# Patient Record
Sex: MALE | Race: ASIAN | NOT HISPANIC OR LATINO | Employment: UNEMPLOYED
[De-identification: names, ages, dates, MRNs, and addresses within clinical notes are randomized per-mention and may not be internally consistent; named-entity substitution may affect disease eponyms.]

---

## 2017-01-26 ENCOUNTER — RECORDS - HEALTHEAST (OUTPATIENT)
Dept: ADMINISTRATIVE | Facility: OTHER | Age: 62
End: 2017-01-26

## 2017-04-05 ENCOUNTER — RECORDS - HEALTHEAST (OUTPATIENT)
Dept: ADMINISTRATIVE | Facility: OTHER | Age: 62
End: 2017-04-05

## 2017-08-23 ENCOUNTER — RECORDS - HEALTHEAST (OUTPATIENT)
Dept: ADMINISTRATIVE | Facility: OTHER | Age: 62
End: 2017-08-23

## 2017-09-11 ENCOUNTER — RECORDS - HEALTHEAST (OUTPATIENT)
Dept: ADMINISTRATIVE | Facility: OTHER | Age: 62
End: 2017-09-11

## 2017-10-27 ENCOUNTER — RECORDS - HEALTHEAST (OUTPATIENT)
Dept: ADMINISTRATIVE | Facility: OTHER | Age: 62
End: 2017-10-27

## 2017-12-13 ENCOUNTER — RECORDS - HEALTHEAST (OUTPATIENT)
Dept: ADMINISTRATIVE | Facility: OTHER | Age: 62
End: 2017-12-13

## 2018-01-18 ENCOUNTER — RECORDS - HEALTHEAST (OUTPATIENT)
Dept: ADMINISTRATIVE | Facility: OTHER | Age: 63
End: 2018-01-18

## 2018-03-23 ENCOUNTER — RECORDS - HEALTHEAST (OUTPATIENT)
Dept: ADMINISTRATIVE | Facility: OTHER | Age: 63
End: 2018-03-23

## 2018-05-11 ENCOUNTER — RECORDS - HEALTHEAST (OUTPATIENT)
Dept: ADMINISTRATIVE | Facility: OTHER | Age: 63
End: 2018-05-11

## 2018-06-15 ENCOUNTER — RECORDS - HEALTHEAST (OUTPATIENT)
Dept: GENERAL RADIOLOGY | Facility: CLINIC | Age: 63
End: 2018-06-15

## 2018-06-15 ENCOUNTER — COMMUNICATION - HEALTHEAST (OUTPATIENT)
Dept: FAMILY MEDICINE | Facility: CLINIC | Age: 63
End: 2018-06-15

## 2018-06-15 ENCOUNTER — OFFICE VISIT - HEALTHEAST (OUTPATIENT)
Dept: FAMILY MEDICINE | Facility: CLINIC | Age: 63
End: 2018-06-15

## 2018-06-15 DIAGNOSIS — R06.02 SHORTNESS OF BREATH: ICD-10-CM

## 2018-06-15 DIAGNOSIS — Z12.11 SCREEN FOR COLON CANCER: ICD-10-CM

## 2018-06-15 DIAGNOSIS — R93.89 ABNORMAL CXR (CHEST X-RAY): ICD-10-CM

## 2018-06-15 DIAGNOSIS — Z71.85 VACCINE COUNSELING: ICD-10-CM

## 2018-06-15 LAB
ALBUMIN SERPL-MCNC: 3.4 G/DL (ref 3.5–5)
ALP SERPL-CCNC: 82 U/L (ref 45–120)
ALT SERPL W P-5'-P-CCNC: 40 U/L (ref 0–45)
ANION GAP SERPL CALCULATED.3IONS-SCNC: 13 MMOL/L (ref 5–18)
AST SERPL W P-5'-P-CCNC: 33 U/L (ref 0–40)
ATRIAL RATE - MUSE: 78 BPM
BASOPHILS # BLD AUTO: 0 THOU/UL (ref 0–0.2)
BASOPHILS NFR BLD AUTO: 0 % (ref 0–2)
BILIRUB SERPL-MCNC: 0.5 MG/DL (ref 0–1)
BNP SERPL-MCNC: <10 PG/ML (ref 0–55)
BUN SERPL-MCNC: 32 MG/DL (ref 8–22)
CALCIUM SERPL-MCNC: 9.3 MG/DL (ref 8.5–10.5)
CHLORIDE BLD-SCNC: 109 MMOL/L (ref 98–107)
CO2 SERPL-SCNC: 20 MMOL/L (ref 22–31)
CREAT SERPL-MCNC: 1.5 MG/DL (ref 0.7–1.3)
D DIMER PPP FEU-MCNC: 1.12 FEU UG/ML
DIASTOLIC BLOOD PRESSURE - MUSE: NORMAL MMHG
EOSINOPHIL # BLD AUTO: 0.2 THOU/UL (ref 0–0.4)
EOSINOPHIL NFR BLD AUTO: 2 % (ref 0–6)
ERYTHROCYTE [DISTWIDTH] IN BLOOD BY AUTOMATED COUNT: 11.9 % (ref 11–14.5)
GFR SERPL CREATININE-BSD FRML MDRD: 47 ML/MIN/1.73M2
GLUCOSE BLD-MCNC: 89 MG/DL (ref 70–125)
HCT VFR BLD AUTO: 47.4 % (ref 40–54)
HGB BLD-MCNC: 15.5 G/DL (ref 14–18)
INTERPRETATION ECG - MUSE: NORMAL
LYMPHOCYTES # BLD AUTO: 2 THOU/UL (ref 0.8–4.4)
LYMPHOCYTES NFR BLD AUTO: 28 % (ref 20–40)
MCH RBC QN AUTO: 30.4 PG (ref 27–34)
MCHC RBC AUTO-ENTMCNC: 32.7 G/DL (ref 32–36)
MCV RBC AUTO: 93 FL (ref 80–100)
MONOCYTES # BLD AUTO: 0.5 THOU/UL (ref 0–0.9)
MONOCYTES NFR BLD AUTO: 7 % (ref 2–10)
NEUTROPHILS # BLD AUTO: 4.4 THOU/UL (ref 2–7.7)
NEUTROPHILS NFR BLD AUTO: 63 % (ref 50–70)
P AXIS - MUSE: 49 DEGREES
PLATELET # BLD AUTO: 133 THOU/UL (ref 140–440)
PMV BLD AUTO: 8.3 FL (ref 7–10)
POTASSIUM BLD-SCNC: 4.4 MMOL/L (ref 3.5–5)
PR INTERVAL - MUSE: 140 MS
PROT SERPL-MCNC: 6.8 G/DL (ref 6–8)
QRS DURATION - MUSE: 86 MS
QT - MUSE: 380 MS
QTC - MUSE: 433 MS
R AXIS - MUSE: -25 DEGREES
RBC # BLD AUTO: 5.1 MILL/UL (ref 4.4–6.2)
SODIUM SERPL-SCNC: 142 MMOL/L (ref 136–145)
SYSTOLIC BLOOD PRESSURE - MUSE: NORMAL MMHG
T AXIS - MUSE: 17 DEGREES
TROPONIN I SERPL-MCNC: <0.01 NG/ML (ref 0–0.29)
TSH SERPL DL<=0.005 MIU/L-ACNC: 1.06 UIU/ML (ref 0.3–5)
VENTRICULAR RATE- MUSE: 78 BPM
WBC: 7 THOU/UL (ref 4–11)

## 2018-06-15 RX ORDER — CLONAZEPAM 1 MG/1
1 TABLET ORAL AT BEDTIME
Status: SHIPPED | COMMUNITY
Start: 2018-06-15 | End: 2021-08-06

## 2018-06-15 ASSESSMENT — MIFFLIN-ST. JEOR: SCORE: 1573.71

## 2018-06-18 ENCOUNTER — COMMUNICATION - HEALTHEAST (OUTPATIENT)
Dept: FAMILY MEDICINE | Facility: CLINIC | Age: 63
End: 2018-06-18

## 2018-06-18 ENCOUNTER — HOSPITAL ENCOUNTER (OUTPATIENT)
Dept: CT IMAGING | Facility: HOSPITAL | Age: 63
Discharge: HOME | End: 2018-06-18
Attending: FAMILY MEDICINE

## 2018-06-18 DIAGNOSIS — J90 PLEURAL EFFUSION: ICD-10-CM

## 2018-06-18 DIAGNOSIS — R06.02 SHORTNESS OF BREATH: ICD-10-CM

## 2018-06-18 DIAGNOSIS — R93.89 ABNORMAL CXR (CHEST X-RAY): ICD-10-CM

## 2018-08-03 ENCOUNTER — OFFICE VISIT - HEALTHEAST (OUTPATIENT)
Dept: PULMONOLOGY | Facility: OTHER | Age: 63
End: 2018-08-03

## 2018-08-03 DIAGNOSIS — K21.9 GERD (GASTROESOPHAGEAL REFLUX DISEASE): ICD-10-CM

## 2018-08-03 DIAGNOSIS — J94.1 FIBROTHORAX: ICD-10-CM

## 2018-08-03 DIAGNOSIS — J90 PLEURAL EFFUSION: ICD-10-CM

## 2018-08-03 DIAGNOSIS — J90 PLEURAL EFFUSION ON LEFT: ICD-10-CM

## 2018-08-03 DIAGNOSIS — J45.40 MODERATE PERSISTENT ASTHMA: ICD-10-CM

## 2018-08-03 DIAGNOSIS — R06.09 DYSPNEA ON EXERTION: ICD-10-CM

## 2018-08-17 ENCOUNTER — HOSPITAL ENCOUNTER (OUTPATIENT)
Dept: RESPIRATORY THERAPY | Facility: HOSPITAL | Age: 63
Discharge: HOME | End: 2018-08-17
Attending: INTERNAL MEDICINE

## 2018-08-17 ENCOUNTER — COMMUNICATION - HEALTHEAST (OUTPATIENT)
Dept: PULMONOLOGY | Facility: OTHER | Age: 63
End: 2018-08-17

## 2018-08-17 ENCOUNTER — HOSPITAL ENCOUNTER (OUTPATIENT)
Dept: CARDIOLOGY | Facility: HOSPITAL | Age: 63
Discharge: HOME | End: 2018-08-17
Attending: INTERNAL MEDICINE

## 2018-08-17 DIAGNOSIS — R06.09 OTHER FORMS OF DYSPNEA: ICD-10-CM

## 2018-08-17 DIAGNOSIS — J45.40 MODERATE PERSISTENT ASTHMA: ICD-10-CM

## 2018-08-17 DIAGNOSIS — R06.09 DYSPNEA ON EXERTION: ICD-10-CM

## 2018-08-17 DIAGNOSIS — M54.2 CERVICALGIA: ICD-10-CM

## 2018-08-17 DIAGNOSIS — I10 ESSENTIAL HYPERTENSION: ICD-10-CM

## 2018-08-17 DIAGNOSIS — J94.1 FIBROTHORAX: ICD-10-CM

## 2018-08-17 LAB
AORTIC ROOT: 3.6 CM
AORTIC VALVE MEAN VELOCITY: 86.9 CM/S
ASCENDING AORTA: 3.4 CM
AV CUSP SEPERATION: 2.2 CM
AV CUSP SEPERATION: 2.2 CM
AV DIMENSIONLESS INDEX VTI: 0.8
AV MEAN GRADIENT: 3 MMHG
AV PEAK GRADIENT: 5.2 MMHG
AV VALVE AREA: 3 CM2
AV VELOCITY RATIO: 0.9
BSA FOR ECHO PROCEDURE: 1.98 M2
CV ECHO HEIGHT: 65 IN
CV ECHO WEIGHT: 188 LBS
DOP CALC AO PEAK VEL: 114 CM/S
DOP CALC AO VTI: 22.8 CM
DOP CALC LVOT AREA: 3.8 CM2
DOP CALC LVOT DIAMETER: 2.2 CM
DOP CALC LVOT PEAK VEL: 97.5 CM/S
DOP CALC LVOT STROKE VOLUME: 68.4 CM3
DOP CALC MV VTI: 22.4 CM
DOP CALCLVOT PEAK VEL VTI: 18 CM
EJECTION FRACTION: 52 % (ref 55–75)
FRACTIONAL SHORTENING: 26.6 % (ref 28–44)
HGB BLD-MCNC: 15.7 G/DL (ref 14–18)
INTERVENTRICULAR SEPTUM IN END DIASTOLE: 0.85 CM (ref 0.6–1)
IVS/PW RATIO: 0.9
LA AREA 1: 14.8 CM2
LA AREA 2: 21.3 CM2
LEFT ATRIUM LENGTH: 4.6 CM
LEFT ATRIUM SIZE: 3.5 CM
LEFT ATRIUM VOLUME INDEX: 29.4 ML/M2
LEFT ATRIUM VOLUME: 58.3 ML
LEFT VENTRICLE CARDIAC INDEX: 2.3 L/MIN/M2
LEFT VENTRICLE CARDIAC OUTPUT: 4.7 L/MIN
LEFT VENTRICLE DIASTOLIC VOLUME INDEX: 36 CM3/M2 (ref 34–74)
LEFT VENTRICLE DIASTOLIC VOLUME: 71.2 CM3 (ref 62–150)
LEFT VENTRICLE HEART RATE: 68 BPM
LEFT VENTRICLE MASS INDEX: 75.6 G/M2
LEFT VENTRICLE SYSTOLIC VOLUME INDEX: 17.1 CM3/M2 (ref 11–31)
LEFT VENTRICLE SYSTOLIC VOLUME: 33.9 CM3 (ref 21–61)
LEFT VENTRICULAR INTERNAL DIMENSION IN DIASTOLE: 4.77 CM (ref 4.2–5.8)
LEFT VENTRICULAR INTERNAL DIMENSION IN SYSTOLE: 3.5 CM (ref 2.5–4)
LEFT VENTRICULAR MASS: 149.6 G
LEFT VENTRICULAR OUTFLOW TRACT MEAN GRADIENT: 2 MMHG
LEFT VENTRICULAR OUTFLOW TRACT MEAN VELOCITY: 70.3 CM/S
LEFT VENTRICULAR OUTFLOW TRACT PEAK GRADIENT: 4 MMHG
LEFT VENTRICULAR POSTERIOR WALL IN END DIASTOLE: 0.98 CM (ref 0.6–1)
LV STROKE VOLUME INDEX: 34.5 ML/M2
MITRAL VALVE DECELERATION SLOPE: 3430 MM/S2
MITRAL VALVE E/A RATIO: 0.9
MITRAL VALVE MEAN INFLOW VELOCITY: 50.2 CM/S
MITRAL VALVE PEAK VELOCITY: 102 CM/S
MITRAL VALVE PRESSURE HALF-TIME: 64 MS
MV AREA VTI: 3.05 CM2
MV AVERAGE E/E' RATIO: 13.7 CM/S
MV DECELERATION TIME: 201 MS
MV E'TISSUE VEL-LAT: 6 CM/S
MV E'TISSUE VEL-MED: 6.19 CM/S
MV LATERAL E/E' RATIO: 13.9
MV MEAN GRADIENT: 1 MMHG
MV MEDIAL E/E' RATIO: 13.5
MV PEAK A VELOCITY: 96 CM/S
MV PEAK E VELOCITY: 83.4 CM/S
MV PEAK GRADIENT: 4.2 MMHG
MV VALVE AREA BY CONTINUITY EQUATION: 3.1 CM2
MV VALVE AREA PRESSURE 1/2 METHOD: 3.4 CM2
NUC REST DIASTOLIC VOLUME INDEX: 3008 LBS
NUC REST SYSTOLIC VOLUME INDEX: 65 IN
TRICUSPID REGURGITATION PEAK PRESSURE GRADIENT: 22.3 MMHG
TRICUSPID VALVE ANULAR PLANE SYSTOLIC EXCURSION: 2.2 CM
TRICUSPID VALVE PEAK REGURGITANT VELOCITY: 236 CM/S

## 2018-08-17 ASSESSMENT — MIFFLIN-ST. JEOR: SCORE: 1564.64

## 2018-08-20 ENCOUNTER — COMMUNICATION - HEALTHEAST (OUTPATIENT)
Dept: PULMONOLOGY | Facility: OTHER | Age: 63
End: 2018-08-20

## 2018-09-06 ENCOUNTER — HOSPITAL ENCOUNTER (OUTPATIENT)
Dept: ULTRASOUND IMAGING | Facility: CLINIC | Age: 63
Discharge: HOME | End: 2018-09-06
Attending: INTERNAL MEDICINE | Admitting: RADIOLOGY

## 2018-09-06 DIAGNOSIS — J90 PLEURAL EFFUSION: ICD-10-CM

## 2018-09-06 DIAGNOSIS — R06.09 OTHER FORMS OF DYSPNEA: ICD-10-CM

## 2018-09-06 DIAGNOSIS — R06.09 DYSPNEA ON EXERTION: ICD-10-CM

## 2018-09-06 LAB
INR PPP: 0.98 (ref 0.9–1.1)
PLATELET # BLD AUTO: 123 THOU/UL (ref 140–440)

## 2018-09-27 ENCOUNTER — OFFICE VISIT - HEALTHEAST (OUTPATIENT)
Dept: PULMONOLOGY | Facility: OTHER | Age: 63
End: 2018-09-27

## 2018-09-27 DIAGNOSIS — J45.41 MODERATE PERSISTENT ASTHMA WITH EXACERBATION: ICD-10-CM

## 2018-10-19 ENCOUNTER — OFFICE VISIT - HEALTHEAST (OUTPATIENT)
Dept: FAMILY MEDICINE | Facility: CLINIC | Age: 63
End: 2018-10-19

## 2018-10-19 ENCOUNTER — RECORDS - HEALTHEAST (OUTPATIENT)
Dept: GENERAL RADIOLOGY | Facility: CLINIC | Age: 63
End: 2018-10-19

## 2018-10-19 DIAGNOSIS — G89.29 CHRONIC LEFT-SIDED LOW BACK PAIN WITHOUT SCIATICA: ICD-10-CM

## 2018-10-19 DIAGNOSIS — F32.9 MAJOR DEPRESSIVE DISORDER, SINGLE EPISODE, UNSPECIFIED: ICD-10-CM

## 2018-10-19 DIAGNOSIS — G89.29 OTHER CHRONIC PAIN: ICD-10-CM

## 2018-10-19 DIAGNOSIS — M54.50 LOW BACK PAIN: ICD-10-CM

## 2018-10-19 DIAGNOSIS — Z12.11 COLON CANCER SCREENING: ICD-10-CM

## 2018-10-19 DIAGNOSIS — Z71.85 VACCINE COUNSELING: ICD-10-CM

## 2018-10-19 DIAGNOSIS — J45.40 MODERATE PERSISTENT ASTHMA, UNSPECIFIED WHETHER COMPLICATED: ICD-10-CM

## 2018-10-19 DIAGNOSIS — M54.50 CHRONIC LEFT-SIDED LOW BACK PAIN WITHOUT SCIATICA: ICD-10-CM

## 2018-10-19 DIAGNOSIS — I10 ESSENTIAL HYPERTENSION: ICD-10-CM

## 2018-10-22 ENCOUNTER — HOSPITAL ENCOUNTER (OUTPATIENT)
Dept: PHYSICAL MEDICINE AND REHAB | Facility: CLINIC | Age: 63
Discharge: HOME | End: 2018-10-22
Attending: FAMILY MEDICINE

## 2018-10-22 DIAGNOSIS — M51.369 DDD (DEGENERATIVE DISC DISEASE), LUMBAR: ICD-10-CM

## 2018-10-22 DIAGNOSIS — M54.50 CHRONIC BILATERAL LOW BACK PAIN WITHOUT SCIATICA: ICD-10-CM

## 2018-10-22 DIAGNOSIS — G89.29 CHRONIC BILATERAL LOW BACK PAIN WITHOUT SCIATICA: ICD-10-CM

## 2018-10-22 DIAGNOSIS — M54.16 RIGHT LUMBAR RADICULOPATHY: ICD-10-CM

## 2018-10-22 DIAGNOSIS — M47.816 LUMBAR FACET ARTHROPATHY: ICD-10-CM

## 2018-10-23 ENCOUNTER — OFFICE VISIT - HEALTHEAST (OUTPATIENT)
Dept: PHARMACY | Facility: CLINIC | Age: 63
End: 2018-10-23

## 2018-10-23 DIAGNOSIS — K21.9 GERD (GASTROESOPHAGEAL REFLUX DISEASE): ICD-10-CM

## 2018-10-23 DIAGNOSIS — E78.1 PURE HYPERGLYCERIDEMIA: ICD-10-CM

## 2018-10-23 DIAGNOSIS — J45.40 MODERATE PERSISTENT ASTHMA, UNSPECIFIED WHETHER COMPLICATED: ICD-10-CM

## 2018-10-23 DIAGNOSIS — M54.50 CHRONIC LEFT-SIDED LOW BACK PAIN WITHOUT SCIATICA: ICD-10-CM

## 2018-10-23 DIAGNOSIS — I10 ESSENTIAL HYPERTENSION: ICD-10-CM

## 2018-10-23 DIAGNOSIS — F32.9 MAJOR DEPRESSIVE DISORDER, SINGLE EPISODE, UNSPECIFIED: ICD-10-CM

## 2018-10-23 DIAGNOSIS — G89.29 CHRONIC LEFT-SIDED LOW BACK PAIN WITHOUT SCIATICA: ICD-10-CM

## 2018-10-30 ENCOUNTER — HOSPITAL ENCOUNTER (OUTPATIENT)
Dept: MRI IMAGING | Facility: HOSPITAL | Age: 63
Discharge: HOME | End: 2018-10-30

## 2018-10-30 DIAGNOSIS — G89.29 CHRONIC BILATERAL LOW BACK PAIN WITHOUT SCIATICA: ICD-10-CM

## 2018-10-30 DIAGNOSIS — M51.369 DDD (DEGENERATIVE DISC DISEASE), LUMBAR: ICD-10-CM

## 2018-10-30 DIAGNOSIS — M54.50 CHRONIC BILATERAL LOW BACK PAIN WITHOUT SCIATICA: ICD-10-CM

## 2018-10-30 DIAGNOSIS — M54.16 RIGHT LUMBAR RADICULOPATHY: ICD-10-CM

## 2018-10-30 DIAGNOSIS — M47.816 LUMBAR FACET ARTHROPATHY: ICD-10-CM

## 2018-11-05 ENCOUNTER — HOSPITAL ENCOUNTER (OUTPATIENT)
Dept: PHYSICAL MEDICINE AND REHAB | Facility: CLINIC | Age: 63
Discharge: HOME | End: 2018-11-05
Attending: NURSE PRACTITIONER

## 2018-11-05 DIAGNOSIS — G89.29 CHRONIC BILATERAL LOW BACK PAIN WITHOUT SCIATICA: ICD-10-CM

## 2018-11-05 DIAGNOSIS — M54.50 CHRONIC BILATERAL LOW BACK PAIN WITHOUT SCIATICA: ICD-10-CM

## 2018-11-05 DIAGNOSIS — M54.16 RIGHT LUMBAR RADICULOPATHY: ICD-10-CM

## 2018-11-05 DIAGNOSIS — M51.369 DDD (DEGENERATIVE DISC DISEASE), LUMBAR: ICD-10-CM

## 2018-11-05 DIAGNOSIS — M47.816 LUMBAR FACET ARTHROPATHY: ICD-10-CM

## 2018-11-05 DIAGNOSIS — M48.061 LUMBAR FORAMINAL STENOSIS: ICD-10-CM

## 2018-11-08 ENCOUNTER — HOSPITAL ENCOUNTER (OUTPATIENT)
Dept: PHYSICAL MEDICINE AND REHAB | Facility: CLINIC | Age: 63
Discharge: HOME | End: 2018-11-08
Attending: PAIN MEDICINE

## 2018-11-08 DIAGNOSIS — G89.29 CHRONIC BILATERAL LOW BACK PAIN WITHOUT SCIATICA: ICD-10-CM

## 2018-11-08 DIAGNOSIS — M54.16 RIGHT LUMBAR RADICULOPATHY: ICD-10-CM

## 2018-11-08 DIAGNOSIS — M99.83 OTHER BIOMECHANICAL LESIONS OF LUMBAR REGION: ICD-10-CM

## 2018-11-08 DIAGNOSIS — M54.50 CHRONIC BILATERAL LOW BACK PAIN WITHOUT SCIATICA: ICD-10-CM

## 2018-11-08 DIAGNOSIS — M48.061 LUMBAR FORAMINAL STENOSIS: ICD-10-CM

## 2018-11-12 ENCOUNTER — OFFICE VISIT - HEALTHEAST (OUTPATIENT)
Dept: FAMILY MEDICINE | Facility: CLINIC | Age: 63
End: 2018-11-12

## 2018-11-12 ENCOUNTER — COMMUNICATION - HEALTHEAST (OUTPATIENT)
Dept: FAMILY MEDICINE | Facility: CLINIC | Age: 63
End: 2018-11-12

## 2018-11-12 DIAGNOSIS — R05.9 COUGH: ICD-10-CM

## 2018-11-12 DIAGNOSIS — R80.9 PROTEINURIA, UNSPECIFIED TYPE: ICD-10-CM

## 2018-11-12 DIAGNOSIS — I10 ESSENTIAL HYPERTENSION: ICD-10-CM

## 2018-11-12 LAB
ANION GAP SERPL CALCULATED.3IONS-SCNC: 10 MMOL/L (ref 5–18)
BUN SERPL-MCNC: 32 MG/DL (ref 8–22)
CALCIUM SERPL-MCNC: 8.5 MG/DL (ref 8.5–10.5)
CHLORIDE BLD-SCNC: 108 MMOL/L (ref 98–107)
CO2 SERPL-SCNC: 22 MMOL/L (ref 22–31)
CREAT SERPL-MCNC: 1.39 MG/DL (ref 0.7–1.3)
GFR SERPL CREATININE-BSD FRML MDRD: 52 ML/MIN/1.73M2
GLUCOSE BLD-MCNC: 101 MG/DL (ref 70–125)
POTASSIUM BLD-SCNC: 4.5 MMOL/L (ref 3.5–5)
SODIUM SERPL-SCNC: 140 MMOL/L (ref 136–145)

## 2018-11-26 ENCOUNTER — HOSPITAL ENCOUNTER (OUTPATIENT)
Dept: PHYSICAL MEDICINE AND REHAB | Facility: CLINIC | Age: 63
Discharge: HOME | End: 2018-11-26
Attending: NURSE PRACTITIONER

## 2018-11-26 DIAGNOSIS — M54.16 RIGHT LUMBAR RADICULOPATHY: ICD-10-CM

## 2018-11-26 DIAGNOSIS — M54.50 CHRONIC BILATERAL LOW BACK PAIN WITHOUT SCIATICA: ICD-10-CM

## 2018-11-26 DIAGNOSIS — M51.369 DDD (DEGENERATIVE DISC DISEASE), LUMBAR: ICD-10-CM

## 2018-11-26 DIAGNOSIS — M48.061 LUMBAR FORAMINAL STENOSIS: ICD-10-CM

## 2018-11-26 DIAGNOSIS — G89.29 CHRONIC BILATERAL LOW BACK PAIN WITHOUT SCIATICA: ICD-10-CM

## 2018-11-26 DIAGNOSIS — M47.816 LUMBAR FACET ARTHROPATHY: ICD-10-CM

## 2018-12-06 ENCOUNTER — OFFICE VISIT - HEALTHEAST (OUTPATIENT)
Dept: PULMONOLOGY | Facility: OTHER | Age: 63
End: 2018-12-06

## 2018-12-06 DIAGNOSIS — J45.909 UNCOMPLICATED ASTHMA, UNSPECIFIED ASTHMA SEVERITY, UNSPECIFIED WHETHER PERSISTENT: ICD-10-CM

## 2018-12-06 DIAGNOSIS — J90 PLEURAL EFFUSION ON LEFT: ICD-10-CM

## 2018-12-06 DIAGNOSIS — J94.1 FIBROTHORAX: ICD-10-CM

## 2018-12-06 ASSESSMENT — MIFFLIN-ST. JEOR: SCORE: 1555.57

## 2018-12-12 ENCOUNTER — OFFICE VISIT - HEALTHEAST (OUTPATIENT)
Dept: FAMILY MEDICINE | Facility: CLINIC | Age: 63
End: 2018-12-12

## 2018-12-12 DIAGNOSIS — J94.1 FIBROTHORAX: ICD-10-CM

## 2018-12-12 DIAGNOSIS — J45.40 MODERATE PERSISTENT ASTHMA, UNSPECIFIED WHETHER COMPLICATED: ICD-10-CM

## 2018-12-12 DIAGNOSIS — J06.9 ACUTE URI: ICD-10-CM

## 2019-01-14 ENCOUNTER — OFFICE VISIT - HEALTHEAST (OUTPATIENT)
Dept: FAMILY MEDICINE | Facility: CLINIC | Age: 64
End: 2019-01-14

## 2019-01-14 DIAGNOSIS — J45.40 MODERATE PERSISTENT ASTHMA, UNSPECIFIED WHETHER COMPLICATED: ICD-10-CM

## 2019-01-14 DIAGNOSIS — J90 PLEURAL EFFUSION ON LEFT: ICD-10-CM

## 2019-01-14 DIAGNOSIS — I10 ESSENTIAL HYPERTENSION: ICD-10-CM

## 2019-01-22 ENCOUNTER — OFFICE VISIT - HEALTHEAST (OUTPATIENT)
Dept: PHARMACY | Facility: CLINIC | Age: 64
End: 2019-01-22

## 2019-01-22 DIAGNOSIS — M54.50 CHRONIC LEFT-SIDED LOW BACK PAIN WITHOUT SCIATICA: ICD-10-CM

## 2019-01-22 DIAGNOSIS — J45.40 MODERATE PERSISTENT ASTHMA, UNSPECIFIED WHETHER COMPLICATED: ICD-10-CM

## 2019-01-22 DIAGNOSIS — E78.1 PURE HYPERGLYCERIDEMIA: ICD-10-CM

## 2019-01-22 DIAGNOSIS — G89.29 CHRONIC LEFT-SIDED LOW BACK PAIN WITHOUT SCIATICA: ICD-10-CM

## 2019-01-22 DIAGNOSIS — I10 ESSENTIAL HYPERTENSION: ICD-10-CM

## 2019-01-22 DIAGNOSIS — F32.9 MAJOR DEPRESSIVE DISORDER WITH SINGLE EPISODE, REMISSION STATUS UNSPECIFIED: ICD-10-CM

## 2019-02-19 ENCOUNTER — OFFICE VISIT - HEALTHEAST (OUTPATIENT)
Dept: FAMILY MEDICINE | Facility: CLINIC | Age: 64
End: 2019-02-19

## 2019-02-19 DIAGNOSIS — I10 ESSENTIAL HYPERTENSION: ICD-10-CM

## 2019-02-19 DIAGNOSIS — G89.29 CHRONIC LEFT-SIDED LOW BACK PAIN WITHOUT SCIATICA: ICD-10-CM

## 2019-02-19 DIAGNOSIS — K21.9 GASTROESOPHAGEAL REFLUX DISEASE WITHOUT ESOPHAGITIS: ICD-10-CM

## 2019-02-19 DIAGNOSIS — Z71.85 VACCINE COUNSELING: ICD-10-CM

## 2019-02-19 DIAGNOSIS — J45.40 MODERATE PERSISTENT ASTHMA, UNSPECIFIED WHETHER COMPLICATED: ICD-10-CM

## 2019-02-19 DIAGNOSIS — M54.50 CHRONIC LEFT-SIDED LOW BACK PAIN WITHOUT SCIATICA: ICD-10-CM

## 2019-02-19 DIAGNOSIS — H61.23 BILATERAL IMPACTED CERUMEN: ICD-10-CM

## 2019-03-11 ENCOUNTER — HOSPITAL ENCOUNTER (OUTPATIENT)
Dept: RADIOLOGY | Facility: HOSPITAL | Age: 64
Discharge: HOME | End: 2019-03-11
Attending: INTERNAL MEDICINE

## 2019-03-11 ENCOUNTER — OFFICE VISIT - HEALTHEAST (OUTPATIENT)
Dept: PULMONOLOGY | Facility: OTHER | Age: 64
End: 2019-03-11

## 2019-03-11 DIAGNOSIS — J45.40 MODERATE PERSISTENT ASTHMA, UNSPECIFIED WHETHER COMPLICATED: ICD-10-CM

## 2019-03-11 DIAGNOSIS — J32.9 CHRONIC SINUSITIS, UNSPECIFIED LOCATION: ICD-10-CM

## 2019-03-11 DIAGNOSIS — J45.40 MODERATE PERSISTENT ASTHMA WITHOUT COMPLICATION: ICD-10-CM

## 2019-03-11 ASSESSMENT — MIFFLIN-ST. JEOR: SCORE: 1573.71

## 2019-03-13 ENCOUNTER — COMMUNICATION - HEALTHEAST (OUTPATIENT)
Dept: PULMONOLOGY | Facility: OTHER | Age: 64
End: 2019-03-13

## 2019-03-21 ENCOUNTER — OFFICE VISIT - HEALTHEAST (OUTPATIENT)
Dept: FAMILY MEDICINE | Facility: CLINIC | Age: 64
End: 2019-03-21

## 2019-03-21 DIAGNOSIS — I10 ESSENTIAL HYPERTENSION: ICD-10-CM

## 2019-03-21 DIAGNOSIS — M54.50 CHRONIC LEFT-SIDED LOW BACK PAIN WITHOUT SCIATICA: ICD-10-CM

## 2019-03-21 DIAGNOSIS — J45.40 MODERATE PERSISTENT ASTHMA, UNSPECIFIED WHETHER COMPLICATED: ICD-10-CM

## 2019-03-21 DIAGNOSIS — G89.29 CHRONIC LEFT-SIDED LOW BACK PAIN WITHOUT SCIATICA: ICD-10-CM

## 2019-03-21 DIAGNOSIS — J90 PLEURAL EFFUSION ON LEFT: ICD-10-CM

## 2019-03-21 DIAGNOSIS — K21.9 GASTROESOPHAGEAL REFLUX DISEASE WITHOUT ESOPHAGITIS: ICD-10-CM

## 2019-04-23 ENCOUNTER — OFFICE VISIT - HEALTHEAST (OUTPATIENT)
Dept: PHARMACY | Facility: CLINIC | Age: 64
End: 2019-04-23

## 2019-04-23 DIAGNOSIS — K21.9 GASTROESOPHAGEAL REFLUX DISEASE WITHOUT ESOPHAGITIS: ICD-10-CM

## 2019-04-23 DIAGNOSIS — G89.29 CHRONIC LEFT-SIDED LOW BACK PAIN WITHOUT SCIATICA: ICD-10-CM

## 2019-04-23 DIAGNOSIS — M54.50 CHRONIC LEFT-SIDED LOW BACK PAIN WITHOUT SCIATICA: ICD-10-CM

## 2019-04-23 DIAGNOSIS — I10 ESSENTIAL HYPERTENSION: ICD-10-CM

## 2019-04-23 DIAGNOSIS — F32.9 MAJOR DEPRESSIVE DISORDER WITH SINGLE EPISODE, REMISSION STATUS UNSPECIFIED: ICD-10-CM

## 2019-04-23 DIAGNOSIS — J45.40 MODERATE PERSISTENT ASTHMA, UNSPECIFIED WHETHER COMPLICATED: ICD-10-CM

## 2019-05-01 ENCOUNTER — OFFICE VISIT - HEALTHEAST (OUTPATIENT)
Dept: PHARMACY | Facility: CLINIC | Age: 64
End: 2019-05-01

## 2019-05-01 DIAGNOSIS — F32.9 MAJOR DEPRESSIVE DISORDER WITH SINGLE EPISODE, REMISSION STATUS UNSPECIFIED: ICD-10-CM

## 2019-05-01 DIAGNOSIS — F32.9 MAJOR DEPRESSIVE DISORDER, SINGLE EPISODE, UNSPECIFIED: ICD-10-CM

## 2019-05-01 DIAGNOSIS — M54.50 CHRONIC LEFT-SIDED LOW BACK PAIN WITHOUT SCIATICA: ICD-10-CM

## 2019-05-01 DIAGNOSIS — G89.29 CHRONIC LEFT-SIDED LOW BACK PAIN WITHOUT SCIATICA: ICD-10-CM

## 2019-05-01 DIAGNOSIS — I10 ESSENTIAL HYPERTENSION: ICD-10-CM

## 2019-05-01 DIAGNOSIS — K21.9 GASTROESOPHAGEAL REFLUX DISEASE WITHOUT ESOPHAGITIS: ICD-10-CM

## 2019-05-01 DIAGNOSIS — J45.40 MODERATE PERSISTENT ASTHMA, UNSPECIFIED WHETHER COMPLICATED: ICD-10-CM

## 2019-05-01 RX ORDER — SENNOSIDES 8.6 MG
650 CAPSULE ORAL EVERY 8 HOURS PRN
Qty: 100 TABLET | Refills: 3 | Status: SHIPPED | OUTPATIENT
Start: 2019-05-01 | End: 2021-07-26

## 2019-05-02 ENCOUNTER — COMMUNICATION - HEALTHEAST (OUTPATIENT)
Dept: FAMILY MEDICINE | Facility: CLINIC | Age: 64
End: 2019-05-02

## 2019-05-02 DIAGNOSIS — F32.9 MAJOR DEPRESSIVE DISORDER, SINGLE EPISODE, UNSPECIFIED: ICD-10-CM

## 2019-05-21 ENCOUNTER — OFFICE VISIT - HEALTHEAST (OUTPATIENT)
Dept: FAMILY MEDICINE | Facility: CLINIC | Age: 64
End: 2019-05-21

## 2019-05-21 ENCOUNTER — OFFICE VISIT - HEALTHEAST (OUTPATIENT)
Dept: PHARMACY | Facility: CLINIC | Age: 64
End: 2019-05-21

## 2019-05-21 DIAGNOSIS — E78.1 PURE HYPERGLYCERIDEMIA: ICD-10-CM

## 2019-05-21 DIAGNOSIS — F32.9 MAJOR DEPRESSIVE DISORDER WITH SINGLE EPISODE, REMISSION STATUS UNSPECIFIED: ICD-10-CM

## 2019-05-21 DIAGNOSIS — F32.1 CURRENT MODERATE EPISODE OF MAJOR DEPRESSIVE DISORDER WITHOUT PRIOR EPISODE (H): ICD-10-CM

## 2019-05-21 DIAGNOSIS — J45.40 MODERATE PERSISTENT ASTHMA, UNSPECIFIED WHETHER COMPLICATED: ICD-10-CM

## 2019-05-21 DIAGNOSIS — J90 PLEURAL EFFUSION ON LEFT: ICD-10-CM

## 2019-05-21 DIAGNOSIS — M54.50 CHRONIC LEFT-SIDED LOW BACK PAIN WITHOUT SCIATICA: ICD-10-CM

## 2019-05-21 DIAGNOSIS — I10 ESSENTIAL HYPERTENSION: ICD-10-CM

## 2019-05-21 DIAGNOSIS — K21.9 GASTROESOPHAGEAL REFLUX DISEASE WITHOUT ESOPHAGITIS: ICD-10-CM

## 2019-05-21 DIAGNOSIS — G89.29 CHRONIC LEFT-SIDED LOW BACK PAIN WITHOUT SCIATICA: ICD-10-CM

## 2019-05-21 DIAGNOSIS — J45.40 MODERATE PERSISTENT ASTHMA WITHOUT COMPLICATION: ICD-10-CM

## 2019-05-21 LAB
ALBUMIN SERPL-MCNC: 3.8 G/DL (ref 3.5–5)
ALP SERPL-CCNC: 59 U/L (ref 45–120)
ALT SERPL W P-5'-P-CCNC: 20 U/L (ref 0–45)
ANION GAP SERPL CALCULATED.3IONS-SCNC: 9 MMOL/L (ref 5–18)
AST SERPL W P-5'-P-CCNC: 18 U/L (ref 0–40)
BILIRUB SERPL-MCNC: 0.5 MG/DL (ref 0–1)
BUN SERPL-MCNC: 39 MG/DL (ref 8–22)
CALCIUM SERPL-MCNC: 9.1 MG/DL (ref 8.5–10.5)
CHLORIDE BLD-SCNC: 110 MMOL/L (ref 98–107)
CHOLEST SERPL-MCNC: 109 MG/DL
CO2 SERPL-SCNC: 20 MMOL/L (ref 22–31)
CREAT SERPL-MCNC: 1.79 MG/DL (ref 0.7–1.3)
FASTING STATUS PATIENT QL REPORTED: YES
GFR SERPL CREATININE-BSD FRML MDRD: 39 ML/MIN/1.73M2
GLUCOSE BLD-MCNC: 91 MG/DL (ref 70–125)
HDLC SERPL-MCNC: 32 MG/DL
LDLC SERPL CALC-MCNC: 42 MG/DL
POTASSIUM BLD-SCNC: 4.6 MMOL/L (ref 3.5–5)
PROT SERPL-MCNC: 7 G/DL (ref 6–8)
SODIUM SERPL-SCNC: 139 MMOL/L (ref 136–145)
TRIGL SERPL-MCNC: 173 MG/DL

## 2019-05-22 LAB
HBV SURFACE AB SERPL IA-ACNC: POSITIVE M[IU]/ML
HBV SURFACE AG SERPL QL IA: NEGATIVE
HCV AB SERPL QL IA: NEGATIVE

## 2019-05-28 ENCOUNTER — COMMUNICATION - HEALTHEAST (OUTPATIENT)
Dept: FAMILY MEDICINE | Facility: CLINIC | Age: 64
End: 2019-05-28

## 2019-05-28 DIAGNOSIS — M54.50 CHRONIC LEFT-SIDED LOW BACK PAIN WITHOUT SCIATICA: ICD-10-CM

## 2019-05-28 DIAGNOSIS — G89.29 CHRONIC LEFT-SIDED LOW BACK PAIN WITHOUT SCIATICA: ICD-10-CM

## 2019-05-29 ENCOUNTER — COMMUNICATION - HEALTHEAST (OUTPATIENT)
Dept: FAMILY MEDICINE | Facility: CLINIC | Age: 64
End: 2019-05-29

## 2019-06-19 ENCOUNTER — COMMUNICATION - HEALTHEAST (OUTPATIENT)
Dept: PULMONOLOGY | Facility: OTHER | Age: 64
End: 2019-06-19

## 2019-06-19 DIAGNOSIS — J45.40 MODERATE PERSISTENT ASTHMA: ICD-10-CM

## 2019-07-15 ENCOUNTER — AMBULATORY - HEALTHEAST (OUTPATIENT)
Dept: PULMONOLOGY | Facility: OTHER | Age: 64
End: 2019-07-15

## 2019-07-15 DIAGNOSIS — J45.40 MODERATE PERSISTENT ASTHMA: ICD-10-CM

## 2019-08-20 ENCOUNTER — OFFICE VISIT - HEALTHEAST (OUTPATIENT)
Dept: PHARMACY | Facility: CLINIC | Age: 64
End: 2019-08-20

## 2019-08-20 DIAGNOSIS — M54.50 CHRONIC LEFT-SIDED LOW BACK PAIN WITHOUT SCIATICA: ICD-10-CM

## 2019-08-20 DIAGNOSIS — K21.9 GASTROESOPHAGEAL REFLUX DISEASE WITHOUT ESOPHAGITIS: ICD-10-CM

## 2019-08-20 DIAGNOSIS — I10 ESSENTIAL HYPERTENSION: ICD-10-CM

## 2019-08-20 DIAGNOSIS — F32.9 MAJOR DEPRESSIVE DISORDER WITH SINGLE EPISODE, REMISSION STATUS UNSPECIFIED: ICD-10-CM

## 2019-08-20 DIAGNOSIS — J45.40 MODERATE PERSISTENT ASTHMA, UNSPECIFIED WHETHER COMPLICATED: ICD-10-CM

## 2019-08-20 DIAGNOSIS — G89.29 CHRONIC LEFT-SIDED LOW BACK PAIN WITHOUT SCIATICA: ICD-10-CM

## 2019-08-22 ENCOUNTER — AMBULATORY - HEALTHEAST (OUTPATIENT)
Dept: PULMONOLOGY | Facility: OTHER | Age: 64
End: 2019-08-22

## 2019-08-22 ENCOUNTER — OFFICE VISIT - HEALTHEAST (OUTPATIENT)
Dept: FAMILY MEDICINE | Facility: CLINIC | Age: 64
End: 2019-08-22

## 2019-08-22 DIAGNOSIS — Z71.85 VACCINE COUNSELING: ICD-10-CM

## 2019-08-22 DIAGNOSIS — J90 CHRONIC PLEURAL EFFUSION: ICD-10-CM

## 2019-08-22 DIAGNOSIS — I10 ESSENTIAL HYPERTENSION: ICD-10-CM

## 2019-08-22 DIAGNOSIS — J45.41 MODERATE PERSISTENT ASTHMA WITH ACUTE EXACERBATION: ICD-10-CM

## 2019-08-22 DIAGNOSIS — J30.1 SEASONAL ALLERGIC RHINITIS DUE TO POLLEN: ICD-10-CM

## 2019-08-22 DIAGNOSIS — Z11.4 ENCOUNTER FOR SCREENING FOR HIV: ICD-10-CM

## 2019-08-22 DIAGNOSIS — N18.30 CKD (CHRONIC KIDNEY DISEASE) STAGE 3, GFR 30-59 ML/MIN (H): ICD-10-CM

## 2019-08-22 DIAGNOSIS — F32.1 CURRENT MODERATE EPISODE OF MAJOR DEPRESSIVE DISORDER WITHOUT PRIOR EPISODE (H): ICD-10-CM

## 2019-08-22 DIAGNOSIS — K21.9 GASTROESOPHAGEAL REFLUX DISEASE WITHOUT ESOPHAGITIS: ICD-10-CM

## 2019-08-22 DIAGNOSIS — R06.02 SHORTNESS OF BREATH: ICD-10-CM

## 2019-08-22 DIAGNOSIS — J45.909 ASTHMA: ICD-10-CM

## 2019-08-22 LAB
ANION GAP SERPL CALCULATED.3IONS-SCNC: 8 MMOL/L (ref 5–18)
BUN SERPL-MCNC: 35 MG/DL (ref 8–22)
CALCIUM SERPL-MCNC: 9.3 MG/DL (ref 8.5–10.5)
CHLORIDE BLD-SCNC: 107 MMOL/L (ref 98–107)
CO2 SERPL-SCNC: 25 MMOL/L (ref 22–31)
CREAT SERPL-MCNC: 1.96 MG/DL (ref 0.7–1.3)
ERYTHROCYTE [DISTWIDTH] IN BLOOD BY AUTOMATED COUNT: 11.4 % (ref 11–14.5)
GFR SERPL CREATININE-BSD FRML MDRD: 35 ML/MIN/1.73M2
GLUCOSE BLD-MCNC: 84 MG/DL (ref 70–125)
HBA1C MFR BLD: 5.4 % (ref 3.5–6)
HCT VFR BLD AUTO: 48.9 % (ref 40–54)
HGB BLD-MCNC: 16.5 G/DL (ref 14–18)
HIV 1+2 AB+HIV1 P24 AG SERPL QL IA: NEGATIVE
MCH RBC QN AUTO: 30.9 PG (ref 27–34)
MCHC RBC AUTO-ENTMCNC: 33.8 G/DL (ref 32–36)
MCV RBC AUTO: 92 FL (ref 80–100)
PLATELET # BLD AUTO: 141 THOU/UL (ref 140–440)
PMV BLD AUTO: 7.9 FL (ref 7–10)
POTASSIUM BLD-SCNC: 4.8 MMOL/L (ref 3.5–5)
RBC # BLD AUTO: 5.35 MILL/UL (ref 4.4–6.2)
SODIUM SERPL-SCNC: 140 MMOL/L (ref 136–145)
WBC: 8.6 THOU/UL (ref 4–11)

## 2019-08-22 ASSESSMENT — MIFFLIN-ST. JEOR: SCORE: 1555.57

## 2019-08-23 LAB — 25(OH)D3 SERPL-MCNC: 34.8 NG/ML (ref 30–80)

## 2019-08-29 ENCOUNTER — COMMUNICATION - HEALTHEAST (OUTPATIENT)
Dept: FAMILY MEDICINE | Facility: CLINIC | Age: 64
End: 2019-08-29

## 2019-09-09 ENCOUNTER — COMMUNICATION - HEALTHEAST (OUTPATIENT)
Dept: FAMILY MEDICINE | Facility: CLINIC | Age: 64
End: 2019-09-09

## 2019-09-09 DIAGNOSIS — I10 ESSENTIAL HYPERTENSION: ICD-10-CM

## 2019-09-17 ENCOUNTER — OFFICE VISIT - HEALTHEAST (OUTPATIENT)
Dept: PHARMACY | Facility: CLINIC | Age: 64
End: 2019-09-17

## 2019-09-17 DIAGNOSIS — J45.40 MODERATE PERSISTENT ASTHMA, UNSPECIFIED WHETHER COMPLICATED: ICD-10-CM

## 2019-09-17 DIAGNOSIS — K21.9 GASTROESOPHAGEAL REFLUX DISEASE WITHOUT ESOPHAGITIS: ICD-10-CM

## 2019-09-17 DIAGNOSIS — I10 ESSENTIAL HYPERTENSION: ICD-10-CM

## 2019-09-17 DIAGNOSIS — M54.50 CHRONIC LEFT-SIDED LOW BACK PAIN WITHOUT SCIATICA: ICD-10-CM

## 2019-09-17 DIAGNOSIS — G89.29 CHRONIC LEFT-SIDED LOW BACK PAIN WITHOUT SCIATICA: ICD-10-CM

## 2019-09-17 DIAGNOSIS — F32.9 MAJOR DEPRESSIVE DISORDER WITH SINGLE EPISODE, REMISSION STATUS UNSPECIFIED: ICD-10-CM

## 2019-09-20 ENCOUNTER — COMMUNICATION - HEALTHEAST (OUTPATIENT)
Dept: FAMILY MEDICINE | Facility: CLINIC | Age: 64
End: 2019-09-20

## 2019-10-01 ENCOUNTER — OFFICE VISIT - HEALTHEAST (OUTPATIENT)
Dept: PULMONOLOGY | Facility: OTHER | Age: 64
End: 2019-10-01

## 2019-10-01 ENCOUNTER — RECORDS - HEALTHEAST (OUTPATIENT)
Dept: PULMONOLOGY | Facility: OTHER | Age: 64
End: 2019-10-01

## 2019-10-01 ENCOUNTER — RECORDS - HEALTHEAST (OUTPATIENT)
Dept: ADMINISTRATIVE | Facility: OTHER | Age: 64
End: 2019-10-01

## 2019-10-01 DIAGNOSIS — J45.909 UNSPECIFIED ASTHMA, UNCOMPLICATED: ICD-10-CM

## 2019-10-01 DIAGNOSIS — K21.9 GASTROESOPHAGEAL REFLUX DISEASE WITHOUT ESOPHAGITIS: ICD-10-CM

## 2019-10-01 DIAGNOSIS — J90 LOCULATED PLEURAL EFFUSION: ICD-10-CM

## 2019-10-01 DIAGNOSIS — J98.4 RESTRICTIVE LUNG DISEASE: ICD-10-CM

## 2019-10-01 DIAGNOSIS — R94.2 ABNORMAL PFT: ICD-10-CM

## 2019-10-01 DIAGNOSIS — J45.40 MODERATE PERSISTENT ASTHMA WITHOUT COMPLICATION: ICD-10-CM

## 2019-10-01 ASSESSMENT — MIFFLIN-ST. JEOR: SCORE: 1551.03

## 2019-10-07 ENCOUNTER — AMBULATORY - HEALTHEAST (OUTPATIENT)
Dept: PULMONOLOGY | Facility: OTHER | Age: 64
End: 2019-10-07

## 2019-10-07 DIAGNOSIS — J45.40 MODERATE PERSISTENT ASTHMA: ICD-10-CM

## 2019-10-07 DIAGNOSIS — J45.41 MODERATE PERSISTENT ASTHMA WITH EXACERBATION: ICD-10-CM

## 2019-10-30 ENCOUNTER — COMMUNICATION - HEALTHEAST (OUTPATIENT)
Dept: FAMILY MEDICINE | Facility: CLINIC | Age: 64
End: 2019-10-30

## 2019-10-30 DIAGNOSIS — I10 ESSENTIAL HYPERTENSION: ICD-10-CM

## 2019-10-30 DIAGNOSIS — J45.40 MODERATE PERSISTENT ASTHMA, UNSPECIFIED WHETHER COMPLICATED: ICD-10-CM

## 2019-10-30 DIAGNOSIS — E78.1 PURE HYPERGLYCERIDEMIA: ICD-10-CM

## 2019-10-30 DIAGNOSIS — F32.9 MAJOR DEPRESSIVE DISORDER, SINGLE EPISODE, UNSPECIFIED: ICD-10-CM

## 2019-11-07 ENCOUNTER — OFFICE VISIT - HEALTHEAST (OUTPATIENT)
Dept: FAMILY MEDICINE | Facility: CLINIC | Age: 64
End: 2019-11-07

## 2019-11-07 DIAGNOSIS — Z71.84 TRAVEL ADVICE ENCOUNTER: ICD-10-CM

## 2019-11-07 ASSESSMENT — MIFFLIN-ST. JEOR: SCORE: 1574.28

## 2019-11-21 ENCOUNTER — OFFICE VISIT - HEALTHEAST (OUTPATIENT)
Dept: FAMILY MEDICINE | Facility: CLINIC | Age: 64
End: 2019-11-21

## 2019-11-21 DIAGNOSIS — Z12.11 SCREEN FOR COLON CANCER: ICD-10-CM

## 2020-01-06 ENCOUNTER — OFFICE VISIT - HEALTHEAST (OUTPATIENT)
Dept: FAMILY MEDICINE | Facility: CLINIC | Age: 65
End: 2020-01-06

## 2020-01-06 DIAGNOSIS — J45.40 MODERATE PERSISTENT ASTHMA WITHOUT COMPLICATION: ICD-10-CM

## 2020-01-06 DIAGNOSIS — F32.1 CURRENT MODERATE EPISODE OF MAJOR DEPRESSIVE DISORDER WITHOUT PRIOR EPISODE (H): ICD-10-CM

## 2020-01-06 DIAGNOSIS — N18.30 CKD (CHRONIC KIDNEY DISEASE) STAGE 3, GFR 30-59 ML/MIN (H): ICD-10-CM

## 2020-01-06 DIAGNOSIS — I10 ESSENTIAL HYPERTENSION: ICD-10-CM

## 2020-01-06 LAB
ANION GAP SERPL CALCULATED.3IONS-SCNC: 9 MMOL/L (ref 5–18)
BUN SERPL-MCNC: 27 MG/DL (ref 8–22)
CALCIUM SERPL-MCNC: 8.9 MG/DL (ref 8.5–10.5)
CHLORIDE BLD-SCNC: 113 MMOL/L (ref 98–107)
CO2 SERPL-SCNC: 17 MMOL/L (ref 22–31)
CREAT SERPL-MCNC: 1.52 MG/DL (ref 0.7–1.3)
GFR SERPL CREATININE-BSD FRML MDRD: 46 ML/MIN/1.73M2
GLUCOSE BLD-MCNC: 90 MG/DL (ref 70–125)
POTASSIUM BLD-SCNC: 4.4 MMOL/L (ref 3.5–5)
SODIUM SERPL-SCNC: 139 MMOL/L (ref 136–145)

## 2020-01-06 ASSESSMENT — ANXIETY QUESTIONNAIRES
5. BEING SO RESTLESS THAT IT IS HARD TO SIT STILL: SEVERAL DAYS
3. WORRYING TOO MUCH ABOUT DIFFERENT THINGS: SEVERAL DAYS
4. TROUBLE RELAXING: SEVERAL DAYS
1. FEELING NERVOUS, ANXIOUS, OR ON EDGE: SEVERAL DAYS
6. BECOMING EASILY ANNOYED OR IRRITABLE: SEVERAL DAYS
IF YOU CHECKED OFF ANY PROBLEMS ON THIS QUESTIONNAIRE, HOW DIFFICULT HAVE THESE PROBLEMS MADE IT FOR YOU TO DO YOUR WORK, TAKE CARE OF THINGS AT HOME, OR GET ALONG WITH OTHER PEOPLE: SOMEWHAT DIFFICULT
2. NOT BEING ABLE TO STOP OR CONTROL WORRYING: SEVERAL DAYS
7. FEELING AFRAID AS IF SOMETHING AWFUL MIGHT HAPPEN: NOT AT ALL
GAD7 TOTAL SCORE: 6

## 2020-01-06 ASSESSMENT — PATIENT HEALTH QUESTIONNAIRE - PHQ9: SUM OF ALL RESPONSES TO PHQ QUESTIONS 1-9: 5

## 2020-01-11 ENCOUNTER — COMMUNICATION - HEALTHEAST (OUTPATIENT)
Dept: FAMILY MEDICINE | Facility: CLINIC | Age: 65
End: 2020-01-11

## 2020-01-22 ENCOUNTER — AMBULATORY - HEALTHEAST (OUTPATIENT)
Dept: PULMONOLOGY | Facility: OTHER | Age: 65
End: 2020-01-22

## 2020-01-22 DIAGNOSIS — J45.40 MODERATE PERSISTENT ASTHMA WITHOUT COMPLICATION: ICD-10-CM

## 2020-01-22 DIAGNOSIS — J32.9 CHRONIC SINUSITIS, UNSPECIFIED LOCATION: ICD-10-CM

## 2020-01-26 ENCOUNTER — COMMUNICATION - HEALTHEAST (OUTPATIENT)
Dept: FAMILY MEDICINE | Facility: CLINIC | Age: 65
End: 2020-01-26

## 2020-01-26 DIAGNOSIS — J15.1 PNEUMONIA DUE TO PSEUDOMONAS SPECIES, UNSPECIFIED LATERALITY, UNSPECIFIED PART OF LUNG (H): ICD-10-CM

## 2020-01-28 ENCOUNTER — OFFICE VISIT - HEALTHEAST (OUTPATIENT)
Dept: FAMILY MEDICINE | Facility: CLINIC | Age: 65
End: 2020-01-28

## 2020-01-28 DIAGNOSIS — J44.1 CHRONIC OBSTRUCTIVE PULMONARY DISEASE WITH ACUTE EXACERBATION (H): ICD-10-CM

## 2020-01-28 DIAGNOSIS — Z09 HOSPITAL DISCHARGE FOLLOW-UP: ICD-10-CM

## 2020-01-28 DIAGNOSIS — I10 BENIGN ESSENTIAL HTN: ICD-10-CM

## 2020-01-28 DIAGNOSIS — J15.1 PNEUMONIA OF RIGHT LOWER LOBE DUE TO PSEUDOMONAS SPECIES (H): ICD-10-CM

## 2020-01-28 DIAGNOSIS — R73.9 STEROID-INDUCED HYPERGLYCEMIA: ICD-10-CM

## 2020-01-28 DIAGNOSIS — T38.0X5A STEROID-INDUCED HYPERGLYCEMIA: ICD-10-CM

## 2020-01-28 DIAGNOSIS — F32.1 MODERATE MAJOR DEPRESSION (H): ICD-10-CM

## 2020-01-28 LAB
ERYTHROCYTE [DISTWIDTH] IN BLOOD BY AUTOMATED COUNT: 11.7 % (ref 11–14.5)
HCT VFR BLD AUTO: 56 % (ref 40–54)
HGB BLD-MCNC: 18.2 G/DL (ref 14–18)
MCH RBC QN AUTO: 29.1 PG (ref 27–34)
MCHC RBC AUTO-ENTMCNC: 32.5 G/DL (ref 32–36)
MCV RBC AUTO: 89 FL (ref 80–100)
PLATELET # BLD AUTO: 209 THOU/UL (ref 140–440)
PMV BLD AUTO: 8.3 FL (ref 7–10)
RBC # BLD AUTO: 6.26 MILL/UL (ref 4.4–6.2)
WBC: 11.8 THOU/UL (ref 4–11)

## 2020-01-29 LAB
ANION GAP SERPL CALCULATED.3IONS-SCNC: 13 MMOL/L (ref 5–18)
BUN SERPL-MCNC: 43 MG/DL (ref 8–22)
CALCIUM SERPL-MCNC: 9.1 MG/DL (ref 8.5–10.5)
CHLORIDE BLD-SCNC: 100 MMOL/L (ref 98–107)
CO2 SERPL-SCNC: 23 MMOL/L (ref 22–31)
CREAT SERPL-MCNC: 2.14 MG/DL (ref 0.7–1.3)
GFR SERPL CREATININE-BSD FRML MDRD: 31 ML/MIN/1.73M2
GLUCOSE BLD-MCNC: 110 MG/DL (ref 70–125)
POTASSIUM BLD-SCNC: 4.7 MMOL/L (ref 3.5–5)
SODIUM SERPL-SCNC: 136 MMOL/L (ref 136–145)

## 2020-02-01 ENCOUNTER — RECORDS - HEALTHEAST (OUTPATIENT)
Dept: ADMINISTRATIVE | Facility: OTHER | Age: 65
End: 2020-02-01

## 2020-02-03 ENCOUNTER — RECORDS - HEALTHEAST (OUTPATIENT)
Dept: ADMINISTRATIVE | Facility: OTHER | Age: 65
End: 2020-02-03

## 2020-02-04 ENCOUNTER — RECORDS - HEALTHEAST (OUTPATIENT)
Dept: ADMINISTRATIVE | Facility: OTHER | Age: 65
End: 2020-02-04

## 2020-02-07 ENCOUNTER — OFFICE VISIT - HEALTHEAST (OUTPATIENT)
Dept: PULMONOLOGY | Facility: OTHER | Age: 65
End: 2020-02-07

## 2020-02-07 DIAGNOSIS — R06.09 DYSPNEA ON EXERTION: ICD-10-CM

## 2020-02-07 DIAGNOSIS — J94.1 FIBROTHORAX: ICD-10-CM

## 2020-02-07 DIAGNOSIS — J32.9 CHRONIC SINUSITIS, UNSPECIFIED LOCATION: ICD-10-CM

## 2020-02-07 ASSESSMENT — MIFFLIN-ST. JEOR: SCORE: 1517.01

## 2020-02-11 ENCOUNTER — OFFICE VISIT - HEALTHEAST (OUTPATIENT)
Dept: FAMILY MEDICINE | Facility: CLINIC | Age: 65
End: 2020-02-11

## 2020-02-11 DIAGNOSIS — R06.02 SHORTNESS OF BREATH: ICD-10-CM

## 2020-02-11 DIAGNOSIS — J90 CHRONIC PLEURAL EFFUSION: ICD-10-CM

## 2020-02-11 DIAGNOSIS — J18.9 COMMUNITY ACQUIRED PNEUMONIA OF RIGHT LOWER LOBE OF LUNG: ICD-10-CM

## 2020-02-14 ENCOUNTER — COMMUNICATION - HEALTHEAST (OUTPATIENT)
Dept: NURSING | Facility: CLINIC | Age: 65
End: 2020-02-14

## 2020-02-14 DIAGNOSIS — J90 CHRONIC PLEURAL EFFUSION: ICD-10-CM

## 2020-02-17 ENCOUNTER — AMBULATORY - HEALTHEAST (OUTPATIENT)
Dept: CARDIAC REHAB | Facility: CLINIC | Age: 65
End: 2020-02-17

## 2020-02-18 ENCOUNTER — COMMUNICATION - HEALTHEAST (OUTPATIENT)
Dept: NURSING | Facility: CLINIC | Age: 65
End: 2020-02-18

## 2020-03-16 ENCOUNTER — AMBULATORY - HEALTHEAST (OUTPATIENT)
Dept: PULMONOLOGY | Facility: OTHER | Age: 65
End: 2020-03-16

## 2020-03-16 ENCOUNTER — COMMUNICATION - HEALTHEAST (OUTPATIENT)
Dept: FAMILY MEDICINE | Facility: CLINIC | Age: 65
End: 2020-03-16

## 2020-03-16 DIAGNOSIS — F32.9 MAJOR DEPRESSIVE DISORDER, SINGLE EPISODE, UNSPECIFIED: ICD-10-CM

## 2020-03-16 DIAGNOSIS — J45.40 MODERATE PERSISTENT ASTHMA, UNSPECIFIED WHETHER COMPLICATED: ICD-10-CM

## 2020-03-23 ENCOUNTER — COMMUNICATION - HEALTHEAST (OUTPATIENT)
Dept: FAMILY MEDICINE | Facility: CLINIC | Age: 65
End: 2020-03-23

## 2020-03-23 DIAGNOSIS — E78.1 PURE HYPERGLYCERIDEMIA: ICD-10-CM

## 2020-03-23 DIAGNOSIS — I10 BENIGN ESSENTIAL HTN: ICD-10-CM

## 2020-04-06 ENCOUNTER — RECORDS - HEALTHEAST (OUTPATIENT)
Dept: ADMINISTRATIVE | Facility: OTHER | Age: 65
End: 2020-04-06

## 2020-04-13 ENCOUNTER — COMMUNICATION - HEALTHEAST (OUTPATIENT)
Dept: FAMILY MEDICINE | Facility: CLINIC | Age: 65
End: 2020-04-13

## 2020-04-13 DIAGNOSIS — E78.1 PURE HYPERGLYCERIDEMIA: ICD-10-CM

## 2020-05-04 ENCOUNTER — COMMUNICATION - HEALTHEAST (OUTPATIENT)
Dept: FAMILY MEDICINE | Facility: CLINIC | Age: 65
End: 2020-05-04

## 2020-05-04 DIAGNOSIS — I10 ESSENTIAL HYPERTENSION: ICD-10-CM

## 2020-05-08 ENCOUNTER — COMMUNICATION - HEALTHEAST (OUTPATIENT)
Dept: FAMILY MEDICINE | Facility: CLINIC | Age: 65
End: 2020-05-08

## 2020-05-08 DIAGNOSIS — M54.50 CHRONIC LEFT-SIDED LOW BACK PAIN WITHOUT SCIATICA: ICD-10-CM

## 2020-05-08 DIAGNOSIS — F32.9 MAJOR DEPRESSIVE DISORDER, SINGLE EPISODE, UNSPECIFIED: ICD-10-CM

## 2020-05-08 DIAGNOSIS — G89.29 CHRONIC LEFT-SIDED LOW BACK PAIN WITHOUT SCIATICA: ICD-10-CM

## 2020-06-02 ENCOUNTER — COMMUNICATION - HEALTHEAST (OUTPATIENT)
Dept: FAMILY MEDICINE | Facility: CLINIC | Age: 65
End: 2020-06-02

## 2020-06-02 ENCOUNTER — AMBULATORY - HEALTHEAST (OUTPATIENT)
Dept: PULMONOLOGY | Facility: OTHER | Age: 65
End: 2020-06-02

## 2020-06-02 DIAGNOSIS — J45.40 MODERATE PERSISTENT ASTHMA WITHOUT COMPLICATION: ICD-10-CM

## 2020-06-02 DIAGNOSIS — I10 BENIGN ESSENTIAL HTN: ICD-10-CM

## 2020-06-02 RX ORDER — IRBESARTAN 150 MG/1
TABLET ORAL
Qty: 90 TABLET | Refills: 4 | Status: SHIPPED | OUTPATIENT
Start: 2020-06-02 | End: 2021-07-26

## 2020-07-28 ENCOUNTER — COMMUNICATION - HEALTHEAST (OUTPATIENT)
Dept: FAMILY MEDICINE | Facility: CLINIC | Age: 65
End: 2020-07-28

## 2020-07-28 DIAGNOSIS — K21.9 GASTROESOPHAGEAL REFLUX DISEASE WITHOUT ESOPHAGITIS: ICD-10-CM

## 2020-08-11 ENCOUNTER — OFFICE VISIT - HEALTHEAST (OUTPATIENT)
Dept: FAMILY MEDICINE | Facility: CLINIC | Age: 65
End: 2020-08-11

## 2020-08-11 DIAGNOSIS — J45.40 MODERATE PERSISTENT ASTHMA WITHOUT COMPLICATION: ICD-10-CM

## 2020-08-11 DIAGNOSIS — J44.9 CHRONIC OBSTRUCTIVE PULMONARY DISEASE, UNSPECIFIED COPD TYPE (H): ICD-10-CM

## 2020-08-11 DIAGNOSIS — F32.1 MODERATE MAJOR DEPRESSION (H): ICD-10-CM

## 2020-08-11 DIAGNOSIS — N18.30 CKD (CHRONIC KIDNEY DISEASE) STAGE 3, GFR 30-59 ML/MIN (H): ICD-10-CM

## 2020-08-11 DIAGNOSIS — I10 BENIGN ESSENTIAL HTN: ICD-10-CM

## 2020-08-11 RX ORDER — BLOOD PRESSURE TEST KIT
KIT MISCELLANEOUS
Qty: 1 EACH | Refills: 0 | Status: SHIPPED | OUTPATIENT
Start: 2020-08-11

## 2020-08-24 ENCOUNTER — AMBULATORY - HEALTHEAST (OUTPATIENT)
Dept: PULMONOLOGY | Facility: OTHER | Age: 65
End: 2020-08-24

## 2020-08-24 DIAGNOSIS — J45.40 MODERATE PERSISTENT ASTHMA, UNSPECIFIED WHETHER COMPLICATED: ICD-10-CM

## 2020-09-21 ENCOUNTER — COMMUNICATION - HEALTHEAST (OUTPATIENT)
Dept: FAMILY MEDICINE | Facility: CLINIC | Age: 65
End: 2020-09-21

## 2020-09-21 ENCOUNTER — AMBULATORY - HEALTHEAST (OUTPATIENT)
Dept: PULMONOLOGY | Facility: OTHER | Age: 65
End: 2020-09-21

## 2020-09-21 DIAGNOSIS — J45.40 MODERATE PERSISTENT ASTHMA, UNSPECIFIED WHETHER COMPLICATED: ICD-10-CM

## 2020-09-21 DIAGNOSIS — E78.1 PURE HYPERGLYCERIDEMIA: ICD-10-CM

## 2020-09-21 DIAGNOSIS — J45.40 MODERATE PERSISTENT ASTHMA: ICD-10-CM

## 2020-09-21 RX ORDER — ALBUTEROL SULFATE 90 UG/1
2 AEROSOL, METERED RESPIRATORY (INHALATION) EVERY 4 HOURS PRN
Qty: 1 INHALER | Refills: 11 | Status: SHIPPED | OUTPATIENT
Start: 2020-09-21 | End: 2021-08-20

## 2020-09-21 RX ORDER — BUDESONIDE AND FORMOTEROL FUMARATE DIHYDRATE 160; 4.5 UG/1; UG/1
AEROSOL RESPIRATORY (INHALATION)
Qty: 1 INHALER | Refills: 11 | Status: SHIPPED | OUTPATIENT
Start: 2020-09-21 | End: 2021-08-24

## 2020-11-09 ENCOUNTER — COMMUNICATION - HEALTHEAST (OUTPATIENT)
Dept: FAMILY MEDICINE | Facility: CLINIC | Age: 65
End: 2020-11-09

## 2020-11-09 DIAGNOSIS — I10 ESSENTIAL HYPERTENSION: ICD-10-CM

## 2020-11-10 ENCOUNTER — COMMUNICATION - HEALTHEAST (OUTPATIENT)
Dept: FAMILY MEDICINE | Facility: CLINIC | Age: 65
End: 2020-11-10

## 2020-11-10 DIAGNOSIS — F32.9 MAJOR DEPRESSIVE DISORDER, SINGLE EPISODE, UNSPECIFIED: ICD-10-CM

## 2020-11-10 RX ORDER — MULTIVITAMIN WITH FOLIC ACID 400 MCG
TABLET ORAL
Qty: 30 TABLET | Refills: 11 | Status: SHIPPED | OUTPATIENT
Start: 2020-11-10 | End: 2021-10-19

## 2020-11-16 ENCOUNTER — OFFICE VISIT - HEALTHEAST (OUTPATIENT)
Dept: FAMILY MEDICINE | Facility: CLINIC | Age: 65
End: 2020-11-16

## 2020-11-16 DIAGNOSIS — E78.1 PURE HYPERGLYCERIDEMIA: ICD-10-CM

## 2020-11-16 DIAGNOSIS — J44.1 CHRONIC OBSTRUCTIVE PULMONARY DISEASE WITH ACUTE EXACERBATION (H): ICD-10-CM

## 2020-11-18 ENCOUNTER — OFFICE VISIT - HEALTHEAST (OUTPATIENT)
Dept: PULMONOLOGY | Facility: OTHER | Age: 65
End: 2020-11-18

## 2020-11-18 DIAGNOSIS — J45.40 MODERATE PERSISTENT ASTHMA WITHOUT COMPLICATION: ICD-10-CM

## 2020-11-18 DIAGNOSIS — R06.09 DYSPNEA ON EXERTION: ICD-10-CM

## 2020-11-18 DIAGNOSIS — J94.1 FIBROTHORAX: ICD-10-CM

## 2020-12-07 ENCOUNTER — AMBULATORY - HEALTHEAST (OUTPATIENT)
Dept: PULMONOLOGY | Facility: OTHER | Age: 65
End: 2020-12-07

## 2020-12-07 DIAGNOSIS — J45.40 MODERATE PERSISTENT ASTHMA WITHOUT COMPLICATION: ICD-10-CM

## 2020-12-07 DIAGNOSIS — J32.9 CHRONIC SINUSITIS, UNSPECIFIED LOCATION: ICD-10-CM

## 2020-12-07 RX ORDER — LORATADINE 10 MG/1
10 TABLET ORAL DAILY
Qty: 30 TABLET | Refills: 11 | Status: SHIPPED | OUTPATIENT
Start: 2020-12-07 | End: 2021-11-15

## 2020-12-14 ENCOUNTER — COMMUNICATION - HEALTHEAST (OUTPATIENT)
Dept: GERIATRIC MEDICINE | Facility: CLINIC | Age: 65
End: 2020-12-14

## 2020-12-21 ENCOUNTER — COMMUNICATION - HEALTHEAST (OUTPATIENT)
Dept: GERIATRIC MEDICINE | Facility: CLINIC | Age: 65
End: 2020-12-21

## 2020-12-22 ENCOUNTER — COMMUNICATION - HEALTHEAST (OUTPATIENT)
Dept: GERIATRIC MEDICINE | Facility: CLINIC | Age: 65
End: 2020-12-22

## 2020-12-22 ASSESSMENT — PATIENT HEALTH QUESTIONNAIRE - PHQ9: SUM OF ALL RESPONSES TO PHQ QUESTIONS 1-9: 22

## 2020-12-29 ENCOUNTER — COMMUNICATION - HEALTHEAST (OUTPATIENT)
Dept: GERIATRIC MEDICINE | Facility: CLINIC | Age: 65
End: 2020-12-29

## 2020-12-30 ENCOUNTER — OFFICE VISIT - HEALTHEAST (OUTPATIENT)
Dept: FAMILY MEDICINE | Facility: CLINIC | Age: 65
End: 2020-12-30

## 2020-12-30 DIAGNOSIS — I10 BENIGN ESSENTIAL HTN: ICD-10-CM

## 2020-12-30 DIAGNOSIS — J90 CHRONIC PLEURAL EFFUSION: ICD-10-CM

## 2020-12-30 DIAGNOSIS — N18.30 STAGE 3 CHRONIC KIDNEY DISEASE, UNSPECIFIED WHETHER STAGE 3A OR 3B CKD (H): ICD-10-CM

## 2020-12-30 DIAGNOSIS — Z09 HOSPITAL DISCHARGE FOLLOW-UP: ICD-10-CM

## 2020-12-30 DIAGNOSIS — J45.40 MODERATE PERSISTENT ASTHMA WITHOUT COMPLICATION: ICD-10-CM

## 2020-12-30 DIAGNOSIS — R06.02 SHORTNESS OF BREATH: ICD-10-CM

## 2020-12-30 LAB
ANION GAP SERPL CALCULATED.3IONS-SCNC: 11 MMOL/L (ref 5–18)
BUN SERPL-MCNC: 22 MG/DL (ref 8–22)
CALCIUM SERPL-MCNC: 9.1 MG/DL (ref 8.5–10.5)
CHLORIDE BLD-SCNC: 103 MMOL/L (ref 98–107)
CO2 SERPL-SCNC: 27 MMOL/L (ref 22–31)
CREAT SERPL-MCNC: 1.89 MG/DL (ref 0.7–1.3)
ERYTHROCYTE [DISTWIDTH] IN BLOOD BY AUTOMATED COUNT: 11.9 % (ref 11–14.5)
GFR SERPL CREATININE-BSD FRML MDRD: 36 ML/MIN/1.73M2
GLUCOSE BLD-MCNC: 89 MG/DL (ref 70–125)
HCT VFR BLD AUTO: 39.8 % (ref 40–54)
HGB BLD-MCNC: 13.3 G/DL (ref 14–18)
MCH RBC QN AUTO: 30.5 PG (ref 27–34)
MCHC RBC AUTO-ENTMCNC: 33.3 G/DL (ref 32–36)
MCV RBC AUTO: 92 FL (ref 80–100)
PLATELET # BLD AUTO: 148 THOU/UL (ref 140–440)
PMV BLD AUTO: 7.3 FL (ref 7–10)
POTASSIUM BLD-SCNC: 5.1 MMOL/L (ref 3.5–5)
RBC # BLD AUTO: 4.35 MILL/UL (ref 4.4–6.2)
SODIUM SERPL-SCNC: 141 MMOL/L (ref 136–145)
WBC: 8.3 THOU/UL (ref 4–11)

## 2020-12-30 ASSESSMENT — MIFFLIN-ST. JEOR: SCORE: 1540.25

## 2020-12-31 ENCOUNTER — COMMUNICATION - HEALTHEAST (OUTPATIENT)
Dept: FAMILY MEDICINE | Facility: CLINIC | Age: 65
End: 2020-12-31

## 2020-12-31 ENCOUNTER — COMMUNICATION - HEALTHEAST (OUTPATIENT)
Dept: GERIATRIC MEDICINE | Facility: CLINIC | Age: 65
End: 2020-12-31

## 2021-01-04 ASSESSMENT — ACTIVITIES OF DAILY LIVING (ADL)
DEPENDENT_IADLS:: CLEANING;COOKING;LAUNDRY;SHOPPING;MEAL PREPARATION;MEDICATION MANAGEMENT;MONEY MANAGEMENT;TRANSPORTATION

## 2021-01-18 ENCOUNTER — OFFICE VISIT - HEALTHEAST (OUTPATIENT)
Dept: FAMILY MEDICINE | Facility: CLINIC | Age: 66
End: 2021-01-18

## 2021-01-18 DIAGNOSIS — I10 BENIGN ESSENTIAL HTN: ICD-10-CM

## 2021-01-18 DIAGNOSIS — G89.29 CHRONIC LEFT-SIDED LOW BACK PAIN WITHOUT SCIATICA: ICD-10-CM

## 2021-01-18 DIAGNOSIS — J94.1 FIBROTHORAX: ICD-10-CM

## 2021-01-18 DIAGNOSIS — N18.32 STAGE 3B CHRONIC KIDNEY DISEASE (H): ICD-10-CM

## 2021-01-18 DIAGNOSIS — Z12.11 SCREEN FOR COLON CANCER: ICD-10-CM

## 2021-01-18 DIAGNOSIS — F32.1 MODERATE MAJOR DEPRESSION (H): ICD-10-CM

## 2021-01-18 DIAGNOSIS — J44.1 CHRONIC OBSTRUCTIVE PULMONARY DISEASE WITH ACUTE EXACERBATION (H): ICD-10-CM

## 2021-01-18 DIAGNOSIS — M54.50 CHRONIC LEFT-SIDED LOW BACK PAIN WITHOUT SCIATICA: ICD-10-CM

## 2021-01-18 DIAGNOSIS — J45.40 MODERATE PERSISTENT ASTHMA WITHOUT COMPLICATION: ICD-10-CM

## 2021-01-18 LAB
ANION GAP SERPL CALCULATED.3IONS-SCNC: 12 MMOL/L (ref 5–18)
BUN SERPL-MCNC: 34 MG/DL (ref 8–22)
CALCIUM SERPL-MCNC: 8.9 MG/DL (ref 8.5–10.5)
CHLORIDE BLD-SCNC: 105 MMOL/L (ref 98–107)
CO2 SERPL-SCNC: 22 MMOL/L (ref 22–31)
CREAT SERPL-MCNC: 1.78 MG/DL (ref 0.7–1.3)
GFR SERPL CREATININE-BSD FRML MDRD: 39 ML/MIN/1.73M2
GLUCOSE BLD-MCNC: 177 MG/DL (ref 70–125)
POTASSIUM BLD-SCNC: 4.3 MMOL/L (ref 3.5–5)
SODIUM SERPL-SCNC: 139 MMOL/L (ref 136–145)

## 2021-01-18 RX ORDER — GABAPENTIN 300 MG/1
300 CAPSULE ORAL 3 TIMES DAILY
Qty: 270 CAPSULE | Refills: 3 | Status: SHIPPED | OUTPATIENT
Start: 2021-01-18 | End: 2021-12-16

## 2021-01-19 ENCOUNTER — COMMUNICATION - HEALTHEAST (OUTPATIENT)
Dept: GERIATRIC MEDICINE | Facility: CLINIC | Age: 66
End: 2021-01-19

## 2021-01-19 DIAGNOSIS — M54.50 CHRONIC LEFT-SIDED LOW BACK PAIN WITHOUT SCIATICA: ICD-10-CM

## 2021-01-19 DIAGNOSIS — G89.29 CHRONIC LEFT-SIDED LOW BACK PAIN WITHOUT SCIATICA: ICD-10-CM

## 2021-01-26 ENCOUNTER — RECORDS - HEALTHEAST (OUTPATIENT)
Dept: ADMINISTRATIVE | Facility: OTHER | Age: 66
End: 2021-01-26

## 2021-01-26 ENCOUNTER — TRANSFERRED RECORDS (OUTPATIENT)
Dept: MULTI SPECIALTY CLINIC | Facility: CLINIC | Age: 66
End: 2021-01-26
Payer: COMMERCIAL

## 2021-01-26 LAB — COLOGUARD-ABSTRACT: NEGATIVE

## 2021-01-29 ENCOUNTER — COMMUNICATION - HEALTHEAST (OUTPATIENT)
Dept: FAMILY MEDICINE | Facility: CLINIC | Age: 66
End: 2021-01-29

## 2021-02-01 ENCOUNTER — AMBULATORY - HEALTHEAST (OUTPATIENT)
Dept: PULMONOLOGY | Facility: OTHER | Age: 66
End: 2021-02-01

## 2021-02-01 ENCOUNTER — COMMUNICATION - HEALTHEAST (OUTPATIENT)
Dept: FAMILY MEDICINE | Facility: CLINIC | Age: 66
End: 2021-02-01

## 2021-02-01 DIAGNOSIS — F32.9 MAJOR DEPRESSIVE DISORDER, SINGLE EPISODE, UNSPECIFIED: ICD-10-CM

## 2021-02-01 DIAGNOSIS — J45.40 MODERATE PERSISTENT ASTHMA, UNSPECIFIED WHETHER COMPLICATED: ICD-10-CM

## 2021-02-01 RX ORDER — MONTELUKAST SODIUM 10 MG/1
10 TABLET ORAL AT BEDTIME
Qty: 30 TABLET | Refills: 11 | Status: SHIPPED | OUTPATIENT
Start: 2021-02-01 | End: 2022-01-10

## 2021-02-03 ENCOUNTER — COMMUNICATION - HEALTHEAST (OUTPATIENT)
Dept: FAMILY MEDICINE | Facility: CLINIC | Age: 66
End: 2021-02-03

## 2021-02-03 DIAGNOSIS — F32.9 MAJOR DEPRESSIVE DISORDER, SINGLE EPISODE, UNSPECIFIED: ICD-10-CM

## 2021-03-19 ENCOUNTER — COMMUNICATION - HEALTHEAST (OUTPATIENT)
Dept: FAMILY MEDICINE | Facility: CLINIC | Age: 66
End: 2021-03-19

## 2021-03-29 ENCOUNTER — COMMUNICATION - HEALTHEAST (OUTPATIENT)
Dept: FAMILY MEDICINE | Facility: CLINIC | Age: 66
End: 2021-03-29

## 2021-03-29 DIAGNOSIS — F32.9 MAJOR DEPRESSIVE DISORDER, SINGLE EPISODE, UNSPECIFIED: ICD-10-CM

## 2021-03-31 ENCOUNTER — COMMUNICATION - HEALTHEAST (OUTPATIENT)
Dept: FAMILY MEDICINE | Facility: CLINIC | Age: 66
End: 2021-03-31

## 2021-03-31 DIAGNOSIS — F32.9 MAJOR DEPRESSIVE DISORDER, SINGLE EPISODE, UNSPECIFIED: ICD-10-CM

## 2021-04-20 ENCOUNTER — OFFICE VISIT - HEALTHEAST (OUTPATIENT)
Dept: FAMILY MEDICINE | Facility: CLINIC | Age: 66
End: 2021-04-20

## 2021-04-20 ENCOUNTER — COMMUNICATION - HEALTHEAST (OUTPATIENT)
Dept: FAMILY MEDICINE | Facility: CLINIC | Age: 66
End: 2021-04-20

## 2021-04-20 DIAGNOSIS — J45.40 MODERATE PERSISTENT ASTHMA WITHOUT COMPLICATION: ICD-10-CM

## 2021-04-20 DIAGNOSIS — I10 BENIGN ESSENTIAL HTN: ICD-10-CM

## 2021-04-20 DIAGNOSIS — F32.1 MODERATE MAJOR DEPRESSION (H): ICD-10-CM

## 2021-04-20 DIAGNOSIS — J42 CHRONIC BRONCHITIS, UNSPECIFIED CHRONIC BRONCHITIS TYPE (H): ICD-10-CM

## 2021-04-20 DIAGNOSIS — J90 CHRONIC PLEURAL EFFUSION: ICD-10-CM

## 2021-04-20 DIAGNOSIS — M54.50 CHRONIC LEFT-SIDED LOW BACK PAIN WITHOUT SCIATICA: ICD-10-CM

## 2021-04-20 DIAGNOSIS — R06.02 SHORTNESS OF BREATH: ICD-10-CM

## 2021-04-20 DIAGNOSIS — J94.1 FIBROTHORAX: ICD-10-CM

## 2021-04-20 DIAGNOSIS — N18.32 STAGE 3B CHRONIC KIDNEY DISEASE (H): ICD-10-CM

## 2021-04-20 DIAGNOSIS — G89.29 CHRONIC LEFT-SIDED LOW BACK PAIN WITHOUT SCIATICA: ICD-10-CM

## 2021-04-26 ENCOUNTER — AMBULATORY - HEALTHEAST (OUTPATIENT)
Dept: PULMONOLOGY | Facility: OTHER | Age: 66
End: 2021-04-26

## 2021-04-26 ENCOUNTER — COMMUNICATION - HEALTHEAST (OUTPATIENT)
Dept: PULMONOLOGY | Facility: OTHER | Age: 66
End: 2021-04-26

## 2021-04-26 ENCOUNTER — COMMUNICATION - HEALTHEAST (OUTPATIENT)
Dept: FAMILY MEDICINE | Facility: CLINIC | Age: 66
End: 2021-04-26

## 2021-04-26 DIAGNOSIS — F32.9 MAJOR DEPRESSIVE DISORDER, SINGLE EPISODE, UNSPECIFIED: ICD-10-CM

## 2021-04-26 DIAGNOSIS — J45.40 MODERATE PERSISTENT ASTHMA WITHOUT COMPLICATION: ICD-10-CM

## 2021-04-26 DIAGNOSIS — J45.40 MODERATE PERSISTENT ASTHMA, UNSPECIFIED WHETHER COMPLICATED: ICD-10-CM

## 2021-04-26 RX ORDER — DULOXETIN HYDROCHLORIDE 60 MG/1
CAPSULE, DELAYED RELEASE ORAL
Qty: 60 CAPSULE | Refills: 11 | Status: SHIPPED | OUTPATIENT
Start: 2021-04-26 | End: 2022-04-05

## 2021-04-26 RX ORDER — IPRATROPIUM BROMIDE AND ALBUTEROL SULFATE 2.5; .5 MG/3ML; MG/3ML
3 SOLUTION RESPIRATORY (INHALATION) 4 TIMES DAILY PRN
Qty: 480 ML | Refills: 3 | Status: SHIPPED | OUTPATIENT
Start: 2021-04-26 | End: 2021-09-27

## 2021-04-26 RX ORDER — TIOTROPIUM BROMIDE INHALATION SPRAY 3.12 UG/1
SPRAY, METERED RESPIRATORY (INHALATION)
Qty: 4 G | Refills: 11 | Status: SHIPPED | OUTPATIENT
Start: 2021-04-26 | End: 2022-04-04

## 2021-05-26 ENCOUNTER — COMMUNICATION - HEALTHEAST (OUTPATIENT)
Dept: FAMILY MEDICINE | Facility: CLINIC | Age: 66
End: 2021-05-26

## 2021-05-26 VITALS — SYSTOLIC BLOOD PRESSURE: 127 MMHG | HEART RATE: 76 BPM | DIASTOLIC BLOOD PRESSURE: 82 MMHG

## 2021-05-26 DIAGNOSIS — E78.1 PURE HYPERGLYCERIDEMIA: ICD-10-CM

## 2021-05-26 RX ORDER — FENOFIBRATE 145 MG/1
TABLET, COATED ORAL
Qty: 30 TABLET | Refills: 1 | Status: SHIPPED | OUTPATIENT
Start: 2021-05-26 | End: 2021-09-17

## 2021-05-26 ASSESSMENT — PATIENT HEALTH QUESTIONNAIRE - PHQ9: SUM OF ALL RESPONSES TO PHQ QUESTIONS 1-9: 5

## 2021-05-27 ASSESSMENT — PATIENT HEALTH QUESTIONNAIRE - PHQ9: SUM OF ALL RESPONSES TO PHQ QUESTIONS 1-9: 22

## 2021-05-28 ASSESSMENT — ASTHMA QUESTIONNAIRES
ACT_TOTALSCORE: 7
ACT_TOTALSCORE: 7
ACT_TOTALSCORE: 10
ACT_TOTALSCORE: 9
ACT_TOTALSCORE: 8
ACT_TOTALSCORE: 11
ACT_TOTALSCORE: 8
ACT_TOTALSCORE: 7

## 2021-05-28 ASSESSMENT — ANXIETY QUESTIONNAIRES: GAD7 TOTAL SCORE: 6

## 2021-05-28 NOTE — PROGRESS NOTES
MTM Initial Encounter  Assessment & Plan                                                       Essential hypertension - Stable   Blood pressure at goal of less than 130/80.  Continue current therapy.     Moderate persistent asthma, unspecified whether complicated -needs improvement. Based on pt's report, pt is not using inhalers correctly. Reviewed maintenance vs rescue inhaler. Also reviewed dose of Incruse. Will have patient make the changes today, and follow-up in 1 week to ensure appropriate use and also assess technique at that time.   -Pt to use Symbicort 2 puffs two times a day   -Pt to use albuterol PRN   -Pt to decrease Incruse to 1 puff daily       Pain: Stable - continue current therapy.      Mood/Sleep: Unimproved - patient has low mood due to persistent pulmonary issues. Also having frequent anxiety, but unclear which medications patient is using. Will review at f/u. At that time, may benefit from Increasing dose of Duloxetine and discussion with psych provider about decreasing Clonazepam use.     Heartburn: Stable - continue current therapy     Follow Up:   Return in about 1 week (around 4/30/2019).      Subjective & Objective                                                     Venita Sanchez is a 63 y.o. male coming in for an initial visit for Medication Therapy Management. He was referred to me from Sylvia Lugo MD for medication review. Joined by professional [employer] .       Medication Adherence: Did not bring medications to visit today. Sets up medications in a pill box. Denies missed doses.     HTN: currently using losartan-HCTZ 100-12.5 mg daily . No HA, no blurry vision, no lightheadedness, no chest pain.    BP Readings from Last 3 Encounters:   04/23/19 124/68   03/21/19 122/74   03/11/19 112/70        Asthma/Allergies: Prescribed Incruse Ellipta 62.5 mg 1 puff daily, montelukast 10 mg daily, Loratadine 10 mg daily, ipratropium-albuterol 0.5-2.5 mg four times a day PRN, fluticasone  50 mcg spray 1 spray each nostril daily, Symbicort 160-4.5 mcg 2 puffs two times a day, and albuterol HFA 90 mcg Q4H PRN.     Did not bring inhalers to visit but states he's using albuterol 2 puffs two times a day,  Symbicort as needed when having shortness of breath, using Incruse two times a day. Using Flonase PRN for stuffy nose. Is using Loratadine daily. Uses nebulizer three times a day - four times a day. Unclear if pt is using Montelukast, doesn't think he has it.     Still having shortness of breath. Waking up at night with shortness of breath and wheezing, daily according to wife.   ACT Total Score: (!) 6 (3/21/2019  1:00 PM)      Pain: Using Acetaminophen 650 mg three times a day PRN, Duloxetine 60 mg daily, Gabapentin 300 mg two times a day PRN,     Reports pain in back is okay after having received injections at spine clinic. Denies pain anywhere else.        Mood/Sleep: Using Duloxetine 60 mg daily and Trazodone 50 mg at bedtime. Also using Clonazepam 1 mg daily. Following with a psychiatrist outside of [facility]. Not sleeping very well - tossing and turning but this is likely due to difficulty breathing. Mood is still low due to concerns about lungs and ability to breathe.     PHQ-9 Total Score: 8 (11/12/2018 10:00 AM)      Fill Date ID Written Drug Qty Days Prescriber Rx # Pharmacy Refill Daily Dose * Pymt Type    04/04/2019  2  01/09/2019  Clonazepam 1 Mg Tablet  30 30 [facility]  [id number]  Com ([id number])  3 2.00 [place]   04/04/2019  2  01/22/2019  Gabapentin 300 Mg Capsule  60 30 [facility]  [id number]  Com ([id number])  2  [place]   03/05/2019  2  01/09/2019  Clonazepam 1 Mg Tablet  30 30 [facility]  [id number]  Com (3501)  2 2.[place]   03/05/2019  2  01/22/2019  Gabapentin 300 Mg Capsule  60 30 Sa [facility]  [id number]  Com ([id number][place]   02/04/2019  2  01/09/2019  Clonazepam 1 Mg Tablet  30 30 [facility]  [id number]  Com ([id number])  1 2.[place]   01/22/2019  2  01/22/2019  Gabapentin 300 Mg  Capsule  60 30 [facility]  [id number]  Com (0426)  [place]   01/09/2019  2  01/09/2019  Clonazepam 1 Mg Tablet  30 30 Ca [facility]  [id number]  Com (3206)  [place] 2.[place]   12/12/2018  2  08/07/2018  Clonazepam 1 Mg Tablet  30 30 [facility]  [id number]  Com (6992)  5 2.[place]         Been worried about his lungs. Feels worried that he's been told there aren't many options for him.     Heartburn: Using Ranitidine 150 mg two times a day PRN - typically using every few days. Does find it effective for symptom relief.       PMH: reviewed in EPIC   Allergies/ADRs: reviewed in EPIC   Alcohol: reviewed in EPIC   Tobacco:   Social History     Tobacco Use   Smoking Status Never Smoker   Smokeless Tobacco Never Used     Today's Vitals:   Vitals:    04/23/19 1529   BP: 124/68     ----------------    Much or all of the text in this note was generated through the use of Dragon Dictate voice-to-text software. Errors in spelling or words which seem out of context are unintentional. Sound alike errors, in particular, may have escaped editing.    The patient declined an after visit summary    I spent 30 minutes with this patient today.   All changes were made via collaborative practice agreement with Sylvia Lugo MD. A copy of the visit note was provided to the patient's provider.     Bry Corona PharmD  Medication Therapy Management (MTM) Pharmacist  [facility]        Current Outpatient Medications   Medication Sig Dispense Refill     acetaminophen (MAPAP ARTHRITIS PAIN) 650 MG CR tablet Take 650 mg by mouth every 8 (eight) hours as needed for pain.       albuterol (VENTOLIN HFA) 90 mcg/actuation inhaler Inhale 2 puffs every 4 (four) hours as needed for wheezing or shortness of breath. 1 Inhaler 11     aspirin 81 MG EC tablet Take 1 tablet (81 mg total) by mouth daily. 100 tablet 11     budesonide-formoterol (SYMBICORT) 160-4.5 mcg/actuation inhaler Inhale 2 puffs 2 (two) times a day. 1 Inhaler 11      DULoxetine (CYMBALTA) 60 MG capsule Take 1 capsule (60 mg total) by mouth daily. 90 capsule 4     fenofibrate (TRICOR) 145 MG tablet Take 1 tablet (145 mg total) by mouth daily. 90 tablet 4     fluticasone (FLONASE ALLERGY RELIEF) 50 mcg/actuation nasal spray 1 spray in each nostril daily 16 g 12     gabapentin (NEURONTIN) 300 MG capsule Take 1 capsule (300 mg total) by mouth 2 (two) times a day. 60 capsule 3     ipratropium-albuterol (DUO-NEB) 0.5-2.5 mg/3 mL nebulizer Take 3 mL by nebulization 4 (four) times a day. 360 mL 11     loratadine (CLARITIN) 10 mg tablet Take 1 tablet (10 mg total) by mouth daily. 30 tablet 11     multivitamin-Ca-iron-minerals (TAB A GERARDO) 18-0.4 mg Tab Take 1 tablet by mouth daily. 90 tablet 4     ranitidine (ZANTAC) 150 MG tablet Take 1 tablet (150 mg total) by mouth 2 (two) times a day as needed for heartburn. 60 tablet 11     traZODone (DESYREL) 50 MG tablet Take 1 tablet (50 mg total) by mouth at bedtime. 30 tablet 6     umeclidinium (INCRUSE ELLIPTA) 62.5 mcg/actuation DsDv inhaler Inhale 1 puff daily. 30 each 11     clonazePAM (KLONOPIN) 1 MG tablet Take 1 mg by mouth 2 (two) times a day as needed for anxiety.       losartan-hydrochlorothiazide (HYZAAR) 100-12.5 mg per tablet Take 1 tablet by mouth daily. 30 tablet 11     montelukast (SINGULAIR) 10 mg tablet Take 1 tablet (10 mg total) by mouth at bedtime. 30 tablet 11     No current facility-administered medications for this visit.

## 2021-05-28 NOTE — TELEPHONE ENCOUNTER
Refill Approved    Rx renewed per Medication Renewal Policy. Medication was last renewed on 10/23/18.    Shelbi Gavin, [facility] Connection Triage/Med Refill 5/2/2019     Requested Prescriptions   Pending Prescriptions Disp Refills     traZODone (DESYREL) 50 MG tablet [Pharmacy Med Name: TRAZODONE HCL 50 MG TABS 50 TAB] 30 tablet 6     Sig: TAKE 1 TABLET DAILY AT BEDTIME FOR SLEEP AID // 1 HNUB NOJ 1 LUB JAXON MUS PW PAB KOM TSAUG ZOG       Tricyclics/Misc Antidepressant/Antianxiety Meds Refill Protocol Passed - 5/2/2019  7:27 AM        Passed - PCP or prescribing provider visit in last year     Last office visit with prescriber/PCP: 3/21/2019 Sylvia Lugo MD OR same dept: 3/21/2019 Sylvia Lugo MD OR same specialty: 3/21/2019 Sylvia Lugo MD  Last physical: Visit date not found Last MTM visit: Visit date not found   Next visit within 3 mo: Visit date not found  Next physical within 3 mo: Visit date not found  Prescriber OR PCP: Sylvia Lugo MD  Last diagnosis associated with med order: 1. Major Depression, Single Episode  - traZODone (DESYREL) 50 MG tablet [Pharmacy Med Name: TRAZODONE HCL 50 MG TABS 50 TAB]; TAKE 1 TABLET DAILY AT BEDTIME FOR SLEEP AID // 1 HNUB NOJ 1 KATHY BANKSGANESH MUS PW NATIVIDAD KOM TSAUG ZOG  Dispense: 30 tablet; Refill: 6    If protocol passes may refill for 12 months if within 3 months of last provider visit (or a total of 15 months).

## 2021-05-28 NOTE — PATIENT INSTRUCTIONS - HE
Recommendations from today's MTM visit:                                                    MTM (medication therapy management) is a service provided by a clinical pharmacist designed to help you get the most of out of your medicines. and Today we reviewed what your medicines are for, how to know if they are working, that your medicines are safe and how to make your medicine regimen as easy as possible.     1. I sent refills of Ranitidine and Acetaminophen to your pharmacy    2. Increase Duloxetine 60 mg to 2 capsules every day at bedtime       Next MTM visit: Tuesday, May 21 at 10:30     To schedule another MTM appointment, please call the clinic directly or you may call the MTM scheduling line at [phone] or toll-free at [phone].     My Clinical Pharmacist's contact information:                                                      It was a pleasure seeing you today!  Please feel free to contact me with any questions or concerns you have.      Bry Corona, PharmD  Medication Therapy Management (MTM) Pharmacist  [facility]       You may receive a survey about the MTM services you received by email and/or US Mail.  I would appreciate your feedback to help me serve you better in the future. Your comments will be anonymous.

## 2021-05-29 NOTE — TELEPHONE ENCOUNTER
Refill Approved    Rx renewed per Medication Renewal Policy. Medication was last renewed on 1/22/19.    Shelbi Gavin, [facility] Connection Triage/Med Refill 5/28/2019     Requested Prescriptions   Pending Prescriptions Disp Refills     gabapentin (NEURONTIN) 300 MG capsule [Pharmacy Med Name: GABAPENTIN 300 MG CAPSULE 300 CAP] 60 capsule 3     Sig: TAKE 1 CAPSULE TWICE DAILY // IB ZAUG NOJ 1 LUB, 1 HNUB 2 ZAUG       Gabapentin/Levetiracetam/Tiagabine Refill Protocol  Passed - 5/28/2019 10:47 AM        Passed - PCP or prescribing provider visit in past 12 months or next 3 months     Last office visit with prescriber/PCP: 5/21/2019 Sylvia Lugo MD OR same dept: 5/21/2019 Sylvia Lugo MD OR same specialty: 5/21/2019 Sylvia Lugo MD  Last physical: Visit date not found Last MTM visit: Visit date not found   Next visit within 3 mo: Visit date not found  Next physical within 3 mo: Visit date not found  Prescriber OR PCP: Sylvia Lugo MD  Last diagnosis associated with med order: 1. Chronic left-sided low back pain without sciatica  - gabapentin (NEURONTIN) 300 MG capsule [Pharmacy Med Name: GABAPENTIN 300 MG CAPSULE 300 CAP]; TAKE 1 CAPSULE TWICE DAILY // IB ZAUG NOJ 1 LUB, 1 HNUB 2 ZAUG  Dispense: 60 capsule; Refill: 3    If protocol passes may refill for 12 months if within 3 months of last provider visit (or a total of 15 months).

## 2021-05-29 NOTE — PROGRESS NOTES
[facility] Office Visit    Chief Complaint:  Chief Complaint   Patient presents with     Follow-up         Assessment/Plan:  1. Essential hypertension  BP Readings from Last 3 Encounters:   05/21/19 122/79   05/01/19 128/76   04/23/19 124/68       well controlled today.  Plan for bloodpressure management includes ongoing focus on healthy DASH type diet and increased activity, encouraged to avoid tobacco products and limit alcohol use, stress reduction, continue hyzaar 100/12.5mg daily.  Labwork and meds ordered and reviewed as below  Lab Results   Component Value Date    K 4.6 05/21/2019      Lab Results   Component Value Date    CREATININE 1.79 (H) 05/21/2019       - Comprehensive Metabolic Panel    2. Moderate persistent asthma without complication  Continue singulair 10mg daily, duonebs scheduled, claritin 10mg daily, incruse ellipta daily, zantac, flonase, symbicort, rescue inhaler as needed.      3. Current moderate episode of major depressive disorder without prior episode (H)  Trazodone at bedtime, cymbalta increased to 120mg daily, clonazepam prn    4. Gastroesophageal reflux disease without esophagitis  Doing well with prn zantac    5. Essential Hypertriglyceridemia  Continue tricor daily  - Lipid Cascade    6. Pleural effusion on left  Chronic and stable.  Check for hepatitis status and vaccinate as needed.    - Hepatitis C Antibody (Anti-HCV)  - Hepatitis B Surface Antibody (Anti-HBs) Vaccine Check  - Hepatitis B Surface Antigen (HBsAG)      Return in about 3 months (around 8/21/2019) for Recheck.    Patient Education/AVS:  There are no Patient Instructions on file for this visit.    HPI:   Venita Sanchez is a 63 y.o. male c/o f/u on his meds.  Has been working with MTM and did increase his cymbalta to 120mg and finds this better. More stable and not thinking too much about his problems.  Pain is stable- not better or worse.  Breathing is stable- not bothering him too much right now.   Not having cough right now.  Fasting today.  Did get his heartburn medicine back ranitidine and takes this when he gets a burning sensation or a hot feeling in his belly.  Takes this about 2-3x/month.  Eating healthy- lots of veggies and broth with some rice.  Walking daily.      ROS:  Constitutional, CV, Resp, GI, , MSK, skin, neuro, psych all negative except as outlined in the HPI above and patient denies any other symptoms.      History summarized from1-2:MTM 4/2019 and 5/2019 reviewed.  Increased duloxetine to 120mg.  Restart ranitidine two times a day.  Continue to work on inhaler technique.      Physical Exam:  /79 (Patient Site: Right Arm, Patient Position: Sitting, Cuff Size: Adult Regular)   Pulse 73   Wt 191 lb (86.6 kg)   BMI 31.78 kg/m   Body mass index is 31.78 kg/m . No LMP for male patient.  Vital signs reviewed  Wt Readings from Last 3 Encounters:   05/21/19 191 lb (86.6 kg)   03/21/19 192 lb (87.1 kg)   03/11/19 190 lb (86.2 kg)     Social History     Tobacco Use   Smoking Status Never Smoker   Smokeless Tobacco Never Used     Social History     Substance and Sexual Activity   Sexual Activity Yes     Partners: Female     Birth control/protection: Post-menopausal     No data recorded  PHQ-9 Total Score: 15 (5/21/2019 11:00 AM)    PHQ-2 Total Score: 6 (5/21/2019 11:00 AM)    ACT Total Score: (!) 11 (5/21/2019 11:00 AM)      All normal as below except abnormalities include: pt appears well and at baseline.  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   General is a  63 y.o. male sitting comfortably in no apparent distress.   Neck: Supple without lymphadenopathy or thyromegally  CV: Regular rate and rhythm S1S2 without rubs, murmurs or gallops,   Lungs: Clear to auscultation bilaterally  Extremities: Warm, No Edema, 2+ Pedal and radial pulses bilaterally  Skin: No lesions or rashes noted  Neuro/MSK: Able to ambulate around the exam room with equal movement,  strength and normal coordination of the upper and lower extremeties symmetrically    Results for orders placed or performed in visit on 05/21/19   Comprehensive Metabolic Panel   Result Value Ref Range    Sodium 139 136 - 145 mmol/L    Potassium 4.6 3.5 - 5.0 mmol/L    Chloride 110 (H) 98 - 107 mmol/L    CO2 20 (L) 22 - 31 mmol/L    Anion Gap, Calculation 9 5 - 18 mmol/L    Glucose 91 70 - 125 mg/dL    BUN 39 (H) 8 - 22 mg/dL    Creatinine 1.79 (H) 0.70 - 1.30 mg/dL    GFR MDRD Af Amer 47 (L) >60 mL/min/1.73m2    GFR MDRD Non Af Amer 39 (L) >60 mL/min/1.73m2    Bilirubin, Total 0.5 0.0 - 1.0 mg/dL    Calcium 9.1 8.5 - 10.5 mg/dL    Protein, Total 7.0 6.0 - 8.0 g/dL    Albumin 3.8 3.5 - 5.0 g/dL    Alkaline Phosphatase 59 45 - 120 U/L    AST 18 0 - 40 U/L    ALT 20 0 - 45 U/L   Hepatitis C Antibody (Anti-HCV)   Result Value Ref Range    Hepatitis C Ab Negative Negative   Hepatitis B Surface Antibody (Anti-HBs) Vaccine Check   Result Value Ref Range    HBV Surface Ab (Vaccine Check) Positive Positive   Hepatitis B Surface Antigen (HBsAG)   Result Value Ref Range    Hepatitis B Surface Ag Negative Negative   Lipid Cascade   Result Value Ref Range    Cholesterol 109 <=199 mg/dL    Triglycerides 173 (H) <=149 mg/dL    HDL Cholesterol 32 (L) >=40 mg/dL    LDL Calculated 42 <=129 mg/dL    Patient Fasting > 8hrs? Yes        Med list and active problem list reviewed and updated as part of this encounter    Current Outpatient Medications on File Prior to Visit   Medication Sig Dispense Refill     acetaminophen (MAPAP ARTHRITIS PAIN) 650 MG CR tablet Take 1 tablet (650 mg total) by mouth every 8 (eight) hours as needed for pain. 100 tablet 3     albuterol (VENTOLIN HFA) 90 mcg/actuation inhaler Inhale 2 puffs every 4 (four) hours as needed for wheezing or shortness of breath. 1 Inhaler 11     aspirin 81 MG EC tablet Take 1 tablet (81 mg total) by mouth daily. 100 tablet 11     budesonide-formoterol (SYMBICORT) 160-4.5  mcg/actuation inhaler Inhale 2 puffs 2 (two) times a day. 1 Inhaler 11     clonazePAM (KLONOPIN) 1 MG tablet Take 1 mg by mouth 2 (two) times a day as needed for anxiety.       DULoxetine (CYMBALTA) 60 MG capsule Take 2 capsules (120 mg total) by mouth daily. 180 capsule 3     fenofibrate (TRICOR) 145 MG tablet Take 1 tablet (145 mg total) by mouth daily. 90 tablet 4     fluticasone (FLONASE ALLERGY RELIEF) 50 mcg/actuation nasal spray 1 spray in each nostril daily 16 g 12     ipratropium-albuterol (DUO-NEB) 0.5-2.5 mg/3 mL nebulizer Take 3 mL by nebulization 4 (four) times a day. 360 mL 11     loratadine (CLARITIN) 10 mg tablet Take 1 tablet (10 mg total) by mouth daily. 30 tablet 11     losartan-hydrochlorothiazide (HYZAAR) 100-12.5 mg per tablet Take 1 tablet by mouth daily. 30 tablet 11     montelukast (SINGULAIR) 10 mg tablet Take 1 tablet (10 mg total) by mouth at bedtime. 30 tablet 11     multivitamin-Ca-iron-minerals (TAB A GERARDO) 18-0.4 mg Tab Take 1 tablet by mouth daily. 90 tablet 4     ranitidine (ZANTAC) 150 MG tablet Take 1 tablet (150 mg total) by mouth 2 (two) times a day as needed for heartburn. 60 tablet 11     traZODone (DESYREL) 50 MG tablet TAKE 1 TABLET DAILY AT BEDTIME FOR SLEEP AID // 1 HNUB NOJ 1 LUB THAUM MUS PW PAB KOM TSAUG ZOG 90 tablet 3     umeclidinium (INCRUSE ELLIPTA) 62.5 mcg/actuation DsDv inhaler Inhale 1 puff daily. 30 each 11     No current facility-administered medications on file prior to visit.          Sylvia Lugo MD

## 2021-05-31 NOTE — PROGRESS NOTES
MTM Follow Up Encounter  Assessment & Plan                                                     Moderate persistent asthma, unspecified whether complicated - Needs improvement- ACT not at goal >19 and pt reports symptoms have worsened. Unclear why pt was switched to Spiriva, but hasn't been using since he was not educated on device. Reviewed with pt today and had him use his first dose in the clinic. Also reviewed that Symbicort maintenance should be use two times a day every day.   -Reviewed Spiriva device education. Pt took dose in clinic today.   -Pt to use Symbicort two times a day every day   -Future: Repeat ACT at f/u     Pain: Stable - pt reports good control with current regimen. Given that pt is not having breakthrough pain later in the day, likely okay to continue using gabapentin 2 caps once daily vs 1 cap two times a day.   -Continue current therapy      Mood/Sleep: Unimproved- Pt reports improvement in sleep and decreased anxiety with increase in Duloxetine dose but still worries about breathing issues. Has not followed with psychiatrist recently. Encouraged pt to make a follow up and discussed that pt can continue following with PharmD for appropriate inhaler use to help with breathing.   -Continue current therapy    -Pt to reschedule psych appt.     Heartburn: Improved - pt reports he has medication and it's controlling symptoms well.   -Continue current therapy     Essential hypertension: Stable - Blood pressure at goal of less than 130/80.    -Continue current therapy.     Follow Up:   Return in about 1 month (around 9/20/2019) for Medication Management Pharmacist.      Subjective & Objective                                                     Venita Sanchez is a 63 y.o. male coming in for a follow up visit for Medication Therapy Management. He was referred to me from Sylvia Lugo MD for medication review. Joined by professional [facility] .     Chief Complaint: I don't know how to use this  new inhaler.     Medication Adherence:  Daughter-in-law sets up meds in a pill box and calls for refills. Pt reports she is a nurse. Denies missed doses. No meds at visit.     Asthma/Allergies: Prescribed Spiriva Respimat 2.5 mcg 2 puff daily, montelukast 10 mg daily, Loratadine 10 mg daily, ipratropium-albuterol 0.5-2.5 mg four times a day PRN, fluticasone 50 mcg spray 1 spray each nostril daily, Symbicort 160-4.5 mcg 2 puffs two times a day, and albuterol HFA 90 mcg Q4H PRN.     Ran out of Incruse on the 12th. Had Spiriva delivered but didn't know how to use so hasn't been using. Has noticed more coughing.      Using Symbicort once daily. Sometimes twice daily.     ACT Total Score: (!) 11 (5/21/2019 11:00 AM)       Pain: Prescribed Acetaminophen 650 mg three times a day PRN, Duloxetine 60 mg 2 caps daily, Gabapentin 300 mg two times a day.  Taking Gabapentin 300 mg 2 caps once daily. Feels like pain is well controlled. Not having flares in the evening.       Mood/Sleep: Using Duloxetine 60 mg 2 caps daily and Trazodone 50 mg at bedtime. Continues Clonazepam 1 mg daily. Feels like Duloxetine is helping to reduce racing thoughts and with sleep. Feels like mood overall is not great. Continues to be very stressed with cough and breathing. Worries about being unable to breath. Has not had a recent appointment with psychiatry.     PHQ-9 Total Score: 15 (5/21/2019 11:00 AM)      Heartburn: Prescribed Ranitidine 150 mg two times a day PRN. Symptoms well controlled with medication.     HTN: currently using losartan-HCTZ 100-12.5 mg daily . No HA, no blurry vision, no lightheadedness, no chest pain.    BP Readings from Last 3 Encounters:   08/20/19 114/72   05/21/19 122/79   05/01/19 128/76     Results for orders placed or performed in visit on 11/12/18   Basic Metabolic Panel   Result Value Ref Range    Sodium 140 136 - 145 mmol/L    Potassium 4.5 3.5 - 5.0 mmol/L    Chloride 108 (H) 98 - 107 mmol/L    CO2 22 22 - 31  mmol/L    Anion Gap, Calculation 10 5 - 18 mmol/L    Glucose 101 70 - 125 mg/dL    Calcium 8.5 8.5 - 10.5 mg/dL    BUN 32 (H) 8 - 22 mg/dL    Creatinine 1.39 (H) 0.70 - 1.30 mg/dL    GFR MDRD Af Amer >60 >60 mL/min/1.73m2    GFR MDRD Non Af Amer 52 (L) >60 mL/min/1.73m2       PMH: reviewed in EPIC   Allergies/ADRs: reviewed in EPIC   Alcohol: reviewed in EPIC   Tobacco:   Social History     Tobacco Use   Smoking Status Never Smoker   Smokeless Tobacco Never Used     Today's Vitals:   Vitals:    08/20/19 1104   BP: 114/72   Pulse: 82     ----------------    Much or all of the text in this note was generated through the use of Dragon Dictate voice-to-text software. Errors in spelling or words which seem out of context are unintentional. Sound alike errors, in particular, may have escaped editing.    The patient was given a summary of these recommendations as an after visit summary    I spent 30 minutes with this patient today.   All changes were made via collaborative practice agreement with Sylvia Lugo MD. A copy of the visit note was provided to the patient's provider.     Bry Corona, LudivinaD  Medication Therapy Management (MTM) Pharmacist  [facility]        Current Outpatient Medications   Medication Sig Dispense Refill     albuterol (PROAIR HFA;PROVENTIL HFA;VENTOLIN HFA) 90 mcg/actuation inhaler Inhale 2 puffs every 4 (four) hours as needed for wheezing or shortness of breath. 1 Inhaler 11     aspirin 81 MG EC tablet Take 1 tablet (81 mg total) by mouth daily. 100 tablet 11     budesonide-formoterol (SYMBICORT) 160-4.5 mcg/actuation inhaler Inhale 2 puffs 2 (two) times a day. 1 Inhaler 11     fluticasone (FLONASE ALLERGY RELIEF) 50 mcg/actuation nasal spray 1 spray in each nostril daily 16 g 12     gabapentin (NEURONTIN) 300 MG capsule TAKE 1 CAPSULE TWICE DAILY // IB ZAUG NOJ 1 LUB, 1 HNUB 2 ZAUG 180 capsule 3     ipratropium-albuterol (DUO-NEB) 0.5-2.5 mg/3 mL nebulizer Take 3 mL by  nebulization 4 (four) times a day. 360 mL 11     loratadine (CLARITIN) 10 mg tablet Take 1 tablet (10 mg total) by mouth daily. 30 tablet 11     losartan-hydrochlorothiazide (HYZAAR) 100-12.5 mg per tablet Take 1 tablet by mouth daily. 30 tablet 11     montelukast (SINGULAIR) 10 mg tablet Take 1 tablet (10 mg total) by mouth at bedtime. 30 tablet 11     multivitamin-Ca-iron-minerals (TAB A GERARDO) 18-0.4 mg Tab Take 1 tablet by mouth daily. 90 tablet 4     ranitidine (ZANTAC) 150 MG tablet Take 1 tablet (150 mg total) by mouth 2 (two) times a day as needed for heartburn. 60 tablet 11     tiotropium bromide (SPIRIVA RESPIMAT) 2.5 mcg/actuation Mist Inhale 2 puffs daily. 4 g 11     traZODone (DESYREL) 50 MG tablet TAKE 1 TABLET DAILY AT BEDTIME FOR SLEEP AID // 1 HNUB NOJ 1 LUB THAUM MUS PW PAB KOM TSAUG ZOG 90 tablet 3     acetaminophen (MAPAP ARTHRITIS PAIN) 650 MG CR tablet Take 1 tablet (650 mg total) by mouth every 8 (eight) hours as needed for pain. 100 tablet 3     clonazePAM (KLONOPIN) 1 MG tablet Take 1 mg by mouth 2 (two) times a day as needed for anxiety.       DULoxetine (CYMBALTA) 60 MG capsule Take 2 capsules (120 mg total) by mouth daily. 180 capsule 3     fenofibrate (TRICOR) 145 MG tablet Take 1 tablet (145 mg total) by mouth daily. 90 tablet 4     No current facility-administered medications for this visit.

## 2021-05-31 NOTE — PROGRESS NOTES
[facility] Office Visit    Chief Complaint:  Chief Complaint   Patient presents with     Follow-up         Assessment/Plan:  1. Moderate persistent asthma with acute exacerbation  Currently having mild URI and/or allergy triggers in addition may be having reflux at HS increasing sx.  Add PPI am fasting and continue ranitidine hs only.  Continue singluair, loratidine, flonase for allergies.  spiriva and symbicort for asthma/lung support.  Pt open to pulmonary rehab at this point as long as it is in [place] and easy to get to independently and not have to rely on his kids for transportation.  F/u with lung doctor advised every 3-6 months.  Pt will get flu shot later this fall.    - Ambulatory referral to Pulmonology  - Ambulatory Referral to Pulmonary Rehab    2. Current moderate episode of major depressive disorder without prior episode (H)  Improved control on cymbalta 120mg daily.      3. CKD (chronic kidney disease) stage 3, GFR 30-59 ml/min (H)  Bump in Cr last visit noted.  bp well controlled.  Continue losartan/hctz 100/12.5mg daily, check labs as below.    - Basic Metabolic Panel  - Vitamin D, Total (25-Hydroxy)  - HM2(CBC w/o Differential)    4. Essential hypertension  BP Readings from Last 3 Encounters:   08/22/19 118/70   08/20/19 114/72   05/21/19 122/79       well controlled today.  Plan for bloodpressure management includes ongoing focus on healthy DASH type diet and increased activity, encouraged to avoid tobacco products and limit alcohol use, stress reduction, continue losartan/hctz 100/12.5mg daily.  Screen for diabetes.  Labwork and meds ordered and reviewed as below  Lab Results   Component Value Date    K 4.6 05/21/2019      Lab Results   Component Value Date    CREATININE 1.79 (H) 05/21/2019       - Glycosylated Hemoglobin A1c  - Ambulatory referral to Ophthalmology    5. Gastroesophageal reflux disease without esophagitis  Add PPI daily fasting in am to see if it helps with  stomach problems and increased cough and shortness of breath while laying down at night.  Zantac at hs only now.    - omeprazole (PRILOSEC) 20 MG capsule; Take 1 capsule (20 mg total) by mouth daily before breakfast.  Dispense: 30 capsule; Refill: 11  - ranitidine (ZANTAC) 150 MG tablet; Take 1 tablet (150 mg total) by mouth at bedtime.  Dispense: 30 tablet; Refill: 11    6. Chronic pleural effusion  Pulmonary rehab appreciated. F/u with lung doctor every 3- 6months.  Flu shot this fall.  Lung meds ongoing.    - Ambulatory referral to Pulmonology  - Ambulatory Referral to Pulmonary Rehab    7. Shortness of breath  As above  - Ambulatory referral to Pulmonology  - Ambulatory Referral to Pulmonary Rehab    8. Encounter for screening for HIV  - HIV Antigen/Antibody Screening [place]    9. Vaccine counseling  - Varicella Zoster, Recombinant Vaccine IM    10. Seasonal allergic rhinitis due to pollen  Continue loratidine, singulair and flonase      Return in about 3 months (around 11/22/2019) for Recheck.    Patient Education/AVS:  Patient Instructions   Take new acid medicine in the morning before you eat- omeprazole  Take ranitidine just at bedtime        HPI:   Venita Sanchez is a 63 y.o. male c/o f/u on his chronic medical conditions.  Struggling with cough at night and wondering if there is anything he can take for this- making it hard for him to sleep. Remembers getting 7 pills at a previous visit to help with this and wondering if he can get this again.  Also noting ongoing shortness of breath.  Last saw lung doctor 3/2019 and was supposed to f/u in 3 months but never got scheduled.     Does note some reflux sx at night when he lays down and this makes the cough worse.  Has had a hoarse voice for past 2 weeks along with increased mucous, coughing, clearing throat, uri sx.  No fever.  Allergies acting up and pt is using nasal spray and pills per report.  Did not bring meds in for review today.   "    ROS:  Constitutional, CV, Resp, GI, , MSK, skin, neuro, psych all negative except as outlined in the HPI above and patient denies any other symptoms.      History summarized from1-2:MTM over summer months reviewed- asthma still not well controlled.  Reviewing how to use spiriva and that symbicort is scheduled two times a day.  Pain well controlled with gabapentin 2 caps daily.  Improved sleep and worry with increase to 120mg duloxetine but still worries about breathing.  bp and heartburn well controlled.    Pulmonary visit 3/2019 reviewed outlining meds and recommendations for pulmonary rehab and f/u in 3 months.    Lab tests reviewed-1: 2019    Physical Exam:  /70 (Patient Site: Right Arm, Patient Position: Sitting, Cuff Size: Adult Regular)   Pulse 80   Resp 24   Ht 5' 5\" (1.651 m)   Wt 186 lb (84.4 kg)   SpO2 96%   BMI 30.95 kg/m   Body mass index is 30.95 kg/m . No LMP for male patient.  Vital signs reviewed  Wt Readings from Last 3 Encounters:   08/22/19 186 lb (84.4 kg)   05/21/19 191 lb (86.6 kg)   03/21/19 192 lb (87.1 kg)     Social History     Tobacco Use   Smoking Status Never Smoker   Smokeless Tobacco Never Used     Social History     Substance and Sexual Activity   Sexual Activity Yes     Partners: Female     Birth control/protection: Post-menopausal     No data recorded  PHQ-9 Total Score: 15 (5/21/2019 11:00 AM)    PHQ-2 Total Score: 6 (5/21/2019 11:00 AM)    ACT Total Score: (!) 11 (5/21/2019 11:00 AM)      All normal as below except abnormalities include: bilateral cerumen - wet, hoarse voice and frequently clearing throat, epigastric pain on palpation, left decreased breath sounds stable, right lung clear and moving air well- no wheezing.    General is a  63 y.o. male sitting comfortably in no apparent distress.   HEENT:  TM are clear bilaterally.  Eye, nasal, oral exams within normal   Neck: Supple without lymphadenopathy or thyromegally  CV: Regular rate and rhythm S1S2 without " rubs, murmurs or gallops,   Abd:  +BS, soft ND,  No masses or organomegally  Extremities: Warm, No Edema, 2+ Pedal and radial pulses bilaterally  Skin: No lesions or rashes noted  Neuro/MSK: Able to ambulate around the exam room with equal movement, strength and normal coordination of the upper and lower extremeties symmetrically    Results for orders placed or performed in visit on 05/21/19   Comprehensive Metabolic Panel   Result Value Ref Range    Sodium 139 136 - 145 mmol/L    Potassium 4.6 3.5 - 5.0 mmol/L    Chloride 110 (H) 98 - 107 mmol/L    CO2 20 (L) 22 - 31 mmol/L    Anion Gap, Calculation 9 5 - 18 mmol/L    Glucose 91 70 - 125 mg/dL    BUN 39 (H) 8 - 22 mg/dL    Creatinine 1.79 (H) 0.70 - 1.30 mg/dL    GFR MDRD Af Amer 47 (L) >60 mL/min/1.73m2    GFR MDRD Non Af Amer 39 (L) >60 mL/min/1.73m2    Bilirubin, Total 0.5 0.0 - 1.0 mg/dL    Calcium 9.1 8.5 - 10.5 mg/dL    Protein, Total 7.0 6.0 - 8.0 g/dL    Albumin 3.8 3.5 - 5.0 g/dL    Alkaline Phosphatase 59 45 - 120 U/L    AST 18 0 - 40 U/L    ALT 20 0 - 45 U/L   Hepatitis C Antibody (Anti-HCV)   Result Value Ref Range    Hepatitis C Ab Negative Negative   Hepatitis B Surface Antibody (Anti-HBs) Vaccine Check   Result Value Ref Range    HBV Surface Ab (Vaccine Check) Positive Positive   Hepatitis B Surface Antigen (HBsAG)   Result Value Ref Range    Hepatitis B Surface Ag Negative Negative   Lipid Cascade   Result Value Ref Range    Cholesterol 109 <=199 mg/dL    Triglycerides 173 (H) <=149 mg/dL    HDL Cholesterol 32 (L) >=40 mg/dL    LDL Calculated 42 <=129 mg/dL    Patient Fasting > 8hrs? Yes        Med list and active problem list reviewed and updated as part of this encounter    Current Outpatient Medications on File Prior to Visit   Medication Sig Dispense Refill     acetaminophen (MAPAP ARTHRITIS PAIN) 650 MG CR tablet Take 1 tablet (650 mg total) by mouth every 8 (eight) hours as needed for pain. 100 tablet 3     albuterol (PROAIR HFA;PROVENTIL  HFA;VENTOLIN HFA) 90 mcg/actuation inhaler Inhale 2 puffs every 4 (four) hours as needed for wheezing or shortness of breath. 1 Inhaler 11     aspirin 81 MG EC tablet Take 1 tablet (81 mg total) by mouth daily. 100 tablet 11     budesonide-formoterol (SYMBICORT) 160-4.5 mcg/actuation inhaler Inhale 2 puffs 2 (two) times a day. 1 Inhaler 11     clonazePAM (KLONOPIN) 1 MG tablet Take 1 mg by mouth 2 (two) times a day as needed for anxiety.       DULoxetine (CYMBALTA) 60 MG capsule Take 2 capsules (120 mg total) by mouth daily. 180 capsule 3     fenofibrate (TRICOR) 145 MG tablet Take 1 tablet (145 mg total) by mouth daily. 90 tablet 4     fluticasone (FLONASE ALLERGY RELIEF) 50 mcg/actuation nasal spray 1 spray in each nostril daily 16 g 12     gabapentin (NEURONTIN) 300 MG capsule TAKE 1 CAPSULE TWICE DAILY // IB ZAUG NOJ 1 LUB, 1 HNUB 2 ZAUG 180 capsule 3     ipratropium-albuterol (DUO-NEB) 0.5-2.5 mg/3 mL nebulizer Take 3 mL by nebulization 4 (four) times a day. 360 mL 11     loratadine (CLARITIN) 10 mg tablet Take 1 tablet (10 mg total) by mouth daily. 30 tablet 11     losartan-hydrochlorothiazide (HYZAAR) 100-12.5 mg per tablet Take 1 tablet by mouth daily. 30 tablet 11     montelukast (SINGULAIR) 10 mg tablet Take 1 tablet (10 mg total) by mouth at bedtime. 30 tablet 11     multivitamin-Ca-iron-minerals (TAB A GEARRDO) 18-0.4 mg Tab Take 1 tablet by mouth daily. 90 tablet 4     tiotropium bromide (SPIRIVA RESPIMAT) 2.5 mcg/actuation Mist Inhale 2 puffs daily. 4 g 11     traZODone (DESYREL) 50 MG tablet TAKE 1 TABLET DAILY AT BEDTIME FOR SLEEP AID // 1 HNUB NOJ 1 LUB THAUM MUS PW PAB KOM TSAUG ZOG 90 tablet 3     [DISCONTINUED] ranitidine (ZANTAC) 150 MG tablet Take 1 tablet (150 mg total) by mouth 2 (two) times a day as needed for heartburn. 60 tablet 11     No current facility-administered medications on file prior to visit.          Sylvia Lugo MD

## 2021-05-31 NOTE — PATIENT INSTRUCTIONS - HE
Take new acid medicine in the morning before you eat- omeprazole  Take ranitidine just at bedtime

## 2021-06-01 VITALS — WEIGHT: 188.7 LBS | BODY MASS INDEX: 31.4 KG/M2

## 2021-06-01 VITALS — BODY MASS INDEX: 31.95 KG/M2 | WEIGHT: 192 LBS

## 2021-06-01 VITALS — WEIGHT: 188 LBS | HEIGHT: 65 IN | BODY MASS INDEX: 31.32 KG/M2

## 2021-06-01 VITALS — BODY MASS INDEX: 32.14 KG/M2 | WEIGHT: 193.12 LBS

## 2021-06-01 VITALS — WEIGHT: 188 LBS | BODY MASS INDEX: 31.28 KG/M2

## 2021-06-01 VITALS — HEIGHT: 65 IN | BODY MASS INDEX: 31.65 KG/M2 | WEIGHT: 190 LBS

## 2021-06-01 NOTE — TELEPHONE ENCOUNTER
Central PA team  [phone]  Pool: HE PA MED ([id number])          PA has been initiated.       PA form completed and faxed insurance via Cover My Meds     Key:  [id number]       Medication:  Trelegy Ellipta 100-62.5-25MCG/INH aerosol powder      Insurance:  [employer]           Response will be received via fax and may take up to 5-10 business days depending on plan

## 2021-06-01 NOTE — PATIENT INSTRUCTIONS - HE
1. Continue Incruse one inhalation daily.  2. Symbicort two inhalations twice daily. Rinse, gargle, and spit water after use.  3. Use the rescue inhaler or the nebulizer as needed.  4. Continue montelukast (Singulair) one pill at bedtime.  5. Start loratadine (Claritin) one pill daily.  6. Nasal fluticasone (Flonase) one spray in each nostril daily.  7. Follow up with Dr. Quinn in 6 months  8. Contact us at the pulmonary clinic if you have any questions

## 2021-06-01 NOTE — TELEPHONE ENCOUNTER
Medication Question or Clarification  Who is calling: Pharmacy: [facility]  What medication are you calling about? (include dose and sig)   Disp Refills Start End     fluticasone-umeclidinium-vilanterol (TRELEGY ELLIPTA) 100-62.5-25 mcg DsDv inhaler 60 each 1 9/18/2019     Sig - Route: Inhale 1 Inhalation daily. - Inhalation    Sent to pharmacy as: fluticasone-umeclidinium-vilanterol (TRELEGY ELLIPTA) 100-62.5-25 mcg DsDv inhaler    E-Prescribing Status: Receipt confirmed by pharmacy (9/18/2019  3:15 PM CDT)        Who prescribed the medication?: Sylvia Lugo MD   What is your question/concern?: Fax stated patient's insurance prefers Advair or Dulera. Please advise if a PA should be done instead.  Pharmacy: [facility] to leave a detailed message?: No  Site CMT - Please call the pharmacy to obtain any additional needed information.

## 2021-06-01 NOTE — TELEPHONE ENCOUNTER
Refill Approved    Rx renewed per Medication Renewal Policy. Medication was last renewed on 10/23/18.    Shelbi Gavin, [facility] Triage/Med Refill 9/10/2019     Requested Prescriptions   Pending Prescriptions Disp Refills     losartan-hydrochlorothiazide (HYZAAR) 100-12.5 mg per tablet [Pharmacy Med Name: LOSARTAN-HCTZ 100-12.5 MG T 100-12.5 TAB] 30 tablet 11     Sig: TAKE 1 TABLET DAILY BY MOUTH FOR HIGH BLOOD PRESSURE // IB HNUB NOJ 1 LUB PAB ROXANNE NTSHAV SIAB       Diuretics/Combination Diuretics Refill Protocol  Passed - 9/9/2019 11:24 AM        Passed - Visit with PCP or prescribing provider visit in past 12 months     Last office visit with prescriber/PCP: 8/22/2019 Sylvia Lugo MD OR same dept: 8/22/2019 Sylvia Lugo MD OR same specialty: 8/22/2019 Sylvia Lugo MD  Last physical: Visit date not found Last MTM visit: Visit date not found   Next visit within 3 mo: Visit date not found  Next physical within 3 mo: Visit date not found  Prescriber OR PCP: Sylvia Lugo MD  Last diagnosis associated with med order: 1. Essential hypertension  - losartan-hydrochlorothiazide (HYZAAR) 100-12.5 mg per tablet [Pharmacy Med Name: LOSARTAN-HCTZ 100-12.5 MG T 100-12.5 TAB]; TAKE 1 TABLET DAILY BY MOUTH FOR HIGH BLOOD PRESSURE // IB HNUB NOJ 1 LUB NATIVIDAD ROXANNE NTSHAV SIAB  Dispense: 30 tablet; Refill: 11    If protocol passes may refill for 12 months if within 3 months of last provider visit (or a total of 15 months).             Passed - Serum Potassium in past 12 months      Lab Results   Component Value Date    Potassium 4.8 08/22/2019             Passed - Serum Sodium in past 12 months      Lab Results   Component Value Date    Sodium 140 08/22/2019             Passed - Blood pressure on file in past 12 months     BP Readings from Last 1 Encounters:   08/22/19 118/70             Passed - Serum Creatinine in past 12 months      Creatinine   Date Value Ref Range Status   08/22/2019 1.96 (H) 0.70  - 1.30 mg/dL Final

## 2021-06-01 NOTE — PATIENT INSTRUCTIONS - HE
Recommendations from today's MTM visit:                                                    MTM (medication therapy management) is a service provided by a clinical pharmacist designed to help you get the most of out of your medicines. and Today we reviewed what your medicines are for, how to know if they are working, that your medicines are safe and how to make your medicine regimen as easy as possible.     1. I will talk to your pulmonologist about combining your inhalers into 1 device.     2. Take Gabapentin two times a day, every day.      3. Take Omeprazole 1 capsule every morning     4. Decrease Ranitidine to 1 tablet daily at bedtime.       It was great to speak with you today.  I value your experience and would be very thankful for your time with providing feedback on our clinic survey. You may receive a survey via email or text message in the next few days.     Next MTM visit: We will call you to schedule this     To schedule another MTM appointment, please call the clinic directly or you may call the MTM scheduling line at [phone] or toll-free at [phone].     My Clinical Pharmacist's contact information:                                                      It was a pleasure seeing you today!  Please feel free to contact me with any questions or concerns you have.      Bry Corona, PharmD  Medication Therapy Management (MTM) Pharmacist  [facility]

## 2021-06-01 NOTE — TELEPHONE ENCOUNTER
Prior Authorization Request  Who s requesting:  Pharmacy  Pharmacy Name and Location: [facility]  Medication Name: Trelegy Ellipta  Insurance Plan: [facility]   Insurance Member ID Number:  [id number]   Informed patient that prior authorizations can take up to 10 business days for response:   Yes  Okay to leave a detailed message: Yes      Pt with severe lung disease, following closely with pulmonology, primary care, MTM, and psychiatry.     Already maxed out on Montelukast, Loratadine, Duo-Neb, Flonase. Also using Symbicort and Spiriva. Has albuterol as needed.     Has tried Incruse in the past- also no longer covered. Due to restrictions in lung function, unable to breath slowly and deeply enough to effectively use HFA inhalers and get the entire dose. Has been able to demonstrate appropriate inhaler technique with appropriate lung capacity for ellipta devices.   Breo is also non-preferred per formulary, and given complexity of pt's condition and current medication regimen, would not be preferred.       Bry Corona, PharmD  Medication Therapy Management (MTM) Pharmacist  [facility]

## 2021-06-02 ENCOUNTER — OFFICE VISIT - HEALTHEAST (OUTPATIENT)
Dept: PULMONOLOGY | Facility: OTHER | Age: 66
End: 2021-06-02

## 2021-06-02 VITALS — BODY MASS INDEX: 31.12 KG/M2 | WEIGHT: 187 LBS

## 2021-06-02 VITALS — WEIGHT: 188 LBS | BODY MASS INDEX: 31.28 KG/M2

## 2021-06-02 VITALS — WEIGHT: 192.5 LBS | BODY MASS INDEX: 32.03 KG/M2

## 2021-06-02 VITALS — HEIGHT: 65 IN | WEIGHT: 190 LBS | BODY MASS INDEX: 31.65 KG/M2

## 2021-06-02 VITALS — WEIGHT: 191 LBS | BODY MASS INDEX: 31.78 KG/M2

## 2021-06-02 VITALS — BODY MASS INDEX: 31.62 KG/M2 | WEIGHT: 190 LBS

## 2021-06-02 VITALS — WEIGHT: 193 LBS | BODY MASS INDEX: 32.12 KG/M2

## 2021-06-02 VITALS — BODY MASS INDEX: 31.28 KG/M2 | WEIGHT: 188 LBS

## 2021-06-02 VITALS — WEIGHT: 192 LBS | BODY MASS INDEX: 31.95 KG/M2

## 2021-06-02 VITALS — WEIGHT: 186 LBS | HEIGHT: 65 IN | BODY MASS INDEX: 30.99 KG/M2

## 2021-06-02 VITALS — BODY MASS INDEX: 30.95 KG/M2 | WEIGHT: 186 LBS

## 2021-06-02 DIAGNOSIS — R07.1 CHEST PAIN ON BREATHING: ICD-10-CM

## 2021-06-02 NOTE — TELEPHONE ENCOUNTER
RN cannot approve Refill Request    RN can NOT refill this medication med is not covered by policy/route to provider.        Shelbi Gavin, [facility] Triage/Med Refill 10/31/2019    Requested Prescriptions   Pending Prescriptions Disp Refills     TAB-A-GERARDO per tablet [Pharmacy Med Name: TAB-A-GERARDO TABS TAB] 90 tablet 4     Sig: TAKE 1 TABLET BY MOUTH DAILY // IB HNUB NOJ 1 LUB       There is no refill protocol information for this order        SYMBICORT 160-4.5 mcg/actuation inhaler [Pharmacy Med Name: SYMBICORT 160-4.5 MCG -4.5 AERO]  11     Sig: INHALE 2 PUFFS TWICE DAILY. RINSE MOUTH AND SPIT OUT AFTERWARDS // 1 ZAUG NQUS 2 PAS, 1 HNU SIV 2 ZAUG. YAUG NCAUJ THAUM SIV TAS.       There is no refill protocol information for this order      Signed Prescriptions Disp Refills    fenofibrate (TRICOR) 145 MG tablet 90 tablet 1     Sig: TAKE 1 TABLET BY MOUTH DAILY FOR CHOLESTEROL // IB HNUB NOJ 1 LUB PAB ROXANNE NTSHAV MUAJ ROJ       Fenofibrate Refill Protocol Passed - 10/30/2019 12:10 PM        Passed - Renal status in last 6 months     Creatinine   Date Value Ref Range Status   08/22/2019 1.96 (H) 0.70 - 1.30 mg/dL Final             Passed - Fasting lipid cascade in last 12 months     Cholesterol   Date Value Ref Range Status   05/21/2019 109 <=199 mg/dL Final     Triglycerides   Date Value Ref Range Status   05/21/2019 173 (H) <=149 mg/dL Final     HDL Cholesterol   Date Value Ref Range Status   05/21/2019 32 (L) >=40 mg/dL Final     LDL Calculated   Date Value Ref Range Status   05/21/2019 42 <=129 mg/dL Final     Patient Fasting > 8hrs?   Date Value Ref Range Status   05/21/2019 Yes  Final             Passed - AST or ALT in last 12 months     AST   Date Value Ref Range Status   05/21/2019 18 0 - 40 U/L Final     ALT   Date Value Ref Range Status   05/21/2019 20 0 - 45 U/L Final               Passed - PCP or prescribing provider visit in past 12 months       Last office visit with prescriber/PCP: 8/22/2019  Sylvia Lugo MD OR same dept: 8/22/2019 Sylvia Lugo MD OR same specialty: 8/22/2019 Sylvia Lugo MD  Last physical: Visit date not found Last MTM visit: Visit date not found   Next visit within 3 mo: Visit date not found  Next physical within 3 mo: Visit date not found  Prescriber OR PCP: Sylvia Lugo MD  Last diagnosis associated with med order: 1. Major Depression, Single Episode  - TAB-A-GERARDO per tablet [Pharmacy Med Name: TAB-A-GERARDO TABS TAB]; TAKE 1 TABLET BY MOUTH DAILY // IB HNUB NOJ 1 LUB  Dispense: 90 tablet; Refill: 4    2. Moderate persistent asthma, unspecified whether complicated  - SYMBICORT 160-4.5 mcg/actuation inhaler [Pharmacy Med Name: SYMBICORT 160-4.5 MCG -4.5 AERO]; INHALE 2 PUFFS TWICE DAILY. RINSE MOUTH AND SPIT OUT AFTERWARDS // 1 ZAUG NQUS 2 PAS, 1 HNU SIV 2 ZAUG. YAUG NCAUJ THAUM SIV TAS.; Refill: 11    3. Essential Hypertriglyceridemia  - fenofibrate (TRICOR) 145 MG tablet; TAKE 1 TABLET BY MOUTH DAILY FOR CHOLESTEROL // IB HNUB NOJ 1 LUB PAB ROXANNE NTSHAV MUAJ ROJ  Dispense: 90 tablet; Refill: 1    4. Essential hypertension  - aspirin 81 MG EC tablet; TAKE 1 TABLET BY MOUTH DAILY FOR STROKE PREVENTION // IB HNUB NOJ 1 LUB PAB KOM NTSHAV KHIAV ZOO  Dispense: 100 tablet; Refill: 1    If protocol passes may refill for 12 months if within 3 months of last provider visit (or a total of 15 months).            aspirin 81 MG EC tablet 100 tablet 1     Sig: TAKE 1 TABLET BY MOUTH DAILY FOR STROKE PREVENTION // IB HNUB NOJ 1 LUB PAB KOM NTSHAV KHIAV ZOO       Aspirin/Dipyridamole Refill Protocol Passed - 10/30/2019 12:10 PM        Passed - PCP or prescribing provider visit in past 12 months       Last office visit with prescriber/PCP: 8/22/2019 Sylvia Lugo MD OR same dept: 8/22/2019 Sylvia Lugo MD OR same specialty: 8/22/2019 Sylvia Lugo MD  Last physical: Visit date not found Last MTM visit: Visit date not found    Next appt within 3 mo:  Visit date not found Next physical within 3 mo: Visit date not found  Prescriber OR PCP: Sylvia Lugo MD  Last diagnosis associated with med order: 1. Major Depression, Single Episode  - TAB-A-GERARDO per tablet [Pharmacy Med Name: TAB-A-GERARDO TABS TAB]; TAKE 1 TABLET BY MOUTH DAILY // IB HNUB NOJ 1 LUB  Dispense: 90 tablet; Refill: 4    2. Moderate persistent asthma, unspecified whether complicated  - SYMBICORT 160-4.5 mcg/actuation inhaler [Pharmacy Med Name: SYMBICORT 160-4.5 MCG -4.5 AERO]; INHALE 2 PUFFS TWICE DAILY. RINSE MOUTH AND SPIT OUT AFTERWARDS // 1 ZAUG NQUS 2 PAS, 1 HNU SIV 2 ZAUG. YAUG NCAUJ THAUM SIV TAS.; Refill: 11    3. Essential Hypertriglyceridemia  - fenofibrate (TRICOR) 145 MG tablet; TAKE 1 TABLET BY MOUTH DAILY FOR CHOLESTEROL // IB HNUB NOJ 1 LUB PAB ROXANNE NTSHAV MUAJ ROJ  Dispense: 90 tablet; Refill: 1    4. Essential hypertension  - aspirin 81 MG EC tablet; TAKE 1 TABLET BY MOUTH DAILY FOR STROKE PREVENTION // IB HNUB NOJ 1 LUB PAB KOM NTSHAV KHIAV ZOO  Dispense: 100 tablet; Refill: 1    If protocol passes may refill for 6 months if within 3 months of last provider visit (or a total of 9 months).

## 2021-06-02 NOTE — PROGRESS NOTES
PULMONARY OUTPATIENT CONSULT NOTE    Assessment:   1. Moderate persistent asthma  Positive outdoors allergies. No tobacco user.  Continue LABA/ICS, LAMA, albuterol HFA as needed  2. Allergic rhinitis  Nasal steroid, H1 blocker, singulair.   3. Chronic loculated large left side pleural effusion with rim calcification  Stable over several years.   Patient declines referral to thoracic surgery for further evaluation at this time.   4. Abnormal PFTs  Unchanged PFTs over one year. Study showed a moderate restrictive lung disease, and moderate reduction of diffusion capacity.   Chest CT scan showed normal lung parenchyma, large left side chronic loculated pleural effusion, with chronic atelectasis.   Abnormalities noted on chest CT scan can results in abnormal lung function test.    5.  GERD  Raise the head of the bed, avoid eating close to bedtime at least 3 hours. PPI daily    Plan:   1. Continue Incruse one inhalation daily.  2. Symbicort two inhalations twice daily, rinse your mouth with water after each use.  3. Albuterol HFA or nebulizer as needed.  4. Continue montelukast (Singulair) one pill at bedtime.  5. Continue loratadine (Claritin) one pill daily.  6. Nasal fluticasone (Flonase) one spray in each nostril daily.  7. Raise the head of the bed, avoid eating close to bedtime at least 3 hours.   8. Continue PPI daily  9. Follow up with Dr. Quinn in 6 months  10. Contact us at the pulmonary clinic if you have any questions    Landon Wright  Pulmonary / Critical Care  10/1/2019    CC:      Chief Complaint   Patient presents with     Asthma     HPI:       Venita Sanchez is an 64 y.o. male who presents for follow up.   Patient has history of asthma, allergic rhinitis, GERD, chronic loculated left pleural effusion.   Patient is followed by Dr. Quinn, he was seen last on 3/11/2019. An  was used during this encounter.   Reports unchanged mild exertional dyspnea. No limitations with activities.  Reports mild dry cough.   No wheezes. Denies chest pain, orthopnea, PND or swelling of LEs.   No acid reflux symptoms while taking PPI.  No sleeping problems.   Denies tobacco use in the past.     Past Medical History:   Diagnosis Date     Fibrothorax      GERD (gastroesophageal reflux disease)      Moderate persistent asthma      Pleural effusion on left      Medications:     Prior to Admission medications    Medication Sig Start Date End Date Taking? Authorizing Provider   acetaminophen (MAPAP ARTHRITIS PAIN) 650 MG CR tablet Take 1 tablet (650 mg total) by mouth every 8 (eight) hours as needed for pain. 5/1/19  Yes Sylvia Lugo MD   aspirin 81 MG EC tablet Take 1 tablet (81 mg total) by mouth daily. 10/23/18  Yes Sylvia Lugo MD   budesonide-formoterol (SYMBICORT) 160-4.5 mcg/actuation inhaler Inhale 2 puffs 2 (two) times a day. 10/23/18  Yes Sylvia Lugo MD   clonazePAM (KLONOPIN) 1 MG tablet Take 1 mg by mouth 2 (two) times a day as needed for anxiety.   Yes PROVIDER, RUIZ   DULoxetine (CYMBALTA) 60 MG capsule Take 2 capsules (120 mg total) by mouth daily. 5/1/19  Yes Sylvia Lugo MD   fenofibrate (TRICOR) 145 MG tablet Take 1 tablet (145 mg total) by mouth daily. 10/23/18  Yes Sylvia Lugo MD   fluticasone (FLONASE ALLERGY RELIEF) 50 mcg/actuation nasal spray 1 spray in each nostril daily 3/11/19  Yes Martir Quinn MD   fluticasone-umeclidinium-vilanterol (TRELEGY ELLIPTA) 100-62.5-25 mcg DsDv inhaler Inhale 1 Inhalation daily. 9/18/19  Yes Sylvia Lugo MD   gabapentin (NEURONTIN) 300 MG capsule TAKE 1 CAPSULE TWICE DAILY // IB ZAUG NOJ 1 LUB, 1 HNUB 2 ZAUG 5/28/19  Yes Sylvia Lugo MD   loratadine (CLARITIN) 10 mg tablet Take 1 tablet (10 mg total) by mouth daily. 3/11/19  Yes Martir Quinn MD   losartan-hydrochlorothiazide (HYZAAR) 100-12.5 mg per tablet TAKE 1 TABLET DAILY BY MOUTH FOR HIGH BLOOD PRESSURE // IB HNUB NOJ 1 KATHY OLIVEIRA  NTSHAV SIAB 9/10/19  Yes Sylvia Lugo MD   montelukast (SINGULAIR) 10 mg tablet Take 1 tablet (10 mg total) by mouth at bedtime. 3/11/19  Yes Martir Quinn MD   multivitamin-Ca-iron-minerals (TAB A GERARDO) 18-0.4 mg Tab Take 1 tablet by mouth daily. 10/23/18  Yes Sylvia Lugo MD   omeprazole (PRILOSEC) 20 MG capsule Take 1 capsule (20 mg total) by mouth daily before breakfast. 8/22/19  Yes Sylvia Lugo MD   ranitidine (ZANTAC) 150 MG tablet Take 1 tablet (150 mg total) by mouth at bedtime. 8/22/19  Yes Sylvia Lugo MD   tiotropium bromide (SPIRIVA RESPIMAT) 2.5 mcg/actuation Mist Inhale 2 puffs daily. 7/15/19  Yes Martir Quinn MD   traZODone (DESYREL) 50 MG tablet TAKE 1 TABLET DAILY AT BEDTIME FOR SLEEP AID // 1 HNUB NOJ 1 LUB THAUM MUS PW PAB KOM TSAUG ZOG 5/2/19  Yes Sylvia Lugo MD   albuterol (PROAIR HFA;PROVENTIL HFA;VENTOLIN HFA) 90 mcg/actuation inhaler Inhale 2 puffs every 4 (four) hours as needed for wheezing or shortness of breath. 10/7/19 8/4/20  Martir Quinn MD   ipratropium-albuterol (DUO-NEB) 0.5-2.5 mg/3 mL nebulizer Take 3 mL by nebulization 4 (four) times a day. 10/7/19 11/6/19  Martir Quinn MD     Social History     Socioeconomic History     Marital status:      Spouse name: Not on file     Number of children: Not on file     Years of education: Not on file     Highest education level: Not on file   Occupational History     Not on file   Social Needs     Financial resource strain: Not on file     Food insecurity:     Worry: Not on file     Inability: Not on file     Transportation needs:     Medical: Not on file     Non-medical: Not on file   Tobacco Use     Smoking status: Never Smoker     Smokeless tobacco: Never Used   Substance and Sexual Activity     Alcohol use: No     Drug use: No     Sexual activity: Yes     Partners: Female     Birth control/protection: Post-menopausal   Lifestyle     Physical activity:     Days per week: Not  "on file     Minutes per session: Not on file     Stress: Not on file   Relationships     Social connections:     Talks on phone: Not on file     Gets together: Not on file     Attends [employer] service: Not on file     Active member of club or organization: Not on file     Attends meetings of clubs or organizations: Not on file     Relationship status: Not on file     Intimate partner violence:     Fear of current or ex partner: Not on file     Emotionally abused: Not on file     Physically abused: Not on file     Forced sexual activity: Not on file   Other Topics Concern     Not on file   Social History Narrative    Lives with wife and kids     FAMILY Hx  - No history on file    Review of Systems  A 12 point comprehensive review of systems was negative except as noted.        Objective:       Vitals:    10/01/19 0843   BP: 138/80   Pulse: 76   Resp: 12   SpO2: 96%   Weight: 185 lb (83.9 kg)   Height: 5' 5\" (1.651 m)     Gen: awake, alert, no distress  HEENT: pink conjunctiva, moist mucosa, Mallampati II/IV  Neck: no thyromegaly, masses or JVD  Lungs: decrease breath sounds left base  CV: regular, no murmurs or gallops appreciated  Abdomen: soft, NT, BS wnl  Ext: no edema  Neuro: CN II-XII intact, non focal     Diagnostic tests:     PFTs 10/1/2019  FEV1/FVC is 80 and is normal.  FEV1 is 56% predicted and is reduced.  FVC is 56% predicted and reduced.  There was no no improvement in spirometry after a single inhaled does of bronchodilator.  TLC is 56% predicted and is reduced.  RV is 63% predicted and is reduced.  DLCO is 58% predicted and is reduced when it   is corrected for hemoglobin.    CTA CHEST PE RUN  6/18/2018 8:50 AM  INDICATION: Shortness of breath sob; pe;  COMPARISON: 04/10/2014  FINDINGS:  ANGIOGRAM CHEST: Adequate opacification of pulmonary arteries and branches. No evidence of pulmonary emboli.  RV/LV RATIO: N/A  LUNGS AND PLEURA: No change in the chronic, large loculated left-sided pleural effusion " with considerable atelectasis of the left lower lobe. This is again noted to have heterogeneous central contents and irregular areas of rim calcification. No new parenchymal disease.  MEDIASTINUM: There are a few small mediastinal nodes largest measuring 8 mm in short axis (image 331).  LIMITED UPPER ABDOMEN: There is a 5 mm low dense right adrenal nodule.   MUSCULOSKELETAL: Unremarkable.  CONCLUSION:  1.  Chronic, complex large right-sided pleural effusion is again noted and unchanged over the last 4 years. Has patient had pulmonary or thoracic surgery consultation to be evaluated for decortication?  2.  No evidence of pulmonary emboli.  3.  Less than 1 cm presumed nonfunctioning right adrenal adenoma again noted.    XR CHEST 2 VIEWS  3/11/2019 11:23 AM  INDICATION: Moderate persistent asthma, uncomplicated.  COMPARISON: 06/18/2018, 06/15/2018.  FINDINGS: Peripherally calcified chronic pleural fluid and/or thickening mid and inferior left hemithorax with compressive atelectasis of the lingula and left lower lobe stable. Faint linear opacities right middle and lower lobe consistent with strands of fibrosis and bronchial wall thickening stable. Lungs otherwise clear. Heart size normal.

## 2021-06-02 NOTE — TELEPHONE ENCOUNTER
Refill Approved    Rx renewed per Medication Renewal Policy. Medication was last renewed on 10/23/18.    Shelbi Gavin, [facility] Triage/Med Refill 10/31/2019     Requested Prescriptions   Pending Prescriptions Disp Refills     TAB-A-GERARDO per tablet [Pharmacy Med Name: TAB-A-GERARDO TABS TAB] 90 tablet 4     Sig: TAKE 1 TABLET BY MOUTH DAILY // IB HNUB NOJ 1 LUB       There is no refill protocol information for this order        SYMBICORT 160-4.5 mcg/actuation inhaler [Pharmacy Med Name: SYMBICORT 160-4.5 MCG -4.5 AERO]  11     Sig: INHALE 2 PUFFS TWICE DAILY. RINSE MOUTH AND SPIT OUT AFTERWARDS // 1 ZAUG NQUS 2 PAS, 1 HNU SIV 2 ZAUG. YAUG NCAUJ THAUM SIV TAS.       There is no refill protocol information for this order        fenofibrate (TRICOR) 145 MG tablet [Pharmacy Med Name: FENOFIBRATE 145 MG TABS 145 TAB] 90 tablet 4     Sig: TAKE 1 TABLET BY MOUTH DAILY FOR CHOLESTEROL // IB HNUB NOJ 1 LUB PAB ROXANNE NTSHAV MUAJ ROJ       Fenofibrate Refill Protocol Passed - 10/30/2019 12:10 PM        Passed - Renal status in last 6 months     Creatinine   Date Value Ref Range Status   08/22/2019 1.96 (H) 0.70 - 1.30 mg/dL Final             Passed - Fasting lipid cascade in last 12 months     Cholesterol   Date Value Ref Range Status   05/21/2019 109 <=199 mg/dL Final     Triglycerides   Date Value Ref Range Status   05/21/2019 173 (H) <=149 mg/dL Final     HDL Cholesterol   Date Value Ref Range Status   05/21/2019 32 (L) >=40 mg/dL Final     LDL Calculated   Date Value Ref Range Status   05/21/2019 42 <=129 mg/dL Final     Patient Fasting > 8hrs?   Date Value Ref Range Status   05/21/2019 Yes  Final             Passed - AST or ALT in last 12 months     AST   Date Value Ref Range Status   05/21/2019 18 0 - 40 U/L Final     ALT   Date Value Ref Range Status   05/21/2019 20 0 - 45 U/L Final               Passed - PCP or prescribing provider visit in past 12 months       Last office visit with prescriber/PCP: 8/22/2019  Sylvia Lugo MD OR same dept: 8/22/2019 Sylvia Lugo MD OR same specialty: 8/22/2019 Sylvia Lugo MD  Last physical: Visit date not found Last MTM visit: Visit date not found   Next visit within 3 mo: Visit date not found  Next physical within 3 mo: Visit date not found  Prescriber OR PCP: Sylvia Lugo MD  Last diagnosis associated with med order: 1. Major Depression, Single Episode  - TAB-A-GERARDO per tablet [Pharmacy Med Name: TAB-A-GERARDO TABS TAB]; TAKE 1 TABLET BY MOUTH DAILY // IB HNUB NOJ 1 LUB  Dispense: 90 tablet; Refill: 4    2. Moderate persistent asthma, unspecified whether complicated  - SYMBICORT 160-4.5 mcg/actuation inhaler [Pharmacy Med Name: SYMBICORT 160-4.5 MCG -4.5 AERO]; INHALE 2 PUFFS TWICE DAILY. RINSE MOUTH AND SPIT OUT AFTERWARDS // 1 ZAUG NQUS 2 PAS, 1 HNU SIV 2 ZAUG. YAUG NCAUJ THAUM SIV TAS.; Refill: 11    3. Essential Hypertriglyceridemia  - fenofibrate (TRICOR) 145 MG tablet [Pharmacy Med Name: FENOFIBRATE 145 MG TABS 145 TAB]; TAKE 1 TABLET BY MOUTH DAILY FOR CHOLESTEROL // IB HNUB NOJ 1 LUB PAB ROXANNE NTSHAV MUAJ ROJ  Dispense: 90 tablet; Refill: 4    4. Essential hypertension  - aspirin 81 MG EC tablet [Pharmacy Med Name:  ASPIRIN EC LOW DOSE 81 M 81 TAB]; TAKE 1 TABLET BY MOUTH DAILY FOR STROKE PREVENTION // IB HNUB NOJ 1 LUB PAB KOM NTSHAV KHIAV ZOO  Dispense: 100 tablet; Refill: 11    If protocol passes may refill for 12 months if within 3 months of last provider visit (or a total of 15 months).             aspirin 81 MG EC tablet [Pharmacy Med Name:  ASPIRIN EC LOW DOSE 81 M 81 TAB] 100 tablet 11     Sig: TAKE 1 TABLET BY MOUTH DAILY FOR STROKE PREVENTION // IB HNUB NOJ 1 LUB PAB KOM NTSHAV KHIAV ZOO       Aspirin/Dipyridamole Refill Protocol Passed - 10/30/2019 12:10 PM        Passed - PCP or prescribing provider visit in past 12 months       Last office visit with prescriber/PCP: 8/22/2019 Sylvia uLgo MD OR same dept: 8/22/2019 Brittney,  Sylvia Peck MD OR same specialty: 8/22/2019 Sylvia Lugo MD  Last physical: Visit date not found Last MTM visit: Visit date not found    Next appt within 3 mo: Visit date not found Next physical within 3 mo: Visit date not found  Prescriber OR PCP: Sylvia Lugo MD  Last diagnosis associated with med order: 1. Major Depression, Single Episode  - TAB-A-GERARDO per tablet [Pharmacy Med Name: TAB-A-GERARDO TABS TAB]; TAKE 1 TABLET BY MOUTH DAILY // IB HNUB NOJ 1 LUB  Dispense: 90 tablet; Refill: 4    2. Moderate persistent asthma, unspecified whether complicated  - SYMBICORT 160-4.5 mcg/actuation inhaler [Pharmacy Med Name: SYMBICORT 160-4.5 MCG -4.5 AERO]; INHALE 2 PUFFS TWICE DAILY. RINSE MOUTH AND SPIT OUT AFTERWARDS // 1 ZAUG NQUS 2 PAS, 1 HNU SIV 2 ZAUG. YAUG NCAUJ THAUM SIV TAS.; Refill: 11    3. Essential Hypertriglyceridemia  - fenofibrate (TRICOR) 145 MG tablet [Pharmacy Med Name: FENOFIBRATE 145 MG TABS 145 TAB]; TAKE 1 TABLET BY MOUTH DAILY FOR CHOLESTEROL // IB HNUB NOJ 1 LUB PAB ROXANNE NTSHAV MUAJ ROJ  Dispense: 90 tablet; Refill: 4    4. Essential hypertension  - aspirin 81 MG EC tablet [Pharmacy Med Name: HM ASPIRIN EC LOW DOSE 81 M 81 TAB]; TAKE 1 TABLET BY MOUTH DAILY FOR STROKE PREVENTION // IB HNUB NOJ 1 LUB PAB KOM NTSHAV KHIAV ZOO  Dispense: 100 tablet; Refill: 11    If protocol passes may refill for 6 months if within 3 months of last provider visit (or a total of 9 months).

## 2021-06-03 VITALS — HEIGHT: 65 IN | WEIGHT: 186 LBS | BODY MASS INDEX: 30.99 KG/M2

## 2021-06-03 VITALS
WEIGHT: 188 LBS | OXYGEN SATURATION: 98 % | SYSTOLIC BLOOD PRESSURE: 118 MMHG | RESPIRATION RATE: 16 BRPM | HEART RATE: 76 BPM | BODY MASS INDEX: 31.53 KG/M2 | DIASTOLIC BLOOD PRESSURE: 80 MMHG

## 2021-06-03 VITALS
DIASTOLIC BLOOD PRESSURE: 85 MMHG | HEIGHT: 65 IN | WEIGHT: 191 LBS | BODY MASS INDEX: 31.82 KG/M2 | SYSTOLIC BLOOD PRESSURE: 127 MMHG | RESPIRATION RATE: 24 BRPM | TEMPERATURE: 98.6 F | HEART RATE: 94 BPM

## 2021-06-03 VITALS
RESPIRATION RATE: 24 BRPM | BODY MASS INDEX: 32.03 KG/M2 | HEART RATE: 84 BPM | OXYGEN SATURATION: 96 % | WEIGHT: 191 LBS | SYSTOLIC BLOOD PRESSURE: 142 MMHG | DIASTOLIC BLOOD PRESSURE: 84 MMHG

## 2021-06-03 VITALS
BODY MASS INDEX: 30.82 KG/M2 | DIASTOLIC BLOOD PRESSURE: 80 MMHG | SYSTOLIC BLOOD PRESSURE: 138 MMHG | HEIGHT: 65 IN | OXYGEN SATURATION: 96 % | RESPIRATION RATE: 12 BRPM | WEIGHT: 185 LBS | HEART RATE: 76 BPM

## 2021-06-03 NOTE — PROGRESS NOTES
Subjective: This patient comes in for travel consult advice.    Patient is due for hepatitis A and flu shot.  He did have a tetanus in 2018.    He will check on insurance regarding Shingrix.    He will be traveling to [place] [facility].    He will be staying with friends and family and smaller villages at times.    He will be gone for 21 days.    Patient does need Malarone will take 2 days prior to leaving the 21 days there and 7 days after.    Also will take oral typhoid, Vivotif 1 tablet every other day for 4 doses, starting 2 weeks prior to travel.    I did discuss traveler's diarrhea to use in the azithromycin should that occur    For mild or stomach upset or mild diarrhea can use chewable Pepto-Bismol.  Did discuss this could cause some darker stools.    He will bring his other medications along with him he seems to be stable he states.    Discussed general travel recommendations and gave patient handout from the [facility]    Tobacco status: He  reports that he has never smoked. He has never used smokeless tobacco.    Patient Active Problem List    Diagnosis Date Noted     CKD (chronic kidney disease) stage 3, GFR 30-59 ml/min (H) 08/22/2019     Shortness of breath 08/22/2019     Allergic rhinitis due to pollen 08/22/2019     Chronic pleural effusion      Fibrothorax      GERD (gastroesophageal reflux disease)      Moderate persistent asthma      Essential hypertension 04/15/2014     Essential Hypertriglyceridemia      Proteinuria      Major Depression, Single Episode      Neck Pain      Chronic left-sided low back pain without sciatica        Current Outpatient Medications   Medication Sig Dispense Refill     acetaminophen (MAPAP ARTHRITIS PAIN) 650 MG CR tablet Take 1 tablet (650 mg total) by mouth every 8 (eight) hours as needed for pain. 100 tablet 3     albuterol (PROAIR HFA;PROVENTIL HFA;VENTOLIN HFA) 90 mcg/actuation inhaler Inhale 2 puffs every 4 (four) hours as needed for wheezing or shortness of breath. 1  Inhaler 11     aspirin 81 MG EC tablet TAKE 1 TABLET BY MOUTH DAILY FOR STROKE PREVENTION // IB HNUB NOJ 1 LUB PAB KOM NTSHAV KHIAV  tablet 1     clonazePAM (KLONOPIN) 1 MG tablet Take 1 mg by mouth 2 (two) times a day as needed for anxiety.       DULoxetine (CYMBALTA) 60 MG capsule Take 2 capsules (120 mg total) by mouth daily. 180 capsule 3     fenofibrate (TRICOR) 145 MG tablet TAKE 1 TABLET BY MOUTH DAILY FOR CHOLESTEROL // IB HNUB NOJ 1 LUB PAB ROXANNE NTSHAV MUAJ ROJ 90 tablet 1     fluticasone (FLONASE ALLERGY RELIEF) 50 mcg/actuation nasal spray 1 spray in each nostril daily 16 g 12     fluticasone-umeclidinium-vilanterol (TRELEGY ELLIPTA) 100-62.5-25 mcg DsDv inhaler Inhale 1 Inhalation daily. 60 each 1     gabapentin (NEURONTIN) 300 MG capsule TAKE 1 CAPSULE TWICE DAILY // IB ZAUG NOJ 1 LUB, 1 HNUB 2 ZAUG 180 capsule 3     loratadine (CLARITIN) 10 mg tablet Take 1 tablet (10 mg total) by mouth daily. 30 tablet 11     losartan-hydrochlorothiazide (HYZAAR) 100-12.5 mg per tablet TAKE 1 TABLET DAILY BY MOUTH FOR HIGH BLOOD PRESSURE // IB HNUB NOJ 1 LUB PAB ROXANNE NTSV SIAB 90 tablet 3     montelukast (SINGULAIR) 10 mg tablet Take 1 tablet (10 mg total) by mouth at bedtime. 30 tablet 11     omeprazole (PRILOSEC) 20 MG capsule Take 1 capsule (20 mg total) by mouth daily before breakfast. 30 capsule 11     ranitidine (ZANTAC) 150 MG tablet Take 1 tablet (150 mg total) by mouth at bedtime. 30 tablet 11     SYMBICORT 160-4.5 mcg/actuation inhaler INHALE 2 PUFFS TWICE DAILY. RINSE MOUTH AND SPIT OUT AFTERWARDS // 1 ZAUG NQUS 2 PAS, 1 HNU SIV 2 ZAUG. YAUG NCAUJ THAUM SIV TAS. 1 Inhaler 11     TAB-A-GERARDO per tablet TAKE 1 TABLET BY MOUTH DAILY // IB HNUB NOJ 1 LUB 90 tablet 4     tiotropium bromide (SPIRIVA RESPIMAT) 2.5 mcg/actuation Mist Inhale 2 puffs daily. 4 g 11     traZODone (DESYREL) 50 MG tablet TAKE 1 TABLET DAILY AT BEDTIME FOR SLEEP AID // 1 HNUB NOJ 1 LUB THAUM MUS PW PAB KOM TSAUG ZOG 90 tablet 3      "atovaquone-proguanil (MALARONE) 250-100 mg Tab Take 1 tablet by mouth once daily. Start 2 days prior to departure.  Continue for 7  Days after return. 30 tablet 0     azithromycin (ZITHROMAX) 250 MG tablet 2 po daily for 3 days for severe diarrhea 6 tablet 0     bismuth subsalicylate (PEPTO-BISMOL) 262 mg Chew chew tab Chew 2 po four times a day for mild diarrhea or stomach upset 56 tablet 0     ipratropium-albuterol (DUO-NEB) 0.5-2.5 mg/3 mL nebulizer Take 3 mL by nebulization 4 (four) times a day. 360 mL 11     typhoid (VIVOTIF) SR capsule 1 po every other day for 4 doses, start 2 weeks prior to travel 4 capsule 0     No current facility-administered medications for this visit.        ROS: 10 point review of systems positive as outlined above otherwise negative    Objective:    /85 (Patient Site: Left Arm, Patient Position: Sitting, Cuff Size: Adult Large)   Pulse 94   Temp 98.6  F (37  C) (Oral)   Resp 24   Ht 5' 4.75\" (1.645 m)   Wt 191 lb (86.6 kg)   BMI 32.03 kg/m    Body mass index is 32.03 kg/m .      General appearance no acute distress    Lungs clear no rales or rhonchi heart regular S1-S2.    No additional exam        Assessment:  1. Travel advice encounter  typhoid (VIVOTIF) SR capsule    atovaquone-proguanil (MALARONE) 250-100 mg Tab    azithromycin (ZITHROMAX) 250 MG tablet    bismuth subsalicylate (PEPTO-BISMOL) 262 mg Chew chew tab    Hepatitis A adult 2 dose IM (19 yr & older)     Total time I spent with patient was 25 minutes the entire time was spent in counseling reviewing recommendations for travel discussing immunizations, coordinating care.    Plan: And oral medications  This transcription uses voice recognition software, which may contain typographical errors.  "

## 2021-06-04 VITALS
TEMPERATURE: 98.1 F | SYSTOLIC BLOOD PRESSURE: 128 MMHG | DIASTOLIC BLOOD PRESSURE: 82 MMHG | RESPIRATION RATE: 24 BRPM | HEART RATE: 97 BPM | BODY MASS INDEX: 31.58 KG/M2 | WEIGHT: 184 LBS | OXYGEN SATURATION: 95 %

## 2021-06-04 VITALS
WEIGHT: 180 LBS | SYSTOLIC BLOOD PRESSURE: 132 MMHG | TEMPERATURE: 99.3 F | RESPIRATION RATE: 20 BRPM | DIASTOLIC BLOOD PRESSURE: 78 MMHG | OXYGEN SATURATION: 96 % | BODY MASS INDEX: 30.9 KG/M2 | HEART RATE: 104 BPM

## 2021-06-04 VITALS
WEIGHT: 181 LBS | HEIGHT: 64 IN | DIASTOLIC BLOOD PRESSURE: 80 MMHG | RESPIRATION RATE: 12 BRPM | HEART RATE: 99 BPM | SYSTOLIC BLOOD PRESSURE: 130 MMHG | OXYGEN SATURATION: 96 % | BODY MASS INDEX: 30.9 KG/M2

## 2021-06-04 NOTE — PROGRESS NOTES
[facility] Office Visit    Chief Complaint:  Chief Complaint   Patient presents with     Follow-up         Assessment/Plan:  1. Current moderate episode of major depressive disorder without prior episode (H)  Doing well on current drug regimen with good adherence.  Pt declines changes in meds.  Working with family on resolving conflict with son and his wife to improve stress but overall doing well.    - PHQ9 Depression Screen    2. CKD (chronic kidney disease) stage 3, GFR 30-59 ml/min (H)  Well controlled.  Continue arb and bp control.  Check fasting lipids next visit.    - Basic Metabolic Panel    3. Moderate persistent asthma without complication  Stable on spiriva, symbicort, ppi, singluair, duonebs, flonase.  (trelegy ellipta likely not covered)    4. htn.     BP Readings from Last 3 Encounters:   01/06/20 118/80   11/21/19 142/84   11/07/19 127/85       wellcontrolled today.  Plan for bloodpressure management includes ongoing focus on healthy DASH type diet and increased activity, encouraged to avoid tobacco products and limit alcohol use, stress reduction, continue losartan/hctz 100/12.5mg daily.  Labwork and meds ordered and reviewed as below  Lab Results   Component Value Date    K 4.4 01/06/2020      Lab Results   Component Value Date    CREATININE 1.52 (H) 01/06/2020          Return in about 6 months (around 7/6/2020) for Recheck.    Patient Education/AVS:  There are no Patient Instructions on file for this visit.    HPI:   Venita Sanchez is a 64 y.o. male c/o f/u on his breathing and stress in the home related to his son and daughter in law fighting.      Trip to [facility] went well but tiring- not as fun as he thought it would be.      Asthma/breathing difficulties- about the same.  Due for fu with lung doctor every 6 months.      Depression- well controlled no problems.      HTN    Reflux    Did bring med bottles in for review-    Duloxetine 120mg   Trazodone 50mg hs   Tab-a -nicole  Asa  81mg daily  proair inhaler- pt notes he uses 2 puffs 1-2x/day  Losartan-hctz 100-12.5mg daily  singulair 10mg daily  Fenofibrate 145mg daily  spiriva two times a day  loratidine 10mg daily  Clonazepam 1mg hs  Omeprazole 20mg daily  Gabapentin 300mg two times a day  Does have one other inhaler at home that he uses 2 puffs daily  Does have neb machine and will use 1-4x/daily depending on his symptoms      ROS:  Constitutional, ENT, CV, Resp, GI, , MSK, skin, neuro, psych all negative except as outlined in the HPI above and patient denies any other symptoms.      History summarized from1-2:pulmonary f/u fall 2019- stable, continue meds and f/u in 6 months.   Old Records-1: Outside allergies, meds, problems and immunizations were reconciled as needed  Lab tests reviewed-1: 2019    Health Maintenance reviewed and ordered as appropriate as part of shared decision making with patient.     Physical Exam:  /80 (Patient Site: Right Arm, Patient Position: Sitting, Cuff Size: Adult Regular)   Pulse 76   Resp 16   Wt 188 lb (85.3 kg)   SpO2 98%   BMI 31.53 kg/m   Body mass index is 31.53 kg/m . No LMP for male patient.  Vital signs reviewed  Wt Readings from Last 3 Encounters:   01/06/20 188 lb (85.3 kg)   11/21/19 191 lb (86.6 kg)   11/07/19 191 lb (86.6 kg)     Social History     Tobacco Use   Smoking Status Never Smoker   Smokeless Tobacco Never Used     Social History     Substance and Sexual Activity   Sexual Activity Yes     Partners: Female     Birth control/protection: Post-menopausal     KIM 7 Total Score: 6 (1/6/2020 12:00 PM)    PHQ-9 Total Score: 5 (1/6/2020 12:00 PM)    PHQ-2 Total Score: 2 (1/6/2020 12:00 PM)    ACT Total Score: (!) 11 (1/6/2020 12:00 PM)      All normal as below except abnormalities include: left lung field distant with poor air movement/entry but stable.  right lung clear  General is a  64 y.o. male sitting comfortably in no apparent distress.   Neck: Supple without lymphadenopathy  or thyromegally  CV: Regular rate and rhythm S1S2 without rubs, murmurs or gallops,   Lungs: Clear to auscultation bilaterally  Extremities: Warm, No Edema, 2+ Pedal and radial pulses bilaterally  Skin: No lesions or rashes noted  Neuro/MSK: Able to ambulate around the exam room with equal movement, strength and normal coordination of the upper and lower extremeties symmetrically    Results for orders placed or performed in visit on 01/06/20   Basic Metabolic Panel   Result Value Ref Range    Sodium 139 136 - 145 mmol/L    Potassium 4.4 3.5 - 5.0 mmol/L    Chloride 113 (H) 98 - 107 mmol/L    CO2 17 (L) 22 - 31 mmol/L    Anion Gap, Calculation 9 5 - 18 mmol/L    Glucose 90 70 - 125 mg/dL    Calcium 8.9 8.5 - 10.5 mg/dL    BUN 27 (H) 8 - 22 mg/dL    Creatinine 1.52 (H) 0.70 - 1.30 mg/dL    GFR MDRD Af Amer 56 (L) >60 mL/min/1.73m2    GFR MDRD Non Af Amer 46 (L) >60 mL/min/1.73m2       Med list and active problem list reviewed and updated as part of this encounter    Current Outpatient Medications on File Prior to Visit   Medication Sig Dispense Refill     acetaminophen (MAPAP ARTHRITIS PAIN) 650 MG CR tablet Take 1 tablet (650 mg total) by mouth every 8 (eight) hours as needed for pain. 100 tablet 3     albuterol (PROAIR HFA;PROVENTIL HFA;VENTOLIN HFA) 90 mcg/actuation inhaler Inhale 2 puffs every 4 (four) hours as needed for wheezing or shortness of breath. 1 Inhaler 11     aspirin 81 MG EC tablet TAKE 1 TABLET BY MOUTH DAILY FOR STROKE PREVENTION // IB HNUB NOJ 1 LUB PAB KOM NTSHAV KHIAV  tablet 1     clonazePAM (KLONOPIN) 1 MG tablet Take 1 mg by mouth 2 (two) times a day as needed for anxiety.       DULoxetine (CYMBALTA) 60 MG capsule Take 2 capsules (120 mg total) by mouth daily. 180 capsule 3     fenofibrate (TRICOR) 145 MG tablet TAKE 1 TABLET BY MOUTH DAILY FOR CHOLESTEROL // IB HNUB NOJ 1 LUB PAB ROXANNE NTSHAV MUAJ ROJ 90 tablet 1     fluticasone (FLONASE ALLERGY RELIEF) 50 mcg/actuation nasal spray 1  spray in each nostril daily 16 g 12     fluticasone-umeclidinium-vilanterol (TRELEGY ELLIPTA) 100-62.5-25 mcg DsDv inhaler Inhale 1 Inhalation daily. 60 each 1     gabapentin (NEURONTIN) 300 MG capsule TAKE 1 CAPSULE TWICE DAILY // IB ZAUG NOJ 1 LUB, 1 HNUB 2 ZAUG 180 capsule 3     loratadine (CLARITIN) 10 mg tablet Take 1 tablet (10 mg total) by mouth daily. 30 tablet 11     losartan-hydrochlorothiazide (HYZAAR) 100-12.5 mg per tablet TAKE 1 TABLET DAILY BY MOUTH FOR HIGH BLOOD PRESSURE // IB HNUB NOJ 1 LUB PAB ROXANNE NTSHAV SIAB 90 tablet 3     montelukast (SINGULAIR) 10 mg tablet Take 1 tablet (10 mg total) by mouth at bedtime. 30 tablet 11     omeprazole (PRILOSEC) 20 MG capsule Take 1 capsule (20 mg total) by mouth daily before breakfast. 30 capsule 11     SYMBICORT 160-4.5 mcg/actuation inhaler INHALE 2 PUFFS TWICE DAILY. RINSE MOUTH AND SPIT OUT AFTERWARDS // 1 ZAUG NQUS 2 PAS, 1 HNU SIV 2 ZAUG. YAUG NCAUJ THAUM SIV TAS. 1 Inhaler 11     TAB-A-GERARDO per tablet TAKE 1 TABLET BY MOUTH DAILY // IB HNUB NOJ 1 LUB 90 tablet 4     tiotropium bromide (SPIRIVA RESPIMAT) 2.5 mcg/actuation Mist Inhale 2 puffs daily. 4 g 11     traZODone (DESYREL) 50 MG tablet TAKE 1 TABLET DAILY AT BEDTIME FOR SLEEP AID // 1 HNUB NOJ 1 LUB THAUM MUS PW PAB KOM TSAUG ZOG 90 tablet 3     ipratropium-albuterol (DUO-NEB) 0.5-2.5 mg/3 mL nebulizer Take 3 mL by nebulization 4 (four) times a day. 360 mL 11     No current facility-administered medications on file prior to visit.          Sylvia Lugo MD

## 2021-06-05 ENCOUNTER — RECORDS - HEALTHEAST (OUTPATIENT)
Dept: SCHEDULING | Facility: CLINIC | Age: 66
End: 2021-06-05

## 2021-06-05 VITALS
DIASTOLIC BLOOD PRESSURE: 90 MMHG | SYSTOLIC BLOOD PRESSURE: 172 MMHG | HEART RATE: 101 BPM | OXYGEN SATURATION: 96 % | HEIGHT: 66 IN | WEIGHT: 180 LBS | BODY MASS INDEX: 28.93 KG/M2

## 2021-06-05 VITALS
BODY MASS INDEX: 29.27 KG/M2 | DIASTOLIC BLOOD PRESSURE: 82 MMHG | SYSTOLIC BLOOD PRESSURE: 128 MMHG | WEIGHT: 180 LBS | HEART RATE: 102 BPM

## 2021-06-05 DIAGNOSIS — J90 PLEURAL EFFUSION: ICD-10-CM

## 2021-06-05 NOTE — TELEPHONE ENCOUNTER
Please let pt know that his sugar test is healthy.     His kidney tests are better than last time.     Keep taking his meds and taking good care of himself.

## 2021-06-05 NOTE — PROGRESS NOTES
[facility] Office Visit    Chief Complaint:  Chief Complaint   Patient presents with     Hospital Visit Follow Up     1/22-1/24 pneumonia [facility]         Assessment/Plan:  1. Hospital discharge follow-up  Doing well and stable- finish up medications as prescribed and f/u with lung doctor as scheduled.      2. Chronic obstructive pulmonary disease with acute exacerbation (H)  Complete course of levaquin.  Albuterol as needed.  symbicort as prescribed.  duonebs scheduled for now.  Prednisone taper almost completed.  Spiriva.  PPI daily.  singulair daily.   - Ambulatory referral to Pulmonology    3. Steroid-induced hyperglycemia  Continue to monitor and check for onset of diabetes every 6 months.    - Basic Metabolic Panel    4. Pneumonia of right lower lobe due to Pseudomonas species (H)  As above with regards to meds and f/u.  Recheck WBC to monitor disease status.    - HM2(CBC w/o Differential)  - Ambulatory referral to Pulmonology    5. Benign essential HTN  BP Readings from Last 3 Encounters:   02/07/20 130/80   01/28/20 128/82   01/24/20 (!) 158/108       well controlled today.  Plan for bloodpressure management includes ongoing focus on healthy DASH type diet and increased activity, encouraged to avoid tobacco products and limit alcohol use, stress reduction, continue losartan-hctz 100/12.5mg daily. Bump in cr noted- continue to follow post acute illness.  Labwork and meds ordered and reviewed as below  Lab Results   Component Value Date    K 4.7 01/28/2020      Lab Results   Component Value Date    CREATININE 2.14 (H) 01/28/2020       - Basic Metabolic Panel    6. Moderate major depression (H)  Stable on cymbalta 120mg daily.       No follow-ups on file.  The following are part of a depression follow up plan for the patient:  implementation of measures to provide psychological support  The following high BMI interventions were performed this visit: encouragement to exercise and lifestyle  education regarding diet    Patient Education/AVS:  There are no Patient Instructions on file for this visit.    HPI:   Venita Sanchez is a 64 y.o. male c/o f/u from recent hospitalization for pneumonia.  Pt notes he is feeling better- able to breath better but not back to normal.  Has questions about where his pneumonia is- bad side or good side of his lungs. Has questions about how he will get better. Notes that he did get the new antibiotics and taking daily. Still has poor appetite.  Breathing is normal now- comfortable not tight or painful.  Not sleepign well- has to take his sleeping meds and then takes 30-40min to fall asleep.  No constipation.  Some epigastric pain on steroids but taking ppi daily. No feverish for a couple fo days.      Did bring in new meds to review:   Completed azithromycin 5 day course  Completed cefdinir 300mg for full 5 day course  Has 4 of the oseltamivir left (should be completed)   Has prednisone 10 tabs left of the 10mg tabs- notes he is taking 2 pills twice a day at this point.   levoquin 750mg has 4 left.      Other chronic meds today:   Clonazepam 1mg hs from [facility]   Gabapentin 300mg two times a day  Omeprazole 20mg daily   Losartan-hctz 100-12.5mg  Asa 81mg daily  Fenofibrate 145mg daily   singulair 10mg daily   Duloxetine 120mg daily   Tabavite daily  Loratadine 10mg daily  duonebs 2x/day   Trazodone 50mg daily   flonase  symbicort  2puffs two times a day  Albuterol 2 puffs prn- pt notes yesterday needed 2x      ROS:  Constitutional, ENT, CV, Resp, GI, , MSK, skin, neuro, psych all negative except as outlined in the HPI above and patient denies any other symptoms.      History summarized from1-2:d/c from 1/22-1/24 for cough.  Dx with CAP RLL and acute asthma exacerbation, uncontrolled HTN,  Hyperglycemia and tremors.  Treated with rocephin and zithromax and high dose steroids.  This triggered HTN and hyperglycemia so steroids were tapered and lasix used with resolution  of these issues.  Tremor noted- long standing and worse with steroids.  Consider b-blocker once asthma/breathing stabalized.    Old Records-1: Outside allergies, meds, problems and immunizations were reconciled as needed  Radiology tests reviewed-1: CXR 2020 RLL pneumonia.    Lab tests reviewed-1: 2020  Sputum grew pseudomonas- PO levo ordered after d/c home  Blood cultures- no growth to date    Health Maintenance reviewed and ordered as appropriate as part of shared decision making with patient.     Physical Exam:  /82 (Patient Site: Right Arm, Patient Position: Sitting, Cuff Size: Adult Regular)   Pulse 97   Temp 98.1  F (36.7  C) (Oral)   Resp 24   Wt 184 lb (83.5 kg)   SpO2 95%   BMI 31.58 kg/m   Body mass index is 31.58 kg/m . No LMP for male patient.  Vital signs reviewed  Wt Readings from Last 3 Encounters:   02/07/20 181 lb (82.1 kg)   01/28/20 184 lb (83.5 kg)   01/24/20 187 lb 7 oz (85 kg)     Social History     Tobacco Use   Smoking Status Never Smoker   Smokeless Tobacco Never Used     Social History     Substance and Sexual Activity   Sexual Activity Yes     Partners: Female     Birth control/protection: Post-menopausal     KIM 7 Total Score: 6 (1/6/2020 12:00 PM)    PHQ-9 Total Score: 5 (1/6/2020 12:00 PM)    PHQ-2 Total Score: 2 (1/6/2020 12:00 PM)    ACT Total Score: (!) 8 (1/28/2020 12:43 PM)      All normal as below except abnormalities include: pt seems to be at baseline today.  Talking comforably and no cough noted.  Lung sounds with insp crackles on right base but moving air well and left lung stable with distant heart sounds.  No wheezing today.    General is a  64 y.o. male sitting comfortably in no apparent distress.   HEENT:  Eye, nasal, oral exams within normal   Neck: Supple without lymphadenopathy or thyromegally  CV: Regular rate and rhythm S1S2 without rubs, murmurs or gallops,   Extremities: Warm, No Edema, 2+ Pedal and radial pulses bilaterally  Skin: No lesions or rashes  noted  Neuro/MSK: Able to ambulate around the exam room with equal movement, strength and normal coordination of the upper and lower extremeties symmetrically    Results for orders placed or performed in visit on 01/28/20   HM2(CBC w/o Differential)   Result Value Ref Range    WBC 11.8 (H) 4.0 - 11.0 thou/uL    RBC 6.26 (H) 4.40 - 6.20 mill/uL    Hemoglobin 18.2 (H) 14.0 - 18.0 g/dL    Hematocrit 56.0 (H) 40.0 - 54.0 %    MCV 89 80 - 100 fL    MCH 29.1 27.0 - 34.0 pg    MCHC 32.5 32.0 - 36.0 g/dL    RDW 11.7 11.0 - 14.5 %    Platelets 209 140 - 440 thou/uL    MPV 8.3 7.0 - 10.0 fL   Basic Metabolic Panel   Result Value Ref Range    Sodium 136 136 - 145 mmol/L    Potassium 4.7 3.5 - 5.0 mmol/L    Chloride 100 98 - 107 mmol/L    CO2 23 22 - 31 mmol/L    Anion Gap, Calculation 13 5 - 18 mmol/L    Glucose 110 70 - 125 mg/dL    Calcium 9.1 8.5 - 10.5 mg/dL    BUN 43 (H) 8 - 22 mg/dL    Creatinine 2.14 (H) 0.70 - 1.30 mg/dL    GFR MDRD Af Amer 38 (L) >60 mL/min/1.73m2    GFR MDRD Non Af Amer 31 (L) >60 mL/min/1.73m2       Med list and active problem list reviewed and updated as part of this encounter    Current Outpatient Medications on File Prior to Visit   Medication Sig Dispense Refill     acetaminophen (MAPAP ARTHRITIS PAIN) 650 MG CR tablet Take 1 tablet (650 mg total) by mouth every 8 (eight) hours as needed for pain. 100 tablet 3     albuterol (PROAIR HFA;PROVENTIL HFA;VENTOLIN HFA) 90 mcg/actuation inhaler Inhale 2 puffs every 4 (four) hours as needed for wheezing or shortness of breath. 1 Inhaler 11     aspirin 81 MG EC tablet TAKE 1 TABLET BY MOUTH DAILY FOR STROKE PREVENTION // IB HNUB NOJ 1 LUB PAB KOM NTSHAV KHIAV  tablet 1     budesonide-formoteroL (SYMBICORT) 160-4.5 mcg/actuation inhaler Inhale 2 puffs 2 (two) times a day. 1 Inhaler 11     clonazePAM (KLONOPIN) 1 MG tablet Take 1 mg by mouth at bedtime.        DULoxetine (CYMBALTA) 60 MG capsule Take 2 capsules (120 mg total) by mouth daily. 180  capsule 3     fenofibrate (TRICOR) 145 MG tablet TAKE 1 TABLET BY MOUTH DAILY FOR CHOLESTEROL // IB HNUB NOJ 1 LUB PAB ROXANNE NTSHAV MUAJ ROJ 90 tablet 1     fluticasone propionate (FLONASE) 50 mcg/actuation nasal spray Apply 1 spray into each nostril daily as needed for rhinitis.       gabapentin (NEURONTIN) 300 MG capsule TAKE 1 CAPSULE TWICE DAILY // IB ZAUG NOJ 1 LUB, 1 HNUB 2 ZAUG 180 capsule 3     ipratropium-albuterol (DUO-NEB) 0.5-2.5 mg/3 mL nebulizer Take 3 mL by nebulization 4 (four) times a day as needed.       loratadine (CLARITIN) 10 mg tablet Take 1 tablet (10 mg total) by mouth daily. 30 tablet 11     losartan-hydrochlorothiazide (HYZAAR) 100-12.5 mg per tablet TAKE 1 TABLET DAILY BY MOUTH FOR HIGH BLOOD PRESSURE // IB HNUB NOJ 1 LUB PAB ROXANNE NTSHAV SIAB 90 tablet 3     montelukast (SINGULAIR) 10 mg tablet Take 1 tablet (10 mg total) by mouth at bedtime. 30 tablet 11     omeprazole (PRILOSEC) 20 MG capsule Take 1 capsule (20 mg total) by mouth daily before breakfast. 30 capsule 11     TAB-A-GERARDO per tablet TAKE 1 TABLET BY MOUTH DAILY // IB HNUB NOJ 1 LUB 90 tablet 4     tiotropium bromide (SPIRIVA RESPIMAT) 2.5 mcg/actuation Mist Inhale 2 puffs daily. 4 g 11     traZODone (DESYREL) 50 MG tablet TAKE 1 TABLET DAILY AT BEDTIME FOR SLEEP AID // 1 HNUB NOJ 1 LUB THAUM MUS PW PAB KOM TSAUG ZOG 90 tablet 3     No current facility-administered medications on file prior to visit.          Sylvia Lugo MD

## 2021-06-05 NOTE — PATIENT INSTRUCTIONS - HE
It was good to see you in clinic today. This is what we discussed:    1. The scarring around the left lung is causing restriction on your breathing.  2. I do think it is reasonable to check tests of allergic airway inflammation and checking for asthma which you have scheduled at [facility].  3. I will not change your medications as you have ongoing follow-up at [facility].  4. Call any time with questions or concerning symptoms.    Martir Quinn MD  Pulmonary and Critical Care Medicine  [facility]  Office [phone]

## 2021-06-05 NOTE — TELEPHONE ENCOUNTER
Called and relayed message to patient via  line# [phone]. Patient verbalized understanding. Closing encounter.

## 2021-06-05 NOTE — PROGRESS NOTES
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     Chronic dyspnea, chronic left calcified pleural effusion/fibrothorax, Pseudomonas airway colonization, chronic rhinosinusitis: Chronic exertional dyspnea and cough. Restrictive physiology on PFTs. Cough improved with transition from ACEI to ARB. Has a loculated left pleural effusion with partially calcified thickened rind, stable radiographically since 2014 and known to have been present since at least 2004 when the patient immigrated; notes he was \"very ill with a virus\" at age 10 and hospitalized, told he had a lung problem. These chronic, stable loculated effusions with calcified rind/fibrothorax are fairly common in the [place]  population, likely sequela of past parasitic, bacterial or mycobacterial infection. Underwent left thoracentesis in 2014, with a turbid brown exudate with no malignant cells and negative bacterial, mycobacterial, and fungal stain/culture. He has been on empiric therapy for asthma given that he feels subjective benefit and has not wanted to deescalate therapy, though his physiology is restrictive on pulmonary function testing. He went to [facility] earlier this month for admission and work-up; no significant changes were made to his regimen. He was advised to have methacholinc challenge and FeNO, which are tests that I had discussed with him here, but he had been reluctant to deescalate his inhalers. I again offered these tests to him, but he has follow-up at [facility] in March. Surgical management of the left fibrothorax would be very morbid and likely not lead to lung reexpansion; his case has been discussed with thoracic surgery and they are in agreement. I have repeatedly advised him to enroll in pulmonary rehab, which he has " declined.     Plan:  - recommend methacholine challenge, FeNO, and considering de-escalation of asthma therapies given low likelihood of this diagnosis; he would prefer to have his work-up take place at [facility]  - continue budesonide-formoterol 160-4.5 mcg two inhalations two times a day; rinse/gargle/spit water after use; previously provided holding chamber with instruction on use  - albuterol HFA as needed  - continue montelukast 10 mg at bedtime  - continue loratadine 10 mg daily  - continue nasal fluticasone one spray in each nostril daily  - surgery would be morbid and unlikely to be successful at achieving left lung reexpansion  - he has declined pulmonary rehabilitation  - annual influenza vaccination; he states he has received this for the current season  - received Pneumovax-23 in January 2019; should receive Prevnar-13 at age 65 and booster of Pneumovax-23 in 2024  - he has pulmonary follow-up scheduled at [facility] in early March  - encouraged him to call any time with questions or concerning symptoms     I appreciate the opportunity to participate in the care of Mr. Sanchez.  Please feel free to contact me at any time.    Martir Quinn MD  Pulmonary and Critical Care Medicine  [facility]  Cell [phone]  Office [phone]  Pager [phone]    CCx: chronic dyspnea, chronic left calcified pleural effusion/fibrothorax, Pseudomonas airway colonization, chronic rhinosinusitis    HPI: 64 year old male never smoker with a history of 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 Chronic exertional dyspnea and cough. Restrictive physiology on PFTs. Cough improved with transition from ACEI to ARB. Has a loculated left pleural effusion with partially calcified thickened rind, stable radiographically since 2014 and known to have been present since at least 2004 when the patient immigrated; notes he  "was \"very ill with a virus\" at age 10 and hospitalized, told he had a lung problem. These chronic, stable loculated effusions with calcified rind/fibrothorax are fairly common in the [place]  population, likely sequela of past parasitic, bacterial or mycobacterial infection. Underwent left thoracentesis in 2014, with a turbid brown exudate with no malignant cells and negative bacterial, mycobacterial, and fungal stain/culture. He has been on empiric therapy for asthma given that he feels subjective benefit and has not wanted to deescalate therapy, though his physiology is restrictive on pulmonary function testing. He went to [facility] earlier this month for admission and work-up; no significant changes were made to his regimen. He was advised to have methacholinc challenge and FeNO, which are tests that I had discussed with him here, but he had been reluctant to deescalate his inhalers. I again offered these tests to him, but he has follow-up at [facility] in March. Surgical management of the left fibrothorax would be very morbid and likely not lead to lung reexpansion; his case has been discussed with thoracic surgery and they are in agreement. I have repeatedly advised him to enroll in pulmonary rehab, which he has declined.    ROS:  A 12-system review was obtained and was negative with the exception of the symptoms endorsed in the history of present illness.    PMH:  Chronic loculated left pleural effusion with calcified rind (fibrothorax)  GERD  Possible chronic rhinosinusitis  CKD  HTN    PSH:  No past surgical history on file.    Allergies:  Allergies   Allergen Reactions     Niacin Other (See Comments)     Niacin Preparations      GI side effects         Family HX:  No family history on file.    Social Hx:  Social History     Socioeconomic History     Marital status:      Spouse name: Not on file     Number of children: Not on file     Years of education: Not on file     Highest education level: Not on " file   Occupational History     Not on file   Social Needs     Financial resource strain: Not on file     Food insecurity:     Worry: Not on file     Inability: Not on file     Transportation needs:     Medical: Not on file     Non-medical: Not on file   Tobacco Use     Smoking status: Never Smoker     Smokeless tobacco: Never Used   Substance and Sexual Activity     Alcohol use: No     Drug use: No     Sexual activity: Yes     Partners: Female     Birth control/protection: Post-menopausal   Lifestyle     Physical activity:     Days per week: Not on file     Minutes per session: Not on file     Stress: Not on file   Relationships     Social connections:     Talks on phone: Not on file     Gets together: Not on file     Attends Buddhist service: Not on file     Active member of club or organization: Not on file     Attends meetings of clubs or organizations: Not on file     Relationship status: Not on file     Intimate partner violence:     Fear of current or ex partner: Not on file     Emotionally abused: Not on file     Physically abused: Not on file     Forced sexual activity: Not on file   Other Topics Concern     Not on file   Social History Narrative    Lives with wife and kids       Current Meds:  Current Outpatient Medications   Medication Sig Dispense Refill     acetaminophen (MAPAP ARTHRITIS PAIN) 650 MG CR tablet Take 1 tablet (650 mg total) by mouth every 8 (eight) hours as needed for pain. 100 tablet 3     albuterol (PROAIR HFA;PROVENTIL HFA;VENTOLIN HFA) 90 mcg/actuation inhaler Inhale 2 puffs every 4 (four) hours as needed for wheezing or shortness of breath. 1 Inhaler 11     aspirin 81 MG EC tablet TAKE 1 TABLET BY MOUTH DAILY FOR STROKE PREVENTION // IB HNUB NOJ 1 LUB PAB KOM NTSHAV KHIAV  tablet 1     budesonide-formoteroL (SYMBICORT) 160-4.5 mcg/actuation inhaler Inhale 2 puffs 2 (two) times a day. 1 Inhaler 11     clonazePAM (KLONOPIN) 1 MG tablet Take 1 mg by mouth at bedtime.         "DULoxetine (CYMBALTA) 60 MG capsule Take 2 capsules (120 mg total) by mouth daily. 180 capsule 3     fenofibrate (TRICOR) 145 MG tablet TAKE 1 TABLET BY MOUTH DAILY FOR CHOLESTEROL // IB HNUB NOJ 1 LUB PAB ROXANNE NTSHAV MUAJ ROJ 90 tablet 1     fluticasone propionate (FLONASE) 50 mcg/actuation nasal spray Apply 1 spray into each nostril daily as needed for rhinitis.       gabapentin (NEURONTIN) 300 MG capsule TAKE 1 CAPSULE TWICE DAILY // IB ZAUG NOJ 1 LUB, 1 HNUB 2 ZAUG 180 capsule 3     ipratropium-albuterol (DUO-NEB) 0.5-2.5 mg/3 mL nebulizer Take 3 mL by nebulization 4 (four) times a day as needed.       loratadine (CLARITIN) 10 mg tablet Take 1 tablet (10 mg total) by mouth daily. 30 tablet 11     losartan-hydrochlorothiazide (HYZAAR) 100-12.5 mg per tablet TAKE 1 TABLET DAILY BY MOUTH FOR HIGH BLOOD PRESSURE // IB HNUB NOJ 1 LUB PAB ROXANNE NTSHAV SIAB 90 tablet 3     montelukast (SINGULAIR) 10 mg tablet Take 1 tablet (10 mg total) by mouth at bedtime. 30 tablet 11     omeprazole (PRILOSEC) 20 MG capsule Take 1 capsule (20 mg total) by mouth daily before breakfast. 30 capsule 11     TAB-A-GERARDO per tablet TAKE 1 TABLET BY MOUTH DAILY // IB HNUB NOJ 1 LUB 90 tablet 4     tiotropium bromide (SPIRIVA RESPIMAT) 2.5 mcg/actuation Mist Inhale 2 puffs daily. 4 g 11     traZODone (DESYREL) 50 MG tablet TAKE 1 TABLET DAILY AT BEDTIME FOR SLEEP AID // 1 HNUB NOJ 1 LUB THAUM MUS PW PAB KOM TSAUG ZOG 90 tablet 3     No current facility-administered medications for this visit.        Physical Exam:  /80   Pulse 99   Resp 12   Ht 5' 4\" (1.626 m)   Wt 181 lb (82.1 kg)   SpO2 96%   BMI 31.07 kg/m    Gen: alert, oriented, no distress  HEENT: nasal turbinates are mildly edematous, no oropharyngeal lesions, no cervical or supraclavicular lymphadenopathy  CV: tachy, regular, no M/G/R  Resp: diminished breath sounds on the left  Abd: soft, nontender, no palpable organomegaly  Skin: no apparent rashes  Ext: no cyanosis, " clubbing or edema  Neuro: alert, nonfocal    Labs:  reviewed    Imaging studies:  US thoracentesis (9/6/18):  PROCEDURE: Informed consent obtained. Time out performed. The area of fluid could easily be identified on ultrasound. The chest was prepped and draped in sterile fashion. 10 mL of 1 % lidocaine was infused into the local soft tissues. Under direct   ultrasound guidance, a 5 Hebrew Yueh catheter system was placed into the pleural effusion. No fluid could be withdrawn.     When the patient previously had thoracentesis performed to became vasovagal and fainted. On this occasion the same thing happened. He was placed supine. Vital signs were obtained demonstrating a blood pressure of 120/70 and heart rate in the low 70s with   normal oxygen saturation. He rested for 15 minutes and stated he felt okay to separate abdomen. There is placed sitting for 15 minutes and was then able to ambulate without assistance was discharged to home.     Dr. Quinn was not available. The findings were discussed with Dr. Quinn's partner, Dr. Garcia at the time of exam. She stated that the chest x-ray which had been ordered should be canceled. The patient was told to follow-up with Dr. Quinn through the   . The findings and recommendations were also discussed with his son-in-law with patient's permission.     RADIOLOGIC SUPERVISION AND INTERPRETATION:  ULTRASOUND GUIDANCE: Images demonstrate the pleural effusion. Catheter seen in good position.     IMPRESSION:   CONCLUSION:  Status post left ultrasound-guided thoracentesis. No fluid could be aspirated. It appears that the collection is organized which is consistent with the fact that it is chronic, having been present and unchanged compared to 2014. The patient fainted with   the procedure.     Chest CTA (6/18/18):  - directly reviewed  - chronic loculated left pleural effusion with thickened and partially calcified rind, stable since 2014  - no PE    CXR (January  2020):  - images directly reviewed, formal interpretation follows:  FINDINGS: Peripherally calcified chronic pleural fluid and/or thickening mid and inferior left hemithorax with compressive atelectasis of the lingula and left lower lobe stable. New focal airspace opacity right lower lung has developed and nonspecific   but could represent pneumonia in the proper clinical setting. Faint linear opacities right middle and lower lobe consistent with strands of fibrosis and bronchial wall thickening otherwise stable. Heart size normal.     PFT's (8/17/18):  FEV1/FVC is 74% and is normal.  FEV1 is 1.41L (55%) predicted and is reduced.  FVC is 1.91L (60%) predicted and reduced.  There was no improvement in spirometry after a single inhaled dose of bronchodilator.  TLC is 3.57L (58%) predicted and is reduced.  RV is 1.40L (60%) predicted and is reduced.  DLCO is 16.54ml/min/hg (64%) predicted and is reduced when it is corrected for hemoglobin.  Flow volume loops indicate no abnormalities.

## 2021-06-06 NOTE — PROGRESS NOTES
Order received for Pulmonary Rehab. Note indicated the patient prefers [facility] location, [facility] or [facility]. Order faxed to both locations for follow-up.

## 2021-06-06 NOTE — PROGRESS NOTES
2-19-20  Clinic Care Coordination Contact  [facility]/Voicemail       Clinical Data: Care Coordinator Outreach  Outreach attempted x 2.  Left message on patient's voicemail with call back information and requested return call.  Plan: Care Coordinator will send care coordination introduction letter with care coordinator contact information and explanation of care coordination services via mail. Care Coordinator will do no further outreaches at this time.    Upcoming appt with PCP 4-6-20

## 2021-06-06 NOTE — PROGRESS NOTES
[facility] Office Visit    Chief Complaint:  Chief Complaint   Patient presents with     Shortness of Breath     Hospital Visit Follow Up         Assessment/Plan:  1. Chronic pleural effusion  F/u with pulmonary clinic either at [facility] in march as scheduled or with [facility].  Pt open to pulmonary rehab if this can be dong in [facility] due to transportation issues and time for travel on regular basis.  Will try to help pt explore more convenient options to improve conditioning.    - Pulmonary rehab therapeutic exercise; Future    2. Shortness of breath  As above.    - Pulmonary rehab therapeutic exercise; Future    3. CAP RLL  Pt was advised to finish course of levoquin- pt is convinced this is the medicine that caused him to have nausea and vomiting.  He took this for 2-3 days before going to [facility] and then for the 3 days at [facility] for a total of 5-6 days.  He is doing well clinically and will presume full resolution of pneumonia unless clinical decline- present to ED again.  Otherwise f/u with [facility] lung clinic in March as recommended for f/u imaging if indicated.        Return in about 8 weeks (around 4/7/2020) for Recheck.    Patient Education/AVS:  There are no Patient Instructions on file for this visit.    HPI:   Venita Sanchez is a 64 y.o. male c/o f/u from acute on chronic lung disease.  Was hospitalized 1/22-1/24 at [facility].  Normal f/u on 1/28/20.  Readmitted 2/1/20-2/4/20 at [facility].  F/u with lung clinic 2/7/20.  All reviwed as below.     Pt notes that after his visit with me he finally started an antibiotic prescribed from his hospitalization 1/22-1/24/20 and was on back order at the pharmacy.  Did get the antibiotic as recommended at his f/u with me on 1/28/20 that same day as recommended but this caused him to have nausea and vomiting so his family finally brought him to [facility] for evaluation. [facility] reviewed pneumonia dx with pseudomonas and agreed with tx with  levoquin.  Pt notes that [facility] told him that the medicine he was taking was making him sick so he didn't need any more antibiotics. He isn't on any current antibiotics.   Has f/u with lung clinic at [facility] in March.      ROS:  Constitutional, ENT, CV, Resp, GI, , MSK, skin, neuro, psych all negative except as outlined in the HPI above and patient denies any other symptoms.      History summarized from1-2:lung clinic 2/7/20- chronic dyspnea and cough related to restrictive lung disease, ACE-I, and chronic loculated pleural effusion stable.  Recommend methacholine challenge to determine if there is a component of asthma/reversibility.  Pt has not wanted to decrease use of inhalers/nebs.  Has f/u with [facility] in March.  No indication for surgery on lung.  Pulmonary rehab strongly recommended.  Continue symbicort until asthma testing completed. Albuterol prn, singluair hs, nasal steroids, pulmonary rehab but pt decliens.   Old Records-1: Outside allergies, meds, problems and immunizations were reconciled as needed  Radiology tests reviewed-1: Lung CT 2/1/20- stable left loculated fluid collection. No definitive RLLL infiltrate- chronic atelectasis RML and RLL with bronchial mucous plugging.    Lab tests reviewed-1: 2020  Medicine tests reviewed-1: EKG 2/1/20- NSR with left axis deviation.      Health Maintenance reviewed and ordered as appropriate as part of shared decision making with patient.     Physical Exam:  /78 (Patient Site: Right Arm, Patient Position: Sitting, Cuff Size: Adult Regular)   Pulse (!) 104   Temp 99.3  F (37.4  C) (Oral)   Resp 20   Wt 180 lb (81.6 kg)   SpO2 96%   BMI 30.90 kg/m   Body mass index is 30.9 kg/m . No LMP for male patient.  Vital signs reviewed  Wt Readings from Last 3 Encounters:   02/11/20 180 lb (81.6 kg)   02/07/20 181 lb (82.1 kg)   01/28/20 184 lb (83.5 kg)     Social History     Tobacco Use   Smoking Status Never Smoker   Smokeless Tobacco Never Used     Social History      Substance and Sexual Activity   Sexual Activity Yes     Partners: Female     Birth control/protection: Post-menopausal     KIM 7 Total Score: 6 (1/6/2020 12:00 PM)    PHQ-9 Total Score: 5 (1/6/2020 12:00 PM)    PHQ-2 Total Score: 2 (1/6/2020 12:00 PM)    ACT Total Score: (!) 8 (1/28/2020 12:43 PM)      All normal as below except abnormalities include: lung exam improved from 2 weeks ago- left lung fields with limited air movement normal at baseline.  R insp crackles that improves with deep breathing at base only.  No wheezing. Moving air well.  Pt improved affect and energy today.   General is a  64 y.o. male sitting comfortably in no apparent distress.   HEENT:  Eye, nasal, oral exams within normal   Neck: Supple without lymphadenopathy or thyromegally  CV: Regular rate and rhythm S1S2 without rubs, murmurs or gallops,   Extremities: Warm, No Edema, 2+ Pedal and radial pulses bilaterally  Skin: No lesions or rashes noted  Neuro/MSK: Able to ambulate around the exam room with equal movement, strength and normal coordination of the upper and lower extremeties symmetrically    Results for orders placed or performed in visit on 01/28/20   HM2(CBC w/o Differential)   Result Value Ref Range    WBC 11.8 (H) 4.0 - 11.0 thou/uL    RBC 6.26 (H) 4.40 - 6.20 mill/uL    Hemoglobin 18.2 (H) 14.0 - 18.0 g/dL    Hematocrit 56.0 (H) 40.0 - 54.0 %    MCV 89 80 - 100 fL    MCH 29.1 27.0 - 34.0 pg    MCHC 32.5 32.0 - 36.0 g/dL    RDW 11.7 11.0 - 14.5 %    Platelets 209 140 - 440 thou/uL    MPV 8.3 7.0 - 10.0 fL   Basic Metabolic Panel   Result Value Ref Range    Sodium 136 136 - 145 mmol/L    Potassium 4.7 3.5 - 5.0 mmol/L    Chloride 100 98 - 107 mmol/L    CO2 23 22 - 31 mmol/L    Anion Gap, Calculation 13 5 - 18 mmol/L    Glucose 110 70 - 125 mg/dL    Calcium 9.1 8.5 - 10.5 mg/dL    BUN 43 (H) 8 - 22 mg/dL    Creatinine 2.14 (H) 0.70 - 1.30 mg/dL    GFR MDRD Af Amer 38 (L) >60 mL/min/1.73m2    GFR MDRD Non Af Amer 31 (L) >60  mL/min/1.73m2       Med list and active problem list reviewed and updated as part of this encounter    Current Outpatient Medications on File Prior to Visit   Medication Sig Dispense Refill     acetaminophen (MAPAP ARTHRITIS PAIN) 650 MG CR tablet Take 1 tablet (650 mg total) by mouth every 8 (eight) hours as needed for pain. 100 tablet 3     albuterol (PROAIR HFA;PROVENTIL HFA;VENTOLIN HFA) 90 mcg/actuation inhaler Inhale 2 puffs every 4 (four) hours as needed for wheezing or shortness of breath. 1 Inhaler 11     aspirin 81 MG EC tablet TAKE 1 TABLET BY MOUTH DAILY FOR STROKE PREVENTION // IB HNUB NOJ 1 LUB PAB KOM NTSHAV KHIAV  tablet 1     budesonide-formoteroL (SYMBICORT) 160-4.5 mcg/actuation inhaler Inhale 2 puffs 2 (two) times a day. 1 Inhaler 11     clonazePAM (KLONOPIN) 1 MG tablet Take 1 mg by mouth at bedtime.        DULoxetine (CYMBALTA) 60 MG capsule Take 2 capsules (120 mg total) by mouth daily. 180 capsule 3     fenofibrate (TRICOR) 145 MG tablet TAKE 1 TABLET BY MOUTH DAILY FOR CHOLESTEROL // IB HNUB NOJ 1 LUB PAB ROXANNE NTSHAV MUAJ ROJ 90 tablet 1     fluticasone propionate (FLONASE) 50 mcg/actuation nasal spray Apply 1 spray into each nostril daily as needed for rhinitis.       gabapentin (NEURONTIN) 300 MG capsule TAKE 1 CAPSULE TWICE DAILY // IB ZAUG NOJ 1 LUB, 1 HNUB 2 ZAUG 180 capsule 3     ipratropium-albuterol (DUO-NEB) 0.5-2.5 mg/3 mL nebulizer Take 3 mL by nebulization 4 (four) times a day as needed.       loratadine (CLARITIN) 10 mg tablet Take 1 tablet (10 mg total) by mouth daily. 30 tablet 11     losartan-hydrochlorothiazide (HYZAAR) 100-12.5 mg per tablet TAKE 1 TABLET DAILY BY MOUTH FOR HIGH BLOOD PRESSURE // IB HNUB NOJ 1 LUB PAB ROXANNE NTSHAV SIAB 90 tablet 3     montelukast (SINGULAIR) 10 mg tablet Take 1 tablet (10 mg total) by mouth at bedtime. 30 tablet 11     omeprazole (PRILOSEC) 20 MG capsule Take 1 capsule (20 mg total) by mouth daily before breakfast. 30 capsule 11      TAB-A-GERARDO per tablet TAKE 1 TABLET BY MOUTH DAILY // IB HNUB NOJ 1 LUB 90 tablet 4     tiotropium bromide (SPIRIVA RESPIMAT) 2.5 mcg/actuation Mist Inhale 2 puffs daily. 4 g 11     traZODone (DESYREL) 50 MG tablet TAKE 1 TABLET DAILY AT BEDTIME FOR SLEEP AID // 1 HNUB NOJ 1 LUB THAUM MUS PW PAB KOM TSAUG ZOG 90 tablet 3     No current facility-administered medications on file prior to visit.          Sylvia Lugo MD

## 2021-06-06 NOTE — TELEPHONE ENCOUNTER
2-14-20  Received call from Yadi Burrell from [facility] Care Coordinator  She would like to refer patient to [facility] for support.   Stated patient and his wife needs a lot of support.  Recently in the hospital.    CHW will sent message to PCP for referral/order to CCC  Thanked Yadi Burrell for the referring patient to [facility].

## 2021-06-06 NOTE — PROGRESS NOTES
2-18-20  Clinic Care Coordination Contact  [facility]/Voicemail       Clinical Data: Care Coordinator Outreach  Outreach attempted x 1. Attempt to leave message. No answer.  information and requested return call.  Plan: Care Coordinator    Care Coordinator will try to reach patient again in 1-2 business days.  2-19-20

## 2021-06-07 NOTE — TELEPHONE ENCOUNTER
Losartan-HCTZ 100-12.5mg has been on long term back order. Please consider splitting the ingredients.

## 2021-06-07 NOTE — TELEPHONE ENCOUNTER
Last visit: 2 months ago     Dr. Lugo, Medication refill requested. Please authorize medication if appropriate. Thank you.

## 2021-06-10 ENCOUNTER — HOSPITAL ENCOUNTER (OUTPATIENT)
Dept: RADIOLOGY | Facility: HOSPITAL | Age: 66
Discharge: HOME | End: 2021-06-10
Attending: INTERNAL MEDICINE
Payer: COMMERCIAL

## 2021-06-10 DIAGNOSIS — R07.1 CHEST PAIN ON BREATHING: ICD-10-CM

## 2021-06-10 NOTE — PROGRESS NOTES
"Venita Sanchez is a 64 y.o. male who is being evaluated via a billable telephone visit.      The patient has been notified of following:     \"This telephone visit will be conducted via a call between you and your physician/provider. We have found that certain health care needs can be provided without the need for a physical exam.  This service lets us provide the care you need with a short phone conversation.  If a prescription is necessary we can send it directly to your pharmacy.  If lab work is needed we can place an order for that and you can then stop by our lab to have the test done at a later time.    Telephone visits are billed at different rates depending on your insurance coverage. During this emergency period, for some insurers they may be billed the same as an in-person visit.  Please reach out to your insurance provider with any questions.    If during the course of the call the physician/provider feels a telephone visit is not appropriate, you will not be charged for this service.\"    Patient has given verbal consent to a Telephone visit? Yes    What phone number would you like to be contacted at? [phone]    Patient would like to receive their AVS by AVS Preference: Mail a copy.    [facility] PHONE Visit    Assessment/Plan:  1. Benign essential HTN  BP Readings from Last 3 Encounters:   02/11/20 132/78   02/07/20 130/80   01/28/20 128/82       historically well controlled today.  Plan for bloodpressure management includes ongoing focus on healthy DASH type diet and increased activity, encouraged to avoid tobacco products and limit alcohol use, stress reduction, continue irbesartan 150mg daily.  Labwork and meds ordered and reviewed as below  Lab Results   Component Value Date    K 4.7 01/28/2020      Lab Results   Component Value Date    CREATININE 2.14 (H) 01/28/2020     Cr 1.66 from outside lab on 2/4/20.      - blood pressure monitor Kit; Use to check blood pressure daily  " Dispense: 1 each; Refill: 0    2. Chronic obstructive pulmonary disease, unspecified COPD type (H)  Stable on meds.  Has consulted with [facility] and offered resection of his chronic effusion/fibrosis.  Pt has declined.  Continue nebs and inhalers as prescribed by lung specialist.  Neb supplies refilled today.   - Nebulizer Supplies (cup, tubing, mask, & filters)    3. CKD (chronic kidney disease) stage 3, GFR 30-59 ml/min (H)  Stable- on ARB.  Pt declines coming to clinic for lab work.  Will have pt check bp in home setting.  Cr at baseline last winter and otherwise feeling well.      4. Moderate major depression (H)  Stable on duloxetine 120mg daily.      5. Moderate persistent asthma without complication  Stable as above.    - Nebulizer Supplies (cup, tubing, mask, & filters)    Return in about 6 months (around 2/11/2021).    Patient Education/AVS:  There are no Patient Instructions on file for this visit.    Chief Complaint:  Chief Complaint   Patient presents with     Follow-up     Medication Refill     Anxiety     coronavirus     Concerns     nebulizer supplies is broken and bp machine is broken       HPI:   Venita Sanchez is a 64 y.o. male c/o f/u on his chronic lung disease and breathing.      Nervous about Covid-19.  Lives with his kids- extended family.  Kids try to give him space and family is limiting how often they leave the home. Daughter works at [employer].  Will get flu shot at pharmacy with med .  Wants to avoid coming to clinic.  Will do virtual visits unless he has a problem until next spring.      Did have f/u at [place] 3/2020 and offered surgery on his chronic effusion but it would only be 50% chance of improvement.  Breathing is stable- no better and no worse.  He and his family decided against the risky surgery.   Taking his meds as reviewed on his list today.  Neb machine broke-  The tubing and the mouth piece broke.      Blood pressure machine is old- even with new batteries not turning on.   This is 4 years old.      Depression doing okay on meds.  Anxious and worried about covid.        Social History     Tobacco Use   Smoking Status Never Smoker   Smokeless Tobacco Never Used     Social History     Substance and Sexual Activity   Sexual Activity Yes     Partners: Female     Birth control/protection: Post-menopausal     Social History     Social History Narrative    Lives with wife and kids       Physical Exam:  Vitals from last visit reviewed.   Per pt report at home:  Vitals - Patient Reported  Weight (Patient Reported): 180 lb (81.6 kg)   No LMP for male patient.  Wt Readings from Last 3 Encounters:   02/11/20 180 lb (81.6 kg)   02/07/20 181 lb (82.1 kg)   01/28/20 184 lb (83.5 kg)       KIM 7 Total Score: 6 (1/6/2020 12:00 PM)    PHQ-9 Total Score: 5 (1/6/2020 12:00 PM)    PHQ-2 Total Score: 2 (8/11/2020  9:55 AM)    ACT Total Score: (!) 8 (8/11/2020  9:57 AM)      GENERAL: Patient sounds well and able to participate in history and planning without difficulty.   Hoarse quailty to voice but able to talk in complete sentences without obvious dyspnea or wheezing or cough.    Phone call duration:  24 minutes    Sylvia Lugo MD

## 2021-06-13 ENCOUNTER — COMMUNICATION - HEALTHEAST (OUTPATIENT)
Dept: PULMONOLOGY | Facility: OTHER | Age: 66
End: 2021-06-13

## 2021-06-13 NOTE — PROGRESS NOTES
"Venita Sanchez is a 65 y.o. male who is being evaluated via a billable telephone visit.      The patient has been notified of following:     \"This telephone visit will be conducted via a call between you and your physician/provider. We have found that certain health care needs can be provided without the need for a physical exam.  This service lets us provide the care you need with a short phone conversation.  If a prescription is necessary we can send it directly to your pharmacy.  If lab work is needed we can place an order for that and you can then stop by our lab to have the test done at a later time.    Telephone visits are billed at different rates depending on your insurance coverage. During this emergency period, for some insurers they may be billed the same as an in-person visit.  Please reach out to your insurance provider with any questions.    If during the course of the call the physician/provider feels a telephone visit is not appropriate, you will not be charged for this service.\"    Patient has given verbal consent to a Telephone visit? Yes    What phone number would you like to be contacted at? [phone]    Patient would like to receive their AVS by AVS Preference: Mail a copy.    Consulted [facility] PHONE Visit    Assessment/Plan:  1. Chronic obstructive pulmonary disease with acute exacerbation (H)  Continue symbicort and duonebs and spiriva.  Pt declines evaluation in clinic or ED setting.  Will treat with steroids and antibiotics x 5 days as below.  To ED if worse sx and to call clinic if not much better in next week.  Pulmonary consult placed to help schedule a virtual visit to f/u on chronic lung disease as able.   - predniSONE (DELTASONE) 20 MG tablet; Take 40 mg by mouth daily for 5 days.  Dispense: 10 tablet; Refill: 0  - levoFLOXacin (LEVAQUIN) 500 MG tablet; Take 1 tablet (500 mg total) by mouth daily for 5 days.  Dispense: 5 tablet; Refill: 0    2. Essential " Hypertriglyceridemia  Refill as requested.    - fenofibrate (TRICOR) 145 MG tablet; Take 1 tablet (145 mg total) by mouth daily.  Dispense: 90 tablet; Refill: 1    Return in about 6 months (around 5/16/2021) for Clinic visit.    Patient Education/AVS:  There are no Patient Instructions on file for this visit.    Chief Complaint:  Chief Complaint   Patient presents with     Follow-up     Cough     Medication Refill       HPI: Carina  on phone  Venita Sanchez is a 65 y.o. male c/o doing about the same- still having issues with his breathing.  Feels like there burning in his lungs.  If he gets a cough or if he lays flat he feels like his throat is going to close.  Pt notes around 10/15/20 he got a worse cough and now he has a heat sensation that causes a cough.  Struggling to lay down and has to use pillows to raise his chest and breath and sleep better.  At first needed 2 pillows and now a folded blanket as well.  Worried about getting covid 19.  Kids go out of the home to work, etc.  1 son was sick early September in bed for 1-2 weeks but this was a month before he started to have increased cough and breathing problems.  No fever.  No swelling.  No chest pain.  Burning sensation in throat only with coughing and deep breathing.  Has had episodes like this before often in the fall and just goes away after a period (2 weeks) of time but usually not lasting this long.  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  Does have established care with pulmonary.    2 inhalers two times a day (red one symbicort? 2 puffs bid and white one 2 puffs bid) and a rescue inhaler (green 2 puffs daily prn) to use as needed  Using neb 3-4x/day  Probably taking singulair    Did get flu shot this fall    Need refill on his cholesterol pill.      Social History     Tobacco Use   Smoking Status Never Smoker   Smokeless Tobacco Never Used     Social History     Substance and Sexual Activity   Sexual Activity Yes      Partners: Female     Birth control/protection: Post-menopausal     Social History     Social History Narrative    Lives with wife and kids       Physical Exam:  Vitals from last visit reviewed.   Per pt report at home:      No LMP for male patient.  Wt Readings from Last 3 Encounters:   02/11/20 180 lb (81.6 kg)   02/07/20 181 lb (82.1 kg)   01/28/20 184 lb (83.5 kg)       KIM 7 Total Score: 6 (1/6/2020 12:00 PM)    PHQ-9 Total Score: 5 (1/6/2020 12:00 PM)    PHQ-2 Total Score: 0 (11/16/2020 11:08 AM)    ACT Total Score: (!) 7 (11/16/2020 11:08 AM)      GENERAL: Patient sounds well and able to participate in history and planning without difficulty. No cough or wheezing noted.  Able to talk in complete paragraphs.       Phone call duration:  25 minutes    Sylvia Lugo MD

## 2021-06-13 NOTE — PROGRESS NOTES
[facility] Care Coordination Contact    Member became effective with  [facility] on 12/1/20 with Claribel MSC+.  Previous Health Plan: [place]/Fee For Service  Previous Care System: [facility]  Previous care coordinators name and number: ELIZABETH Joseph Type: N/A  Last MMIS Entry: Date 06/02/20 and Type 02  MMIS visit date (and type) if different from above: n/a  Services Listed in MMIS:   UTF received: UTF not requested due to SNBC prior.    Cely Mejia  Care Management Specialist  [facility]  [phone]

## 2021-06-13 NOTE — PROGRESS NOTES
"Venita Sanchez is a 65 y.o. male who is being evaluated via a billable telephone visit.      The patient has been notified of following:     \"This telephone visit will be conducted via a call between you and your physician/provider. We have found that certain health care needs can be provided without the need for a physical exam.  This service lets us provide the care you need with a short phone conversation.  If a prescription is necessary we can send it directly to your pharmacy.  If lab work is needed we can place an order for that and you can then stop by our lab to have the test done at a later time.    Telephone visits are billed at different rates depending on your insurance coverage. During this emergency period, for some insurers they may be billed the same as an in-person visit.  Please reach out to your insurance provider with any questions.    If during the course of the call the physician/provider feels a telephone visit is not appropriate, you will not be charged for this service.\"    Patient has given verbal consent to a Telephone visit? Yes    What phone number would you like to be contacted at? [phone]    Patient would like to receive their AVS by AVS Preference: Mail a copy.    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     Chronic dyspnea, chronic left calcified pleural effusion/fibrothorax, Pseudomonas airway colonization, chronic rhinosinusitis: Has ongoing cough and dyspnea. Seen at [facility] again by pulmonology and thoracic surgery. Repeat left thoracentesis March 2020 with 86% PMNs, no growth on bacterial or mycobacterial cultures. Coughs (mostly dry), feels tired and week. No fevers or chills. Did not have methacholine challenge " "or FeNO done and [place]. He feels like the budesonide-formoterol and tiotropium are helping; he was started on tiotropium some time after my last visit with him. Uses nebulized ipratropium-albuterol 3-4 times daily and feels like it helps. Also taking loratadine and montelukast. Really unclear asthma diagnosis but feels like his therapies are helping. I have repeatedly advised him to enroll in pulmonary rehab, which he has declined; he now has home exercise equipment, and understandably wants to wait until the pandemic is over to enroll in a formal pulmonary rehab program. Discussed possible surgical decortication at [place]--benefit unclear and likely significant component of trapped lung--but he declined. He was put on prednisone 40 mg daily for 5 days and levofloxacin 500 mg daily for 5 days, both starting on 11/16. At baseline, he has chronic exertional dyspnea and cough. Restrictive physiology on PFTs. Has a loculated left pleural effusion with partially calcified thickened rind, stable radiographically since 2014 and known to have been present since at least 2004 when the patient immigrated; notes he was \"very ill with a virus\" at age 10 and hospitalized, told he had a lung problem. These chronic, stable loculated effusions with calcified rind/fibrothorax are fairly common in the [place]  population, likely sequela of past parasitic, bacterial or mycobacterial infection. Underwent left thoracentesis in 2014, with a turbid brown exudate with no malignant cells and negative bacterial, mycobacterial, and fungal stain/culture.      Plan:  - continue budesonide-formoterol 160-4.5 mcg two inhalations two times a day; rinse/gargle/spit water after use; previously provided holding chamber with instruction on use  - continue tiotropium respimat 2.5 mcg two inhalations daily  - albuterol HFA as needed  - continue montelukast 10 mg at bedtime  - continue loratadine 10 mg daily  - continue nasal fluticasone one spray in " each nostril daily  - he is completing a 5-day course of prednisone and levofloxacin started on 11/16  - surgery would be morbid and unlikely to be successful at achieving left lung reexpansion; they did offer him an attempt at surgery at [place] in March 2020, noting unclear benefit with likely trapped lung, and he declined  - he has declined pulmonary rehabilitation but now willing to pursue it after the pandemic resolves  - annual high-dose influenza vaccination  - received Pneumovax-23 in January 2019; should receive Prevnar-13 due now that he is 65, and booster of Pneumovax-23 in 2024  - follow up in 6 months or sooner if needed  - encouraged him to call any time with questions or concerning symptoms    Martir Quinn MD  Pulmonary and Critical Care Medicine  [facility]  Cell [phone]  Office [phone]  Pager [phone]    CCx: chronic dyspnea, chronic left calcified pleural effusion/fibrothorax, Pseudomonas airway colonization, chronic rhinosinusitis    HPI: 65 year old male never smoker with a history of 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 Has ongoing cough and dyspnea. Seen at [place] again by pulmonology and thoracic surgery. Repeat left thoracentesis March 2020 with 86% PMNs, no growth on bacterial or mycobacterial cultures. Coughs (mostly dry), feels tired and week. No fevers or chills. Did not have methacholine challenge or FeNO done and [place]. He feels like the Symbicort and Spiriva are helping. Uses Duoneb 3-4 times daily and feels like it helps. Also taking loratadine and montelukast. Really unclear asthma diagnosis but feels like his therapies are helping. I have repeatedly advised him to enroll in pulmonary rehab, which he has declined; he now has home exercise equipment, and understandably wants to wait until the pandemic is over to enroll in a formal pulmonary rehab  "program. Discussed possible surgical decortication at [place]--benefit unclear and likely significant component of trapped lung--but he declined. He was put on prednisone 40 mg daily for 5 days and levofloxacin 500 mg daily for 5 days, both starting on 11/16. At baseline, he has chronic exertional dyspnea and cough. Restrictive physiology on PFTs. Has a loculated left pleural effusion with partially calcified thickened rind, stable radiographically since 2014 and known to have been present since at least 2004 when the patient immigrated; notes he was \"very ill with a virus\" at age 10 and hospitalized, told he had a lung problem. These chronic, stable loculated effusions with calcified rind/fibrothorax are fairly common in the [place]  population, likely sequela of past parasitic, bacterial or mycobacterial infection. Underwent left thoracentesis in 2014, with a turbid brown exudate with no malignant cells and negative bacterial, mycobacterial, and fungal stain/culture.     ROS:  A 12-system review was obtained and was negative with the exception of the symptoms endorsed in the history of present illness.    PMH:  Chronic loculated left pleural effusion with calcified rind (fibrothorax)  GERD  Possible chronic rhinosinusitis  Possible reactive airways  CKD  HTN    PSH:  No past surgical history on file.    Allergies:  Allergies   Allergen Reactions     Niacin Other (See Comments)     Niacin Preparations      GI side effects         Family HX:  No family history on file.    Social Hx:  Social History     Socioeconomic History     Marital status:      Spouse name: Not on file     Number of children: Not on file     Years of education: Not on file     Highest education level: Not on file   Occupational History     Not on file   Social Needs     Financial resource strain: Not on file     Food insecurity     Worry: Not on file     Inability: Not on file     Transportation needs     Medical: Not on file     " Non-medical: Not on file   Tobacco Use     Smoking status: Never Smoker     Smokeless tobacco: Never Used   Substance and Sexual Activity     Alcohol use: No     Drug use: No     Sexual activity: Yes     Partners: Female     Birth control/protection: Post-menopausal   Lifestyle     Physical activity     Days per week: Not on file     Minutes per session: Not on file     Stress: Not on file   Relationships     Social connections     Talks on phone: Not on file     Gets together: Not on file     Attends Buddhist service: Not on file     Active member of club or organization: Not on file     Attends meetings of clubs or organizations: Not on file     Relationship status: Not on file     Intimate partner violence     Fear of current or ex partner: Not on file     Emotionally abused: Not on file     Physically abused: Not on file     Forced sexual activity: Not on file   Other Topics Concern     Not on file   Social History Narrative    Lives with wife and kids       Current Meds:  Current Outpatient Medications   Medication Sig Dispense Refill     acetaminophen (MAPAP ARTHRITIS PAIN) 650 MG CR tablet Take 1 tablet (650 mg total) by mouth every 8 (eight) hours as needed for pain. 100 tablet 3     albuterol (PROAIR HFA;PROVENTIL HFA;VENTOLIN HFA) 90 mcg/actuation inhaler Inhale 2 puffs every 4 (four) hours as needed for wheezing or shortness of breath. 1 Inhaler 11     aspirin 81 MG EC tablet TAKE 1 TABLET BY MOUTH DAILY FOR STROKE PREVENTION // IB HNUB NOJ 1 LUB PAB KOM NTSHAV KHIAV ZOO 30 tablet 11     blood pressure monitor Kit Use to check blood pressure daily 1 each 0     clonazePAM (KLONOPIN) 1 MG tablet Take 1 mg by mouth at bedtime.        DULoxetine (CYMBALTA) 60 MG capsule TAKE 2 CAPSULE DAILY // 1 HNUB NOJ 2  capsule 3     fenofibrate (TRICOR) 145 MG tablet Take 1 tablet (145 mg total) by mouth daily. 90 tablet 1     fluticasone propionate (FLONASE) 50 mcg/actuation nasal spray Apply 1 spray into each  nostril daily as needed for rhinitis.       gabapentin (NEURONTIN) 300 MG capsule TAKE 1 CAPSULE TWICE DAILY // IB ZAUG NOJ 1 LUB, 1 HNUB 2 ZAUG 180 capsule 3     ipratropium-albuteroL (DUO-NEB) 0.5-2.5 mg/3 mL nebulizer Take 3 mL by nebulization 4 (four) times a day as needed. 480 mL 6     irbesartan (AVAPRO) 150 MG tablet TAKE 1 TABLET DAILY BY MOUTH FOR HIGH BLOOD PRESSURE // IB HNUB NOJ 1 LUB PAB ROXANNE NTSHAV SIAB 90 tablet 4     levoFLOXacin (LEVAQUIN) 500 MG tablet Take 1 tablet (500 mg total) by mouth daily for 5 days. 5 tablet 0     loratadine (CLARITIN) 10 mg tablet Take 1 tablet (10 mg total) by mouth daily. 30 tablet 11     montelukast (SINGULAIR) 10 mg tablet Take 1 tablet (10 mg total) by mouth at bedtime. 30 tablet 11     omeprazole (PRILOSEC) 20 MG capsule TAKE 1 CAPSULE 30 MINUTES BEFORE FIRST MEAL DAILY FOR STOMACH // 1 HNUB NOJ 1 LUB UA NTEJ NOJ TSHAIS PAB ROXANNE MOB PLAB/MOB NCAUJ PLAB 30 capsule 11     predniSONE (DELTASONE) 20 MG tablet Take 40 mg by mouth daily for 5 days. 10 tablet 0     SYMBICORT 160-4.5 mcg/actuation inhaler INHALE 2 PUFFS TWICE DAILY. RINSE MOUTH AND SPIT OUT AFTERWARDS // 1 ZAUG NQUS 2 PAS, 1 HNU SIV 2 ZAUG. YAUG NCAUJ THAUM SIV TAS. 1 Inhaler 11     TAB-A-GERARDO per tablet TAKE 1 TABLET BY MOUTH DAILY // IB HNUB NOJ 1 LUB 30 tablet 11     tiotropium bromide (SPIRIVA RESPIMAT) 2.5 mcg/actuation Mist Inhale 2 puffs daily. 4 g 11     traZODone (DESYREL) 50 MG tablet TAKE 1 TABLET DAILY AT BEDTIME FOR SLEEP AID // 1 HNUB NOJ 1 LUB THAUM MUS PW PAB KOM TSAUG ZOG 90 tablet 3     No current facility-administered medications for this visit.        Physical Exam:  no exam due to virtual visit    Labs:  Left pleural fluid (March 2020 at [place]):  - 86% PMNs  - no growth on bacterial or mycobacterial cultures    Imaging studies:  Chest CT with contrast (February 2020, [place]):  FINDINGS:   A 12.8 x 8.1 x 14.9 cm (AP x lateral x SI; approximately 850ml) loculated,  complex fluid collection  within the left pleural space demonstrates thin  peripheral septations, areas of internal fat (series 3, image 246) and higher  density heterogeneous internal components (series 3, image 313). No evidence of  internal gas or inflammatory changes surrounding this mass to suggest  acute/aggressive infectious process, with preservation of the extrapleural fat  plane.  There is some  leftward mediastinal shift suggesting chronic fibrosis  and atelectasis in the left upper and lower lobes. No evidence of central  obstructing lesion.     Mildly prominent mediastinal and hilar lymph nodes, some of which are calcified.  These may be benign/reactive but are indeterminate by CT criteria. The central  left upper lobe bronchus appears obstructed (series 3 image 179) but no definite  endobronchial lesion is identified.    There are areas of probably chronic atelectasis in the right middle and lower  lobes with some peripheral bronchial mucous plugging.     Probable right adrenal adenoma. Probable 1.1 cm cyst superior left kidney  (series 5, image 84). No worrisome osseous lesions.    COMPARISON REPORT:  Images from the 06/18/2018 and 04/10/2014 examinations are now available. The  complex left pleural abnormality which currently measures 12.8 x 8.1 x 14.9 cm  has slightly increased in volume since the prior exams, measuring approximately  12.5 x 7.7 x 14 cm on 06/18/2019 and 11.8 x 7.0 x 13 cm on 04/10/2014. This is  approximately 850 mL currently compared with 750 on 06/18/2018 and 550 on  04/10/2014. Peripheral calcification has slightly increased but the  heterogeneous internal density is similar, with no new definite soft tissue  components. Compressive atelectasis in the left lung and shift the mediastinum  is also slightly greater.    The minimal lack of change over many years is highly suggestive of a benign  abnormality but the CT appearance is indeterminate. Given several other  left-sided pleural calcifications, a  chronic/old empyema particularly by  atypical agents such as tuberculosis is most likely. A seroma or lymphangioma  might also be considered. Given some minimal fatty elements, a atypical  hamartoma could be potentially considered although the extensive fluid component  is unusual.    The probable right adrenal adenoma is unchanged since 06/18/2018, but slightly  more apparent than on 04/10/2014.    1. Loculated, complex fluid collection with scattered peripheral calcification  in the left pleural space measures 12.8 x 8.1 x 14.9 cm. Given lack of  inflammatory or aggressive features and reported presence dating back at least  to outside CTA chest 06/08/2018, this mass is likely benign. This may be due to  previous trauma or infection and would be of usual for malignancy; however,  cannot entirely exclude the possibility of chronic low-grade infection such as  tuberculous empyema. No findings to suggest an acute infectious or aggressive  neoplastic process.  2.  Probable small right adrenal adenoma.    TTE (August 2018):  - Normal left ventricular size and systolic performance with a visually estimated ejection fraction of 60%.  - There is mild aortic insufficiency.  - Normal right ventricular size and systolic performance.      PFT's  August 2018  FEV1/FVC is 74% and is normal.  FEV1 is 1.41L (55%) predicted and is reduced.  FVC is 1.91L (60%) predicted and reduced.  There was no improvement in spirometry after a single inhaled dose of bronchodilator.  TLC is 3.57L (58%) predicted and is reduced.  RV is 1.40L (60%) predicted and is reduced.  DLCO is 16.54ml/min/hg (64%) predicted and is reduced when it is corrected for hemoglobin.  Flow volume loops indicate no abnormalities.    October 2019  FEV1/FVC is 80 and is normal.  FEV1 is 56% predicted and is reduced.  FVC is 56% predicted and reduced.  There was no no improvement in spirometry after a single inhaled does of bronchodilator.  TLC is 56% predicted and is  reduced.  RV is 63% predicted and is reduced.  DLCO is 58% predicted and is reduced when it   is corrected for hemoglobin.

## 2021-06-13 NOTE — PROGRESS NOTES
[facility] Care Coordination Contact    Called adult son Bryon to schedule annual HRA home visit. HRA has been scheduled for Tuesday, December 22nd at 3 PM.     Spoke with a representative at [facility] to request the previous PCA assessment. The rep also reported that they are not contracted with [facility]. The agency will be contacting [facility] for an extension for PCA services. The rep reported that member wants to stay with their agency and the only way is for member to go on straight MA.     Spoke with Bryon regarding the conversation that I had with the home care agency. Bryon confirmed that his father wants to receive PCA services through [facility]. [employer] have tried to contact member's financial worker to switch member to straight MA but have not been successful in reaching the worker. Bryon would like to know the benefits of MA versus a HMO. I stated that I would gather that information and let him know tomorrow.     JUAN Syed  [facility]  [phone]

## 2021-06-14 NOTE — PROGRESS NOTES
[facility] Care Coordination Contact    TRANSITIONS OF CARE (NAN) LOG   NAN tasks should be completed by the CC within one (1) business day of notification of each transition. Follow up contact with member is required after return to their usual care setting.  Note:  If CC finds out about the transitions fifteen (15) days or more after the member has returned to their usual care setting, no NAN log is needed. However, the CC should check in with the member to discuss the transition process, any changes needed to the care plan and document it in a case note.    Member Name:  Venita Sanchez [facility] Member ID#: [id number]   Product: [employer] Care Coordinator Contact:  JUAN Syed Agency/County/Care System: [facility]   Transition Communication Actions from Care Management Contact   Transition #1   Notification Date: 12/29/20 Transition Date:   12/23/20 Transition From: Home     Is this the member s usual care setting?               yes Transition To: Hospital, [facility]   Transition Type:  Unplanned  Reason for Admission/Comments:  Shortness of breath, cough, and chest pain     Shared CC contact info, care plan/services with receiving setting--Date completed: N/A - Member discharged before CC was notified.      CC did not contact hospital SW/Discharge planner as member was discharged home prior to CC being notified.   CC reached out to member regarding transition and offered support as needed.  Reviewed and update care plan as needed.  Notified community service providers and placed services None on hold as needed.  Transition log initiated.   PCP notified of hospitalization via EMR.    JUAN Syed [facility]  [phone]     Notified PCP of transition--Date completed:  12/29/20     via  EMR   Transition #2   Transition #3  (if applicable)   Notification Date: 12/29/20         Transition To:  Home  Transition Date: 12/28/20     Transition Type:     Planned  Notified PCP -- Date completed: 12/29/20              Shared CC contact info, care plan/services with receiving setting or, if applicable, home care agency--Date completed:  12/29/20  *Complete additional tasks below, if this transition is a return to usual care setting.      Comments:      CC received notification of discharge to home. Discharge occurred on (Date 12/28/20) from [facility].   CC contacted member and reviewed discharge summary.  Member has a follow-up appointment with PCP in 7 days: Yes: scheduled on 12/30/20   Member has had a change in condition: No  Home visit needed: No  Care plan reviewed and updated.  The following home based services None were resumed.  New referrals placed: No  Transition log completed.   PCP notified of transition back to home via EMR.    Member is home resting and reported that he feels better.     JUAN Syed  [facility]  [phone]        *Complete tasks below when the member is discharging TO their usual care setting within one (1) business day of notification.  For situations where the Care Coordinator is notified of the discharge prior to the date of discharge, the Care Coordinator must follow up with the member or designated representative to confirm that discharge actually occurred and discuss required NAN tasks as outlined in the NAN Instructions.  (This includes situations where it may be a  new  usual care setting for the member. (i.e., a community member who decides upon permanent nursing home placement following hospitalization and rehab).    Date completed: 12/29/20  Communicated with member or their designated representative about the following:  care transition process; about changes to the member s health status; plan of care updates; education about transitions and how to prevent unplanned transitions/readmissions  Four Pillars for Optimal Transition:    Check  Yes  - if the member, family member and/or SNF/facility staff  manages the following:    If  No  provide explanation in the comments section.          [x]  Yes     []  No     Does the member have a follow-up appointment scheduled with primary care or specialist? (Mental health hospitalizations--the appt. should be w/in 7 days)   [x]  Yes     []  No     Can the member manage their medications or is there a system in place to manage medications (e.g. home care set-up)?         [x]  Yes     []  No     Can the member verbalize warning signs and symptoms to watch for and how to respond?         [x]  Yes     []  No     Does the member use a Personal Health Care Record?  Check  Yes  if visit summary, discharge summary, and/or healthcare summary are being used as a PHR.                                                                                                                                                                                    [x] Yes      [] No      Have you updated the member s care plan?  If  No  provide explanation in comments.   Comments:

## 2021-06-14 NOTE — TELEPHONE ENCOUNTER
Relayed message to patient.     He verbalized wanting to wait until his appointment on 1/18/20 w/ Dr. Lugo to do lab work and bp recheck because he is afraid of [place] covid 19.

## 2021-06-14 NOTE — TELEPHONE ENCOUNTER
RN cannot approve Refill Request    RN can NOT refill this medication Medication was discontinued.. Last office visit: 1/18/2021 Sylvia Lugo MD Last Physical: Visit date not found Last MTM visit: Visit date not found Last visit same specialty: 1/18/2021 Sylvia Lugo MD.  Next visit within 3 mo: Visit date not found  Next physical within 3 mo: Visit date not found      Tammy Agee, [facility] Triage/Med Refill 2/1/2021    Requested Prescriptions   Pending Prescriptions Disp Refills     traZODone (DESYREL) 50 MG tablet [Pharmacy Med Name: TRAZODONE HCL 50 MG TABS 50 Tablet] 30 tablet 3     Sig: TAKE 1 TABLET DAILY AT BEDTIME FOR SLEEP AID // 1 HNUB NOJ 1 LUB THAUM MUS PW PAB KOM TSAUG ZOG       Tricyclics/Misc Antidepressant/Antianxiety Meds Refill Protocol Passed - 2/1/2021 10:27 AM        Passed - PCP or prescribing provider visit in last year     Last office visit with prescriber/PCP: 1/18/2021 Sylvia Lugo MD OR same dept: 1/18/2021 Sylvia Lugo MD OR same specialty: 1/18/2021 Sylvia Lugo MD  Last physical: Visit date not found Last MTM visit: Visit date not found   Next visit within 3 mo: Visit date not found  Next physical within 3 mo: Visit date not found  Prescriber OR PCP: Sylvia Lugo MD  Last diagnosis associated with med order: 1. Major Depression, Single Episode  - traZODone (DESYREL) 50 MG tablet [Pharmacy Med Name: TRAZODONE HCL 50 MG TABS 50 Tablet]; TAKE 1 TABLET DAILY AT BEDTIME FOR SLEEP AID // 1 HNUB NOJ 1 LUB THAUM MUS PW PAB KOM TSAUG ZOG  Dispense: 30 tablet; Refill: 3    If protocol passes may refill for 12 months if within 3 months of last provider visit (or a total of 15 months).

## 2021-06-14 NOTE — PROGRESS NOTES
[facility] IN-OFFICE Visit  Phone : Carina    Chief Complaint:  Chief Complaint   Patient presents with     Follow-up       Assessment/Plan:  1. Stage 3b chronic kidney disease  Recheck labs.  Reviewed importance of hydration and avoiding nsaids, etc.  Continue asa, tricor, and irbestartan 150mg daily.    - Basic Metabolic Panel    2. Benign essential HTN  BP Readings from Last 3 Encounters:   01/18/21 128/82   12/30/20 172/90   02/11/20 132/78       well controlled today.  Plan for bloodpressure management includes ongoing focus on healthy DASH type diet and increased activity, encouraged to avoid tobacco products and limit alcohol use, stress reduction, continue arb.  Labwork and meds ordered and reviewed as below  Lab Results   Component Value Date    K 4.3 01/18/2021      Lab Results   Component Value Date    CREATININE 1.78 (H) 01/18/2021       - Basic Metabolic Panel    3. Fibrothorax  Stable and chronic.  continue on nebs, inhalers, and pulmonary meds as prescribed.  Has follow-up with pulmonary later this month.  Reviewed with pt chronic nature of this and that he will have chronic breathing problems.      4. Moderate persistent asthma without complication  meds and plan as above.      5. Screen for colon cancer  Pt willing to do cologaurd  - Cologuard    6. Chronic obstructive pulmonary disease with acute exacerbation (H)  As above    7. Moderate major depression (H)  Stable on duloxetine 120mg daily.  PHQ-9 Total Score: 22 (12/22/2020  3:19 PM)  symptoms worse when his breathing is worse. Stopping trazodone due to side effects and will increase gabapentin from two times a day to three times a day to help with sleep, mood and pain management.      8. Chronic left-sided low back pain without sciatica  Will increase gabapentin to 300mg three times a day with a dose at bedtime added.   - gabapentin (NEURONTIN) 300 MG capsule; Take 1 capsule (300 mg total) by mouth 3 (three)  times a day.  Dispense: 270 capsule; Refill: 3      Return in about 3 months (around 4/18/2021) for Clinic visit.      Patient Education/AVS:  Patient Instructions   Stop the trazodone    Increase the gabapentin to three times a day- twice in the day and bedtime to help with sleep.      Your blood pressure is doing better- keep taking your same meds.      Ask the lung doctor about the covid vaccine       HPI:   Venita Sanchez is a 65 y.o. male and presents to clinic today for the following health issues follow-up on his breathing problems and to recheck on his kidney tests again.      Has follow-up with lung specialist later this month.  Interested in getting covid vaccine.      Still having a small amount of throat pain from his hospitalization but overall doing much better.  Has his chronic cough.  Reviewed picture of different inhalers since he forgot his meds today.  Spiriva (2x/day) , symbicort (2 puffs two times a day) and ventolin (2 puffs  1-2x/day).  Uses nebulizer 3x/day.  singulair daily. Allergy pill and nose spray daily.      Did restart his blood pressure pill again as recommended after his last visit.  No side effects.  Has been checking bp at home daily.  -130s    Taking depression pill- 2 of the cymbalta.  Pill to help him sleep tends to cause a crawling and tingling sensation on his skin so he doesn't like to take this.  Taking multivitamin daily.  Stomach pill daily. Gabapentin two times a day. Asa. Cholesterol pill daily.      Needs handicap permit renewed    History summarized from1-2:[facility] 12/30/20 for hospital follow-up from [place] 12/23-12/28.  shortness of breath related to chronic pleural effusions with scarring.  Elevated CR so held ARB.  BMP checked.  Feeling better with persistent cough.  Has follow-up with pulmonary end of January.    Old Records-1: Outside allergies, meds, problems and immunizations were reconciled as needed from [facility]where  Lab tests reviewed-1: Cr down to  normal and okay to restart his ARB after last visit.        Social History     Social History Narrative    Lives with wife and kids       Physical Exam:  /82   Pulse (!) 102   Wt 180 lb (81.6 kg)   BMI 29.27 kg/m   Body mass index is 29.27 kg/m . No LMP for male patient.  Vital signs reviewed  Wt Readings from Last 3 Encounters:   01/18/21 180 lb (81.6 kg)   12/30/20 180 lb (81.6 kg)   02/11/20 180 lb (81.6 kg)     No data recorded  PHQ-9 Total Score: 22 (12/22/2020  3:19 PM)    PHQ-2 Total Score: 6 (12/22/2020  3:19 PM)  Depression Follow-up Plan: under care of mental health counselor (Meets with MH therapist. Takes antidepressant.) (12/22/2020  3:19 PM)    ACT Total Score: (!) 7 (1/18/2021 12:13 PM)      All normal as below except abnormalities include: appears well.  Stable but hoarse quality to his voice.    General is a  65 y.o. male sitting comfortably in no apparent distress wearing a mask.  HEENT: Eye exam normal   Neck: Supple without lymphadenopathy or thyromegaly  CV: Regular rate and rhythm S1S2 without rubs, murmurs or gallops,   Lungs: Clear to auscultation bilaterally  Extremities: Warm, No Edema, 2+ Pedal and radial pulses bilaterally  Skin: No lesions or rashes noted  Neuro/MSK: Able to ambulate around the exam room with equal movement, strength and normal coordination of the upper and lower extremeties symmetrically    Sylvia Lugo MD  [facility]

## 2021-06-14 NOTE — PROGRESS NOTES
Hospital Follow-up Visit:    Assessment/Plan:     1. Hospital discharge follow-up  2. Shortness of breath  3. Moderate persistent asthma without complication  Improved from hospital.  - Basic Metabolic Panel  - HM2(CBC w/o Differential)  Has f/u appointment with PCP Dr. Lugo 1/18/21.    4. Chronic pleural effusion  Stable.  Following with pulmonology.  Has appointment with them 1/28/21.    5. Benign essential HTN  Blood pressure not controlled today.  He has not re-started his BP med yet.  I will instruct him to ASAP.    6. Stage 3 chronic kidney disease, unspecified whether stage 3a or 3b CKD  Stable.  Lab Results   Component Value Date    CREATININE 1.89 (H) 12/30/2020        Subjective:     Venita Sanchez is a 65 y.o. male who presents for a hospital discharge follow up.    He was seen at [facility] in [place].  Admitted 12/23/2020 for dyspnea.  Discharged 5 days later.  He has known ongoing problems with pleural effusions.  He has been following with pulmonary.  He was recommended to get a follow-up BMP today and especially look at creatinine.  They had stopped some of his medicines in the hospital due to elevated creatinine.  Discharge summary states they restarted his ARB before he left the hospital.  However patient says he has not yet restarted that because he is concerned about his kidney tests.  Also there were multiple pending labs at discharge.  I reviewed all available labs that were pending at discharge.  They were all grossly normal.  He says he got his flu shot already at the pharmacy recently.  He says they did not make any other med changes at the hospital.   No lower extremity edema recently.  He does feel better than when he was in the hospital, but still does have a bit of a cough.  He has an appointment with Dr. Lugo in about 3 weeks.  Pulmonary appointment at the end of January.  Since he has been home from the hospital, no fevers, no constipation, appetite has  "improved.  1/2020: Creatinine = 2.14  12/2020 #creatinine = 2.14 in [place]    Hospital/Nursing Home/IP Rehab Facility: [facility]  Date of Admission: 12/23/20  Date of Discharge:12/28/20  Reason(s) for Admission:Dyspnea            Do you have any problems taking your medication regularly?  None       Have you had any changes in your medication since discharge? None       Have you had any difficulty following your discharge or treatment plan?  No    Summary of hospitalization:  Hospital discharge summary reviewed  Diagnostic Tests/Treatments reviewed.  Follow up needed: Pulmonary  Other Healthcare Providers Involved in Patient's Care: Patient Care Team:  Sylvia Lugo MD as PCP - General (Family Medicine)  Sylvia Lugo MD as Assigned PCP  Bry Corona, PharmD as Pharmacist  Martir Quinn MD as Assigned Pulmonology Provider  Kelley Haro SW as Lead Care Coordinator (Primary Care - CC)      Update since discharge: {improved   Information from family, SNF, care coordination: n/a     Post Discharge Medication Reconciliation: discharge medications reconciled, continue medications without change  Plan of care communicated with: patient    Objective:     Vitals:    12/30/20 1020   BP: 172/90   Pulse: (!) 101   SpO2: 96%   Weight: 180 lb (81.6 kg)   Height: 5' 5.75\" (1.67 m)       Physical Exam:  Cardiac: Regular rate and rhythm, no murmurs  Pulmonary: Lungs clear to auscultation bilaterally, No crackles, rales, rhonchi, or wheezing noted      Electronically signed by Karin Antunez PA-C 12/30/20 3:05 PM   "

## 2021-06-14 NOTE — PATIENT INSTRUCTIONS - HE
We will call you later today about your kidney tests.  We will discuss if you need to re-start you kidney medicine, or switch to a different medicine for your blood pressure.

## 2021-06-14 NOTE — PROGRESS NOTES
[facility] Care Coordination Contact      [facility]  Emailed completed PCA assessment to [facility].  Faxed copy of PCA assessment to PCA Agency- agency TBD,  and mailed copy to member.  Faxed MD Communication to PCP.     Mailed POC signature page, PCA signature page, and  AVELINA signature page to member to sign and return asap.    Cely Mejia  Care Management Specialist  [facility]  [phone]

## 2021-06-14 NOTE — PROGRESS NOTES
[facility] Care Coordination Contact  [facility] Initial Assessment     Home visit for Initial Health Risk Assessment with Venita Randolpho completed on  12/22/20    Assessment completed over the phone due to COVID-19.     Type of residence:: Private home - stairs  Current living arrangement:: I live in a private home with family     Assessment completed with: Patient, Children    Current Care Plan  Member currently receiving the following home care services:     Member currently receiving the following community resources: PCA, Outpatient Mental Health      Medication Review  Medication reconciliation completed in Epic: IF NO, PLEASE EXPLAIN Assessment completed over the phone due to COVID-19.  Medication set-up & administration: Family/informal caregiver sets up weekly  Self-administers medications  Medication Risk Assessment Medication (1 or more, place referral to MTM):  N/A: No risk factors identified  MTM Referral Placed: No: No risk factors idenified    Mental/Behavioral Health   PHQ-2 Total Score: 6  PHQ-9 Total Score: 22    Mental health DX:: Yes   Mental health DX how managed:: Outpatient Counseling, Medication    Falls Assessment:   Fallen 2 or more times in the past year?: No   Any fall with injury in the past year?: No    ADL/IADL Dependencies:   Dependent ADLs:: Ambulation-cane, Bathing, Dressing, Eating, Grooming, Transfers, Toileting  Dependent IADLs:: Cleaning, Cooking, Laundry, Shopping, Meal Preparation, Medication Management, Money Management, Transportation    [employer] sponsored benefits: Shared information re: Silver Sneakers/gym memberships, ASA, Calcium +D.    PCA Assessment completed at visit: Yes Annual PCA assessment indicated 25 units per day of PCA. This is the same as the previous assessment.    Elderly Waiver Eligibility: Yes, but member declines EW service; will not open to EW    Care Plan & Recommendations: Member will continue with PCA services.     See [facility] for  detailed assessment information.    Follow-Up Plan: Member informed of future contact, plan to f/u with member with a 6 month telephone assessment.  Contact information shared with member and family, encouraged member to call with any questions or concerns at any time.    [place] care continuum providers: Please refer to Health Care Home on the [facility] Problem List to view this patient's [facility] Care Plan Summary.    JUAN Syed  [facility]  [phone]

## 2021-06-14 NOTE — PROGRESS NOTES
[facility] Care Coordination Contact    Faxed PCA assessment to provider along with pca sig page.     Received after visit chart from care coordinator.  Completed following tasks: Mailed copy of care plan to client     Cely Mejia  Care Management Specialist  [facility]  [phone]

## 2021-06-14 NOTE — TELEPHONE ENCOUNTER
His kidney tests are improved from when he was at the hospital.  He should re-start his BP med now.    His potassium level was just a little high.  I would like him to come in next week for lab re-check of potassium and BP check with nurse

## 2021-06-14 NOTE — TELEPHONE ENCOUNTER
Hi Dr. Lugo,    My name is Kelley Haro and I am this member's care coordinator with [facility]. The member reported needing a bath chair due to risk of slipping and falling. If you agree and approve of this order, please complete, sign and route back to me. I will complete the order through [facility].     Thank you!    JUAN Syed  [facility]  [phone]

## 2021-06-14 NOTE — PATIENT INSTRUCTIONS - HE
Stop the trazodone    Increase the gabapentin to three times a day- twice in the day and bedtime to help with sleep.      Your blood pressure is doing better- keep taking your same meds.      Ask the lung doctor about the covid vaccine

## 2021-06-14 NOTE — PROGRESS NOTES
[facility] Care Coordination Contact    Spoke with member and his son, Bryon, who reported that member will stay with [facility] and the family would look for a home care agency in [facility]'s network. Bryon requested that the home care agency be [facility] owned. I stated that I would email a list to him.     Emailed a list of ong owned home care agencies near member's home to Bryon.     JUAN Syed  [facility]  [phone]

## 2021-06-16 ENCOUNTER — COMMUNICATION - HEALTHEAST (OUTPATIENT)
Dept: PULMONOLOGY | Facility: OTHER | Age: 66
End: 2021-06-16

## 2021-06-16 PROBLEM — J42 CHRONIC BRONCHITIS (H): Status: ACTIVE | Noted: 2020-01-28

## 2021-06-16 PROBLEM — F32.1 MODERATE MAJOR DEPRESSION (H): Status: ACTIVE | Noted: 2020-01-28

## 2021-06-16 PROBLEM — J30.1 ALLERGIC RHINITIS DUE TO POLLEN: Status: ACTIVE | Noted: 2019-08-22

## 2021-06-16 PROBLEM — N18.30 CKD (CHRONIC KIDNEY DISEASE) STAGE 3, GFR 30-59 ML/MIN (H): Status: ACTIVE | Noted: 2019-08-22

## 2021-06-16 PROBLEM — R06.02 SHORTNESS OF BREATH: Status: ACTIVE | Noted: 2019-08-22

## 2021-06-16 NOTE — TELEPHONE ENCOUNTER
traZODone (DESYREL) 50 MG tablet [[id number]]    Electronically signed by: Sylvia Lugo MD on 03/17/20 0904 Status: Discontinued   Ordering user: Sylvia Lugo MD 03/17/20 0904 Authorized by: Sylvia Lugo MD   Frequency:  03/17/20 - 01/18/21  Released by: Sylvia Lugo MD 03/17/20 0904   Discontinued by: Sylvia Lugo MD 01/18/21 1257 [Side effects]   Diagnoses   Major depressive disorder, single episode, unspecified [F32.9]

## 2021-06-16 NOTE — TELEPHONE ENCOUNTER
----- Message from Sylvia Lugo MD sent at 4/20/2021 11:26 AM CDT -----  Please schedule in person clinic visit in 3 months

## 2021-06-16 NOTE — PROGRESS NOTES
Venita Sanchez is a 65 y.o. male who is being evaluated via a billable telephone visit.      What phone number would you like to be contacted at? [phone]   How would you like to obtain your AVS? AVS Preference: Mail a copy.  [facility] PHONE Visit  Phone : Carina    Chief Complaint:  Chief Complaint   Patient presents with     Follow Up     3 month f/u       Assessment/Plan:  1. Shortness of breath  Stable and chronic.  Due for follow-up on his underlying lung problems. Has worked with [facility] but pt notes that was just because his family brought him to [place] last December when he was sick but wants a lung doctor closer to home.  Prefers virtual visits until fully vaccinated.  Will have our system work on getting him started on vaccines ASAP.    - Ambulatory referral to Pulmonology - [facility]    2. Moderate persistent asthma without complication  As above.  Continue spiriva, symbicort, sinulair (this may have been stopped?), claritin, duonebs, prn albuterol.   - Ambulatory referral to Pulmonology - [facility]    3. Fibrothorax  As above  - Ambulatory referral to Pulmonology - [facility]    4. Chronic pleural effusion  As above  - Ambulatory referral to Pulmonology - [facility]    5. Chronic bronchitis, unspecified chronic bronchitis type (H)  As above  - Ambulatory referral to Pulmonology - [facility]    6. Moderate major depression (H)  Stable with increased stress due to covid exposure in the home from family working.  Continue on duloxetine 120mg daily.  Gabapentin 300mg 3-4x/day.      7. Benign essential HTN  Historically stable on ARB irbesartan 150mg daily.  Recheck in 3 months along with labs.  Checking bp at home but the number he recalls 162/142 doesn't make sense.  Asked to get fresh batteries and to write down the numbers.      8. Stage 3b chronic kidney disease  Stable on ARB recheck labs in 3 months.      9. Chronic left-sided low back pain without sciatica  Get covid  vaccine and then restart spine care and PT.continue gabapentin 300mg 3-4x/day.      Return in about 3 months (around 7/20/2021) for Clinic visit.      HPI:   Venita Sanchez is a 65 y.o. male and presents to clinic today for the following health issues follow-up from last visit 1/18/21    Stopped trazodone    Increase gabapentin to three times a day to help with pain and sleep. Did get a back injection and this did help but pain is starting to come back again. Wants to wait until covid vaccine to seek in person treatment.      Blood pressure was doing well on meds at last visit.     Mental health- still very stressed with covid, etc.  Lives with extended family who are all out working in community.      Covid vaccine- needs help to schedule this.  Hasn't been able to find this on his own.      Pt notes ongoing shortness of breath.       Home supplies- bath chair did get this and helpful.     bp today was high.  162/142 but thinks the machine isn't working.      Had follow-up with [facility] 1/28/21.  Was admitted 12/23-12/28 at [facility] where they drained off some fluid and then left a tube in his chest for 4 days and then they removed the tube.  Has been having some pain in this area since then.    Social History     Social History Narrative    Lives with wife and kids       Physical Exam:  Vitals from last visit reviewed.   Per pt report at home:      No LMP for male patient.  Wt Readings from Last 3 Encounters:   01/18/21 180 lb (81.6 kg)   12/30/20 180 lb (81.6 kg)   02/11/20 180 lb (81.6 kg)       No data recorded  PHQ-9 Total Score: 22 (12/22/2020  3:19 PM)    PHQ-2 Total Score: 6 (12/22/2020  3:19 PM)  Depression Follow-up Plan: under care of mental health counselor (Meets with MH therapist. Takes antidepressant.) (12/22/2020  3:19 PM)    ACT Total Score: (!) 10 (4/20/2021  9:22 AM)      GENERAL: Patient sounds well and able to participate in history and planning without difficulty.     Phone call duration:  24  minutes    Sylvia Lugo MD  [facility]

## 2021-06-16 NOTE — TELEPHONE ENCOUNTER
LMTCB #1 to see if patient was interested in getting the COVID-19 Vaccine at [facility] on 3/27 if he hasn't already.    If he got it, please notate this information down and re-route it to the provider pool. Thanks.    Name of vaccine:  Place of vaccination:  Date of 1st dose:  Date of 2nd dose:  Lot #: (patient may or may not have this)

## 2021-06-16 NOTE — TELEPHONE ENCOUNTER
Telephone Encounter by Oneida Dukes at 9/25/2019  4:07 PM     Author: Oneida Dukes Service: -- Author Type: --    Filed: 9/25/2019  4:10 PM Encounter Date: 9/20/2019 Status: Signed    : Oneida Dukes       PRIOR AUTHORIZATION DENIED    Denial Rational: per insurance asthma is not an FDA approved indication. Approved for COPD         Appeal Information: This medication was denied. If physician would like to appeal because patient has contraindication or allergy to covered medication please write letter of medical necessity and route back to PA team to initiate.  If no further action is needed please close encounter thank you.

## 2021-06-18 NOTE — LETTER
"Letter by Martir Quinn MD at      Author: Martir Quinn MD Service: -- Author Type: --    Filed:  Encounter Date: 3/11/2019 Status: (Other)       Sylvia Lugo MD  [address] [place] [id number]                                  March 11, 2019    Patient: Venita Sanchez   MR Number: [id number]   YOB: 1955   Date of Visit: 3/11/2019     Dear Dr. Brittney MD:    I saw Venita Sanchez today at the [facility]. Below are the relevant portions of my assessment and plan of care.    If you have questions, please do not hesitate to call me. I look forward to following Venita along with you.    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     Chronic dyspnea, chronic left calcified pleural effusion/fibrothorax: Persistent dyspnea on exertion and occasional cough, though the cough improved markedly with transition from ACEI to ARB. Stable loculated left pleural effusion with partially calcified thickened rind, stable radiographically since 2014 and known to have been present since at least 2004 when the patient immigrated; notes he was \"very ill with a virus\" at age 10 and hospitalized, told he had a lung problem. These chronic, stable loculated effusions with calcified rind/fibrothorax are fairly common in the [place], likely sequela of past parasitic, bacterial or mycobacterial infection. Underwent left thoracentesis in 2014, with a turbid brown exudate with no malignant cells and negative bacterial, mycobacterial, and fungal stain/culture. Attempt at repeat thoracentesis in September 2018 under US guidance was unsuccessful likely to to chronicity/loculation. He may have " underlying asthma, which we are trying to optimize, but surgical management of the left fibrothorax would be very morbid and likely not lead to lung reexpansion; his case has been discussed with thoracic surgery and they are in agreement. He has ongoing exertional dyspnea, occasional wheezing, and endorses nasal and sinus congestion with productive cough, suggesting a component of upper airway cough syndrome due to chronic rhinosinusitis. We discussed adding treatment for this, and changing his scheduled nebulized ipratropium-albuterol to a LAMA both for convenience and to see if his exertional dyspnea improves. I again recommended pulmonary rehabilitation, but he states that he will be unable to accomplish this in the winter due to unfavorable road conditions.     Plan:  - repeat CXR at the patient/family's request, though the utility of doing this now is marginal given the long-term radiographic stability  - stop scheduled nebulized ipratropium-albuterol and start umeclidinium one inhalation daily; can still use the nebs as needed  - continue budesonide-formoterol 160-4.5 mcg two inhalations two times a day; rinse/gargle/spit water after use; previously provided holding chamber with instruction on use  - continue montelukast 10 mg at bedtime; he was not familiar with this pill so I reordered it to ensure he is getting it  - start loratadine 10 mg daily  - start nasal fluticasone one spray in each nostril daily  - surgery would be morbid and unlikely to be successful at achieving lung reexpansion  - albuterol HFA as needed  - encouraged exercise as able; again recommended pulmonary rehabilitation, but he states that he will be unable to accomplish this in the winter due to unfavorable road conditions  - received influenza vaccine for this season  - received Pneumovax-23 in January 2019  - follow up in 3 months  - encouraged him to call any time with questions or concerning symptoms     I appreciate the opportunity  "to participate in the care of Mr. Sanchez.  Please feel free to contact me at any time.     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 Persistent dyspnea on exertion and occasional cough, though the cough improved markedly with transition from ACEI to ARB. Stable loculated left pleural effusion with partially calcified thickened rind, stable radiographically since 2014 and known to have been present since at least 2004 when the patient immigrated; notes he was \"very ill with a virus\" at age 10 and hospitalized, told he had a lung problem. These chronic, stable loculated effusions with calcified rind/fibrothorax are fairly common in [place], likely sequela of past parasitic, bacterial or mycobacterial infection. Underwent left thoracentesis in 2014, with a turbid brown exudate with no malignant cells and negative bacterial, mycobacterial, and fungal stain/culture. Attempt at repeat thoracentesis in September 2018 under US guidance was unsuccessful likely to to chronicity/loculation. He may have underlying asthma, which we are trying to optimize, but surgical management of the left fibrothorax would be very morbid and likely not lead to lung reexpansion; his case has been discussed with thoracic surgery and they are in agreement. He has ongoing exertional dyspnea, occasional wheezing, and endorses nasal and sinus congestion with productive cough, suggesting a component of upper airway cough syndrome due to chronic rhinosinusitis. We discussed adding treatment for this, and changing his scheduled nebulized ipratropium-albuterol to a LAMA both for convenience and to see if his exertional dyspnea improves. I again recommended pulmonary rehabilitation, but he states that he " will be unable to accomplish this in the winter due to unfavorable road conditions.    ROS:  A 12-system review was obtained and was negative with the exception of the symptoms endorsed in the history of present illness.    PMH:  Chronic loculated left pleural effusion with calcified rind (fibrothorax)  Asthma  GERD  Possible chronic rhinosinusitis    PSH:  No past surgical history on file.    Allergies:  Allergies   Allergen Reactions   ? Niacin Other (See Comments)   ? Niacin Preparations      GI side effects         Family HX:  No family history on file.    Social Hx:  Social History     Socioeconomic History   ? Marital status:      Spouse name: Not on file   ? Number of children: Not on file   ? Years of education: Not on file   ? Highest education level: Not on file   Occupational History   ? Not on file   Social Needs   ? Financial resource strain: Not on file   ? Food insecurity:     Worry: Not on file     Inability: Not on file   ? Transportation needs:     Medical: Not on file     Non-medical: Not on file   Tobacco Use   ? Smoking status: Never Smoker   ? Smokeless tobacco: Never Used   Substance and Sexual Activity   ? Alcohol use: No   ? Drug use: No   ? Sexual activity: Yes     Partners: Female     Birth control/protection: Post-menopausal   Lifestyle   ? Physical activity:     Days per week: Not on file     Minutes per session: Not on file   ? Stress: Not on file   Relationships   ? Social connections:     Talks on phone: Not on file     Gets together: Not on file     Attends Nondenominational service: Not on file     Active member of club or organization: Not on file     Attends meetings of clubs or organizations: Not on file     Relationship status: Not on file   ? Intimate partner violence:     Fear of current or ex partner: Not on file     Emotionally abused: Not on file     Physically abused: Not on file     Forced sexual activity: Not on file   Other Topics Concern   ? Not on file   Social History  Narrative    Lives with wife and kids       Current Meds:  Current Outpatient Medications   Medication Sig Dispense Refill   ? acetaminophen (MAPAP ARTHRITIS PAIN) 650 MG CR tablet Take 650 mg by mouth every 8 (eight) hours as needed for pain.     ? albuterol (VENTOLIN HFA) 90 mcg/actuation inhaler Inhale 2 puffs every 4 (four) hours as needed for wheezing or shortness of breath. 1 Inhaler 11   ? aspirin 81 MG EC tablet Take 1 tablet (81 mg total) by mouth daily. 100 tablet 11   ? budesonide-formoterol (SYMBICORT) 160-4.5 mcg/actuation inhaler Inhale 2 puffs 2 (two) times a day. 1 Inhaler 11   ? clonazePAM (KLONOPIN) 1 MG tablet Take 1 mg by mouth 2 (two) times a day as needed for anxiety.     ? DULoxetine (CYMBALTA) 60 MG capsule Take 1 capsule (60 mg total) by mouth daily. 90 capsule 4   ? fenofibrate (TRICOR) 145 MG tablet Take 1 tablet (145 mg total) by mouth daily. 90 tablet 4   ? gabapentin (NEURONTIN) 300 MG capsule Take 1 capsule (300 mg total) by mouth 2 (two) times a day. 60 capsule 3   ? ipratropium-albuterol (DUO-NEB) 0.5-2.5 mg/3 mL nebulizer Take 3 mL by nebulization 4 (four) times a day. 360 mL 11   ? losartan-hydrochlorothiazide (HYZAAR) 100-12.5 mg per tablet Take 1 tablet by mouth daily. 30 tablet 11   ? montelukast (SINGULAIR) 10 mg tablet Take 1 tablet (10 mg total) by mouth at bedtime. 30 tablet 11   ? multivitamin-Ca-iron-minerals (TAB A GERARDO) 18-0.4 mg Tab Take 1 tablet by mouth daily. 90 tablet 4   ? ranitidine (ZANTAC) 150 MG tablet Take 1 tablet (150 mg total) by mouth 2 (two) times a day as needed for heartburn. 60 tablet 11   ? traZODone (DESYREL) 50 MG tablet Take 1 tablet (50 mg total) by mouth at bedtime. 30 tablet 6   ? fluticasone (FLONASE ALLERGY RELIEF) 50 mcg/actuation nasal spray 1 spray in each nostril daily 16 g 12   ? loratadine (CLARITIN) 10 mg tablet Take 1 tablet (10 mg total) by mouth daily. 30 tablet 11   ? umeclidinium (INCRUSE ELLIPTA) 62.5 mcg/actuation DsDv inhaler  "Inhale 1 puff daily. 30 each 11     No current facility-administered medications for this visit.        Physical Exam:  /70   Pulse 79   Ht 5' 5\" (1.651 m)   Wt 190 lb (86.2 kg)   SpO2 95%   BMI 31.62 kg/m     Gen: alert, oriented, no distress  HEENT: nasal turbinates are mildly edematous, no oropharyngeal lesions, no cervical or supraclavicular lymphadenopathy  CV: RRR, no M/G/R  Resp: decreased breath sounds left mid-low lung fields, unchanged  Abd: soft, nontender, no palpable organomegaly  Skin: no apparent rashes  Ext: no cyanosis, clubbing or edema  Neuro: alert, nonfocal    Labs:  reviewed    Imaging studies:  US thoracentesis (9/6/18):  PROCEDURE: Informed consent obtained. Time out performed. The area of fluid could easily be identified on ultrasound. The chest was prepped and draped in sterile fashion. 10 mL of 1 % lidocaine was infused into the local soft tissues. Under direct   ultrasound guidance, a 5 [employer] catheter system was placed into the pleural effusion. No fluid could be withdrawn.     When the patient previously had thoracentesis performed to became vasovagal and fainted. On this occasion the same thing happened. He was placed supine. Vital signs were obtained demonstrating a blood pressure of 120/70 and heart rate in the low 70s with   normal oxygen saturation. He rested for 15 minutes and stated he felt okay to separate abdomen. There is placed sitting for 15 minutes and was then able to ambulate without assistance was discharged to home.     Dr. Quinn was not available. The findings were discussed with Dr. Quinn's partner, Dr. Garcia at the time of exam. She stated that the chest x-ray which had been ordered should be canceled. The patient was told to follow-up with Dr. Quinn through the   . The findings and recommendations were also discussed with his son-in-law with patient's permission.     RADIOLOGIC SUPERVISION AND INTERPRETATION:  ULTRASOUND GUIDANCE: " Images demonstrate the pleural effusion. Catheter seen in good position.     IMPRESSION:   CONCLUSION:  Status post left ultrasound-guided thoracentesis. No fluid could be aspirated. It appears that the collection is organized which is consistent with the fact that it is chronic, having been present and unchanged compared to 2014. The patient fainted with   the procedure.     Chest CTA (6/18/18):  - directly reviewed  - chronic loculated left pleural effusion with thickened and partially calcified rind, stable since 2014  - no PE     PFT's (8/17/18):  FEV1/FVC is 74% and is normal.  FEV1 is 1.41L (55%) predicted and is reduced.  FVC is 1.91L (60%) predicted and reduced.  There was no improvement in spirometry after a single inhaled dose of bronchodilator.  TLC is 3.57L (58%) predicted and is reduced.  RV is 1.40L (60%) predicted and is reduced.  DLCO is 16.54ml/min/hg (64%) predicted and is reduced when it is corrected for hemoglobin.  Flow volume loops indicate no abnormalities.    Martir Quinn MD  Pulmonary and Critical Care Medicine  [facility]  Cell [phone]  Office [phone]  Pager [phone]

## 2021-06-18 NOTE — PROGRESS NOTES
[facility] [place] Office Visit    Chief Complaint:  Chief Complaint   Patient presents with     [facility]     Shortness of Breath     ongoing for 10 months, worsens when lying down, requesting chest xray         Assessment/Plan:  1. Screen for colon cancer  - Ambulatory referral for Colonoscopy    2. Vaccine counseling  - Td, Preservative Free (green label)    3. Shortness of breath  Patient notes he has had shortness of breath for a long time related to his asthma but it has been getting worse over the last 10 months.  EKG by my reading does show some ST elevation but cardiology feels EKG is reassuring.  Chest x-ray does certainly show a left-sided effusion.  Patient scheduled for CT scan I would like him to do this today.  Patient notes that his shortness of breath does not feel that alarming or worrisome and he would like to do this next week when it worked better for his family.  We did try to contact patient after he left the clinic when his d-dimer came back elevated encouraging him to go to the ER tonight.  - Electrocardiogram Perform - Clinic  - XR Chest 2 Views; Future  - HM1(CBC and Differential)  - Comprehensive Metabolic Panel  - BNP(B-type Natriuretic Peptide)  - HM1 (CBC with Diff)  - Thyroid Cascade  - D-dimer, Quantitative  - Troponin I    4. Abnormal CXR (chest x-ray)  Plan as above.    Patient Education/AVS:  There are no Patient Instructions on file for this visit.    HPI:   Venita Sanchez is a 62 y.o. male c/o shortness of breath on and off for many years getting worse over past 6 months.      At this poitn constant both at rest or with activity.  Can't catch his breath.  Has to stop walking after 2 blocks and can't go up 1 flight of stairs without stopping to rest due to breathing.  No chest pain.  Worse when laying flat.  Has trouble sleeping b/c he can't breath.  Scary at night-afraid he will die in his sleep because he can't breath.  Getting up 2-3x/night to urinate.  No  "swelling.  Right sided numbness and tingling in his right leg but has chronic LBP that this is related to.  Uses an inhaler and uses 2x/day on daily basis and helps for about 30 minutes.  Has a burning sensation in his throat all the time but worse after eating.  Takes PPI daily but not helping.  No n/v.  No abd fullness/bloating.  No fevers or weight changes.     Did discuss with his PMD but that doctor didn't want to do any imaging studies so pt decided to get another opinion.      Has h/o hypertension, high cholesterol and depression.  No tobacco use.  Med list requested from pharmacy and pt reports taking meds as prescribed but wasn't able to identify meds by name.  He didn't bring his meds in for review.      ROS:  Constitutional, CV, Resp, GI, , MSK, skin, neuro, psych all negative except as outlined in the HPI above.    History summarized from1-2: Testing at [facility] December 2012 by Dr. Kory Augustine.  Seen for hypertension well-controlled on Diovan.  Hyperlipidemia on fenofibrate.  Gastritis stable on PPI omeprazole.  Status post pterygium removal.  History of depression from home and works with psychiatrist.  Old Records-1: Requested from Dr. Horn and David Espinoza  Radiology tests reviewed-1: Reviewed after she left and we found that he had a \"stable\" left sided pleural effusion.  CT scan 4/2014 of the lungs showing complex loculated left pleural effusion collection with wall calcification and pleural thickening.  Possible chronic empyema, including TB.  Less likely hemothorax or neoplasm.  Ultrasound thoracentesis June 2014 300 mL of dark brown fluid removed and sent to the lab.    Lab tests reviewed-1: Rectal cytology June 2014 malignant cells identified, negative for fungal isolation, no mycobacteria isolated, 2+ PMNs growth on culture.      Physical Exam:  /84 (Patient Site: Left Arm, Patient Position: Sitting, Cuff Size: Adult Large)  Pulse 88  Resp 20  Ht 5' 5\" (1.651 m)  Wt 190 lb " (86.2 kg)  SpO2 95%  BMI 31.62 kg/m2 Body mass index is 31.62 kg/(m^2). No LMP for male patient.  Vital signs reviewed  Wt Readings from Last 3 Encounters:   06/15/18 190 lb (86.2 kg)     History   Smoking Status     Never Smoker   Smokeless Tobacco     Never Used     History   Sexual Activity     Sexual activity: Yes     Partners: Female     Birth control/ protection: Post-menopausal     No Data Recorded  No Data Recorded  No Data Recorded  No Data Recorded    All normal as below except abnormalities include: possibly decreased breath sounds on left base compared to right. abd distended but otherwise normal exam.  No lymphadenopathy appreciated.  She is able to talk in complete sentences without shortness of breath.  No wheezing or cough appreciated today.  Does get a little winded with walking down to x-ray does not need to stop and rest.  General is a  62 y.o. male sitting comfortably in no apparent distress.   HEENT:  TM are clear bilaterally.  Eye, nasal, oral exams within normal   Neck: Supple without lymphadenopathy or thyromegally  CV: Regular rate and rhythm S1S2 without rubs, murmurs or gallops,   Lungs: Clear to auscultation bilaterally  Abd:  +BS, soft NT,  No masses or organomegally  Extremities: Warm, No Edema, 2+ Pedal and radial pulses bilaterally  Skin: No lesions or rashes noted  Neuro/MSK: Able to ambulate around the exam room with equal movement, strength and normal coordination of the upper and lower extremeties symmetrically    Results for orders placed or performed in visit on 06/15/18   Comprehensive Metabolic Panel   Result Value Ref Range    Sodium 142 136 - 145 mmol/L    Potassium 4.4 3.5 - 5.0 mmol/L    Chloride 109 (H) 98 - 107 mmol/L    CO2 20 (L) 22 - 31 mmol/L    Anion Gap, Calculation 13 5 - 18 mmol/L    Glucose 89 70 - 125 mg/dL    BUN 32 (H) 8 - 22 mg/dL    Creatinine 1.50 (H) 0.70 - 1.30 mg/dL    GFR MDRD Af Amer 57 (L) >60 mL/min/1.73m2    GFR MDRD Non Af Amer 47 (L) >60  mL/min/1.73m2    Bilirubin, Total 0.5 0.0 - 1.0 mg/dL    Calcium 9.3 8.5 - 10.5 mg/dL    Protein, Total 6.8 6.0 - 8.0 g/dL    Albumin 3.4 (L) 3.5 - 5.0 g/dL    Alkaline Phosphatase 82 45 - 120 U/L    AST 33 0 - 40 U/L    ALT 40 0 - 45 U/L   BNP(B-type Natriuretic Peptide)   Result Value Ref Range    BNP <10 0 - 55 pg/mL   HM1 (CBC with Diff)   Result Value Ref Range    WBC 7.0 4.0 - 11.0 thou/uL    RBC 5.10 4.40 - 6.20 mill/uL    Hemoglobin 15.5 14.0 - 18.0 g/dL    Hematocrit 47.4 40.0 - 54.0 %    MCV 93 80 - 100 fL    MCH 30.4 27.0 - 34.0 pg    MCHC 32.7 32.0 - 36.0 g/dL    RDW 11.9 11.0 - 14.5 %    Platelets 133 (L) 140 - 440 thou/uL    MPV 8.3 7.0 - 10.0 fL    Neutrophils % 63 50 - 70 %    Lymphocytes % 28 20 - 40 %    Monocytes % 7 2 - 10 %    Eosinophils % 2 0 - 6 %    Basophils % 0 0 - 2 %    Neutrophils Absolute 4.4 2.0 - 7.7 thou/uL    Lymphocytes Absolute 2.0 0.8 - 4.4 thou/uL    Monocytes Absolute 0.5 0.0 - 0.9 thou/uL    Eosinophils Absolute 0.2 0.0 - 0.4 thou/uL    Basophils Absolute 0.0 0.0 - 0.2 thou/uL   Thyroid Cascade   Result Value Ref Range    TSH 1.06 0.30 - 5.00 uIU/mL   D-dimer, Quantitative   Result Value Ref Range    D-Dimer, Quant 1.12 (H) <=0.50 FEU ug/mL   Troponin I   Result Value Ref Range    Troponin I <0.01 0.00 - 0.29 ng/mL   Electrocardiogram Perform - Clinic   Result Value Ref Range    SYSTOLIC BLOOD PRESSURE  mmHg    DIASTOLIC BLOOD PRESSURE  mmHg    VENTRICULAR RATE 78 BPM    ATRIAL RATE 78 BPM    P-R INTERVAL 140 ms    QRS DURATION 86 ms    Q-T INTERVAL 380 ms    QTC CALCULATION (BEZET) 433 ms    P Axis 49 degrees    R AXIS -25 degrees    T AXIS 17 degrees    MUSE DIAGNOSIS       Normal sinus rhythm  Normal ECG  No previous ECGs available  Confirmed by CRISTIANO LEDBETTER MD LOC:[facility] ([id number]) on 6/15/2018 1:20:55 PM         Med list and active problem list reviewed and updated as part of this encounter    No current outpatient prescriptions on file prior to visit.     No current  facility-administered medications on file prior to visit.          Sylvia Lugo MD

## 2021-06-19 NOTE — PROGRESS NOTES
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    Chronic dyspnea, chronic left pleural effusion: Stable loculated left pleural effusion with partially calcified thickened rind, stable radiographically since 2014 and known to have been present since at least 2004 when the patient immigrated; notes he was \"very ill with a virus\" at age 10 and hospitalized, told he had a lung problem.  These chronic, stable loculated effusions with calcified rind/fibrothorax are fairly common in the [place]  population, likely sequela of past parasitic, bacterial or mycobacterial infection.  She underwent left thoracentesis in 2014, with a turbid brown exudate with no malignant cells and negative bacterial, mycobacterial, and fungal stain/culture.  We can again attempt thoracentesis for diagnostic and therapeutic purposes, but at this point trapped lung with failure of reexpansion is the likely outcome, which I explained to the patient and his family.  In addition, attempted surgical evacuation and decortication in the setting a calcified thickened rind present for at least 15 years can be quite morbid with risk of bleeding, difficult bronchopleural fistula, and failure of lung reexpansion.  I did discuss his case with thoracic surgery and will wait for their review of his case.  He may have underlying asthma, which we can try to optimize.    Plan:  - left thoracentesis for diagnostic and attempted therapeutic purposes with post-procedural CXR, though I doubt we will find active infection or malignancy, and the left lung will likely not reexpand after the procedure  - will discuss his case with thoracic surgery; in these cases attempted surgical evaculation/decortication can be quite difficult and morbid with risk of bleeding, bronchopleural " "fistula and failure of lung reexpansion  - continue budesonide-formoterol 160-4.5 mcg two inhalations two times a day; rinse/gargle/spit water after use  - start montelukast 10 mg at bedtime  - albuterol HFA as needed  - obtain PFTs and TTE  - follow up in 4-6 weeks  - encouraged him to call any time with questions or concerning symptoms    I appreciate the opportunity to participate in the care of Mr. Sanchez.  Please feel free to contact me at any time.    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  Seen with a family member and a [facility] .  Stable loculated left pleural effusion with partially calcified thickened rind, stable radiographically since 2014 and known to have been present since at least 2004 when the patient immigrated.  These chronic, stable loculated effusions with calcified rind/fibrothorax are fairly common in the [place]  population, likely sequela of past parasitic, bacterial or mycobacterial infection.  She underwent left thoracentesis in 2014, with a turbid brown exudate with no malignant cells and negative bacterial, mycobacterial, and fungal stain/culture.  We can again attempt thoracentesis for diagnostic and therapeutic purposes, but at this point trapped lung with failure of reexpansion is the likely outcome, which I explained to the patient and his family.  In addition, attempted surgical evacuation and decortication in the setting a calcified thickened rind present for at least 15 years can be quite morbid with risk of bleeding, difficult bronchopleural fistula, and failure of lung reexpansion.  I did discuss his case with thoracic surgery and will wait for their review of his case.  He may have underlying asthma, which we can try to optimize.  At age 10 in [place] was \"very ill with a virus\" " and hospitalized.  The patient is from [facility], lived in a refugee camp in [place] [facility], and immigrated in 2004.  For many years has had dyspnea with exertion.  Can walk one city block but has to go slow.  Walking up 10 stairs leads to dyspnea.    ROS:  A 12-system review was obtained and was negative with the exception of the symptoms endorsed in the history of present illness.    PMH:  Chronic loculated left pleural effusion with calcified rind  Asthma  GERD    PSH:  No past surgical history on file.    Allergies:  Allergies   Allergen Reactions     Niacin Other (See Comments)     Niacin Preparations      GI side effects         Family HX:  No family history on file.    Social Hx:  Social History     Social History     Marital status:      Spouse name: N/A     Number of children: N/A     Years of education: N/A     Occupational History     Not on file.     Social History Main Topics     Smoking status: Never Smoker     Smokeless tobacco: Never Used     Alcohol use No     Drug use: No     Sexual activity: Yes     Partners: Female     Birth control/ protection: Post-menopausal     Other Topics Concern     Not on file     Social History Narrative    Lives with wife and kids       Current Meds:  Current Outpatient Prescriptions   Medication Sig Dispense Refill     acetaminophen (MAPAP ARTHRITIS PAIN) 650 MG CR tablet Take 650 mg by mouth every 8 (eight) hours as needed for pain.       albuterol (VENTOLIN HFA) 90 mcg/actuation inhaler Inhale 2 puffs every 4 (four) hours as needed for wheezing or shortness of breath. 1 Inhaler 11     aspirin 81 MG EC tablet Take 81 mg by mouth daily.       budesonide-formoterol (SYMBICORT) 160-4.5 mcg/actuation inhaler Inhale 2 puffs 2 (two) times a day.       clonazePAM (KLONOPIN) 1 MG tablet Take 1 mg by mouth 2 (two) times a day as needed for anxiety.       diclofenac sodium ER (VOLTAREN XR) 100 mg 24 hr tablet Take 100 mg by mouth daily.       DULoxetine (CYMBALTA) 60 MG capsule  Take 60 mg by mouth daily.       fenofibrate (TRICOR) 145 MG tablet Take 145 mg by mouth daily.       gabapentin (NEURONTIN) 300 MG capsule Take 300 mg by mouth 2 (two) times a day.       montelukast (SINGULAIR) 10 mg tablet Take 1 tablet (10 mg total) by mouth at bedtime. 30 tablet 11     NIFEdipine (ADALAT CC) 90 MG 24 hr tablet Take 90 mg by mouth daily.       omeprazole (PRILOSEC) 20 MG capsule Take 20 mg by mouth daily before breakfast.       traMADol (ULTRAM) 50 mg tablet Take 50 mg by mouth every 6 (six) hours as needed for pain.       traZODone (DESYREL) 50 MG tablet Take 50 mg by mouth at bedtime.       multivit with calcium,iron,min (TAB A GERARDO ORAL) Take by mouth.       No current facility-administered medications for this visit.        Physical Exam:  /82  Pulse 74  Resp 17  Wt 188 lb (85.3 kg)  SpO2 97% Comment: RA  BMI 31.28 kg/m2  Gen: alert, oriented, no distress  HEENT: nasal turbinates are unremarkable, no oropharyngeal lesions, no cervical or supraclavicular lymphadenopathy  CV: RRR, no M/G/R  Resp: decreased throughout left hemithorax, mild wheezing on right  Abd: soft, nontender, no palpable organomegaly  Skin: no apparent rashes  Ext: no cyanosis, clubbing or edema  Neuro: alert, nonfocal    Labs:  reviewed    Imaging studies:  Chest CTA (6/18/18):  - directly reviewed  - chronic loculated left pleural effusion with thickened and partially calcified rind, stable since 2014  - no PE    Martir Quinn MD  [facility]  Cell [phone]  Office [phone]  Pager [phone]

## 2021-06-19 NOTE — LETTER
Letter by Sylvia Lugo MD at      Author: Sylvia Lugo MD Service: -- Author Type: --    Filed:  Encounter Date: 5/29/2019 Status: (Other)         Venita Sanchez  [address] [place] [address]             May 29, 2019         Dear Mr. Sanchez,    Below are the results from your recent visit:    Resulted Orders   Comprehensive Metabolic Panel   Result Value Ref Range    Sodium 139 136 - 145 mmol/L    Potassium 4.6 3.5 - 5.0 mmol/L    Chloride 110 (H) 98 - 107 mmol/L    CO2 20 (L) 22 - 31 mmol/L    Anion Gap, Calculation 9 5 - 18 mmol/L    Glucose 91 70 - 125 mg/dL    BUN 39 (H) 8 - 22 mg/dL    Creatinine 1.79 (H) 0.70 - 1.30 mg/dL    GFR MDRD Af Amer 47 (L) >60 mL/min/1.73m2    GFR MDRD Non Af Amer 39 (L) >60 mL/min/1.73m2    Bilirubin, Total 0.5 0.0 - 1.0 mg/dL    Calcium 9.1 8.5 - 10.5 mg/dL    Protein, Total 7.0 6.0 - 8.0 g/dL    Albumin 3.8 3.5 - 5.0 g/dL    Alkaline Phosphatase 59 45 - 120 U/L    AST 18 0 - 40 U/L    ALT 20 0 - 45 U/L    Narrative    Fasting Glucose reference range is 70-99 mg/dL per  American Diabetes Association (ADA) guidelines.   Hepatitis C Antibody (Anti-HCV)   Result Value Ref Range    Hepatitis C Ab Negative Negative   Hepatitis B Surface Antibody (Anti-HBs) Vaccine Check   Result Value Ref Range    HBV Surface Ab (Vaccine Check) Positive Positive   Hepatitis B Surface Antigen (HBsAG)   Result Value Ref Range    Hepatitis B Surface Ag Negative Negative   Lipid Cascade   Result Value Ref Range    Cholesterol 109 <=199 mg/dL    Triglycerides 173 (H) <=149 mg/dL    HDL Cholesterol 32 (L) >=40 mg/dL    LDL Calculated 42 <=129 mg/dL    Patient Fasting > 8hrs? Yes        Your kidney tests are a little worse this time- keep taking your blood pressure pills and we will check again in August.      Your liver tests are healthy     Your cholesterol is much better.      You do NOT have hepatitis and are IMMUNE to hepatitis B and will not get this infection in the future.       Please call with questions or contact us using [employer].    Sincerely,        Electronically signed by Sylvia Lugo MD

## 2021-06-19 NOTE — PROGRESS NOTES
RESPIRATORY CARE NOTE     Patient Name: Venita Sanchez  Today's Date: 8/17/2018     Complete PFT done. Pt performed tests with good effort, 2.5 mg Albuterol neb given. Test results meet ATS criteria for pre & post spirometry, interpeter used during the test.    The following test did not meet ATS criteria for the following reasons; TGV efforts are not with in 5% of each one, Switch in Error is >250 ml, best efforts were reported.  DLCO - patient did not achieve 85% of VC but were within 10% of each one.  Results scanned into epic. Pt left in no distress.       Nara Brenner

## 2021-06-19 NOTE — LETTER
Letter by Sylvia Lugo MD at      Author: Sylvia Lugo MD Service: -- Author Type: --    Filed:  Encounter Date: 8/29/2019 Status: (Other)         Venita Sanchez  [address] [place] [address]           August 29, 2019         Dear Mr. Sanchez,    Below are the results from your recent visit:    Resulted Orders   HIV Antigen/Antibody Screening Cascade   Result Value Ref Range    HIV Antigen / Antibody Negative Negative    Narrative    Method is Abbott HIV Ag/Ab for the detection of HIV p24 antigen, HIV-1 antibodies and HIV-2 antibodies.   Basic Metabolic Panel   Result Value Ref Range    Sodium 140 136 - 145 mmol/L    Potassium 4.8 3.5 - 5.0 mmol/L    Chloride 107 98 - 107 mmol/L    CO2 25 22 - 31 mmol/L    Anion Gap, Calculation 8 5 - 18 mmol/L    Glucose 84 70 - 125 mg/dL    Calcium 9.3 8.5 - 10.5 mg/dL    BUN 35 (H) 8 - 22 mg/dL    Creatinine 1.96 (H) 0.70 - 1.30 mg/dL    GFR MDRD Af Amer 42 (L) >60 mL/min/1.73m2    GFR MDRD Non Af Amer 35 (L) >60 mL/min/1.73m2    Narrative    Fasting Glucose reference range is 70-99 mg/dL per  American Diabetes Association (ADA) guidelines.   Vitamin D, Total (25-Hydroxy)   Result Value Ref Range    Vitamin D, Total (25-Hydroxy) 34.8 30.0 - 80.0 ng/mL    Narrative    Deficiency <10.0 ng/mL  Insufficiency 10.0-29.9 ng/mL  Sufficiency 30.0-80.0 ng/mL  Toxicity (possible) >100.0 ng/mL   HM2(CBC w/o Differential)   Result Value Ref Range    WBC 8.6 4.0 - 11.0 thou/uL    RBC 5.35 4.40 - 6.20 mill/uL    Hemoglobin 16.5 14.0 - 18.0 g/dL    Hematocrit 48.9 40.0 - 54.0 %    MCV 92 80 - 100 fL    MCH 30.9 27.0 - 34.0 pg    MCHC 33.8 32.0 - 36.0 g/dL    RDW 11.4 11.0 - 14.5 %    Platelets 141 140 - 440 thou/uL    MPV 7.9 7.0 - 10.0 fL   Glycosylated Hemoglobin A1c   Result Value Ref Range    Hemoglobin A1c 5.4 3.5 - 6.0 %       Your kidney tests are a little worse again this check.  Be sure you are getting plenty to drink.  Be sure you are taking your meds every  day.  We will check again at your next visit and if still not getting better then we will have you see a kidney doctor.      No diabetes.  No anemia.  No HIV.      Your vitamin D level is healthy.      Please call with questions or contact us using [employer].    Sincerely,        Electronically signed by Sylvia Lugo MD

## 2021-06-20 NOTE — LETTER
"Letter by Martir Quinn MD at      Author: Martir Quinn MD Service: -- Author Type: --    Filed:  Encounter Date: 2/7/2020 Status: (Other)         Sylvia Lugo MD  [address] [place] [id number]                                  February 8, 2020    Patient: Venita Sanchez   MR Number: [id number]   YOB: 1955   Date of Visit: 2/7/2020     Dear Dr. Brittney MD:    I saw Venita Sanchez on the above date at the [facility]. Below are the relevant portions of my assessment and plan of care.    If you have questions, please do not hesitate to call me.    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     Chronic dyspnea, chronic left calcified pleural effusion/fibrothorax, Pseudomonas airway colonization, chronic rhinosinusitis: Chronic exertional dyspnea and cough. Restrictive physiology on PFTs. Cough improved with transition from ACEI to ARB. Has a loculated left pleural effusion with partially calcified thickened rind, stable radiographically since 2014 and known to have been present since at least 2004 when the patient immigrated; notes he was \"very ill with a virus\" at age 10 and hospitalized, told he had a lung problem. These chronic, stable loculated effusions with calcified rind/fibrothorax are fairly common in [place], likely sequela of past parasitic, bacterial or mycobacterial infection. Underwent left thoracentesis in 2014, with a turbid brown exudate with no malignant cells and negative bacterial, mycobacterial, and fungal stain/culture. He has been on empiric therapy for asthma given that he feels subjective benefit and has not " wanted to deescalate therapy, though his physiology is restrictive on pulmonary function testing. He went to [place] earlier this month for admission and work-up; no significant changes were made to his regimen. He was advised to have methacholinc challenge and FeNO, which are tests that I had discussed with him here, but he had been reluctant to deescalate his inhalers. I again offered these tests to him, but he has follow-up at [place] in March. Surgical management of the left fibrothorax would be very morbid and likely not lead to lung reexpansion; his case has been discussed with thoracic surgery and they are in agreement. I have repeatedly advised him to enroll in pulmonary rehab, which he has declined.     Plan:  - recommend methacholine challenge, FeNO, and considering de-escalation of asthma therapies given low likelihood of this diagnosis; he would prefer to have his work-up take place at [place]  - continue budesonide-formoterol 160-4.5 mcg two inhalations two times a day; rinse/gargle/spit water after use; previously provided holding chamber with instruction on use  - albuterol HFA as needed  - continue montelukast 10 mg at bedtime  - continue loratadine 10 mg daily  - continue nasal fluticasone one spray in each nostril daily  - surgery would be morbid and unlikely to be successful at achieving left lung reexpansion  - he has declined pulmonary rehabilitation  - annual influenza vaccination; he states he has received this for the current season  - received Pneumovax-23 in January 2019; should receive Prevnar-13 at age 65 and booster of Pneumovax-23 in 2024  - he has pulmonary follow-up scheduled at [place] in early March  - encouraged him to call any time with questions or concerning symptoms     I appreciate the opportunity to participate in the care of Mr. Sanchez.  Please feel free to contact me at any time.    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 Chronic exertional dyspnea and cough. Restrictive physiology on PFTs. Cough improved with transition from ACEI to ARB. Has a loculated left pleural effusion with partially calcified thickened rind, stable radiographically since 2014 and known to have been present since at least 2004 when the patient immigrated; notes he was \"very ill with a virus\" at age 10 and hospitalized, told he had a lung problem. These chronic, stable loculated effusions with calcified rind/fibrothorax are fairly common in [place], likely sequela of past parasitic, bacterial or mycobacterial infection. Underwent left thoracentesis in 2014, with a turbid brown exudate with no malignant cells and negative bacterial, mycobacterial, and fungal stain/culture. He has been on empiric therapy for asthma given that he feels subjective benefit and has not wanted to deescalate therapy, though his physiology is restrictive on pulmonary function testing. He went to [place] earlier this month for admission and work-up; no significant changes were made to his regimen. He was advised to have methacholinc challenge and FeNO, which are tests that I had discussed with him here, but he had been reluctant to deescalate his inhalers. I again offered these tests to him, but he has follow-up at [place] in March. Surgical management of the left fibrothorax would be very morbid and likely not lead to lung reexpansion; his case has been discussed with thoracic surgery and they are in agreement. I have repeatedly advised him to enroll in pulmonary rehab, which he has " declined.    ROS:  A 12-system review was obtained and was negative with the exception of the symptoms endorsed in the history of present illness.    PMH:  Chronic loculated left pleural effusion with calcified rind (fibrothorax)  GERD  Possible chronic rhinosinusitis  CKD  HTN    PSH:  No past surgical history on file.    Allergies:  Allergies   Allergen Reactions   ? Niacin Other (See Comments)   ? Niacin Preparations      GI side effects         Family HX:  No family history on file.    Social Hx:  Social History     Socioeconomic History   ? Marital status:      Spouse name: Not on file   ? Number of children: Not on file   ? Years of education: Not on file   ? Highest education level: Not on file   Occupational History   ? Not on file   Social Needs   ? Financial resource strain: Not on file   ? Food insecurity:     Worry: Not on file     Inability: Not on file   ? Transportation needs:     Medical: Not on file     Non-medical: Not on file   Tobacco Use   ? Smoking status: Never Smoker   ? Smokeless tobacco: Never Used   Substance and Sexual Activity   ? Alcohol use: No   ? Drug use: No   ? Sexual activity: Yes     Partners: Female     Birth control/protection: Post-menopausal   Lifestyle   ? Physical activity:     Days per week: Not on file     Minutes per session: Not on file   ? Stress: Not on file   Relationships   ? Social connections:     Talks on phone: Not on file     Gets together: Not on file     Attends Shinto service: Not on file     Active member of club or organization: Not on file     Attends meetings of clubs or organizations: Not on file     Relationship status: Not on file   ? Intimate partner violence:     Fear of current or ex partner: Not on file     Emotionally abused: Not on file     Physically abused: Not on file     Forced sexual activity: Not on file   Other Topics Concern   ? Not on file   Social History Narrative    Lives with wife and kids       Current Meds:  Current  Outpatient Medications   Medication Sig Dispense Refill   ? acetaminophen (MAPAP ARTHRITIS PAIN) 650 MG CR tablet Take 1 tablet (650 mg total) by mouth every 8 (eight) hours as needed for pain. 100 tablet 3   ? albuterol (PROAIR HFA;PROVENTIL HFA;VENTOLIN HFA) 90 mcg/actuation inhaler Inhale 2 puffs every 4 (four) hours as needed for wheezing or shortness of breath. 1 Inhaler 11   ? aspirin 81 MG EC tablet TAKE 1 TABLET BY MOUTH DAILY FOR STROKE PREVENTION // IB HNUB NOJ 1 LUB PAB KO NTSV KHIAV  tablet 1   ? budesonide-formoteroL (SYMBICORT) 160-4.5 mcg/actuation inhaler Inhale 2 puffs 2 (two) times a day. 1 Inhaler 11   ? clonazePAM (KLONOPIN) 1 MG tablet Take 1 mg by mouth at bedtime.      ? DULoxetine (CYMBALTA) 60 MG capsule Take 2 capsules (120 mg total) by mouth daily. 180 capsule 3   ? fenofibrate (TRICOR) 145 MG tablet TAKE 1 TABLET BY MOUTH DAILY FOR CHOLESTEROL // IB HNUB NOJ 1 LUB PAB ROXANNE [facility] MUAJ ROJ 90 tablet 1   ? fluticasone propionate (FLONASE) 50 mcg/actuation nasal spray Apply 1 spray into each nostril daily as needed for rhinitis.     ? gabapentin (NEURONTIN) 300 MG capsule TAKE 1 CAPSULE TWICE DAILY // IB ZAUG NOJ 1 LUB, 1 HNUB 2 ZAUG 180 capsule 3   ? ipratropium-albuterol (DUO-NEB) 0.5-2.5 mg/3 mL nebulizer Take 3 mL by nebulization 4 (four) times a day as needed.     ? loratadine (CLARITIN) 10 mg tablet Take 1 tablet (10 mg total) by mouth daily. 30 tablet 11   ? losartan-hydrochlorothiazide (HYZAAR) 100-12.5 mg per tablet TAKE 1 TABLET DAILY BY MOUTH FOR HIGH BLOOD PRESSURE // IB HNUB NOJ 1 LUB PAB [facility] 90 tablet 3   ? montelukast (SINGULAIR) 10 mg tablet Take 1 tablet (10 mg total) by mouth at bedtime. 30 tablet 11   ? omeprazole (PRILOSEC) 20 MG capsule Take 1 capsule (20 mg total) by mouth daily before breakfast. 30 capsule 11   ? TAB-A-GERARDO per tablet TAKE 1 TABLET BY MOUTH DAILY // IB HNUB NOJ 1 LUB 90 tablet 4   ? tiotropium bromide (SPIRIVA RESPIMAT) 2.5  "mcg/actuation Mist Inhale 2 puffs daily. 4 g 11   ? traZODone (DESYREL) 50 MG tablet TAKE 1 TABLET DAILY AT BEDTIME FOR SLEEP AID // 1 HNUB NOJ 1 LUB THAUM MUS PW PAB KOM TSAUG ZOG 90 tablet 3     No current facility-administered medications for this visit.        Physical Exam:  /80   Pulse 99   Resp 12   Ht 5' 4\" (1.626 m)   Wt 181 lb (82.1 kg)   SpO2 96%   BMI 31.07 kg/m     Gen: alert, oriented, no distress  HEENT: nasal turbinates are mildly edematous, no oropharyngeal lesions, no cervical or supraclavicular lymphadenopathy  CV: tachy, regular, no M/G/R  Resp: diminished breath sounds on the left  Abd: soft, nontender, no palpable organomegaly  Skin: no apparent rashes  Ext: no cyanosis, clubbing or edema  Neuro: alert, nonfocal    Labs:  reviewed    Imaging studies:  US thoracentesis (9/6/18):  PROCEDURE: Informed consent obtained. Time out performed. The area of fluid could easily be identified on ultrasound. The chest was prepped and draped in sterile fashion. 10 mL of 1 % lidocaine was infused into the local soft tissues. Under direct   ultrasound guidance, a 5 [employer] catheter system was placed into the pleural effusion. No fluid could be withdrawn.     When the patient previously had thoracentesis performed to became vasovagal and fainted. On this occasion the same thing happened. He was placed supine. Vital signs were obtained demonstrating a blood pressure of 120/70 and heart rate in the low 70s with   normal oxygen saturation. He rested for 15 minutes and stated he felt okay to separate abdomen. There is placed sitting for 15 minutes and was then able to ambulate without assistance was discharged to home.     Dr. Quinn was not available. The findings were discussed with Dr. Quinn's partner, Dr. Garcia at the time of exam. She stated that the chest x-ray which had been ordered should be canceled. The patient was told to follow-up with Dr. Quinn through the   . The findings " and recommendations were also discussed with his son-in-law with patient's permission.     RADIOLOGIC SUPERVISION AND INTERPRETATION:  ULTRASOUND GUIDANCE: Images demonstrate the pleural effusion. Catheter seen in good position.     IMPRESSION:   CONCLUSION:  Status post left ultrasound-guided thoracentesis. No fluid could be aspirated. It appears that the collection is organized which is consistent with the fact that it is chronic, having been present and unchanged compared to 2014. The patient fainted with   the procedure.     Chest CTA (6/18/18):  - directly reviewed  - chronic loculated left pleural effusion with thickened and partially calcified rind, stable since 2014  - no PE    CXR (January 2020):  - images directly reviewed, formal interpretation follows:  FINDINGS: Peripherally calcified chronic pleural fluid and/or thickening mid and inferior left hemithorax with compressive atelectasis of the lingula and left lower lobe stable. New focal airspace opacity right lower lung has developed and nonspecific   but could represent pneumonia in the proper clinical setting. Faint linear opacities right middle and lower lobe consistent with strands of fibrosis and bronchial wall thickening otherwise stable. Heart size normal.     PFT's (8/17/18):  FEV1/FVC is 74% and is normal.  FEV1 is 1.41L (55%) predicted and is reduced.  FVC is 1.91L (60%) predicted and reduced.  There was no improvement in spirometry after a single inhaled dose of bronchodilator.  TLC is 3.57L (58%) predicted and is reduced.  RV is 1.40L (60%) predicted and is reduced.  DLCO is 16.54ml/min/hg (64%) predicted and is reduced when it is corrected for hemoglobin.  Flow volume loops indicate no abnormalities.

## 2021-06-20 NOTE — LETTER
Letter by Sampson Penny CHW at      Author: Sampson Penny CHW Service: -- Author Type: --    Filed:  Encounter Date: 2/18/2020 Status: (Other)       CARE COORDINATION  [facility] [address] [place] [address]    February 19, 2020    Venita Sanchez  [address]  [place] [address]      Dear Venita,    I am a clinic care coordinator who works with Sylvia Lugo MD at [facility]. I have been trying to reach you recently to introduce Clinic Care Coordination and to see if there was anything I could assist you with.  Below is a description of clinic care coordination and how I can further assist you.      The clinic care coordinator team is made up of a registered nurse,  and community health worker who understand the health care system. The goal of clinic care coordination is to help you manage your health and improve access to the health care system in the most efficient manner. The team can assist you in meeting your health care goals by providing education, coordinating services, strengthening the communication among your providers  and supporting you with any resource needs.    Please feel free to contact me, at [phone] with any questions or concerns. We are focused on providing you with the highest-quality healthcare experience possible and that all starts with you.                 Sincerely,           PABLITO Mack     Enclosed: CHW's business card

## 2021-06-20 NOTE — PROGRESS NOTES
"Pulmonary Clinic Follow-up Visit    Assessment and Plan:   63 year old male never smoker with a history of chronic loculated left pleural effusion with calcified rind/fibrothorax, GERD, and asthma, presenting for evaluation.     Chronic dyspnea, chronic left calcified pleural effusion/fibrothorax: Stable loculated left pleural effusion with partially calcified thickened rind, stable radiographically since 2014 and known to have been present since at least 2004 when the patient immigrated; notes he was \"very ill with a virus\" at age 10 and hospitalized, told he had a lung problem.  These chronic, stable loculated effusions with calcified rind/fibrothorax are fairly common in [place]  population, likely sequela of past parasitic, bacterial or mycobacterial infection.  Underwent left thoracentesis in 2014, with a turbid brown exudate with no malignant cells and negative bacterial, mycobacterial, and fungal stain/culture.  Attempt at repeat thoracentesis on 9/6 under US guidance was unsuccessful likely to to chronicity/loculation; has had some left lateral sharp chest pain since the attempted thoracentesis.  He may have underlying asthma, which we can try to optimize.     Plan:  - surgery would be morbid and unlikely to be successful at achieving lung reexpansion  - continue budesonide-formoterol 160-4.5 mcg two inhalations two times a day; rinse/gargle/spit water after use; provided a holding chamber today with instruction  - start nebulized ipratropium-albuterol four times a day; nebulizer provided in clinic today with instruction  - continue montelukast 10 mg at bedtime  - albuterol HFA as needed  - acetaminophen and heating pad as needed for chest pain; if persists could consider lidocaine patch or even gabapentin  - administered influenza vaccine today in clinic  - follow up in 2 months  - encouraged him to call any time with questions or concerning symptoms     I appreciate the opportunity to " participate in the care of Mr. Sanchez.  Please feel free to contact me at any time.     CCx: dyspnea on exertion, chronic left pleural effusion    HPI: 63 year old male never smoker with a history of chronic loculated left pleural effusion with calcified rind/fibrothorax, GERD, and asthma, presenting for evaluation. Attempted US-guided left thoracentesis was recently unsuccessful at obtaining fluid.  Since the attempt, he is having intermittent stabbling left lateral chest pain with breathing.  No fevers/chills or sweats.  Has persistent dyspnea with walking.    ROS:  A 12-system review was obtained and was negative with the exception of the symptoms endorsed in the history of present illness.    PMH:  Chronic loculated left pleural effusion with calcified rind (fibrothorax)  Asthma  GERD    PSH:  No past surgical history on file.    Allergies:  Allergies   Allergen Reactions     Niacin Other (See Comments)     Niacin Preparations      GI side effects         Family HX:  No family history on file.    Social Hx:  Social History     Social History     Marital status:      Spouse name: N/A     Number of children: N/A     Years of education: N/A     Occupational History     Not on file.     Social History Main Topics     Smoking status: Never Smoker     Smokeless tobacco: Never Used     Alcohol use No     Drug use: No     Sexual activity: Yes     Partners: Female     Birth control/ protection: Post-menopausal     Other Topics Concern     Not on file     Social History Narrative    Lives with wife and kids       Current Meds:  Current Outpatient Prescriptions   Medication Sig Dispense Refill     acetaminophen (MAPAP ARTHRITIS PAIN) 650 MG CR tablet Take 650 mg by mouth every 8 (eight) hours as needed for pain.       albuterol (VENTOLIN HFA) 90 mcg/actuation inhaler Inhale 2 puffs every 4 (four) hours as needed for wheezing or shortness of breath. 1 Inhaler 11     aspirin 81 MG EC tablet Take 81 mg by mouth daily.        budesonide-formoterol (SYMBICORT) 160-4.5 mcg/actuation inhaler Inhale 2 puffs 2 (two) times a day.       clonazePAM (KLONOPIN) 1 MG tablet Take 1 mg by mouth 2 (two) times a day as needed for anxiety.       diclofenac sodium ER (VOLTAREN XR) 100 mg 24 hr tablet Take 100 mg by mouth daily.       DULoxetine (CYMBALTA) 60 MG capsule Take 60 mg by mouth daily.       fenofibrate (TRICOR) 145 MG tablet Take 145 mg by mouth daily.       gabapentin (NEURONTIN) 300 MG capsule Take 300 mg by mouth 2 (two) times a day.       montelukast (SINGULAIR) 10 mg tablet Take 1 tablet (10 mg total) by mouth at bedtime. 30 tablet 11     multivit with calcium,iron,min (TAB A GERARDO ORAL) Take by mouth.       NIFEdipine (ADALAT CC) 90 MG 24 hr tablet Take 90 mg by mouth daily.       omeprazole (PRILOSEC) 20 MG capsule Take 20 mg by mouth daily before breakfast.       traMADol (ULTRAM) 50 mg tablet Take 50 mg by mouth every 6 (six) hours as needed for pain.       traZODone (DESYREL) 50 MG tablet Take 50 mg by mouth at bedtime.       ipratropium-albuterol (DUO-NEB) 0.5-2.5 mg/3 mL nebulizer Take 3 mL by nebulization 4 (four) times a day. 360 mL 11     No current facility-administered medications for this visit.        Physical Exam:  /82  Pulse 84  Resp 24  Wt 188 lb 11.2 oz (85.6 kg)  SpO2 97% Comment: RA  BMI 31.4 kg/m2  Gen: alert, oriented, no distress  HEENT: nasal turbinates are unremarkable, no oropharyngeal lesions, no cervical or supraclavicular lymphadenopathy  CV: RRR, no M/G/R  Resp: decreased breat sounds left mid-low lung fields, no focal crackles or wheezes  Abd: soft, nontender, no palpable organomegaly  Skin: no apparent rashes  Ext: no cyanosis, clubbing or edema  Neuro: alert, nonfocal    Labs:  reviewed    Imaging studies:  US thoracentesis (9/6/18):  PROCEDURE: Informed consent obtained. Time out performed. The area of fluid could easily be identified on ultrasound. The chest was prepped and draped in  sterile fashion. 10 mL of 1 % lidocaine was infused into the local soft tissues. Under direct   ultrasound guidance, a 5 [employer] Yueh catheter system was placed into the pleural effusion. No fluid could be withdrawn.     When the patient previously had thoracentesis performed to became vasovagal and fainted. On this occasion the same thing happened. He was placed supine. Vital signs were obtained demonstrating a blood pressure of 120/70 and heart rate in the low 70s with   normal oxygen saturation. He rested for 15 minutes and stated he felt okay to separate abdomen. There is placed sitting for 15 minutes and was then able to ambulate without assistance was discharged to home.     Dr. Quinn was not available. The findings were discussed with Dr. Quinn's partner, Dr. Garcia at the time of exam. She stated that the chest x-ray which had been ordered should be canceled. The patient was told to follow-up with Dr. Quinn through the   . The findings and recommendations were also discussed with his son-in-law with patient's permission.        RADIOLOGIC SUPERVISION AND INTERPRETATION:  ULTRASOUND GUIDANCE: Images demonstrate the pleural effusion. Catheter seen in good position.     IMPRESSION:   CONCLUSION:  Status post left ultrasound-guided thoracentesis. No fluid could be aspirated. It appears that the collection is organized which is consistent with the fact that it is chronic, having been present and unchanged compared to 2014. The patient fainted with   the procedure.    Chest CTA (6/18/18):  - directly reviewed  - chronic loculated left pleural effusion with thickened and partially calcified rind, stable since 2014  - no PE    PFT's (8/17/18):  FEV1/FVC is 74% and is normal.  FEV1 is 1.41L (55%) predicted and is reduced.  FVC is 1.91L (60%) predicted and reduced.  There was no improvement in spirometry after a single inhaled dose of bronchodilator.  TLC is 3.57L (58%) predicted and is reduced.  RV is  1.40L (60%) predicted and is reduced.  DLCO is 16.54ml/min/hg (64%) predicted and is reduced when it is corrected for hemoglobin.  Flow volume loops indicate no abnormalities.    Martir Quinn MD  Pulmonary and Critical Care Medicine  [facility]  Cell [phone]  Office [phone]  Pager [phone]

## 2021-06-20 NOTE — PROGRESS NOTES
Nebulizer given from [facility], instructed on how to use and how often. Through a [employer] .  Had no questions at this time. Numbers given to call with any questions or concerns.

## 2021-06-21 NOTE — LETTER
Letter by Kelley Haro SW at      Author: Kelley Haro SW Service: -- Author Type: --    Filed:  Encounter Date: 12/14/2020 Status: (Other)         December 14, 2020    RACHELL TRIMBLE  [address]  [place] [address]        Dear  David Nathancome to [facility] Plus (MSC+) health program. My name is JUAN Syed. I am your MSC+ care coordinator.     I will call you soon to see how you are doing and determine what needs you may have. My job is to help connect you to services, complete an assessment, and develop a care plan with you. There is no charge to you for the care coordination and assessment services. Our goal is to keep you as healthy and independent as possible.     [facility] [place] includes the benefits you may receive from Medical Assistance.    Soon, you will receive a new MSC+ member identification (ID) card from [facility]. When you receive it, please use this card along with your [place] [facility] card and Prescription Drug Coverage Program card. When you receive, it please use this card where you get your health services. If you have Medicare, you will need to show your Medicare card when you get health services.    If you have questions, please call me at [phone]. If you reach my voice mail, leave a message and your phone number. If you are hearing impaired, please call the [place] [employer] at [phone] or [phone] ([email or web address] relay service).    Sincerely,        JUAN Syed    E-mail: [email or web address]  [phone]      [facility] [id number] DHS Approved ([id number])  [id number] (11/18)

## 2021-06-21 NOTE — PROGRESS NOTES
ASSESSMENT: Venita Sanchez is a 63 y.o. 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  On exam increased pain with forward flexion as well as facet loading, increased back pain with straight leg raise as well as SI provocative maneuvers.    -Chronic right leg numbness and tingling with global paresthesias on exam.    Patient is neurologically intact on exam. No myelopathic or red flag symptoms.     CYNTHIA Score: 72    WHO 5: 5 -patient is not interested in behavioral health therapy.    PHQ-2: 5    Diagnoses and all orders for this visit:    Chronic bilateral low back pain without sciatica  -     MR Lumbar Spine Without Contrast; Future; Expected date: 10/22/18    DDD (degenerative disc disease), lumbar  -     MR Lumbar Spine Without Contrast; Future; Expected date: 10/22/18    Lumbar facet arthropathy  -     MR Lumbar Spine Without Contrast; Future; Expected date: 10/22/18    Right lumbar radiculopathy  -     MR Lumbar Spine Without Contrast; Future; Expected date: 10/22/18      PLAN:  Reviewed spine anatomy and disease process. Discussed diagnosis and treatment options with the patient today. A shared decision making model was used.  The patient's values and choices were respected. The following represents what was discussed and decided upon by the provider and the patient.      -DIAGNOSTIC TESTS:  Images were personally reviewed and interpreted and explained to patient today using spine model.   --Ordered lumbar spine MRI to further evaluate chronic back pain and right lumbar radiculopathy as patient is interested in injection options.  --Lumbar spine  x-ray 10/19/2018 shows moderate degenerative disc height loss L5-S1 with ventral interbody spurring.    -PHYSICAL THERAPY: Did discuss trialing different types of physical therapy including traditional versus pool therapy however he reports he is tried different types of therapy in the past and is not interested in that at this time.  Did explain that we still recommend continuing home exercises as long as it is not aggravating as it is important and strengthening to prevent pain from worsening.  Discussed the importance of core strengthening, ROM, stretching exercises with the patient and how each of these entities is important in decreasing pain.  Explained to the patient that the purpose of physical therapy is to teach the patient a home exercise program.  These exercises need to be performed every day in order to decrease pain and prevent future occurrences of pain.        -MEDICATIONS: No change in medications advised patient continue gabapentin which he does find helpful.  Discussed multiple medication options today with patient. Discussed risks, side effects, and proper use of medications. Patient verbalized understanding.    -INTERVENTIONS: Did discuss the possibility of injections, medial branch block versus right lumbar YASMIN pending MRI review.  Discussed risks and benefits of injections with patient today.    -PATIENT EDUCATION:  45 minutes of total visit time was spent face to face with the patient today, greater than 50% of total time spent with patient was spent on counseling, education, and coordinating care.   -10 minutes spent outside of visit time, non-face-to-face time, reviewing chart.    -FOLLOW-UP:   Follow-up after MRI to review the results and plan.    Advised patient to call the [facility] if symptoms worsen or you have problems controlling bladder and bowel function.   ______________________________________________________________________    SUBJECTIVE:  HPI:  Venita Sanchez  Is a 63 y.o.  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  Since then his pain has been constant currently an 8/10 and has been progressive over the last couple of years he reports.  He does report global numbness and tingling into the right lower extremity that comes and goes however he denies any weakness in his lower extremities, denies left leg tingling, denies recent trips or falls or balance changes, denies bowel or bladder dysfunction.      -Treatment to Date: No prior spinal surgery or spinal injection.  Patient reports [facility] as well as other locations multiple times with no relief and increase in his pain.  Chiropractic treatments in the past with no lasting benefit.    -Medications:  Gabapentin 300 mg twice daily long-term with benefit, no side effect noted.    *Pulmonology 9/27/18 recommended Tylenol, heating pad, lidocaine patch or gabapentin for chronic left lateral sharp chest pain.    Current Outpatient Prescriptions on File Prior to Encounter   Medication Sig Dispense Refill     acetaminophen (MAPAP ARTHRITIS PAIN) 650 MG CR tablet Take 650 mg by mouth every 8 (eight) hours as needed for pain.       albuterol (VENTOLIN HFA) 90 mcg/actuation inhaler Inhale 2 puffs every 4 (four) hours as needed for wheezing or shortness of breath. 1 Inhaler 11     aspirin 81 MG EC tablet Take 81 mg by mouth daily.       budesonide-formoterol (SYMBICORT) 160-4.5 mcg/actuation inhaler Inhale 2 puffs 2 (two) times a day.       clonazePAM (KLONOPIN) 1 MG tablet Take 1 mg by mouth 2 (two) times a day as needed for anxiety.       diclofenac sodium ER (VOLTAREN XR) 100 mg 24 hr tablet Take 100 mg by mouth daily.       DULoxetine (CYMBALTA) 60 MG capsule Take 60 mg  by mouth daily.       fenofibrate (TRICOR) 145 MG tablet Take 145 mg by mouth daily.       gabapentin (NEURONTIN) 300 MG capsule Take 300 mg by mouth 2 (two) times a day.       ipratropium-albuterol (DUO-NEB) 0.5-2.5 mg/3 mL nebulizer Take 3 mL by nebulization 4 (four) times a day. 360 mL 11     montelukast (SINGULAIR) 10 mg tablet Take 1 tablet (10 mg total) by mouth at bedtime. 30 tablet 11     multivit with calcium,iron,min (TAB A GERARDO ORAL) Take by mouth.       NIFEdipine (ADALAT CC) 90 MG 24 hr tablet Take 90 mg by mouth daily.       omeprazole (PRILOSEC) 20 MG capsule Take 20 mg by mouth daily before breakfast.       traZODone (DESYREL) 50 MG tablet Take 50 mg by mouth at bedtime.       No current facility-administered medications on file prior to encounter.        Allergies   Allergen Reactions     Niacin Other (See Comments)     Niacin Preparations      GI side effects         Past Medical History:   Diagnosis Date     Fibrothorax      GERD (gastroesophageal reflux disease)      Moderate persistent asthma      Pleural effusion on left         Patient Active Problem List   Diagnosis     Essential Hypertriglyceridemia     Proteinuria     Major Depression, Single Episode     Neck Pain     Chronic left-sided low back pain without sciatica     Essential hypertension     Pleural effusion on left     Fibrothorax     GERD (gastroesophageal reflux disease)     Moderate persistent asthma       No past surgical history on file.    No family history on file.    Reviewed past medical, surgical, and family history with patient found on new patient intake packet located in EMR [employer] tab.     SOCIAL HX: Patient is on disability not currently working.  Patient denies smoking/tobacco use, denies alcohol use, denies history being a heavy drinker, denies recreational drug use.    ROS: Positive for poor sleep quality, indigestion, back pain, joint pain, depression/worry, wheezing, shortness of breath, cough, chest pressure.   Specifically negative for bowel/bladder dysfunction, balance changes, headache, dizziness, foot drop, fevers, chills, appetite changes, nausea/vomiting, unexplained weight loss. Otherwise 13 systems reviewed are negative. Please see the patient's intake questionnaire from today for details.    OBJECTIVE:  /71 (Patient Site: Right Arm, Patient Position: Sitting)  Pulse 92  Wt 192 lb (87.1 kg)  SpO2 94%  BMI 31.95 kg/m2    PHYSICAL EXAMINATION:    --CONSTITUTIONAL:  Vital signs as above.  No acute distress.  The patient is well nourished and well groomed.  --PSYCHIATRIC:  Appropriate mood and affect. The patient is awake, alert, oriented to person, place, time and answering questions appropriately with clear speech.    --SKIN:  Skin over the face, bilateral lower extremities, and posterior torso is clean, dry, intact without rashes.    --RESPIRATORY: Normal rhythm and effort. No abnormal accessory muscle breathing patterns noted.   --ABDOMINAL:  Soft, non-distended, non-tender throughout all quadrants.   --STANDING EXAMINATION:  Normal lumbar lordosis noted, no lateral shift.  --MUSCULOSKELETAL: Lumbar spine inspection reveals no evidence of deformity. Range of motion is limited in all movements including: Lumbar flexion, extension, lateral rotation as well as extension and lateral rotation simultaneously bilaterally.  Tenderness to palpation L5-S1 region midline. Straight leg raising in the supine position is negative to radicular pain positive to back pain right greater than left. Sciatic notch tender bilaterally.  --SACROILIAC JOINT: Positive right greater than left distraction.  Positive right greater than left Ad's with reproduction of pain to affected extremity.   --GROSS MOTOR: Gait is non-antalgic. Easily arises from a seated position. Toe walking and heel walking are normal without significant difficulty.    --LOWER EXTREMITY MOTOR TESTING:  Plantar flexion left 5/5, right 5/5   Dorsiflexion left  5/5, right 5/5   Great toe MTP extension left 5/5, right 5/5  Knee flexion left 5/5, right 5/5  Knee extension left 5/5, right 5/5   Hip flexion left 5/5, right 5/5  Hip abduction left 5/5, right 5/5  Hip adduction left 5/5, right 5/5   --HIPS: Full range of motion bilaterally. Negative FABERs on the involved lower extremity.   --NEUROLOGICAL:  2/4 patellar, medial hamstring, and achilles reflexes bilaterally.  Sensation to light touch is intact on the left, diminished globally on the right. Babinski is negative. No clonus.  Negative Dorothy reflex bilaterally.  --VASCULAR:  2/4 dorsalis pedis and posterior tibialsi pulses bilaterally.  Bilateral lower extremities are warm.  There is no pitting edema of the bilateral lower extremities.    RESULTS: Prior medical records from [facility] 8/3/2018 to current and care everywhere were reviewed today.    Imaging: Lumbar spine Imaging was personally reviewed and interpreted today. The images were shown to the patient and the findings were explained using a spine model.      Xr Lumbar Spine 2 Or 3 Vws  Result Date: 10/19/2018  INDICATION: Low back pain. COMPARISON: None. FINDINGS: Five nonrib-bearing lumbar-type vertebral bodies. Slight convex-right lumbar curvature. Normal lumbar lordosis. At least mild degenerative interspace narrowing L5-S1. Vertebral body heights and intervertebral disc space heights are otherwise preserved. Mildly prominent ventral interbody spurring L5-S1 and to a lesser extent ventrally at L4-L5 and T11-T12. Visualized osseous pelvis and proximal femora are unremarkable. Normal bowel gas pattern.

## 2021-06-21 NOTE — LETTER
Letter by Sylvia Lugo MD at      Author: Sylvia Lugo MD Service: -- Author Type: --    Filed:  Encounter Date: 1/29/2021 Status: (Other)         Venita Sanchez  [address] [place] [address]             January 29, 2021         Dear Mr. Sanchez,    Below are the results from your recent visit:    Resulted Orders   Basic Metabolic Panel   Result Value Ref Range    Sodium 139 136 - 145 mmol/L    Potassium 4.3 3.5 - 5.0 mmol/L    Chloride 105 98 - 107 mmol/L    CO2 22 22 - 31 mmol/L    Anion Gap, Calculation 12 5 - 18 mmol/L    Glucose 177 (H) 70 - 125 mg/dL    Calcium 8.9 8.5 - 10.5 mg/dL    BUN 34 (H) 8 - 22 mg/dL    Creatinine 1.78 (H) 0.70 - 1.30 mg/dL    GFR MDRD Af Amer 47 (L) >60 mL/min/1.73m2    GFR MDRD Non Af Amer 39 (L) >60 mL/min/1.73m2    Narrative    Fasting Glucose reference range is 70-99 mg/dL per  American Diabetes Association (ADA) guidelines.       Your sugar and kidney tests are stable. We will check these again in April.     Please call with questions or contact us using [place].    Sincerely,        Electronically signed by Sylvia Lugo MD

## 2021-06-21 NOTE — LETTER
Letter by Kelley Haro SW at      Author: Kelley Haro SW Service: -- Author Type: --    Filed:  Encounter Date: 12/31/2020 Status: (Other)       Jailyn Mcclure  [facility], [address]  [place] [address]  Phone:  [phone]  Fax:  [phone]        December 31, 2020    RACHELL TRIMBLE  [address] [facility] [address]    Dear Jodie Nathan is a copy of your completed PCA Assessment and Service Plan.  This is for your records and no action is required by you.  If you have additional questions regarding your assessment please contact me at [phone]. If you feel that your needs are not being met, please contact the Clinical Supervisor at [phone].    Sincerely,      JUAN Syed    E-mail: [email or web address]  [phone]      [facility]            Enclosure:  Completed PCA assessment

## 2021-06-21 NOTE — PROGRESS NOTES
Assessment:     Diagnoses and all orders for this visit:    Chronic bilateral low back pain without sciatica    Right lumbar radiculopathy    Lumbar foraminal stenosis    DDD (degenerative disc disease), lumbar    Lumbar facet arthropathy       Venita Sanchez is a 63 y.o. y.o. 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    Patient is neurologically intact on exam.     Plan:     A shared decision making plan was used. The patient's values and choices were respected. Prior medical records from 11/5/18 were reviewed today. The following represents what was discussed and decided upon by the provider and the patient.        -DIAGNOSTIC TESTS: Images were personally reviewed and interpreted.   --Lumbar spine MRI 10/30/2018 shows mild degenerative disease at L4-5 with moderate to marked degenerative disease at L5-S1 with 3 mm spondylolisthesis.  Multilevel mild facet arthropathy.  Moderate right, mild to moderate left L5-S1 foraminal stenosis.  Mild to moderate right and moderate left foraminal stenosis at L4-5.  --Lumbar spine x-ray 10/19/2018 shows moderate degenerative disc height loss L5-S1 with ventral interbody spurring.    -INTERVENTIONS: Discussed that we can repeat injection down the road if symptoms are worsening typically would like to stick to no more than 4 injections in a year which about 3 months between injections at the least.    -MEDICATIONS: No change in medications advised patient continue gabapentin 200 mg 1 tablet twice daily as well as Tylenol as needed.  Discussed side effects of medications and proper use. Patient verbalized  understanding.    -PHYSICAL THERAPY: Did discuss trialing physical therapy again however he is not interested in this modality at this time.    Discussed the importance of core strengthening, ROM, stretching exercises with the patient and how each of these entities is important in decreasing pain.  Explained to the patient that the purpose of physical therapy is to teach the patient a home exercise program.  These exercises need to be performed every day in order to decrease pain and prevent future occurrences of pain.        -PATIENT EDUCATION:  20 minutes of total visit time was spent face to face with the patient today, 60 % of the visit was spent on counseling, education, and coordinating care.   -5 minutes spent outside of visit time, non-face-to-face time, reviewing chart.    -FOLLOW UP: Follow-up as needed.   Advised to contact clinic if symptoms worsen or change.    Subjective:     Venita Sanchez is a 63 y.o. male who presents today for follow-up regarding ongoing low back pain lumbosacral junction right greater than left ongoing for 17+ years with nonspecific right lumbar radicular pain into the lower extremity with associated numbness and tingling that is intermittent, overall he did receive 50% relief with right L5-S1 TF YASMIN and is feeling much better at this time.  Currently his pain is a 1/10 up to 3 at its worst.  He denies any new symptoms, denies any left leg pain, denies recent trips or falls or balance changes.     was present during entire visit today.      -Treatment to Date: No prior spinal surgery or spinal injection.  [facility] as well as other locations multiple times with no relief and increase in his pain.  Chiropractic treatments in the past with no lasting benefit.     -Medications:  Gabapentin 300 mg twice daily long-term with benefit, no side effect noted.    Right L5-S1 TF YASMIN 11/8/2018 with 50% relief right lumbar radiculitis.   Preprocedure pain 7/10, post 0/10.     *Pulmonology 9/27/18 recommended Tylenol, heating pad, lidocaine patch or gabapentin for chronic left lateral sharp chest pain.    Patient Active Problem List   Diagnosis     Essential Hypertriglyceridemia     Proteinuria     Major Depression, Single Episode     Neck Pain     Chronic left-sided low back pain without sciatica     Essential hypertension     Pleural effusion on left     Fibrothorax     GERD (gastroesophageal reflux disease)     Moderate persistent asthma       Current Outpatient Medications on File Prior to Encounter   Medication Sig Dispense Refill     acetaminophen (MAPAP ARTHRITIS PAIN) 650 MG CR tablet Take 650 mg by mouth every 8 (eight) hours as needed for pain.       albuterol (VENTOLIN HFA) 90 mcg/actuation inhaler Inhale 2 puffs every 4 (four) hours as needed for wheezing or shortness of breath. 1 Inhaler 11     aspirin 81 MG EC tablet Take 1 tablet (81 mg total) by mouth daily. 100 tablet 11     budesonide-formoterol (SYMBICORT) 160-4.5 mcg/actuation inhaler Inhale 2 puffs 2 (two) times a day. 1 Inhaler 11     clonazePAM (KLONOPIN) 1 MG tablet Take 1 mg by mouth 2 (two) times a day as needed for anxiety.       dextromethorphan-guaifenesin (GUAIFENESIN-DM)  mg/5 mL Liqd Take 5 mL by mouth every 6 (six) hours as needed. 237 mL 1     DULoxetine (CYMBALTA) 60 MG capsule Take 1 capsule (60 mg total) by mouth daily. 90 capsule 4     fenofibrate (TRICOR) 145 MG tablet Take 1 tablet (145 mg total) by mouth daily. 90 tablet 4     gabapentin (NEURONTIN) 300 MG capsule Take 300 mg by mouth 2 (two) times a day.       ipratropium-albuterol (DUO-NEB) 0.5-2.5 mg/3 mL nebulizer Take 3 mL by nebulization 4 (four) times a day. 360 mL 11     losartan-hydrochlorothiazide (HYZAAR) 100-12.5 mg per tablet Take 1 tablet by mouth daily. 30 tablet 11     montelukast (SINGULAIR) 10 mg tablet Take 1 tablet (10 mg total) by mouth at bedtime. 30 tablet 11      multivitamin-Ca-iron-minerals (TAB A GERARDO) 18-0.4 mg Tab Take 1 tablet by mouth daily. 90 tablet 4     omeprazole (PRILOSEC) 20 MG capsule Take 1 capsule (20 mg total) by mouth daily before breakfast. 90 capsule 4     traZODone (DESYREL) 50 MG tablet Take 1 tablet (50 mg total) by mouth at bedtime. 30 tablet 6     diclofenac sodium ER (VOLTAREN XR) 100 mg 24 hr tablet Take 100 mg by mouth daily.       No current facility-administered medications on file prior to encounter.        Allergies   Allergen Reactions     Niacin Other (See Comments)     Niacin Preparations      GI side effects         Past Medical History:   Diagnosis Date     Fibrothorax      GERD (gastroesophageal reflux disease)      Moderate persistent asthma      Pleural effusion on left         Review of Systems  ROS:  Specifically negative for bowel/bladder dysfunction, balance changes, headache, dizziness, foot drop, fevers, chills, appetite changes, nausea/vomiting, unexplained weight loss. Otherwise 13 systems reviewed are negative. Please see the patient's intake questionnaire from today for details.    Reviewed Social, Family, Past Medical and Past Surgical history with patient, no significant changes noted since prior visit.     Objective:     /90 (Patient Site: Right Arm, Patient Position: Sitting)   Pulse 85   Wt 186 lb (84.4 kg)   SpO2 96%   BMI 30.95 kg/m      PHYSICAL EXAMINATION:    --CONSTITUTIONAL: Well developed, well nourished, healthy appearing individual.  --PSYCHIATRIC: Appropriate mood and affect. No difficulty interacting due to temper, social withdrawal, or memory issues.  --SKIN: Lumbar region is dry and intact.   --RESPIRATORY: Normal rhythm and effort. No abnormal accessory muscle breathing patterns noted.   --MUSCULOSKELETAL:  Normal lumbar lordosis noted, no lateral shift.  --GROSS MOTOR: Easily arises from a seated position. Gait is non-antalgic  --LUMBAR SPINE:  Inspection reveals no evidence of deformity.      RESULTS:   Imaging: Lumbar spine imaging was reviewed today. The images were shown to the patient and the findings were explained using a spine model.      Mr Lumbar Spine Without Contrast  Result Date: 10/30/2018  INDICATION: Low back pain, >6 weeks / red flag(s) / radiculopathy chronic bilateral low back pain and right lumbar radiculopathy. COMPARISON: 10/19/2018 lumbar spine plain film. TECHNIQUE: Without IV contrast FINDINGS: Numbering is based on 5 lumbar type vertebrae with a rudimentary S1-S2 disc space. Broad rightward curvature spanning the lumbar spine with apex at L2-L3. 3 mm retrolisthesis of L5 on S1. Otherwise unremarkable alignment. Vertebral body heights relatively maintained. Type II Modic endplate degenerative change at L5-S1. No significant osseous edema. The conus tip is identified at the L1-L2 level. T2 hyperintense lesions in the bilateral kidneys potentially reflect cysts, but are incompletely evaluated on this exam. The visualized portions of the bony pelvis are normal for age. T12-L1 through L2-L3: Normal disc height and signal with no posterior disc bulge and no significant facet arthropathy. Spinal canal and neural foramina are widely patent. L3-L4: Normal disc height and signal with only a very small diffuse posterior disc bulge. Mild bilateral facet arthropathy with mild ligamentum flavum hypertrophy. Spinal canal is patent. Neural foraminal stenosis is minimal. L4-L5: Normal disc height. Mild loss of disc signal. Small diffuse posterior disc bulge. Mild bilateral facet arthropathy. Mild spinal canal stenosis with mild narrowing of the left lateral recess, where there is slight contact of the traversing left L5 nerve. Mild to moderate right and moderate left sided neural foraminal stenosis.   L5-S1: Moderate to severe loss of disc height at the right lateral aspect of the disc, with a more mild loss of disc height at the left lateral aspect. Trace disc edema. Small diffuse posterior  disc bulge. Mild bilateral facet arthropathy. Mild spinal canal stenosis with mild narrowing of the right lateral recess, where there is slight contact of the traversing right S1 nerve. Moderate right and mild to moderate left neural foraminal stenosis.   CONCLUSION:   1.  Mild degenerative disc disease at the L4-L5 level with a more moderate to marked degenerative disc disease at the L5-S1 level. This is more pronounced on the right. Multilevel mild facet arthropathy in the lower lumbar spine is as detailed above.   2.  No high-grade spinal canal stenosis at any level.   3.  Moderate right L5-S1 neural foraminal stenosis, with a more mild to moderate left L5-S1 neural foraminal stenosis. Mild to moderate right and moderate left L4-L5 neural foraminal stenosis.   4.  Slight contact of the traversing left L5 nerve at the mildly narrowed left L4-L5 lateral recess. This is a finding of questionable clinical significance, and correlation for left L5 nerve symptoms would be of benefit.   5.  Slight contact of the traversing right S1 nerve at the mildly narrowed right L5-S1 lateral recess. This is also a finding of questionable clinical significance, and correlation for symptoms in a right S1 nerve distribution would be of benefit.        Xr Lumbar Spine 2 Or 3 Vws  Result Date: 10/19/2018  INDICATION: Low back pain. COMPARISON: None. FINDINGS: Five nonrib-bearing lumbar-type vertebral bodies. Slight convex-right lumbar curvature. Normal lumbar lordosis. At least mild degenerative interspace narrowing L5-S1. Vertebral body heights and intervertebral disc space heights are otherwise preserved. Mildly prominent ventral interbody spurring L5-S1 and to a lesser extent ventrally at L4-L5 and T11-T12. Visualized osseous pelvis and proximal femora are unremarkable. Normal bowel gas pattern.

## 2021-06-21 NOTE — PROGRESS NOTES
[facility] Clinic Office Visit    Chief Complaint:  Chief Complaint   Patient presents with     Back Pain     Low back pain      Follow-up     Medication      Cough     Couple weeks          Assessment/Plan:  1. Chronic left-sided low back pain without sciatica  Based on his age and symptoms we will get x-ray to look for pathologic fractures etc. contributing to his increased pain.  Sounds like patient does have some long-standing degenerative changes to his lumbar spine and some moving towards the spine referral is recommended.  - XR Lumbar Spine 2 or 3 VWS; Future  - Ambulatory referral to Spine Care    2. Moderate persistent asthma, unspecified whether complicated  Concerned about adherence and proper use of his inhalers and nebulizers.  Patient seems a little confused about how to use medications timing etc.  Appreciate MTM.  Pulmonary as scheduled.  - Ambulatory referral to Medication Management    3. Essential hypertension  BP Readings from Last 3 Encounters:   11/05/18 149/82   10/23/18 158/90   10/22/18 152/71      Uncontrolled today.  Plan for bloodpressure management includes ongoing focus on healthy DASH type diet and increased activity, encouraged to avoid tobacco products and limit alcohol use, stress reduction, concerned about adherence to medications.  Continue nifedipine 90 mg daily if blood pressure remains elevated would likely benefit from more effective medication such as a thiazide diuretic, ACE inhibitor etc.  Patient warned to limit his NSAID use..  Labwork and meds ordered and reviewed as below  Lab Results   Component Value Date    K 4.4 06/15/2018      Lab Results   Component Value Date    CREATININE 1.50 (H) 06/15/2018       - Ambulatory referral to Medication Management    4. Major depressive disorder, single episode, unspecified  Based on his PHQ 9 this appears uncontrolled patient attributes this mostly to his pain.  Would like to continue on his Cymbalta 60 mg daily,  trazodone 50 mg at bedtime with clonazepam 1 mg twice daily as needed    5. Vaccine counseling  - Td, Preservative Free (green label)    6. Colon cancer screening  - Ambulatory referral for Colonoscopy    Return in about 3 months (around 1/19/2019) for Recheck.  The following are part of a depression follow up plan for the patient:  implementation of measures to provide psychological support  The following high BMI interventions were performed this visit: encouragement to exercise and lifestyle education regarding diet    Patient Education/AVS:  There are no Patient Instructions on file for this visit.    HPI:   Venita Sanchez is a 63 y.o. male c/o Low back pain and cough for past couple of weeks.  Known chronic lung consolidation working with pulmonary- concern for underlying asthma recently and placed on controller inhaler.  Pt has h/o lumbar strain and low back pain based on previous visits from several years ago.  Did get flu shot already.      Pt notes left lower back pain.  Also has pain on mid/upper back where they did the thoracentesis this fall. Pain with sitting or with bending over or when standing too long.  Pain is there all of the time but gets worse with above activities.  Wondering if the back pain is associated with his lungs.  Pain seems to be getting worse.  Upper back pain from the thoracentesis is getting better slowly with time.  This is getting better with warm and cold packs alternating.  Has low back pain chronically but gets worse at times- recently worse but has a hard times saying over what time period or if it is really much worse than normal.  Just frustrated and wants to know if I have any new ideas.  Has tried medications in the past.  Has had imaging in the past many years ago around 2006? and told that the hole in his back where all of the veins go through is smaller than other people and there is some increased blood vessels and swelling in this area.  This was with a specialized  machine  Has done several rounds of PT but the more he does the worse the pain gets.  No injections before.  No pain in the groin- only the back.  Painful to walk.  Hard to get up at times due to pain- kids have to help him stand up if he has been sitting too long.  Gabapentin really not helpful.  Has had rx for diclofenac xr daily but not sure if he is taking this or not.      Cough-he is taking his symbicort 2 puffs two times a day.  Also has his nebulizer 2-3x/day.  Notes that since he got the flu shot he has been coughing every day.      Okay for td but declines shingles shot.    Has been taking bp med daily but didn't htake any meds this morning.          ROS:  Constitutional, CV, Resp, GI, , MSK, skin, neuro, psych all negative except as outlined in the HPI above and patient denies any other symptoms.      History summarized from1-2: Pulmonary consultation 9/27/2018 reviewed regarding his chronic dyspnea pleural effusion etc.  Surgery not recommended.  Symbicort 2 puffs twice daily scheduled.  DuoNeb scheduled 4 times daily, singular 10 mg at bedtime, albuterol inhaler as needed.  Flu shot administered at that time.  Lidocaine patch or gabapentin for chronic chest pain related to his chronic effusion.  Radiology tests reviewed-1: Ultrasound-guided thoracentesis 9/6/2018 reviewed  Lab tests reviewed-1: 2018  Medicine tests reviewed-1: Cardiac echo 8/17/2018 reviewed normal with EF of 60%.    Physical Exam:  BP (!) 152/94  Pulse 80  Resp 20  Wt 193 lb 1.9 oz (87.6 kg)  BMI 32.14 kg/m2 Body mass index is 32.14 kg/(m^2). No LMP for male patient.  Vital signs reviewed  Wt Readings from Last 3 Encounters:   11/05/18 187 lb (84.8 kg)   10/22/18 192 lb (87.1 kg)   10/19/18 193 lb 1.9 oz (87.6 kg)     History   Smoking Status     Never Smoker   Smokeless Tobacco     Never Used     History   Sexual Activity     Sexual activity: Yes     Partners: Female     Birth control/ protection: Post-menopausal     No Data  Recorded  PHQ-9 Total Score: 19 (10/19/2018 10:33 AM)  PHQ-2 Total Score: 6 (10/19/2018 10:33 AM)  ACT Total Score: 10 (10/19/2018 10:33 AM)    All normal as below except abnormalities include: Patient appears tired but well.  We will talk in complete sentences with hoarse quality to his voice.  occ loose cough.  Able to move around the room without difficulty.  Decreased to absent lung sounds on the left lungs consistent with chronic effusion.  Moving air well in the right side with minimal crackles.  Occasional expiratory wheezing heard today.  Negative straight leg raise.  Normal visual inspection of his lower spine.  No rashes appreciated.  No point tenderness over the lumbar spine.  Minimal discomfort over the SI joints.  Both lower extremities appear grossly intact with regards to strength and coordination.  General is a  63 y.o. male sitting comfortably in no apparent distress.   HEENT:  TM are clear bilaterally.  Eye, nasal, oral exams within normal   Neck: Supple without lymphadenopathy or thyromegally  CV: Regular rate and rhythm S1S2 without rubs, murmurs or gallops,   Abd:  +BS, soft NT/ND,  No masses or organomegally  Extremities: Warm, No Edema, 2+ Pedal and radial pulses bilaterally  Skin: No lesions or rashes noted  Neuro/MSK: Able to ambulate around the exam room with equal movement, strength and normal coordination of the upper and lower extremeties symmetrically    Results for orders placed or performed during the hospital encounter of 09/06/18   INR   Result Value Ref Range    INR 0.98 0.90 - 1.10   Platelet Count   Result Value Ref Range    Platelets 123 (L) 140 - 440 thou/uL       Med list and active problem list reviewed and updated as part of this encounter    Current Outpatient Prescriptions on File Prior to Visit   Medication Sig Dispense Refill     acetaminophen (MAPAP ARTHRITIS PAIN) 650 MG CR tablet Take 650 mg by mouth every 8 (eight) hours as needed for pain.       albuterol (VENTOLIN  HFA) 90 mcg/actuation inhaler Inhale 2 puffs every 4 (four) hours as needed for wheezing or shortness of breath. 1 Inhaler 11     clonazePAM (KLONOPIN) 1 MG tablet Take 1 mg by mouth 2 (two) times a day as needed for anxiety.       diclofenac sodium ER (VOLTAREN XR) 100 mg 24 hr tablet Take 100 mg by mouth daily.       ipratropium-albuterol (DUO-NEB) 0.5-2.5 mg/3 mL nebulizer Take 3 mL by nebulization 4 (four) times a day. 360 mL 11     gabapentin (NEURONTIN) 300 MG capsule Take 300 mg by mouth 2 (two) times a day.       No current facility-administered medications on file prior to visit.          Sylvia Lugo MD

## 2021-06-21 NOTE — PROGRESS NOTES
[facility] Office Visit    Chief Complaint:  Chief Complaint   Patient presents with     Follow-up         Assessment/Plan:  1. Essential hypertension  BP Readings from Last 3 Encounters:   11/12/18 134/80   11/08/18 144/78   11/05/18 149/82       well controlled today.  Plan for bloodpressure management includes ongoing focus on healthy DASH type diet and increased activity, encouraged to avoid tobacco products and limit alcohol use, stress reduction, now with dry cough/tickle in throat.  Will switch to losartan/hctz to see if this will help. Slight increase in dosing due to borderline control over past few checks.  Labwork and meds ordered and reviewed as below  Lab Results   Component Value Date    K 4.5 11/12/2018      Lab Results   Component Value Date    CREATININE 1.39 (H) 11/12/2018       - Basic Metabolic Panel    2. Proteinuria, unspecified type  Support need for ACE-I or ARB for renal protection while tx bp.      3. Cough  Likely multifactorial but certainly new since starting ACE-I.  Switch to ARB to see if this will help.  Continue symbicort, singulair, and PPI with duo-nebs as needed.  Cough syrup requested as this has helped in past with cough.  F/u with lung clinic 12/6/18 as scheduled  - dextromethorphan-guaifenesin (GUAIFENESIN-DM)  mg/5 mL Liqd; Take 5 mL by mouth every 6 (six) hours as needed.  Dispense: 237 mL; Refill: 1      Return in about 2 months (around 1/12/2019) for Recheck.    Patient Education/AVS:  There are no Patient Instructions on file for this visit.    HPI:   Venita Sanchez is a 63 y.o. male c/o f/u on bp after med adjustment with MTM.  Nifedipine was stopped and switched to lisinopril/hctz.  Pt initially notes no problems but on further review does note a cough.  May be related to a cold.  Feels an itch in his throat like he wants to cough. Does have underlying lung issues and does have a runny nose and itchy throat.  Overall cough doesn't feel much  different than normal.      Colonoscopy scheduled next month.      ROS:  Constitutional, CV, Resp, GI, , MSK, skin, neuro, psych all negative except as outlined in the HPI above and patient denies any other symptoms.      History summarized from1-2:Spine care - 11/5/18 outlining recommendations for injections and subsequent injections 11/8/18.    Lab tests reviewed-1: 2018    Physical Exam:  /80 (Patient Site: Left Arm, Patient Position: Sitting, Cuff Size: Adult Large)   Pulse 92   Wt 188 lb (85.3 kg)   BMI 31.28 kg/m   Body mass index is 31.28 kg/m . No LMP for male patient.  Vital signs reviewed  Wt Readings from Last 3 Encounters:   11/12/18 188 lb (85.3 kg)   11/08/18 187 lb (84.8 kg)   11/05/18 187 lb (84.8 kg)     Social History     Tobacco Use   Smoking Status Never Smoker   Smokeless Tobacco Never Used     Social History     Substance and Sexual Activity   Sexual Activity Yes     Partners: Female     Birth control/protection: Post-menopausal     No Data Recorded  PHQ-9 Total Score: 8 (11/12/2018 10:00 AM)    PHQ-2 Total Score: 2 (11/12/2018 10:00 AM)    ACT Total Score: (!) 10 (10/19/2018 10:33 AM)      All normal as below except abnormalities include: nasal quality to his voice with occ dry cough.  Able to talk easily in complete sentences.  No dyspnea or shortness of breath noted today.  Decreased but stable breath sounds on left side.  Otherwise moving air well and no wheezes heard today.    General is a  63 y.o. male sitting comfortably in no apparent distress.   HEENT:  TM are clear bilaterally.  Eye, nasal, oral exams within normal   Neck: Supple without lymphadenopathy or thyromegally  CV: Regular rate and rhythm S1S2 without rubs, murmurs or gallops,   Lungs: Clear to auscultation bilaterally  Extremities: Warm, No Edema, 2+ Pedal and radial pulses bilaterally  Skin: No lesions or rashes noted  Neuro/MSK: Able to ambulate around the exam room with equal movement, strength and normal  coordination of the upper and lower extremeties symmetrically    Results for orders placed or performed in visit on 11/12/18   Basic Metabolic Panel   Result Value Ref Range    Sodium 140 136 - 145 mmol/L    Potassium 4.5 3.5 - 5.0 mmol/L    Chloride 108 (H) 98 - 107 mmol/L    CO2 22 22 - 31 mmol/L    Anion Gap, Calculation 10 5 - 18 mmol/L    Glucose 101 70 - 125 mg/dL    Calcium 8.5 8.5 - 10.5 mg/dL    BUN 32 (H) 8 - 22 mg/dL    Creatinine 1.39 (H) 0.70 - 1.30 mg/dL    GFR MDRD Af Amer >60 >60 mL/min/1.73m2    GFR MDRD Non Af Amer 52 (L) >60 mL/min/1.73m2       Med list and active problem list reviewed and updated as part of this encounter    Current Outpatient Medications on File Prior to Visit   Medication Sig Dispense Refill     acetaminophen (MAPAP ARTHRITIS PAIN) 650 MG CR tablet Take 650 mg by mouth every 8 (eight) hours as needed for pain.       albuterol (VENTOLIN HFA) 90 mcg/actuation inhaler Inhale 2 puffs every 4 (four) hours as needed for wheezing or shortness of breath. 1 Inhaler 11     aspirin 81 MG EC tablet Take 1 tablet (81 mg total) by mouth daily. 100 tablet 11     budesonide-formoterol (SYMBICORT) 160-4.5 mcg/actuation inhaler Inhale 2 puffs 2 (two) times a day. 1 Inhaler 11     clonazePAM (KLONOPIN) 1 MG tablet Take 1 mg by mouth 2 (two) times a day as needed for anxiety.       diclofenac sodium ER (VOLTAREN XR) 100 mg 24 hr tablet Take 100 mg by mouth daily.       DULoxetine (CYMBALTA) 60 MG capsule Take 1 capsule (60 mg total) by mouth daily. 90 capsule 4     fenofibrate (TRICOR) 145 MG tablet Take 1 tablet (145 mg total) by mouth daily. 90 tablet 4     gabapentin (NEURONTIN) 300 MG capsule Take 300 mg by mouth 2 (two) times a day.       ipratropium-albuterol (DUO-NEB) 0.5-2.5 mg/3 mL nebulizer Take 3 mL by nebulization 4 (four) times a day. 360 mL 11     losartan-hydrochlorothiazide (HYZAAR) 100-12.5 mg per tablet Take 1 tablet by mouth daily. 30 tablet 11     montelukast (SINGULAIR) 10 mg  tablet Take 1 tablet (10 mg total) by mouth at bedtime. 30 tablet 11     multivitamin-Ca-iron-minerals (TAB A GERARDO) 18-0.4 mg Tab Take 1 tablet by mouth daily. 90 tablet 4     omeprazole (PRILOSEC) 20 MG capsule Take 1 capsule (20 mg total) by mouth daily before breakfast. 90 capsule 4     traZODone (DESYREL) 50 MG tablet Take 1 tablet (50 mg total) by mouth at bedtime. 30 tablet 6     No current facility-administered medications on file prior to visit.          Sylvia Lugo MD

## 2021-06-21 NOTE — LETTER
"Letter by Martir Quinn MD at      Author: Martir Quinn MD Service: -- Author Type: --    Filed:  Encounter Date: 2020 Status: (Other)       2020    Dear Dr. Brittney MD,    See below for documentation of a virtual visit that I had with Venita Sanchez ( 1955). Please feel free to call me at any time if needed.    Sincerely,    Martir Quinn MD  Pulmonary and Critical Care Medicine  [facility]  Cell [phone]  Office [phone]  Pager [phone]    Venita Sanchez is a 65 y.o. male who is being evaluated via a billable telephone visit.      The patient has been notified of following:     \"This telephone visit will be conducted via a call between you and your physician/provider. We have found that certain health care needs can be provided without the need for a physical exam.  This service lets us provide the care you need with a short phone conversation.  If a prescription is necessary we can send it directly to your pharmacy.  If lab work is needed we can place an order for that and you can then stop by our lab to have the test done at a later time.    Telephone visits are billed at different rates depending on your insurance coverage. During this emergency period, for some insurers they may be billed the same as an in-person visit.  Please reach out to your insurance provider with any questions.    If during the course of the call the physician/provider feels a telephone visit is not appropriate, you will not be charged for this service.\"    Patient has given verbal consent to a Telephone visit? Yes    What phone number would you like to be contacted at? [phone]    Patient would like to receive their AVS by AVS Preference: Mail a copy.    Phone call duration: 12 minutes    Josefina Cain LPN     Pulmonary Clinic Follow-up Visit    Assessment and Plan:   65 year old male never smoker with a history of chronic loculated left pleural effusion with " "calcified left fibrothorax, GERD, CKD, HTN, Pseudomonas airway colonization, presenting for follow-up.     Chronic dyspnea, chronic left calcified pleural effusion/fibrothorax, Pseudomonas airway colonization, chronic rhinosinusitis: Has ongoing cough and dyspnea. Seen at [facility] again by pulmonology and thoracic surgery. Repeat left thoracentesis March 2020 with 86% PMNs, no growth on bacterial or mycobacterial cultures. Coughs (mostly dry), feels tired and week. No fevers or chills. Did not have methacholine challenge or FeNO done and [facility]. He feels like the budesonide-formoterol and tiotropium are helping; he was started on tiotropium some time after my last visit with him. Uses nebulized ipratropium-albuterol 3-4 times daily and feels like it helps. Also taking loratadine and montelukast. Really unclear asthma diagnosis but feels like his therapies are helping. I have repeatedly advised him to enroll in pulmonary rehab, which he has declined; he now has home exercise equipment, and understandably wants to wait until the pandemic is over to enroll in a formal pulmonary rehab program. Discussed possible surgical decortication at [facility]--benefit unclear and likely significant component of trapped lung--but he declined. He was put on prednisone 40 mg daily for 5 days and levofloxacin 500 mg daily for 5 days, both starting on 11/16. At baseline, he has chronic exertional dyspnea and cough. Restrictive physiology on PFTs. Has a loculated left pleural effusion with partially calcified thickened rind, stable radiographically since 2014 and known to have been present since at least 2004 when the patient immigrated; notes he was \"very ill with a virus\" at age 10 and hospitalized, told he had a lung problem. These chronic, stable loculated effusions with calcified rind/fibrothorax are fairly common in the [place]  population, likely sequela of past parasitic, bacterial or mycobacterial infection. Underwent left " thoracentesis in 2014, with a turbid brown exudate with no malignant cells and negative bacterial, mycobacterial, and fungal stain/culture.      Plan:  - continue budesonide-formoterol 160-4.5 mcg two inhalations two times a day; rinse/gargle/spit water after use; previously provided holding chamber with instruction on use  - continue tiotropium respimat 2.5 mcg two inhalations daily  - albuterol HFA as needed  - continue montelukast 10 mg at bedtime  - continue loratadine 10 mg daily  - continue nasal fluticasone one spray in each nostril daily  - he is completing a 5-day course of prednisone and levofloxacin started on 11/16  - surgery would be morbid and unlikely to be successful at achieving left lung reexpansion; they did offer him an attempt at surgery at [facility] in March 2020, noting unclear benefit with likely trapped lung, and he declined  - he has declined pulmonary rehabilitation but now willing to pursue it after the pandemic resolves  - annual high-dose influenza vaccination  - received Pneumovax-23 in January 2019; should receive Prevnar-13 due now that he is 65, and booster of Pneumovax-23 in 2024  - follow up in 6 months or sooner if needed  - encouraged him to call any time with questions or concerning symptoms    Martir Quinn MD  Pulmonary and Critical Care Medicine  [facility]  Cell [phone]  Office [phone]  Pager [phone]    CCx: chronic dyspnea, chronic left calcified pleural effusion/fibrothorax, Pseudomonas airway colonization, chronic rhinosinusitis    HPI: 65 year old male never smoker with a history of 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 Has ongoing cough and dyspnea. Seen at [facility] again by pulmonology and thoracic surgery. Repeat left thoracentesis March 2020 with 86% PMNs, no growth on bacterial or mycobacterial cultures. Coughs (mostly dry), feels  "tired and week. No fevers or chills. Did not have methacholine challenge or FeNO done and [facility]. He feels like the Symbicort and Spiriva are helping. Uses Duoneb 3-4 times daily and feels like it helps. Also taking loratadine and montelukast. Really unclear asthma diagnosis but feels like his therapies are helping. I have repeatedly advised him to enroll in pulmonary rehab, which he has declined; he now has home exercise equipment, and understandably wants to wait until the pandemic is over to enroll in a formal pulmonary rehab program. Discussed possible surgical decortication at [facility]--benefit unclear and likely significant component of trapped lung--but he declined. He was put on prednisone 40 mg daily for 5 days and levofloxacin 500 mg daily for 5 days, both starting on 11/16. At baseline, he has chronic exertional dyspnea and cough. Restrictive physiology on PFTs. Has a loculated left pleural effusion with partially calcified thickened rind, stable radiographically since 2014 and known to have been present since at least 2004 when the patient immigrated; notes he was \"very ill with a virus\" at age 10 and hospitalized, told he had a lung problem. These chronic, stable loculated effusions with calcified rind/fibrothorax are fairly common in [place]  population, likely sequela of past parasitic, bacterial or mycobacterial infection. Underwent left thoracentesis in 2014, with a turbid brown exudate with no malignant cells and negative bacterial, mycobacterial, and fungal stain/culture.     ROS:  A 12-system review was obtained and was negative with the exception of the symptoms endorsed in the history of present illness.    PMH:  Chronic loculated left pleural effusion with calcified rind (fibrothorax)  GERD  Possible chronic rhinosinusitis  Possible reactive airways  CKD  HTN    PSH:  No past surgical history on file.    Allergies:  Allergies   Allergen Reactions   ? Niacin Other (See Comments)   ? Niacin " Preparations      GI side effects         Family HX:  No family history on file.    Social Hx:  Social History     Socioeconomic History   ? Marital status:      Spouse name: Not on file   ? Number of children: Not on file   ? Years of education: Not on file   ? Highest education level: Not on file   Occupational History   ? Not on file   Social Needs   ? Financial resource strain: Not on file   ? Food insecurity     Worry: Not on file     Inability: Not on file   ? Transportation needs     Medical: Not on file     Non-medical: Not on file   Tobacco Use   ? Smoking status: Never Smoker   ? Smokeless tobacco: Never Used   Substance and Sexual Activity   ? Alcohol use: No   ? Drug use: No   ? Sexual activity: Yes     Partners: Female     Birth control/protection: Post-menopausal   Lifestyle   ? Physical activity     Days per week: Not on file     Minutes per session: Not on file   ? Stress: Not on file   Relationships   ? Social connections     Talks on phone: Not on file     Gets together: Not on file     Attends Scientologist service: Not on file     Active member of club or organization: Not on file     Attends meetings of clubs or organizations: Not on file     Relationship status: Not on file   ? Intimate partner violence     Fear of current or ex partner: Not on file     Emotionally abused: Not on file     Physically abused: Not on file     Forced sexual activity: Not on file   Other Topics Concern   ? Not on file   Social History Narrative    Lives with wife and kids       Current Meds:  Current Outpatient Medications   Medication Sig Dispense Refill   ? acetaminophen (MAPAP ARTHRITIS PAIN) 650 MG CR tablet Take 1 tablet (650 mg total) by mouth every 8 (eight) hours as needed for pain. 100 tablet 3   ? albuterol (PROAIR HFA;PROVENTIL HFA;VENTOLIN HFA) 90 mcg/actuation inhaler Inhale 2 puffs every 4 (four) hours as needed for wheezing or shortness of breath. 1 Inhaler 11   ? aspirin 81 MG EC tablet TAKE 1  TABLET BY MOUTH DAILY FOR STROKE PREVENTION // IB HNUB NOJ 1 LUB PAB KOM NTSHAV KHIAV ZOO 30 tablet 11   ? blood pressure monitor Kit Use to check blood pressure daily 1 each 0   ? clonazePAM (KLONOPIN) 1 MG tablet Take 1 mg by mouth at bedtime.      ? DULoxetine (CYMBALTA) 60 MG capsule TAKE 2 CAPSULE DAILY // 1 HNUB NOJ 2  capsule 3   ? fenofibrate (TRICOR) 145 MG tablet Take 1 tablet (145 mg total) by mouth daily. 90 tablet 1   ? fluticasone propionate (FLONASE) 50 mcg/actuation nasal spray Apply 1 spray into each nostril daily as needed for rhinitis.     ? gabapentin (NEURONTIN) 300 MG capsule TAKE 1 CAPSULE TWICE DAILY // IB ZAUG NOJ 1 LUB, 1 HNUB 2 ZAUG 180 capsule 3   ? ipratropium-albuteroL (DUO-NEB) 0.5-2.5 mg/3 mL nebulizer Take 3 mL by nebulization 4 (four) times a day as needed. 480 mL 6   ? irbesartan (AVAPRO) 150 MG tablet TAKE 1 TABLET DAILY BY MOUTH FOR HIGH BLOOD PRESSURE // IB HNUB NOJ 1 LUB PAB ROXANNE NTSHAV SIAB 90 tablet 4   ? levoFLOXacin (LEVAQUIN) 500 MG tablet Take 1 tablet (500 mg total) by mouth daily for 5 days. 5 tablet 0   ? loratadine (CLARITIN) 10 mg tablet Take 1 tablet (10 mg total) by mouth daily. 30 tablet 11   ? montelukast (SINGULAIR) 10 mg tablet Take 1 tablet (10 mg total) by mouth at bedtime. 30 tablet 11   ? omeprazole (PRILOSEC) 20 MG capsule TAKE 1 CAPSULE 30 MINUTES BEFORE FIRST MEAL DAILY FOR STOMACH // 1 HNUB NOJ 1 LUB UA NTEJ NOJ TSHAIS PAB ROXANNE MOB PLAB/MOB NCAUJ PLAB 30 capsule 11   ? predniSONE (DELTASONE) 20 MG tablet Take 40 mg by mouth daily for 5 days. 10 tablet 0   ? SYMBICORT 160-4.5 mcg/actuation inhaler INHALE 2 PUFFS TWICE DAILY. RINSE MOUTH AND SPIT OUT AFTERWARDS // 1 ZAUG NQUS 2 PAS, 1 HNU SIV 2 ZAUG. YAUG NCAUJ THAUM SIV TAS. 1 Inhaler 11   ? TAB-A-GERARDO per tablet TAKE 1 TABLET BY MOUTH DAILY // IB HNUB NOJ 1 LUB 30 tablet 11   ? tiotropium bromide (SPIRIVA RESPIMAT) 2.5 mcg/actuation Mist Inhale 2 puffs daily. 4 g 11   ? traZODone (DESYREL) 50 MG  tablet TAKE 1 TABLET DAILY AT BEDTIME FOR SLEEP AID // 1 HNUB NOJ 1 LUB THAUM MUS PW PAB KOM TSAUG ZOG 90 tablet 3     No current facility-administered medications for this visit.        Physical Exam:  no exam due to virtual visit    Labs:  Left pleural fluid (March 2020 at [facility]):  - 86% PMNs  - no growth on bacterial or mycobacterial cultures    Imaging studies:  Chest CT with contrast (February 2020, [facility]):  FINDINGS:   A 12.8 x 8.1 x 14.9 cm (AP x lateral x SI; approximately 850ml) loculated,  complex fluid collection within the left pleural space demonstrates thin  peripheral septations, areas of internal fat (series 3, image 246) and higher  density heterogeneous internal components (series 3, image 313). No evidence of  internal gas or inflammatory changes surrounding this mass to suggest  acute/aggressive infectious process, with preservation of the extrapleural fat  plane.  There is some  leftward mediastinal shift suggesting chronic fibrosis  and atelectasis in the left upper and lower lobes. No evidence of central  obstructing lesion.     Mildly prominent mediastinal and hilar lymph nodes, some of which are calcified.  These may be benign/reactive but are indeterminate by CT criteria. The central  left upper lobe bronchus appears obstructed (series 3 image 179) but no definite  endobronchial lesion is identified.    There are areas of probably chronic atelectasis in the right middle and lower  lobes with some peripheral bronchial mucous plugging.     Probable right adrenal adenoma. Probable 1.1 cm cyst superior left kidney  (series 5, image 84). No worrisome osseous lesions.    COMPARISON REPORT:  Images from the 06/18/2018 and 04/10/2014 examinations are now available. The  complex left pleural abnormality which currently measures 12.8 x 8.1 x 14.9 cm  has slightly increased in volume since the prior exams, measuring approximately  12.5 x 7.7 x 14 cm on 06/18/2019 and 11.8 x 7.0 x 13 cm on 04/10/2014.  This is  approximately 850 mL currently compared with 750 on 06/18/2018 and 550 on  04/10/2014. Peripheral calcification has slightly increased but the  heterogeneous internal density is similar, with no new definite soft tissue  components. Compressive atelectasis in the left lung and shift the mediastinum  is also slightly greater.    The minimal lack of change over many years is highly suggestive of a benign  abnormality but the CT appearance is indeterminate. Given several other  left-sided pleural calcifications, a chronic/old empyema particularly by  atypical agents such as tuberculosis is most likely. A seroma or lymphangioma  might also be considered. Given some minimal fatty elements, a atypical  hamartoma could be potentially considered although the extensive fluid component  is unusual.    The probable right adrenal adenoma is unchanged since 06/18/2018, but slightly  more apparent than on 04/10/2014.    1. Loculated, complex fluid collection with scattered peripheral calcification  in the left pleural space measures 12.8 x 8.1 x 14.9 cm. Given lack of  inflammatory or aggressive features and reported presence dating back at least  to outside CTA chest 06/08/2018, this mass is likely benign. This may be due to  previous trauma or infection and would be of usual for malignancy; however,  cannot entirely exclude the possibility of chronic low-grade infection such as  tuberculous empyema. No findings to suggest an acute infectious or aggressive  neoplastic process.  2.  Probable small right adrenal adenoma.    TTE (August 2018):  - Normal left ventricular size and systolic performance with a visually estimated ejection fraction of 60%.  - There is mild aortic insufficiency.  - Normal right ventricular size and systolic performance.      PFT's  August 2018  FEV1/FVC is 74% and is normal.  FEV1 is 1.41L (55%) predicted and is reduced.  FVC is 1.91L (60%) predicted and reduced.  There was no improvement in  spirometry after a single inhaled dose of bronchodilator.  TLC is 3.57L (58%) predicted and is reduced.  RV is 1.40L (60%) predicted and is reduced.  DLCO is 16.54ml/min/hg (64%) predicted and is reduced when it is corrected for hemoglobin.  Flow volume loops indicate no abnormalities.    October 2019  FEV1/FVC is 80 and is normal.  FEV1 is 56% predicted and is reduced.  FVC is 56% predicted and reduced.  There was no no improvement in spirometry after a single inhaled does of bronchodilator.  TLC is 56% predicted and is reduced.  RV is 63% predicted and is reduced.  DLCO is 58% predicted and is reduced when it   is corrected for hemoglobin.

## 2021-06-21 NOTE — LETTER
Letter by Kelley Haro SW at      Author: Kelley Haro SW Service: -- Author Type: --    Filed:  Encounter Date: 12/31/2020 Status: (Other)       January 19, 2021    RACHELL TRIMBLE  [address] [place] [address]        Dear Rachell:    At [facility], we are dedicated to improving your health and well-being. Enclosed is the Comprehensive Care Plan that we developed with you on 12/22/20. Please review the Care Plan carefully.    As a reminder, some of the things we discussed at your visit include:    Your physical and mental health    Ways to reduce falls    Health care needs you may have    Dont forget to contact your care coordinator if you:    Have been hospitalized or plan to be hospitalized     Have had a fall     Have experienced a change in physical health    Are experiencing emotional problems     If you do not agree with your Care Plan, have questions about it, or have experienced a change in your needs, please call me at [phone]. If you are hearing impaired, please call the [place] [employer] at 220 or [phone] (vclkcm-ln-pjfflc relay service).    Sincerely,          JUAN Syed    E-mail: [email or web address]  [phone]      [facility]+[id number] [place] [id number]     [id number] (11/18)

## 2021-06-21 NOTE — PROGRESS NOTES
Assessment:     Diagnoses and all orders for this visit:    Chronic bilateral low back pain without sciatica  -     OPS TFESI Lumbar Sacral Unilateral; Future; Expected date: 11/5/18    DDD (degenerative disc disease), lumbar    Lumbar facet arthropathy    Right lumbar radiculopathy  -     OPS TFESI Lumbar Sacral Unilateral; Future; Expected date: 11/5/18    Lumbar foraminal stenosis  -     OPS TFESI Lumbar Sacral Unilateral; Future; Expected date: 11/5/18       Venita Sanchez is a 63 y.o. y.o. 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    Patient is neurologically intact on exam.     Plan:     A shared decision making plan was used. The patient's values and choices were respected. Prior medical records from 10/22/18 were reviewed today. The following represents what was discussed and decided upon by the provider and the patient.        -DIAGNOSTIC TESTS: Images were personally reviewed and interpreted.   --Lumbar spine MRI 10/30/2018 shows mild degenerative disease at L4-5 with moderate to marked degenerative disease at L5-S1 with 3 mm spondylolisthesis.  Multilevel mild facet arthropathy.  Moderate right, mild to moderate left L5-S1 foraminal stenosis.  Mild to moderate right and moderate left foraminal stenosis at L4-5.  --Lumbar spine x-ray 10/19/2018 shows moderate degenerative disc height loss L5-S1 with ventral interbody spurring.    -INTERVENTIONS: Ordered a right L5-S1 transforaminal epidural steroid injection to see if we get further relief of his right low back pain, he does have mild spinal listhesis  with right foraminal and lateral recess stenosis at this level.    -MEDICATIONS: Advised patient to continue gabapentin at this time, no new medications ordered.  Discussed side effects of medications and proper use. Patient verbalized understanding.    -PHYSICAL THERAPY: Did advise patient to continue with home exercises from prior physical therapy sessions, he reports he is not doing those currently and did offer new physical therapy options including traditional to reestablish home exercise program versus pool therapy but he defers at this time.  We can discuss further down the road if symptoms are not improving which injection, he is aware however that the core strengthening exercises can help prevent pain from worsening down the road.  Discussed the importance of core strengthening, ROM, stretching exercises with the patient and how each of these entities is important in decreasing pain.  Explained to the patient that the purpose of physical therapy is to teach the patient a home exercise program.  These exercises need to be performed every day in order to decrease pain and prevent future occurrences of pain.        -PATIENT EDUCATION:  25 minutes of total visit time was spent face to face with the patient today, 60 % of the visit was spent on counseling, education, and coordinating care.   -5 minutes spent outside of visit time, non-face-to-face time, reviewing chart.    -FOLLOW UP: Follow-up for injection with Dr. Wyman in 2 weeks postinjection  Advised to contact clinic if symptoms worsen or change.    Subjective:     Venita Sanchez is a 63 y.o. male who presents today for follow-up regarding low back pain lumbosacral junction right greater than left ongoing for 17+ years with nonspecific pain and numbness and tingling into his right lower extremity that is more intermittent.  Currently his pain in general is a 6/10, a 4 at its best but up to a 10 at its worst more bothersome with standing and moving in  general, sitting still with no movements does make his pain better he states.    Patient denies any bowel or bladder dysfunction, denies balance changes or recent trips or falls, denies left leg pain or tingling.  Patient is here to review MRI.     was present during entire visit today.     -Treatment to Date: No prior spinal surgery or spinal injection.  [facility] as well as other locations multiple times with no relief and increase in his pain.  Chiropractic treatments in the past with no lasting benefit.     -Medications:  Gabapentin 300 mg twice daily long-term with benefit, no side effect noted.     *Pulmonology 9/27/18 recommended Tylenol, heating pad, lidocaine patch or gabapentin for chronic left lateral sharp chest pain.    Patient Active Problem List   Diagnosis     Essential Hypertriglyceridemia     Proteinuria     Major Depression, Single Episode     Neck Pain     Chronic left-sided low back pain without sciatica     Essential hypertension     Pleural effusion on left     Fibrothorax     GERD (gastroesophageal reflux disease)     Moderate persistent asthma       Current Outpatient Prescriptions on File Prior to Encounter   Medication Sig Dispense Refill     acetaminophen (MAPAP ARTHRITIS PAIN) 650 MG CR tablet Take 650 mg by mouth every 8 (eight) hours as needed for pain.       albuterol (VENTOLIN HFA) 90 mcg/actuation inhaler Inhale 2 puffs every 4 (four) hours as needed for wheezing or shortness of breath. 1 Inhaler 11     aspirin 81 MG EC tablet Take 1 tablet (81 mg total) by mouth daily. 100 tablet 11     budesonide-formoterol (SYMBICORT) 160-4.5 mcg/actuation inhaler Inhale 2 puffs 2 (two) times a day. 1 Inhaler 11     clonazePAM (KLONOPIN) 1 MG tablet Take 1 mg by mouth 2 (two) times a day as needed for anxiety.       diclofenac sodium ER (VOLTAREN XR) 100 mg 24 hr tablet Take 100 mg by mouth daily.       DULoxetine (CYMBALTA) 60 MG capsule  Take 1 capsule (60 mg total) by mouth daily. 90 capsule 4     fenofibrate (TRICOR) 145 MG tablet Take 1 tablet (145 mg total) by mouth daily. 90 tablet 4     gabapentin (NEURONTIN) 300 MG capsule Take 300 mg by mouth 2 (two) times a day.       ipratropium-albuterol (DUO-NEB) 0.5-2.5 mg/3 mL nebulizer Take 3 mL by nebulization 4 (four) times a day. 360 mL 11     losartan-hydrochlorothiazide (HYZAAR) 100-12.5 mg per tablet Take 1 tablet by mouth daily. 30 tablet 11     montelukast (SINGULAIR) 10 mg tablet Take 1 tablet (10 mg total) by mouth at bedtime. 30 tablet 11     multivitamin-Ca-iron-minerals (TAB A GERARDO) 18-0.4 mg Tab Take 1 tablet by mouth daily. 90 tablet 4     omeprazole (PRILOSEC) 20 MG capsule Take 1 capsule (20 mg total) by mouth daily before breakfast. 90 capsule 4     traZODone (DESYREL) 50 MG tablet Take 1 tablet (50 mg total) by mouth at bedtime. 30 tablet 6     No current facility-administered medications on file prior to encounter.        Allergies   Allergen Reactions     Niacin Other (See Comments)     Niacin Preparations      GI side effects         Past Medical History:   Diagnosis Date     Fibrothorax      GERD (gastroesophageal reflux disease)      Moderate persistent asthma      Pleural effusion on left         Review of Systems  ROS: Positive for numbness and tingling and perceived weakness.  Specifically negative for bowel/bladder dysfunction, balance changes, headache, dizziness, foot drop, fevers, chills, appetite changes, nausea/vomiting, unexplained weight loss. Otherwise 13 systems reviewed are negative. Please see the patient's intake questionnaire from today for details.    Reviewed Social, Family, Past Medical and Past Surgical history with patient, no significant changes noted since prior visit.     Objective:     /82 (Patient Site: Right Arm, Patient Position: Sitting)  Pulse 77  Temp 98.4  F (36.9  C) (Oral)   Wt 187 lb (84.8 kg)  SpO2 96%  BMI 31.12 kg/m2    PHYSICAL  EXAMINATION:    --CONSTITUTIONAL: Well developed, well nourished, healthy appearing individual.  --PSYCHIATRIC: Appropriate mood and affect. No difficulty interacting due to temper, social withdrawal, or memory issues.  --SKIN: Lumbar region is dry and intact.   --RESPIRATORY: Normal rhythm and effort. No abnormal accessory muscle breathing patterns noted.   --MUSCULOSKELETAL:  Normal lumbar lordosis noted, no lateral shift.  --GROSS MOTOR: Easily arises from a seated position. Gait is non-antalgic  --LUMBAR SPINE:  Inspection reveals no evidence of deformity. Range of motion is limited in all movements including: Lumbar flexion, extension, lateral rotation.  Tenderness to palpation midline L5-S1 region. Straight leg raising in the supine position is negative to radicular pain on the left and positive on the right. Sciatic notch tender bilaterally.   --SACROILIAC JOINT:. One Finger point test negative.  --LOWER EXTREMITY MOTOR TESTING:  Plantar flexion left 5/5, right 5/5   Dorsiflexion left 5/5, right 5/5   Great toe MTP extension left 5/5, right 5/5  Knee flexion left 5/5, right 5/5  Knee extension left 5/5, right 5/5   Hip flexion left 5/5, right 5/5  Hip abduction left 5/5, right 5/5  Hip adduction left 5/5, right 5/5   --HIPS: Full range of motion bilaterally.   --NEUROLOGIC: Bilateral patellar and achilles reflexes are physiologic and symmetric. Sensation to light touch is intact on the left, diminished globally on the right.    RESULTS:   Imaging: Lumbar spine imaging was reviewed today. The images were shown to the patient and the findings were explained using a spine model.      Mr Lumbar Spine Without Contrast  Result Date: 10/30/2018  INDICATION: Low back pain, >6 weeks / red flag(s) / radiculopathy chronic bilateral low back pain and right lumbar radiculopathy. COMPARISON: 10/19/2018 lumbar spine plain film. TECHNIQUE: Without IV contrast FINDINGS: Numbering is based on 5 lumbar type vertebrae with a  rudimentary S1-S2 disc space. Broad rightward curvature spanning the lumbar spine with apex at L2-L3. 3 mm retrolisthesis of L5 on S1. Otherwise unremarkable alignment. Vertebral body heights relatively maintained. Type II Modic endplate degenerative change at L5-S1. No significant osseous edema. The conus tip is identified at the L1-L2 level. T2 hyperintense lesions in the bilateral kidneys potentially reflect cysts, but are incompletely evaluated on this exam. The visualized portions of the bony pelvis are normal for age. T12-L1 through L2-L3: Normal disc height and signal with no posterior disc bulge and no significant facet arthropathy. Spinal canal and neural foramina are widely patent. L3-L4: Normal disc height and signal with only a very small diffuse posterior disc bulge. Mild bilateral facet arthropathy with mild ligamentum flavum hypertrophy. Spinal canal is patent. Neural foraminal stenosis is minimal. L4-L5: Normal disc height. Mild loss of disc signal. Small diffuse posterior disc bulge. Mild bilateral facet arthropathy. Mild spinal canal stenosis with mild narrowing of the left lateral recess, where there is slight contact of the traversing left L5 nerve. Mild to moderate right and moderate left sided neural foraminal stenosis.   L5-S1: Moderate to severe loss of disc height at the right lateral aspect of the disc, with a more mild loss of disc height at the left lateral aspect. Trace disc edema. Small diffuse posterior disc bulge. Mild bilateral facet arthropathy. Mild spinal canal stenosis with mild narrowing of the right lateral recess, where there is slight contact of the traversing right S1 nerve. Moderate right and mild to moderate left neural foraminal stenosis.   CONCLUSION:   1.  Mild degenerative disc disease at the L4-L5 level with a more moderate to marked degenerative disc disease at the L5-S1 level. This is more pronounced on the right. Multilevel mild facet arthropathy in the lower  lumbar spine is as detailed above.   2.  No high-grade spinal canal stenosis at any level.   3.  Moderate right L5-S1 neural foraminal stenosis, with a more mild to moderate left L5-S1 neural foraminal stenosis. Mild to moderate right and moderate left L4-L5 neural foraminal stenosis.   4.  Slight contact of the traversing left L5 nerve at the mildly narrowed left L4-L5 lateral recess. This is a finding of questionable clinical significance, and correlation for left L5 nerve symptoms would be of benefit.   5.  Slight contact of the traversing right S1 nerve at the mildly narrowed right L5-S1 lateral recess. This is also a finding of questionable clinical significance, and correlation for symptoms in a right S1 nerve distribution would be of benefit.      Xr Lumbar Spine 2 Or 3 Vws  Result Date: 10/19/2018  INDICATION: Low back pain. COMPARISON: None. FINDINGS: Five nonrib-bearing lumbar-type vertebral bodies. Slight convex-right lumbar curvature. Normal lumbar lordosis. At least mild degenerative interspace narrowing L5-S1. Vertebral body heights and intervertebral disc space heights are otherwise preserved. Mildly prominent ventral interbody spurring L5-S1 and to a lesser extent ventrally at L4-L5 and T11-T12. Visualized osseous pelvis and proximal femora are unremarkable. Normal bowel gas pattern.

## 2021-06-22 ENCOUNTER — COMMUNICATION - HEALTHEAST (OUTPATIENT)
Dept: FAMILY MEDICINE | Facility: CLINIC | Age: 66
End: 2021-06-22

## 2021-06-22 DIAGNOSIS — I10 BENIGN ESSENTIAL HTN: ICD-10-CM

## 2021-06-22 DIAGNOSIS — K21.9 GASTROESOPHAGEAL REFLUX DISEASE WITHOUT ESOPHAGITIS: ICD-10-CM

## 2021-06-22 RX ORDER — IRBESARTAN 150 MG/1
TABLET ORAL
Qty: 90 TABLET | Refills: 3 | Status: SHIPPED | OUTPATIENT
Start: 2021-06-22 | End: 2021-07-26 | Stop reason: ALTCHOICE

## 2021-06-22 NOTE — PROGRESS NOTES
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     Chronic dyspnea, chronic left calcified pleural effusion/fibrothorax: Persistent dyspnea on exertion and occasional cough, though the cough improved markedly with transition from ACEI to ARB. Stable loculated left pleural effusion with partially calcified thickened rind, stable radiographically since 2014 and known to have been present since at least 2004 when the patient immigrated; notes he was \"very ill with a virus\" at age 10 and hospitalized, told he had a lung problem. These chronic, stable loculated effusions with calcified rind/fibrothorax are fairly common in the [place]  population, likely sequela of past parasitic, bacterial or mycobacterial infection. Underwent left thoracentesis in 2014, with a turbid brown exudate with no malignant cells and negative bacterial, mycobacterial, and fungal stain/culture. Attempt at repeat thoracentesis in September 2018 under US guidance was unsuccessful likely to to chronicity/loculation; has had some left lateral sharp chest pain since the attempted thoracentesis.  He may have underlying asthma, which we are trying to optimize, but surgical management of the left fibrothorax would be very morbid and likely not lead to lung reexpansion; his case has been discussed with thoracic surgery.     Plan:  - surgery would be morbid and unlikely to be successful at achieving lung reexpansion  - continue budesonide-formoterol 160-4.5 mcg two inhalations two times a day; rinse/gargle/spit water after use; previously provided holding chamber with instruction on use  - continue nebulized ipratropium-albuterol four times a day  - continue montelukast 10 mg at bedtime  - albuterol HFA as needed  - encouraged exercise as able  - received influenza vaccine " "for this season  - should discuss pneumococcal vaccination at a future visit if not already received  - follow up in 3 months  - encouraged him to call any time with questions or concerning symptoms     I appreciate the opportunity to participate in the care of Mr. Sanchez.  Please feel free to contact me at any time.     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 Persistent dyspnea on exertion and occasional cough, though the cough improved markedly with transition from ACEI to ARB. Stable loculated left pleural effusion with partially calcified thickened rind, stable radiographically since 2014 and known to have been present since at least 2004 when the patient immigrated; notes he was \"very ill with a virus\" at age 10 and hospitalized, told he had a lung problem. These chronic, stable loculated effusions with calcified rind/fibrothorax are fairly common in the [place]  population, likely sequela of past parasitic, bacterial or mycobacterial infection. Underwent left thoracentesis in 2014, with a turbid brown exudate with no malignant cells and negative bacterial, mycobacterial, and fungal stain/culture. Attempt at repeat thoracentesis in September 2018 under US guidance was unsuccessful likely to to chronicity/loculation; has had some left lateral sharp chest pain since the attempted thoracentesis.  He may have underlying asthma, which we are trying to optimize, but surgical management of the left fibrothorax would be very morbid and likely not lead to lung reexpansion; his case has been discussed with thoracic surgery.    ROS:  A 12-system review was obtained and was negative with the exception of the symptoms endorsed in the history of present illness.    PMH:  Chronic loculated " left pleural effusion with calcified rind (fibrothorax)  Asthma  GERD    PSH:  No past surgical history on file.    Allergies:  Allergies   Allergen Reactions     Niacin Other (See Comments)     Niacin Preparations      GI side effects         Family HX:  No family history on file.    Social Hx:  Social History     Socioeconomic History     Marital status:      Spouse name: Not on file     Number of children: Not on file     Years of education: Not on file     Highest education level: Not on file   Social Needs     Financial resource strain: Not on file     Food insecurity - worry: Not on file     Food insecurity - inability: Not on file     Transportation needs - medical: Not on file     Transportation needs - non-medical: Not on file   Occupational History     Not on file   Tobacco Use     Smoking status: Never Smoker     Smokeless tobacco: Never Used   Substance and Sexual Activity     Alcohol use: No     Drug use: No     Sexual activity: Yes     Partners: Female     Birth control/protection: Post-menopausal   Other Topics Concern     Not on file   Social History Narrative    Lives with wife and kids       Current Meds:  Current Outpatient Medications   Medication Sig Dispense Refill     acetaminophen (MAPAP ARTHRITIS PAIN) 650 MG CR tablet Take 650 mg by mouth every 8 (eight) hours as needed for pain.       albuterol (VENTOLIN HFA) 90 mcg/actuation inhaler Inhale 2 puffs every 4 (four) hours as needed for wheezing or shortness of breath. 1 Inhaler 11     aspirin 81 MG EC tablet Take 1 tablet (81 mg total) by mouth daily. 100 tablet 11     budesonide-formoterol (SYMBICORT) 160-4.5 mcg/actuation inhaler Inhale 2 puffs 2 (two) times a day. 1 Inhaler 11     clonazePAM (KLONOPIN) 1 MG tablet Take 1 mg by mouth 2 (two) times a day as needed for anxiety.       dextromethorphan-guaifenesin (GUAIFENESIN-DM)  mg/5 mL Liqd Take 5 mL by mouth every 6 (six) hours as needed. 237 mL 1     diclofenac sodium ER  "(VOLTAREN XR) 100 mg 24 hr tablet Take 100 mg by mouth daily.       DULoxetine (CYMBALTA) 60 MG capsule Take 1 capsule (60 mg total) by mouth daily. 90 capsule 4     fenofibrate (TRICOR) 145 MG tablet Take 1 tablet (145 mg total) by mouth daily. 90 tablet 4     gabapentin (NEURONTIN) 300 MG capsule Take 300 mg by mouth 2 (two) times a day.       ipratropium-albuterol (DUO-NEB) 0.5-2.5 mg/3 mL nebulizer Take 3 mL by nebulization 4 (four) times a day. 360 mL 11     losartan-hydrochlorothiazide (HYZAAR) 100-12.5 mg per tablet Take 1 tablet by mouth daily. 30 tablet 11     montelukast (SINGULAIR) 10 mg tablet Take 1 tablet (10 mg total) by mouth at bedtime. 30 tablet 11     multivitamin-Ca-iron-minerals (TAB A GERARDO) 18-0.4 mg Tab Take 1 tablet by mouth daily. 90 tablet 4     omeprazole (PRILOSEC) 20 MG capsule Take 1 capsule (20 mg total) by mouth daily before breakfast. 90 capsule 4     traZODone (DESYREL) 50 MG tablet Take 1 tablet (50 mg total) by mouth at bedtime. 30 tablet 6     No current facility-administered medications for this visit.        Physical Exam:  /60   Pulse 88   Resp 12   Ht 5' 5\" (1.651 m)   Wt 186 lb (84.4 kg)   SpO2 96%   BMI 30.95 kg/m    Gen: alert, oriented, no distress  HEENT: nasal turbinates are unremarkable, no oropharyngeal lesions, no cervical or supraclavicular lymphadenopathy  CV: RRR, no M/G/R  Resp: decreased breath sounds left mid-low lung fields  Abd: soft, nontender, no palpable organomegaly  Skin: no apparent rashes  Ext: no cyanosis, clubbing or edema  Neuro: alert, nonfocal    Labs:  reviewed    Imaging studies:  US thoracentesis (9/6/18):  PROCEDURE: Informed consent obtained. Time out performed. The area of fluid could easily be identified on ultrasound. The chest was prepped and draped in sterile fashion. 10 mL of 1 % lidocaine was infused into the local soft tissues. Under direct   ultrasound guidance, a 5 [employer] catheter system was placed into the pleural " effusion. No fluid could be withdrawn.     When the patient previously had thoracentesis performed to became vasovagal and fainted. On this occasion the same thing happened. He was placed supine. Vital signs were obtained demonstrating a blood pressure of 120/70 and heart rate in the low 70s with   normal oxygen saturation. He rested for 15 minutes and stated he felt okay to separate abdomen. There is placed sitting for 15 minutes and was then able to ambulate without assistance was discharged to home.     Dr. Quinn was not available. The findings were discussed with Dr. Quinn's partner, Dr. Garcia at the time of exam. She stated that the chest x-ray which had been ordered should be canceled. The patient was told to follow-up with Dr. Quinn through the   . The findings and recommendations were also discussed with his son-in-law with patient's permission.        RADIOLOGIC SUPERVISION AND INTERPRETATION:  ULTRASOUND GUIDANCE: Images demonstrate the pleural effusion. Catheter seen in good position.     IMPRESSION:   CONCLUSION:  Status post left ultrasound-guided thoracentesis. No fluid could be aspirated. It appears that the collection is organized which is consistent with the fact that it is chronic, having been present and unchanged compared to 2014. The patient fainted with   the procedure.     Chest CTA (6/18/18):  - directly reviewed  - chronic loculated left pleural effusion with thickened and partially calcified rind, stable since 2014  - no PE     PFT's (8/17/18):  FEV1/FVC is 74% and is normal.  FEV1 is 1.41L (55%) predicted and is reduced.  FVC is 1.91L (60%) predicted and reduced.  There was no improvement in spirometry after a single inhaled dose of bronchodilator.  TLC is 3.57L (58%) predicted and is reduced.  RV is 1.40L (60%) predicted and is reduced.  DLCO is 16.54ml/min/hg (64%) predicted and is reduced when it is corrected for hemoglobin.  Flow volume loops indicate no  abnormalities.    Martir Quinn MD  Pulmonary and Critical Care Medicine  [facility]  Cell [phone]  Office [phone]  Pager [phone]

## 2021-06-22 NOTE — PROGRESS NOTES
Subjective:    Venita Sanchez is a 63 y.o. male with moderate persistent asthma and fibrothorax, who presents for evaluation of cough and dyspnea.  He has had a cough for possibly 2 weeks.  Cough has been severe for 1 week, and seems to be getting worse.  He is coughing during the day and night.  Cough and respiratory symptoms seem to be worst at night.  He has had a tactile fever, worse at night.  Sometimes had chills.  He has been taking cough syrup and that has not been helpful.  He verifies he is using his controller medicine and DuoNeb daily as directed.  He estimates he has only used his albuterol 3 or 4 times in the past 2 weeks.  He reports that his asthma is generally under good control.  His last visit with pulmonology was on 12/6/2018, note reviewed today.  He did get his flu shot this year.    Patient Active Problem List   Diagnosis     Essential Hypertriglyceridemia     Proteinuria     Major Depression, Single Episode     Neck Pain     Chronic left-sided low back pain without sciatica     Essential hypertension     Pleural effusion on left     Fibrothorax     GERD (gastroesophageal reflux disease)     Moderate persistent asthma       Current Outpatient Medications:      acetaminophen (MAPAP ARTHRITIS PAIN) 650 MG CR tablet, Take 650 mg by mouth every 8 (eight) hours as needed for pain., Disp: , Rfl:      albuterol (VENTOLIN HFA) 90 mcg/actuation inhaler, Inhale 2 puffs every 4 (four) hours as needed for wheezing or shortness of breath., Disp: 1 Inhaler, Rfl: 11     aspirin 81 MG EC tablet, Take 1 tablet (81 mg total) by mouth daily., Disp: 100 tablet, Rfl: 11     azithromycin (ZITHROMAX Z-VALDO) 250 MG tablet, Take 2 tablets (500 mg) on  Day 1,  Then take 1 tablet (250 mg) once daily on Days 2 through 5.., Disp: 6 tablet, Rfl: 0     budesonide-formoterol (SYMBICORT) 160-4.5 mcg/actuation inhaler, Inhale 2 puffs 2 (two) times a day., Disp: 1 Inhaler, Rfl: 11     clonazePAM (KLONOPIN) 1 MG tablet, Take 1  mg by mouth 2 (two) times a day as needed for anxiety., Disp: , Rfl:      dextromethorphan-guaifenesin (GUAIFENESIN-DM)  mg/5 mL Liqd, Take 5 mL by mouth every 6 (six) hours as needed., Disp: 237 mL, Rfl: 1     diclofenac sodium ER (VOLTAREN XR) 100 mg 24 hr tablet, Take 100 mg by mouth daily., Disp: , Rfl:      DULoxetine (CYMBALTA) 60 MG capsule, Take 1 capsule (60 mg total) by mouth daily., Disp: 90 capsule, Rfl: 4     fenofibrate (TRICOR) 145 MG tablet, Take 1 tablet (145 mg total) by mouth daily., Disp: 90 tablet, Rfl: 4     gabapentin (NEURONTIN) 300 MG capsule, Take 300 mg by mouth 2 (two) times a day., Disp: , Rfl:      ipratropium-albuterol (DUO-NEB) 0.5-2.5 mg/3 mL nebulizer, Take 3 mL by nebulization 4 (four) times a day., Disp: 360 mL, Rfl: 11     losartan-hydrochlorothiazide (HYZAAR) 100-12.5 mg per tablet, Take 1 tablet by mouth daily., Disp: 30 tablet, Rfl: 11     montelukast (SINGULAIR) 10 mg tablet, Take 1 tablet (10 mg total) by mouth at bedtime., Disp: 30 tablet, Rfl: 11     multivitamin-Ca-iron-minerals (TAB A GERARDO) 18-0.4 mg Tab, Take 1 tablet by mouth daily., Disp: 90 tablet, Rfl: 4     omeprazole (PRILOSEC) 20 MG capsule, Take 1 capsule (20 mg total) by mouth daily before breakfast., Disp: 90 capsule, Rfl: 4     predniSONE (DELTASONE) 10 mg tablet, Take 40 mg by mouth daily for 5 days., Disp: 20 tablet, Rfl: 0     traZODone (DESYREL) 50 MG tablet, Take 1 tablet (50 mg total) by mouth at bedtime., Disp: 30 tablet, Rfl: 6     Objective:   Allergies:  Niacin and Niacin preparations    Vitals:  Vitals:    12/12/18 1007   BP: 130/78   Patient Site: Right Arm   Patient Position: Sitting   Cuff Size: Adult Regular   Pulse: 98   Temp: 98.8  F (37.1  C)   TempSrc: Oral   SpO2: 98%   Weight: 188 lb (85.3 kg)     Body mass index is 31.28 kg/m .    Vital signs reviewed.  General: Patient is alert and oriented x 3, in no apparent distress  Ears: TMs are obscured by impacted cerumen  bilaterally  Throat: no erythema, edema or exudate noted  Lymphatic: no anterior cervical lymph node enlargement  Cardiac: regular rate and rhythm, no murmurs  Pulmonary: lungs have slightly decreased breath sounds throughout, no crackles, rales, rhonchi, or wheezing noted    Assessment and Plan:   1.  Moderate persistent asthma  2.  Fibrothorax  3.  Probable Acute URI.  Continue all respiratory medicines as directed.  Continue to use albuterol as needed.  Exam and Vital signs stable.  Start azithromycin and prednisone, both for 5 days.  These should be generally compatible with his other medications, he will only be taking them for 5 days.  Next visit with pulmonology is in January.  If medicines prescribed today do not help with symptoms, he is asked to follow-up with us or pulmonology.  Certainly let us know sooner, or go in to the emergency room, if needed.    This dictation uses voice recognition software, which may contain typographical errors.

## 2021-06-23 NOTE — PROGRESS NOTES
[facility] Office Visit    Chief Complaint:  Chief Complaint   Patient presents with     Follow-up     Shortness of Breath         Assessment/Plan:  1. Essential hypertension  BP Readings from Last 3 Encounters:   01/14/19 130/70   12/12/18 130/78   12/06/18 110/60       well controlled today.  Plan for bloodpressure management includes ongoing focus on healthy DASH type diet and increased activity, encouraged to avoid tobacco products and limit alcohol use, stress reduction, continue losartan hctz 100/12.5mg daily.  Labwork and meds ordered and reviewed as below  Lab Results   Component Value Date    K 4.5 11/12/2018      Lab Results   Component Value Date    CREATININE 1.39 (H) 11/12/2018         2. Moderate persistent asthma, unspecified whether complicated  Unclear about adherence to meds and inhaler techniques. Pt open to MTM to review.  Pt reminded that he should use duonebs scheduled 4x/day to help maintain airway and pulmonary congestion.  symbicort 2 puffs two times a day.  Limited NSAIDs- not sure if he is taking diclofenac? Use of singulair at bedtime.  PPI daily in am before eating.      - Pneumococcal polysaccharide vaccine 23-valent 3 yo or older, subq/IM  - Amb Referral to Medication Management    3. Pleural effusion on left  duonebs 4x/day scheduled.  Not a surgical candidate- consolutated and cannot be drained at this point.  F/u with pulm in 3/2019 as scheduled.        Return in about 6 weeks (around 2/25/2019) for Recheck.    Patient Education/AVS:  There are no Patient Instructions on file for this visit.    HPI:   Venita Sanchez is a 63 y.o. male c/o f/u on cough and high blood pressure.  Developed a cough after starting ACE-I and then was switched to ARB.  H.o asthma and chronic lung disease and seen for URI mid December- treated with azithromycin and prednisone by [facility] and f/u with pulm scheduled for Jan.  No need for ED f/u since then.  Has f/u with lung doctor in 3/2019.   Pt notes cough from December finally got better with antibiotics and prednisone.     Now pt notes that he is having a hard time breathing and his wheezing is acting up. In the past he use to breath well during the day but over the past 2 weeks has had more problems breathing during the day.  Use to only have problems during the night when he was trying to sleep.  At night has wheezing and shortness of breath.  No swelling in his feet.  Notes that since 12/12 his cough is better but shortness of breath never got better.  Using green rescue inhaler- ventolin 2 puffs 1-2x/day.  Using symbicort 3 puffs two times a day at this point.  Doesn't think he takes an asthma pill at bedtime.  Using his nebs two times a day.  Taking stomach pill daily and bp med daily.  shortness of breath at rest and with movement.  Weight gain likely from not being as active. Does feel bloated in stomach- eating small amount once a day.  No fevers/chills.      Pharmacy did indicate that he has been getting singulair filled Oct/nov/december and plans for delivery again this week.      ROS:  Constitutional, CV, Resp, GI, , MSK, skin, neuro, psych all negative except as outlined in the HPI above and patient denies any other symptoms.      History summarized from1-2:12/6/18- pulmonary f/u- no shortness of breath/Cough at that time.  Better since switching from aCE-I to arb.  Continue controller inhaler, duo nebs scheduled, singulair, rescue inhaler as needed, exercise for lung health, f/u in 3 months.  Pneumovax recommended.    Radiology tests reviewed-1: chest CT 6/2018 showing left stable pneumothorax  Lab tests reviewed-1: 2018  Medicine tests reviewed-1: Echo 8/2018- normal with EF 60%.  Normal EKG 6/2018    Physical Exam:  /70 (Patient Site: Right Arm, Patient Position: Sitting, Cuff Size: Adult Large)   Pulse 80   Temp 98.4  F (36.9  C) (Oral)   Wt 192 lb 8 oz (87.3 kg)   SpO2 96%   BMI 32.03 kg/m   Body mass index is 32.03  kg/m . No LMP for male patient.  Vital signs reviewed  Wt Readings from Last 3 Encounters:   01/14/19 192 lb 8 oz (87.3 kg)   12/12/18 188 lb (85.3 kg)   12/06/18 186 lb (84.4 kg)     Social History     Tobacco Use   Smoking Status Never Smoker   Smokeless Tobacco Never Used     Social History     Substance and Sexual Activity   Sexual Activity Yes     Partners: Female     Birth control/protection: Post-menopausal     No Data Recorded  PHQ-9 Total Score: 8 (11/12/2018 10:00 AM)    PHQ-2 Total Score: 2 (11/12/2018 10:00 AM)    ACT Total Score: (!) 9 (1/14/2019 11:00 AM)      All normal as below except abnormalities include: feels sweaty today.  Decreased lung sounds on left side.  Right insp crackles- no wheezing today.    General is a  63 y.o. male sitting comfortably in no apparent distress.   HEENT:  TM are clear bilaterally.  Eye, nasal, oral exams within normal   Neck: Supple without lymphadenopathy or thyromegally  CV: Regular rate and rhythm S1S2 without rubs, murmurs or gallops,   Abd:  +BS, soft NT/ND,  No masses or organomegally  Extremities: Warm, No Edema, 2+ Pedal and radial pulses bilaterally  Skin: No lesions or rashes noted  Neuro/MSK: Able to ambulate around the exam room with equal movement, strength and normal coordination of the upper and lower extremeties symmetrically    Results for orders placed or performed in visit on 11/12/18   Basic Metabolic Panel   Result Value Ref Range    Sodium 140 136 - 145 mmol/L    Potassium 4.5 3.5 - 5.0 mmol/L    Chloride 108 (H) 98 - 107 mmol/L    CO2 22 22 - 31 mmol/L    Anion Gap, Calculation 10 5 - 18 mmol/L    Glucose 101 70 - 125 mg/dL    Calcium 8.5 8.5 - 10.5 mg/dL    BUN 32 (H) 8 - 22 mg/dL    Creatinine 1.39 (H) 0.70 - 1.30 mg/dL    GFR MDRD Af Amer >60 >60 mL/min/1.73m2    GFR MDRD Non Af Amer 52 (L) >60 mL/min/1.73m2       Med list and active problem list reviewed and updated as part of this encounter    Current Outpatient Medications on File Prior to  Visit   Medication Sig Dispense Refill     acetaminophen (MAPAP ARTHRITIS PAIN) 650 MG CR tablet Take 650 mg by mouth every 8 (eight) hours as needed for pain.       albuterol (VENTOLIN HFA) 90 mcg/actuation inhaler Inhale 2 puffs every 4 (four) hours as needed for wheezing or shortness of breath. 1 Inhaler 11     aspirin 81 MG EC tablet Take 1 tablet (81 mg total) by mouth daily. 100 tablet 11     budesonide-formoterol (SYMBICORT) 160-4.5 mcg/actuation inhaler Inhale 2 puffs 2 (two) times a day. 1 Inhaler 11     clonazePAM (KLONOPIN) 1 MG tablet Take 1 mg by mouth 2 (two) times a day as needed for anxiety.       diclofenac sodium ER (VOLTAREN XR) 100 mg 24 hr tablet Take 100 mg by mouth daily.       fenofibrate (TRICOR) 145 MG tablet Take 1 tablet (145 mg total) by mouth daily. 90 tablet 4     gabapentin (NEURONTIN) 300 MG capsule Take 300 mg by mouth 2 (two) times a day.       ipratropium-albuterol (DUO-NEB) 0.5-2.5 mg/3 mL nebulizer Take 3 mL by nebulization 4 (four) times a day. 360 mL 11     montelukast (SINGULAIR) 10 mg tablet Take 1 tablet (10 mg total) by mouth at bedtime. 30 tablet 11     multivitamin (ONE A DAY) per tablet Take 1 tablet by mouth. Take 1 tablet by mouth.       multivitamin-Ca-iron-minerals (TAB A GERARDO) 18-0.4 mg Tab Take 1 tablet by mouth daily. 90 tablet 4     NIFEdipine (PROCARDIA XL) 90 MG 24 hr tablet Take 90 mg by mouth. Take 90 mg by mouth.       omeprazole (PRILOSEC) 20 MG capsule Take 1 capsule (20 mg total) by mouth daily before breakfast. 90 capsule 4     traZODone (DESYREL) 50 MG tablet Take 1 tablet (50 mg total) by mouth at bedtime. 30 tablet 6     DULoxetine (CYMBALTA) 60 MG capsule Take 1 capsule (60 mg total) by mouth daily. 90 capsule 4     losartan-hydrochlorothiazide (HYZAAR) 100-12.5 mg per tablet Take 1 tablet by mouth daily. 30 tablet 11     No current facility-administered medications on file prior to visit.          Sylvia Lugo MD

## 2021-06-23 NOTE — PROGRESS NOTES
Called [facility] who said they HAVE been giving him his singular.  It was delivered to him on 10/23/18 when refilled, 11/14/18, 12/12/18 and are getting set to deliver it today.  Thanks.

## 2021-06-24 NOTE — PROGRESS NOTES
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     Chronic dyspnea, chronic left calcified pleural effusion/fibrothorax: Persistent dyspnea on exertion and occasional cough, though the cough improved markedly with transition from ACEI to ARB. Stable loculated left pleural effusion with partially calcified thickened rind, stable radiographically since 2014 and known to have been present since at least 2004 when the patient immigrated; notes he was \"very ill with a virus\" at age 10 and hospitalized, told he had a lung problem. These chronic, stable loculated effusions with calcified rind/fibrothorax are fairly common in the [place], likely sequela of past parasitic, bacterial or mycobacterial infection. Underwent left thoracentesis in 2014, with a turbid brown exudate with no malignant cells and negative bacterial, mycobacterial, and fungal stain/culture. Attempt at repeat thoracentesis in September 2018 under [place] guidance was unsuccessful likely to to chronicity/loculation. He may have underlying asthma, which we are trying to optimize, but surgical management of the left fibrothorax would be very morbid and likely not lead to lung reexpansion; his case has been discussed with thoracic surgery and they are in agreement. He has ongoing exertional dyspnea, occasional wheezing, and endorses nasal and sinus congestion with productive cough, suggesting a component of upper airway cough syndrome due to chronic rhinosinusitis. We discussed adding treatment for this, and changing his scheduled nebulized ipratropium-albuterol to a LAMA both for convenience and to see if his exertional dyspnea improves. I again recommended pulmonary rehabilitation, but he states that he will be unable to accomplish this in the winter due to unfavorable " "road conditions.     Plan:  - repeat CXR at the patient/family's request, though the utility of doing this now is marginal given the long-term radiographic stability  - stop scheduled nebulized ipratropium-albuterol and start umeclidinium one inhalation daily; can still use the nebs as needed  - continue budesonide-formoterol 160-4.5 mcg two inhalations two times a day; rinse/gargle/spit water after use; previously provided holding chamber with instruction on use  - continue montelukast 10 mg at bedtime; he was not familiar with this pill so I reordered it to ensure he is getting it  - start loratadine 10 mg daily  - start nasal fluticasone one spray in each nostril daily  - surgery would be morbid and unlikely to be successful at achieving lung reexpansion  - albuterol HFA as needed  - encouraged exercise as able; again recommended pulmonary rehabilitation, but he states that he will be unable to accomplish this in the winter due to unfavorable road conditions  - received influenza vaccine for this season  - received Pneumovax-23 in January 2019  - follow up in 3 months  - encouraged him to call any time with questions or concerning symptoms     I appreciate the opportunity to participate in the care of Mr. Sanchez.  Please feel free to contact me at any time.     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 Persistent dyspnea on exertion and occasional cough, though the cough improved markedly with transition from ACEI to ARB. Stable loculated left pleural effusion with partially calcified thickened rind, stable radiographically since 2014 and known to have been present since at least 2004 when the patient immigrated; notes he was \"very ill with a virus\" at age 10 and hospitalized, told he " had a lung problem. These chronic, stable loculated effusions with calcified rind/fibrothorax are fairly common in [place], likely sequela of past parasitic, bacterial or mycobacterial infection. Underwent left thoracentesis in 2014, with a turbid brown exudate with no malignant cells and negative bacterial, mycobacterial, and fungal stain/culture. Attempt at repeat thoracentesis in September 2018 under [place] guidance was unsuccessful likely to to chronicity/loculation. He may have underlying asthma, which we are trying to optimize, but surgical management of the left fibrothorax would be very morbid and likely not lead to lung reexpansion; his case has been discussed with thoracic surgery and they are in agreement. He has ongoing exertional dyspnea, occasional wheezing, and endorses nasal and sinus congestion with productive cough, suggesting a component of upper airway cough syndrome due to chronic rhinosinusitis. We discussed adding treatment for this, and changing his scheduled nebulized ipratropium-albuterol to a LAMA both for convenience and to see if his exertional dyspnea improves. I again recommended pulmonary rehabilitation, but he states that he will be unable to accomplish this in the winter due to unfavorable road conditions.    ROS:  A 12-system review was obtained and was negative with the exception of the symptoms endorsed in the history of present illness.    PMH:  Chronic loculated left pleural effusion with calcified rind (fibrothorax)  Asthma  GERD  Possible chronic rhinosinusitis    PSH:  No past surgical history on file.    Allergies:  Allergies   Allergen Reactions     Niacin Other (See Comments)     Niacin Preparations      GI side effects         Family HX:  No family history on file.    Social Hx:  Social History     Socioeconomic History     Marital status:      Spouse name: Not on file     Number of children: Not on file     Years of education: Not on file      Highest education level: Not on file   Occupational History     Not on file   Social Needs     Financial resource strain: Not on file     Food insecurity:     Worry: Not on file     Inability: Not on file     Transportation needs:     Medical: Not on file     Non-medical: Not on file   Tobacco Use     Smoking status: Never Smoker     Smokeless tobacco: Never Used   Substance and Sexual Activity     Alcohol use: No     Drug use: No     Sexual activity: Yes     Partners: Female     Birth control/protection: Post-menopausal   Lifestyle     Physical activity:     Days per week: Not on file     Minutes per session: Not on file     Stress: Not on file   Relationships     Social connections:     Talks on phone: Not on file     Gets together: Not on file     Attends [employer] service: Not on file     Active member of club or organization: Not on file     Attends meetings of clubs or organizations: Not on file     Relationship status: Not on file     Intimate partner violence:     Fear of current or ex partner: Not on file     Emotionally abused: Not on file     Physically abused: Not on file     Forced sexual activity: Not on file   Other Topics Concern     Not on file   Social History Narrative    Lives with wife and kids       Current Meds:  Current Outpatient Medications   Medication Sig Dispense Refill     acetaminophen (MAPAP ARTHRITIS PAIN) 650 MG CR tablet Take 650 mg by mouth every 8 (eight) hours as needed for pain.       albuterol (VENTOLIN HFA) 90 mcg/actuation inhaler Inhale 2 puffs every 4 (four) hours as needed for wheezing or shortness of breath. 1 Inhaler 11     aspirin 81 MG EC tablet Take 1 tablet (81 mg total) by mouth daily. 100 tablet 11     budesonide-formoterol (SYMBICORT) 160-4.5 mcg/actuation inhaler Inhale 2 puffs 2 (two) times a day. 1 Inhaler 11     clonazePAM (KLONOPIN) 1 MG tablet Take 1 mg by mouth 2 (two) times a day as needed for anxiety.       DULoxetine (CYMBALTA) 60 MG capsule Take 1  "capsule (60 mg total) by mouth daily. 90 capsule 4     fenofibrate (TRICOR) 145 MG tablet Take 1 tablet (145 mg total) by mouth daily. 90 tablet 4     gabapentin (NEURONTIN) 300 MG capsule Take 1 capsule (300 mg total) by mouth 2 (two) times a day. 60 capsule 3     ipratropium-albuterol (DUO-NEB) 0.5-2.5 mg/3 mL nebulizer Take 3 mL by nebulization 4 (four) times a day. 360 mL 11     losartan-hydrochlorothiazide (HYZAAR) 100-12.5 mg per tablet Take 1 tablet by mouth daily. 30 tablet 11     montelukast (SINGULAIR) 10 mg tablet Take 1 tablet (10 mg total) by mouth at bedtime. 30 tablet 11     multivitamin-Ca-iron-minerals (TAB A GERARDO) 18-0.4 mg Tab Take 1 tablet by mouth daily. 90 tablet 4     ranitidine (ZANTAC) 150 MG tablet Take 1 tablet (150 mg total) by mouth 2 (two) times a day as needed for heartburn. 60 tablet 11     traZODone (DESYREL) 50 MG tablet Take 1 tablet (50 mg total) by mouth at bedtime. 30 tablet 6     fluticasone (FLONASE ALLERGY RELIEF) 50 mcg/actuation nasal spray 1 spray in each nostril daily 16 g 12     loratadine (CLARITIN) 10 mg tablet Take 1 tablet (10 mg total) by mouth daily. 30 tablet 11     umeclidinium (INCRUSE ELLIPTA) 62.5 mcg/actuation DsDv inhaler Inhale 1 puff daily. 30 each 11     No current facility-administered medications for this visit.        Physical Exam:  /70   Pulse 79   Ht 5' 5\" (1.651 m)   Wt 190 lb (86.2 kg)   SpO2 95%   BMI 31.62 kg/m    Gen: alert, oriented, no distress  HEENT: nasal turbinates are mildly edematous, no oropharyngeal lesions, no cervical or supraclavicular lymphadenopathy  CV: RRR, no M/G/R  Resp: decreased breath sounds left mid-low lung fields, unchanged  Abd: soft, nontender, no palpable organomegaly  Skin: no apparent rashes  Ext: no cyanosis, clubbing or edema  Neuro: alert, nonfocal    Labs:  reviewed    Imaging studies:  US thoracentesis (9/6/18):  PROCEDURE: Informed consent obtained. Time out performed. The area of fluid could " easily be identified on ultrasound. The chest was prepped and draped in sterile fashion. 10 mL of 1 % lidocaine was infused into the local soft tissues. Under direct   ultrasound guidance, a 5 Russian Yueh catheter system was placed into the pleural effusion. No fluid could be withdrawn.     When the patient previously had thoracentesis performed to became vasovagal and fainted. On this occasion the same thing happened. He was placed supine. Vital signs were obtained demonstrating a blood pressure of 120/70 and heart rate in the low 70s with   normal oxygen saturation. He rested for 15 minutes and stated he felt okay to separate abdomen. There is placed sitting for 15 minutes and was then able to ambulate without assistance was discharged to home.     Dr. Quinn was not available. The findings were discussed with Dr. Quinn's partner, Dr. Garcia at the time of exam. She stated that the chest x-ray which had been ordered should be canceled. The patient was told to follow-up with Dr. Quinn through the   . The findings and recommendations were also discussed with his son-in-law with patient's permission.     RADIOLOGIC SUPERVISION AND INTERPRETATION:  ULTRASOUND GUIDANCE: Images demonstrate the pleural effusion. Catheter seen in good position.     IMPRESSION:   CONCLUSION:  Status post left ultrasound-guided thoracentesis. No fluid could be aspirated. It appears that the collection is organized which is consistent with the fact that it is chronic, having been present and unchanged compared to 2014. The patient fainted with   the procedure.     Chest CTA (6/18/18):  - directly reviewed  - chronic loculated left pleural effusion with thickened and partially calcified rind, stable since 2014  - no PE     PFT's (8/17/18):  FEV1/FVC is 74% and is normal.  FEV1 is 1.41L (55%) predicted and is reduced.  FVC is 1.91L (60%) predicted and reduced.  There was no improvement in spirometry after a single inhaled dose of  bronchodilator.  TLC is 3.57L (58%) predicted and is reduced.  RV is 1.40L (60%) predicted and is reduced.  DLCO is 16.54ml/min/hg (64%) predicted and is reduced when it is corrected for hemoglobin.  Flow volume loops indicate no abnormalities.    Martir Quinn MD  Pulmonary and Critical Care Medicine  [facility]  Cell [phone]  Office [phone]  Pager [phone]

## 2021-06-24 NOTE — PROGRESS NOTES
[facility] Office Visit    Chief Complaint:  Chief Complaint   Patient presents with     Follow-up     Hypertension & Lung         Assessment/Plan:  1. Moderate persistent asthma, unspecified whether complicated  Improved control with better adherence to treatment plan as outlined by pulmonary. Continue current meds and f/u with pulm next month as scheduled.  Continue singulair 10mg daily, duonebs scheduled, symbicort 2 puffs two times a day, prn rescue inhaler.      2. Essential hypertension  BP Readings from Last 3 Encounters:   02/19/19 104/74   01/22/19 122/78   01/14/19 130/70       well controlled today.  Plan for bloodpressure management includes ongoing focus on healthy DASH type diet and increased activity, encouraged to avoid tobacco products and limit alcohol use, stress reduction, continue losartan/kwxd177/12.5mg daily.  Labwork and meds ordered and reviewed as below  Lab Results   Component Value Date    K 4.5 11/12/2018      Lab Results   Component Value Date    CREATININE 1.39 (H) 11/12/2018         3. Chronic left-sided low back pain without sciatica  Pt did work with spine care with some relief and notes pain is starting to return and requests help in scheduling a f/u   - Ambulatory referral to Spine Care    4. Gastroesophageal reflux disease without esophagitis  Pt has been taking PPI on prn basis.  Will switch to zantac to use prn.    - ranitidine (ZANTAC) 150 MG tablet; Take 1 tablet (150 mg total) by mouth 2 (two) times a day as needed for heartburn.  Dispense: 60 tablet; Refill: 11    5. Bilateral impacted cerumen  Affecting hearing.  S/p ear lavage and manual removal of cerumen with ear loop.   - Ear cerumen removal    6. Vaccine counseling  Pt okay with vaccine but not available today- will defer for future date  - Varicella Zoster, Recombinant Vaccine IM      Return in about 3 months (around 5/19/2019) for Recheck.    Patient Education/AVS:  There are no Patient  Instructions on file for this visit.    HPI:   Venita Sanchez is a 63 y.o. male c/o f/u on his breathing.  Met with pharmacist last month and has improved understanding of his meds, inhalers and nebulizers, etc. Has f/u with pulmonary next month.      Asthma seems better than last month.  Still waking up at night but less.  Now 1x/night 2-3x/week with shortness of breath and relieved with rescue inhaler.  Using rescue inhaler 2x/day for symptoms.  symbicort 2 puffs two times a day, neb 4x/day scheduled and singulair at bedtime.      Blood pressure doing well with meds.      Back pain- did get an injection a couple months ago and was doing better.  Last couple of days has been flaring up and wondering about getting another injection.  Gabapentin daily b/c was well controlled.      Ongoing heartburn sx.  Taking ppi prn for a couple of days and then will feel better and stop it and restart as needed.      Pt notes discomfort in his ears- plugged    ROS:  Constitutional, CV, Resp, GI, , MSK, skin, neuro, psych all negative except as outlined in the HPI above and patient denies any other symptoms.      History summarized from1-2:MTM 1/22/19 reviewe regarding use of inhalers.  Consider switching Tricor to statin in future.      Physical Exam:  /74 (Patient Site: Left Arm, Patient Position: Sitting, Cuff Size: Adult Large)   Pulse 84   Temp 98.1  F (36.7  C) (Oral)   Wt 190 lb (86.2 kg)   BMI 31.62 kg/m   Body mass index is 31.62 kg/m . No LMP for male patient.  Vital signs reviewed  Wt Readings from Last 3 Encounters:   02/19/19 190 lb (86.2 kg)   01/22/19 193 lb (87.5 kg)   01/14/19 192 lb 8 oz (87.3 kg)     Social History     Tobacco Use   Smoking Status Never Smoker   Smokeless Tobacco Never Used     Social History     Substance and Sexual Activity   Sexual Activity Yes     Partners: Female     Birth control/protection: Post-menopausal     No Data Recorded  PHQ-9 Total Score: 8 (11/12/2018 10:00 AM)    PHQ-2  Total Score: 2 (11/12/2018 10:00 AM)    ACT Total Score: (!) 13 (2/19/2019  1:00 PM)      All normal as below except abnormalities include: ear canals are normal, TMs obscured by thick white/yellow earwax. This is located too far towards the TM to safely manually remove- due to sx will attempt ear lavage.    After ear lavage wax was much softer and moved forward so was able to get most wax out of right ear canal and all wax removed from left.  Lung exam grossly normal today.    General is a  63 y.o. male sitting comfortably in no apparent distress.   HEENT:  TM are clear bilaterally.  Eye, nasal, oral exams within normal   Neck: Supple without lymphadenopathy or thyromegally  CV: Regular rate and rhythm S1S2 without rubs, murmurs or gallops,   Lungs: Clear to auscultation bilaterally  Abd:  +BS, soft NT/ND,  No masses or organomegally  Extremities: Warm, No Edema, 2+ Pedal and radial pulses bilaterally  Skin: No lesions or rashes noted  Neuro/MSK: Able to ambulate around the exam room with equal movement, strength and normal coordination of the upper and lower extremeties symmetrically    Results for orders placed or performed in visit on 11/12/18   Basic Metabolic Panel   Result Value Ref Range    Sodium 140 136 - 145 mmol/L    Potassium 4.5 3.5 - 5.0 mmol/L    Chloride 108 (H) 98 - 107 mmol/L    CO2 22 22 - 31 mmol/L    Anion Gap, Calculation 10 5 - 18 mmol/L    Glucose 101 70 - 125 mg/dL    Calcium 8.5 8.5 - 10.5 mg/dL    BUN 32 (H) 8 - 22 mg/dL    Creatinine 1.39 (H) 0.70 - 1.30 mg/dL    GFR MDRD Af Amer >60 >60 mL/min/1.73m2    GFR MDRD Non Af Amer 52 (L) >60 mL/min/1.73m2       Med list and active problem list reviewed and updated as part of this encounter    Current Outpatient Medications on File Prior to Visit   Medication Sig Dispense Refill     acetaminophen (MAPAP ARTHRITIS PAIN) 650 MG CR tablet Take 650 mg by mouth every 8 (eight) hours as needed for pain.       albuterol (VENTOLIN HFA) 90 mcg/actuation  inhaler Inhale 2 puffs every 4 (four) hours as needed for wheezing or shortness of breath. 1 Inhaler 11     aspirin 81 MG EC tablet Take 1 tablet (81 mg total) by mouth daily. 100 tablet 11     budesonide-formoterol (SYMBICORT) 160-4.5 mcg/actuation inhaler Inhale 2 puffs 2 (two) times a day. 1 Inhaler 11     clonazePAM (KLONOPIN) 1 MG tablet Take 1 mg by mouth 2 (two) times a day as needed for anxiety.       DULoxetine (CYMBALTA) 60 MG capsule Take 1 capsule (60 mg total) by mouth daily. 90 capsule 4     fenofibrate (TRICOR) 145 MG tablet Take 1 tablet (145 mg total) by mouth daily. 90 tablet 4     gabapentin (NEURONTIN) 300 MG capsule Take 1 capsule (300 mg total) by mouth 2 (two) times a day. 60 capsule 3     ipratropium-albuterol (DUO-NEB) 0.5-2.5 mg/3 mL nebulizer Take 3 mL by nebulization 4 (four) times a day. 360 mL 11     losartan-hydrochlorothiazide (HYZAAR) 100-12.5 mg per tablet Take 1 tablet by mouth daily. 30 tablet 11     montelukast (SINGULAIR) 10 mg tablet Take 1 tablet (10 mg total) by mouth at bedtime. 30 tablet 11     multivitamin-Ca-iron-minerals (TAB A GERARDO) 18-0.4 mg Tab Take 1 tablet by mouth daily. 90 tablet 4     traZODone (DESYREL) 50 MG tablet Take 1 tablet (50 mg total) by mouth at bedtime. 30 tablet 6     No current facility-administered medications on file prior to visit.          Sylvia Lugo MD

## 2021-06-24 NOTE — TELEPHONE ENCOUNTER
Pulmonary Telephone Note    CXR shows stable left fibrothorax, no other changes. Tried the emergency contact but it was a wrong number. Tried the home phone but reached a busy signal.    Martir Quinn MD  Pulmonary and Critical Care Medicine  [facility]  Cell [phone]  Office [phone]  Pager [phone]

## 2021-06-24 NOTE — PATIENT INSTRUCTIONS - HE
It was good to see you in clinic today. This is what we discussed:    1. We will get a chest X-ray and I will call you with the result.  2. Start Incruse one inhalation daily.  3. Continue Symbicort two inhalations twice daily. Rinse, gargle, and spit water after use.  4. Use the rescue inhaler or the nebulizer as needed.  5. Continue montelukast (Singulair) one pill at bedtime.  6. Start loratadine (Claritin) one pill daily.  7. Start nasal fluticasone (Flonase) one spray in each nostril daily.  8. I recommend a rehab program.  9. I will see you in about 3 months.  10. Call any time with questions or concerning symptoms.    Martir Quinn MD  Pulmonary and Critical Care Medicine  [facility]  Office [phone]

## 2021-06-25 NOTE — PROGRESS NOTES
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     Chronic dyspnea, chronic left calcified pleural effusion/fibrothorax, Pseudomonas airway colonization, chronic rhinosinusitis: Venita had worsening dyspnea and left chest pain in October 2020 and was hospitalized again at [facility] for 5 nights in December 2020, where they actually placed a left pleural pigtail catheter into the collection, which predictably showed a chronic chylothorax, as this effusion has been present for decades, at least since he immigrated in [place], and is surrounded by a calcified rind. Of course, the lung failed to fully expand with this pigtail catheter, which was subsequently removed. He has had left lateral chest pain since this. He has no fevers, chills, or weight loss. It has been repeatedly sampled and found not to show malignant cells or infection. He is now havingracentesis; there is a calcified fibrothorax component, and I imagine repeat thoracentesis through this could contribute to pain. I have low suspicion for any new development, but will obtain CXR at patient's request to ensure no new changes since his [facility] hospitalization. Recall that, at baseline, he has chronic exertional dyspnea and cough. Restrictive physiology on PFTs but may have an asthma component and has clinically responded to asthma therapy. Has a loculated left pleural effusion with partially calcified thickened rind, stable radiographically since 2014 and known to have been present since at least 2004 when the patient immigrated; notes he was \"very ill with a virus\" at age 10 and hospitalized, told he had a lung problem. These chronic, stable loculated effusions with calcified rind/fibrothorax are fairly common in the [place]  population, likely a sequela of " past parasitic, bacterial or mycobacterial infection.      Plan:  - CXR PA and lateral; I will call him with the result  - heating pad to left lateral chest two times a day as needed for chest wall pain  - continue budesonide-formoterol 160-4.5 mcg two inhalations two times a day; rinse/gargle/spit water after use; previously provided holding chamber with instruction on use  - continue tiotropium respimat 2.5 mcg two inhalations daily  - albuterol HFA as needed  - continue montelukast 10 mg at bedtime  - continue loratadine 10 mg daily  - continue nasal fluticasone one spray in each nostril daily  - surgery would be morbid and unlikely to be successful at achieving left lung reexpansion  - again offered pulmonary rehabilitation which he declines due to transportation limitations  - annual high-dose influenza vaccination  - received Pneumovax-23 in January 2019; should receive Prevnar-13 due now that he is 65, and booster of Pneumovax-23 in 2024; will discuss further at follow-up  - received his first Pfizer-BioNTech COVID-19 vaccine, scheduled for the second  - follow up in one year or sooner if needed  - he apparently also has follow-up scheduled in the [facility] on [address]  - encouraged him to call any time with questions or concerning symptoms    Martir Quinn MD  Pulmonary and Critical Care Medicine  [facility]  Cell [phone]  Office [phone]  Pager [phone]  (he/him/his)    CCx: chronic dyspnea, chronic left calcified pleural effusion/fibrothorax, Pseudomonas airway colonization, chronic rhinosinusitis    HPI: 65 year old male never smoker with a history of 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 Venita had worsening dyspnea and left chest pain in October 2020 and was hospitalized again at [facility] for 5 nights in December 2020, where he underwent yet another left  "thoracentesis, which predictably showed a chronic chylothorax, as this effusion has been present for decades, at least since he immigrated in [place], and is surrounded by a calcified rind. He has no fevers, chills, or weight loss. It has been repeatedly sampled and found not to show malignant cells or infection. He is now having left posterolateral chest wall pain since the most recent left thoracentesis; there is a calcified fibrothorax component, and I imagine repeat thoracentesis through this could contribute to pain. I have low suspicion for any new development, but will obtain CXR. Recall that, at baseline, he has chronic exertional dyspnea and cough. Restrictive physiology on PFTs but may have an asthma component and has clinically responded to asthma therapy. Has a loculated left pleural effusion with partially calcified thickened rind, stable radiographically since 2014 and known to have been present since at least 2004 when the patient immigrated; notes he was \"very ill with a virus\" at age 10 and hospitalized, told he had a lung problem. These chronic, stable loculated effusions with calcified rind/fibrothorax are fairly common in the [place]  population, likely a sequela of past parasitic, bacterial or mycobacterial infection.     ROS:  A 12-system review was obtained and was negative with the exception of the symptoms endorsed in the history of present illness.    PMH:  Chronic loculated left pleural effusion with calcified rind (fibrothorax) with chronic exudative changes of chylothorax on laboratory analysis  GERD  Possible chronic rhinosinusitis  Possible asthma  CKD  HTN    PSH:  No past surgical history on file.    Allergies:  Allergies   Allergen Reactions     Niacin Other (See Comments)     Niacin Preparations      GI side effects         Family HX:  No family history on file.    Social Hx:  Social History     Socioeconomic History     Marital status:      Spouse name: Not on file     " Number of children: Not on file     Years of education: Not on file     Highest education level: Not on file   Occupational History     Not on file   Social Needs     Financial resource strain: Not on file     Food insecurity     Worry: Not on file     Inability: Not on file     Transportation needs     Medical: Not on file     Non-medical: Not on file   Tobacco Use     Smoking status: Never Smoker     Smokeless tobacco: Never Used   Substance and Sexual Activity     Alcohol use: No     Drug use: No     Sexual activity: Yes     Partners: Female     Birth control/protection: Post-menopausal   Lifestyle     Physical activity     Days per week: Not on file     Minutes per session: Not on file     Stress: Not on file   Relationships     Social connections     Talks on phone: Not on file     Gets together: Not on file     Attends Synagogue service: Not on file     Active member of club or organization: Not on file     Attends meetings of clubs or organizations: Not on file     Relationship status: Not on file     Intimate partner violence     Fear of current or ex partner: Not on file     Emotionally abused: Not on file     Physically abused: Not on file     Forced sexual activity: Not on file   Other Topics Concern     Not on file   Social History Narrative    Lives with wife and kids       Current Meds:  Current Outpatient Medications   Medication Sig Dispense Refill     acetaminophen (MAPAP ARTHRITIS PAIN) 650 MG CR tablet Take 1 tablet (650 mg total) by mouth every 8 (eight) hours as needed for pain. 100 tablet 3     albuterol (PROAIR HFA;PROVENTIL HFA;VENTOLIN HFA) 90 mcg/actuation inhaler Inhale 2 puffs every 4 (four) hours as needed for wheezing or shortness of breath. 1 Inhaler 11     aspirin 81 MG EC tablet TAKE 1 TABLET BY MOUTH DAILY FOR STROKE PREVENTION // IB HNUB NOJ 1 LUB PAB KOM NTSHAV KHIAV ZOO 30 tablet 11     blood pressure monitor Kit Use to check blood pressure daily 1 each 0     clonazePAM  (KLONOPIN) 1 MG tablet Take 1 mg by mouth at bedtime.        DULoxetine (CYMBALTA) 60 MG capsule TAKE 2 CAPSULE DAILY // 1 HNUB NOJ 2 LUB 60 capsule 11     fenofibrate (TRICOR) 145 MG tablet TAKE 1 TABLET BY MOUTH DAILY FOR CHOLESTEROL // IB HNUB NOJ 1 LUB PAB ROXANNE NTSHAV MUAJ ROJ 30 tablet 1     fluticasone propionate (FLONASE) 50 mcg/actuation nasal spray Apply 1 spray into each nostril daily as needed for rhinitis.       gabapentin (NEURONTIN) 300 MG capsule Take 1 capsule (300 mg total) by mouth 3 (three) times a day. 270 capsule 3     ipratropium-albuteroL (DUO-NEB) 0.5-2.5 mg/3 mL nebulizer Take 3 mL by nebulization 4 (four) times a day as needed. 480 mL 3     irbesartan (AVAPRO) 150 MG tablet TAKE 1 TABLET DAILY BY MOUTH FOR HIGH BLOOD PRESSURE // IB HNUB NOJ 1 LUB PAB ROXANNE NTSHAV SIAB 90 tablet 4     loratadine (CLARITIN) 10 mg tablet Take 1 tablet (10 mg total) by mouth daily. 30 tablet 11     montelukast (SINGULAIR) 10 mg tablet Take 1 tablet (10 mg total) by mouth at bedtime. 30 tablet 11     omeprazole (PRILOSEC) 20 MG capsule TAKE 1 CAPSULE 30 MINUTES BEFORE FIRST MEAL DAILY FOR STOMACH // 1 HNUB NOJ 1 LUB UA NTEJ NOJ TSHAIS PAB ROXANNE MOB PLAB/MOB NCAUJ PLAB 30 capsule 11     SPIRIVA RESPIMAT 2.5 mcg/actuation Mist INHALE 2 PUFFS BY MOUTH DAILY./ IB HNUB NQUS 2 PAS TXHUA HNUB 4 g 11     SYMBICORT 160-4.5 mcg/actuation inhaler INHALE 2 PUFFS TWICE DAILY. RINSE MOUTH AND SPIT OUT AFTERWARDS // 1 ZAUG NQUS 2 PAS, 1 HNU SIV 2 ZAUG. YAUG NCAUJ THAUM SIV TAS. 1 Inhaler 11     TAB-A-GERARDO per tablet TAKE 1 TABLET BY MOUTH DAILY // IB HNUB NOJ 1 LUB 30 tablet 11     No current facility-administered medications for this visit.        Physical Exam:  /82   Pulse 76   Wt 189 lb 4.8 oz (85.9 kg)   SpO2 97% Comment: RA  BMI 30.79 kg/m    Gen: alert, oriented, no distress  HEENT: nasal turbinates are unremarkable, no oropharyngeal lesions, no cervical or supraclavicular lymphadenopathy  CV: RRR, no M/G/R  Resp:  diminished breath sounds left mid-low lung fields  Abd: soft, nontender, no palpable organomegaly  Skin: no apparent rashes  Ext: no cyanosis, clubbing or edema  Neuro: alert, nonfocal    Labs:  Left pleural fluid analysis March 2020 and December 2020 at [facility] showed chronic exudate with elevated triglyceride level, negative for malignancy and infection    Imaging studies:  Chest CT with contrast (February 2020, [facility]):  FINDINGS:   A 12.8 x 8.1 x 14.9 cm (AP x lateral x SI; approximately 850ml) loculated,  complex fluid collection within the left pleural space demonstrates thin  peripheral septations, areas of internal fat (series 3, image 246) and higher  density heterogeneous internal components (series 3, image 313). No evidence of  internal gas or inflammatory changes surrounding this mass to suggest  acute/aggressive infectious process, with preservation of the extrapleural fat  plane.  There is some  leftward mediastinal shift suggesting chronic fibrosis  and atelectasis in the left upper and lower lobes. No evidence of central  obstructing lesion.     Mildly prominent mediastinal and hilar lymph nodes, some of which are calcified.  These may be benign/reactive but are indeterminate by CT criteria. The central  left upper lobe bronchus appears obstructed (series 3 image 179) but no definite  endobronchial lesion is identified.    There are areas of probably chronic atelectasis in the right middle and lower  lobes with some peripheral bronchial mucous plugging.     Probable right adrenal adenoma. Probable 1.1 cm cyst superior left kidney  (series 5, image 84). No worrisome osseous lesions.    COMPARISON REPORT:  Images from the 06/18/2018 and 04/10/2014 examinations are now available. The  complex left pleural abnormality which currently measures 12.8 x 8.1 x 14.9 cm  has slightly increased in volume since the prior exams, measuring approximately  12.5 x 7.7 x 14 cm on 06/18/2019 and 11.8 x 7.0 x 13 cm on  04/10/2014. This is  approximately 850 mL currently compared with 750 on 06/18/2018 and 550 on  04/10/2014. Peripheral calcification has slightly increased but the  heterogeneous internal density is similar, with no new definite soft tissue  components. Compressive atelectasis in the left lung and shift the mediastinum  is also slightly greater.    The minimal lack of change over many years is highly suggestive of a benign  abnormality but the CT appearance is indeterminate. Given several other  left-sided pleural calcifications, a chronic/old empyema particularly by  atypical agents such as tuberculosis is most likely. A seroma or lymphangioma  might also be considered. Given some minimal fatty elements, a atypical  hamartoma could be potentially considered although the extensive fluid component  is unusual.    The probable right adrenal adenoma is unchanged since 06/18/2018, but slightly  more apparent than on 04/10/2014.     1. Loculated, complex fluid collection with scattered peripheral calcification  in the left pleural space measures 12.8 x 8.1 x 14.9 cm. Given lack of  inflammatory or aggressive features and reported presence dating back at least  to outside CTA chest 06/08/2018, this mass is likely benign. This may be due to  previous trauma or infection and would be of usual for malignancy; however,  cannot entirely exclude the possibility of chronic low-grade infection such as  tuberculous empyema. No findings to suggest an acute infectious or aggressive  neoplastic process.  2.  Probable small right adrenal adenoma.    CT chest (December 2020, [place]):  - images directly reviewed, formal interpretation follows:  Since prior imaging, a new left chest pigtail catheter is in place with  substantial decrease in size of the loculated left pleural effusion seen  previously.  There is some ex vacuo pneumothorax with an extensive thick-walled rind along  the periphery of this cavity with areas of  calcification.  Partial reexpansion of the left lower lobe.  Scattered bronchial wall thickening, lower lobe hypoinflation, and air trapping  is at least partly related to the phase of respiration (but there may be  reactive airways disease and/or an element of bronchitis).  Aortic valvular minimal coronary artery calcification.     TTE (August 2018):  - Normal left ventricular size and systolic performance with a visually estimated ejection fraction of 60%.  - There is mild aortic insufficiency.  - Normal right ventricular size and systolic performance.     Pulmonary Function Testing  August 2018  FEV1/FVC is 74% and is normal.  FEV1 is 1.41L (55%) predicted and is reduced.  FVC is 1.91L (60%) predicted and reduced.  There was no improvement in spirometry after a single inhaled dose of bronchodilator.  TLC is 3.57L (58%) predicted and is reduced.  RV is 1.40L (60%) predicted and is reduced.  DLCO is 16.54ml/min/hg (64%) predicted and is reduced when it is corrected for hemoglobin.  Flow volume loops indicate no abnormalities.     October 2019  FEV1/FVC is 80 and is normal.  FEV1 is 56% predicted and is reduced.  FVC is 56% predicted and reduced.  There was no no improvement in spirometry after a single inhaled does of bronchodilator.  TLC is 56% predicted and is reduced.  RV is 63% predicted and is reduced.  DLCO is 58% predicted and is reduced when it   is corrected for hemoglobin.

## 2021-06-25 NOTE — TELEPHONE ENCOUNTER
RN cannot approve Refill Request    RN can NOT refill this medication PCP messaged that patient is overdue for Labs. Last office visit: 1/18/2021 Sylvia Lugo MD Last Physical: Visit date not found Last MTM visit: Visit date not found Last visit same specialty: 1/18/2021 Sylvia Lugo MD.  Next visit within 3 mo: Visit date not found  Next physical within 3 mo: Visit date not found      Catrach[facility] WOLF Bull, [facility] Triage/Med Refill 5/26/2021    Requested Prescriptions   Pending Prescriptions Disp Refills     fenofibrate (TRICOR) 145 MG tablet [Pharmacy Med Name: FENOFIBRATE 145 MG TABS 145 Tablet] 30 tablet 1     Sig: TAKE 1 TABLET BY MOUTH DAILY FOR CHOLESTEROL // IB HNUB NOJ 1 LUB PAB ROXANNE NTSHAV MUAJ ROJ       Fenofibrate Refill Protocol Failed - 5/26/2021 11:26 AM        Failed - Fasting lipid cascade in last 12 months     Cholesterol   Date Value Ref Range Status   05/21/2019 109 <=199 mg/dL Final     Triglycerides   Date Value Ref Range Status   05/21/2019 173 (H) <=149 mg/dL Final     HDL Cholesterol   Date Value Ref Range Status   05/21/2019 32 (L) >=40 mg/dL Final     LDL Calculated   Date Value Ref Range Status   05/21/2019 42 <=129 mg/dL Final     Patient Fasting > 8hrs?   Date Value Ref Range Status   05/21/2019 Yes  Final             Failed - AST or ALT in last 12 months     AST   Date Value Ref Range Status   01/23/2020 16 0 - 40 U/L Final     ALT   Date Value Ref Range Status   01/23/2020 24 0 - 45 U/L Final               Passed - Renal status in last 6 months     Creatinine   Date Value Ref Range Status   01/18/2021 1.78 (H) 0.70 - 1.30 mg/dL Final             Passed - PCP or prescribing provider visit in past 12 months       Last office visit with prescriber/PCP: 1/18/2021 Sylvia Lugo MD OR same dept: 1/18/2021 Sylvia Lugo MD OR same specialty: 1/18/2021 Sylvia Lugo MD  Last physical: Visit date not found Last MTM visit: Visit date not found   Next  visit within 3 mo: Visit date not found  Next physical within 3 mo: Visit date not found  Prescriber OR PCP: Sylvia Lugo MD  Last diagnosis associated with med order: 1. Essential Hypertriglyceridemia  - fenofibrate (TRICOR) 145 MG tablet [Pharmacy Med Name: FENOFIBRATE 145 MG TABS 145 Tablet]; TAKE 1 TABLET BY MOUTH DAILY FOR CHOLESTEROL // IB HNUB NOJ 1 LUB PAB ROXANNE NTSHAV MUAJ ROJ  Dispense: 30 tablet; Refill: 1    If protocol passes may refill for 12 months if within 3 months of last provider visit (or a total of 15 months).

## 2021-06-25 NOTE — TELEPHONE ENCOUNTER
Pulmonary Telephone Note    As expected, the CXR looks essentially unchanged from prior. Compared to imaging performed 7 years ago, appears almost identical. Called Venita via [employer]  and reached voicemail on his cell and home phones. [employer]  left a voicemail message on his cell phone that I would call him again at another time.    Martir Quinn MD  Pulmonary and Critical Care Medicine  [facility]  Cell [phone]  Office [phone]  Pager [phone]  (he/him/his)

## 2021-06-25 NOTE — TELEPHONE ENCOUNTER
Pulmonary Telephone Note    As expected, the CXR looks essentially unchanged from prior. Compared to imaging performed 7 years ago, appears almost identical. Called Venita and discussed this via [employer] . Will plan to follow up in one year. Encouraged him to call any time with questions or concerning symptoms.    Martir Quinn MD  Pulmonary and Critical Care Medicine  [facility]  Cell [phone]  Office [phone]  Pager [phone]  (he/him/his)

## 2021-06-25 NOTE — PROGRESS NOTES
[facility] Office Visit    Chief Complaint:  Chief Complaint   Patient presents with     Follow-up     Pulmonary Issues         Assessment/Plan:  1. Gastroesophageal reflux disease without esophagitis  Reviewed idea that gastric acid may be contributing to his lung/breathing issues.  Reviewed recommendations to keep HOB elevated, careful about his diet, timing of meals prior to bedtime, use of zantac in evenings before bedtime even without symptoms.      2. Chronic left-sided low back pain without sciatica  Pt noting chronic pain- reviewed spine care recommendations again with pt from 11/2018.  Pt declines any further formal interventions.  Advised pt to stretch daily and stay active.     3. Moderate persistent asthma, unspecified whether complicated  Contributing to his breathing difficulties per pulmonary.  Reviewed meds and MTM - pt does have singulair in his med regimen and seems to be taking it daily, even though he is not aware of this med specifically.  Continue use of incruse, claritin, duonebs, flonase, symbicort with prn albuterol.  F/u with pulmonary as scheduled.      4. Pleural effusion on left  Chronic stable and no further treatment recommended.  Reviewed this with pt and his wife today and offered empathy that this is frustrating but that any possible treatments would cause more harm and more problems than he is having currently.      5. Essential hypertension  BP Readings from Last 3 Encounters:   03/21/19 122/74   03/11/19 112/70   02/19/19 104/74       well controlled today.  Plan for bloodpressure management includes ongoing focus on healthy DASH type diet and increased activity, encouraged to avoid tobacco products and limit alcohol use, stress reduction, continue losartan/hctz 100/12.5mg daily.  Labwork and meds ordered and reviewed as below  Lab Results   Component Value Date    K 4.5 11/12/2018      Lab Results   Component Value Date    CREATININE 1.39 (H) 11/12/2018        Return in about 2 months (around 5/21/2019) for Recheck.  The following high BMI interventions were performed this visit: encouragement to exercise and lifestyle education regarding diet    Patient Education/AVS:  There are no Patient Instructions on file for this visit.    HPI:   Venita Sanchez is a 63 y.o. male c/o f/u on lung problems and chest xray ordered by pulmonary.  He was supposed to fu with me in 1-2 months but his wife had an appointment so he came in early as she also had an appt today.  Pt wanting my opinion on his lungs and for me to explain what the results show.  He is still having breathing difficulties but stable and no worse than usual.      ROS:  Constitutional, CV, Resp, GI, , MSK, skin, neuro, psych all negative except as outlined in the HPI above and patient denies any other symptoms.      History summarized from1-2:pulmonary consult 3/11/19 reviewed- stop nebs and start LAMA.  Nebs prn now.  Inhaled steroids ongoing.  Singulair.  Loratidine.  Flonase.  PPI.  Exercise.  F/u in 3 months.    Radiology tests reviewed-1: CXR 3/11/19 reviewed- FINDINGS: Peripherally calcified chronic pleural fluid and/or thickening mid and inferior left hemithorax with compressive atelectasis of the lingula and left lower lobe stable. Faint linear opacities right middle and lower lobe consistent with strands   of fibrosis and bronchial wall thickening stable. Lungs otherwise clear. Heart size normal.  Lab tests reviewed-1: 2018    Physical Exam:  /74 (Patient Site: Left Arm, Patient Position: Sitting, Cuff Size: Adult Regular)   Pulse 88   Temp 98.7  F (37.1  C) (Oral)   Wt 192 lb (87.1 kg)   BMI 31.95 kg/m   Body mass index is 31.95 kg/m . No LMP for male patient.  Vital signs reviewed  Wt Readings from Last 3 Encounters:   03/21/19 192 lb (87.1 kg)   03/11/19 190 lb (86.2 kg)   02/19/19 190 lb (86.2 kg)     Social History     Tobacco Use   Smoking Status Never Smoker   Smokeless Tobacco Never  Used     Social History     Substance and Sexual Activity   Sexual Activity Yes     Partners: Female     Birth control/protection: Post-menopausal     No Data Recorded  PHQ-9 Total Score: 8 (11/12/2018 10:00 AM)    PHQ-2 Total Score: 2 (11/12/2018 10:00 AM)    ACT Total Score: (!) 6 (3/21/2019  1:00 PM)      All normal as below except abnormalities include: pt appears well at his baseline.  Limited air movement on left side.  Right side with insp crackles but moving air well.  No wheezing heard today.    General is a  63 y.o. male sitting comfortably in no apparent distress.   Neck: Supple without lymphadenopathy or thyromegally  CV: Regular rate and rhythm S1S2 without rubs, murmurs or gallops,   Lungs: Clear to auscultation bilaterally  Extremities: Warm, No Edema, 2+ Pedal and radial pulses bilaterally  Skin: No lesions or rashes noted  Neuro/MSK: Able to ambulate around the exam room with equal movement, strength and normal coordination of the upper and lower extremeties symmetrically    Results for orders placed or performed in visit on 11/12/18   Basic Metabolic Panel   Result Value Ref Range    Sodium 140 136 - 145 mmol/L    Potassium 4.5 3.5 - 5.0 mmol/L    Chloride 108 (H) 98 - 107 mmol/L    CO2 22 22 - 31 mmol/L    Anion Gap, Calculation 10 5 - 18 mmol/L    Glucose 101 70 - 125 mg/dL    Calcium 8.5 8.5 - 10.5 mg/dL    BUN 32 (H) 8 - 22 mg/dL    Creatinine 1.39 (H) 0.70 - 1.30 mg/dL    GFR MDRD Af Amer >60 >60 mL/min/1.73m2    GFR MDRD Non Af Amer 52 (L) >60 mL/min/1.73m2       Med list and active problem list reviewed and updated as part of this encounter    Current Outpatient Medications on File Prior to Visit   Medication Sig Dispense Refill     acetaminophen (MAPAP ARTHRITIS PAIN) 650 MG CR tablet Take 650 mg by mouth every 8 (eight) hours as needed for pain.       albuterol (VENTOLIN HFA) 90 mcg/actuation inhaler Inhale 2 puffs every 4 (four) hours as needed for wheezing or shortness of breath. 1  Inhaler 11     aspirin 81 MG EC tablet Take 1 tablet (81 mg total) by mouth daily. 100 tablet 11     budesonide-formoterol (SYMBICORT) 160-4.5 mcg/actuation inhaler Inhale 2 puffs 2 (two) times a day. 1 Inhaler 11     clonazePAM (KLONOPIN) 1 MG tablet Take 1 mg by mouth 2 (two) times a day as needed for anxiety.       DULoxetine (CYMBALTA) 60 MG capsule Take 1 capsule (60 mg total) by mouth daily. 90 capsule 4     fenofibrate (TRICOR) 145 MG tablet Take 1 tablet (145 mg total) by mouth daily. 90 tablet 4     fluticasone (FLONASE ALLERGY RELIEF) 50 mcg/actuation nasal spray 1 spray in each nostril daily 16 g 12     gabapentin (NEURONTIN) 300 MG capsule Take 1 capsule (300 mg total) by mouth 2 (two) times a day. 60 capsule 3     ipratropium-albuterol (DUO-NEB) 0.5-2.5 mg/3 mL nebulizer Take 3 mL by nebulization 4 (four) times a day. 360 mL 11     loratadine (CLARITIN) 10 mg tablet Take 1 tablet (10 mg total) by mouth daily. 30 tablet 11     losartan-hydrochlorothiazide (HYZAAR) 100-12.5 mg per tablet Take 1 tablet by mouth daily. 30 tablet 11     montelukast (SINGULAIR) 10 mg tablet Take 1 tablet (10 mg total) by mouth at bedtime. 30 tablet 11     multivitamin-Ca-iron-minerals (TAB A GERARDO) 18-0.4 mg Tab Take 1 tablet by mouth daily. 90 tablet 4     ranitidine (ZANTAC) 150 MG tablet Take 1 tablet (150 mg total) by mouth 2 (two) times a day as needed for heartburn. 60 tablet 11     traZODone (DESYREL) 50 MG tablet Take 1 tablet (50 mg total) by mouth at bedtime. 30 tablet 6     umeclidinium (INCRUSE ELLIPTA) 62.5 mcg/actuation DsDv inhaler Inhale 1 puff daily. 30 each 11     No current facility-administered medications on file prior to visit.          Sylvia Lugo MD

## 2021-06-26 NOTE — PATIENT INSTRUCTIONS - HE
It was good to see you in clinic today. This is what we discussed:    1. We will get a chest X-ray and I will call you with the result.  2. Use a heating pad on the painful area twice daily as needed.  3. Continue your current medications.  4. I will see you in about one year.  5. Call any time with questions or concerning symptoms.    Martir Quinn MD  Pulmonary and Critical Care Medicine  [facility]  Office [phone]

## 2021-06-26 NOTE — TELEPHONE ENCOUNTER
Refills Approved x 2    Rx renewed per Medication Renewal Policy. Medication was last renewed on:  Irbesartan = 6/2/2020 with 4 refills  Omeprazole = 7/28/2020 with 11 refills.  Last office visit: Virtual visit 4/20/2021 with PCP Dr ELLIOTT Gasca, [facility] Triage/Med Refill 6/22/2021     Requested Prescriptions   Pending Prescriptions Disp Refills     irbesartan (AVAPRO) 150 MG tablet [Pharmacy Med Name: IRBESARTAN 150 MG TABS 150 Tablet] 30 tablet 4     Sig: TAKE 1 TABLET DAILY BY MOUTH FOR HIGH BLOOD PRESSURE // IB HNUB NOJ 1 LUB PAB ROXANNE NTSHAV SIAB       Angiotensin Receptor Blocker Protocol Passed - 6/21/2021  1:23 PM        Passed - PCP or prescribing provider visit in past 12 months       Last office visit with prescriber/PCP: 1/18/2021 Sylvia Lugo MD OR same dept: 1/18/2021 Sylvia Lugo MD OR same specialty: 1/18/2021 Sylvia Lugo MD  Last physical: Visit date not found Last MTM visit: Visit date not found   Next visit within 3 mo: Visit date not found  Next physical within 3 mo: Visit date not found  Prescriber OR PCP: Sylvia Lugo MD  Last diagnosis associated with med order: 1. Benign essential HTN  - irbesartan (AVAPRO) 150 MG tablet [Pharmacy Med Name: IRBESARTAN 150 MG TABS 150 Tablet]; TAKE 1 TABLET DAILY BY MOUTH FOR HIGH BLOOD PRESSURE // IB HNUB NOJ 1 LUB PAB ROXANNE NTSHAV SIAB  Dispense: 30 tablet; Refill: 4    2. Gastroesophageal reflux disease without esophagitis  - omeprazole (PRILOSEC) 20 MG capsule [Pharmacy Med Name: OMEPRAZOLE 20 MG CPDR 20 Capsule]; TAKE 1 CAPSULE 30 MINUTES BEFORE FIRST MEAL DAILY FOR STOMACH // 1 HNUB NOJ 1 LUB UA NTEJ NOJ TSHAIS PAB ROXANNE MOB PLAB/MOB NCAUJ PLAB  Dispense: 30 capsule; Refill: 11    If protocol passes may refill for 12 months if within 3 months of last provider visit (or a total of 15 months).             Passed - Serum potassium within the past 12 months     Lab Results   Component Value Date    Potassium 4.3  01/18/2021             Passed - Blood pressure filed in past 12 months     BP Readings from Last 1 Encounters:   06/02/21 128/82             Passed - Serum creatinine within the past 12 months     Creatinine   Date Value Ref Range Status   01/18/2021 1.78 (H) 0.70 - 1.30 mg/dL Final                omeprazole (PRILOSEC) 20 MG capsule [Pharmacy Med Name: OMEPRAZOLE 20 MG CPDR 20 Capsule] 30 capsule 11     Sig: TAKE 1 CAPSULE 30 MINUTES BEFORE FIRST MEAL DAILY FOR STOMACH // 1 HNUB NOJ 1 LUB UA NTEJ NOJ TSHS PAB ROXANNE MOB PLAB/MOB NCAU PLAB       GI Medications Refill Protocol Passed - 6/21/2021  1:23 PM        Passed - PCP or prescribing provider visit in last 12 or next 3 months.     Last office visit with prescriber/PCP: 1/18/2021 Sylvia Lugo MD OR same dept: 1/18/2021 Sylvia Lugo MD OR same specialty: 1/18/2021 Sylvia Lugo MD  Last physical: Visit date not found Last MTM visit: Visit date not found   Next visit within 3 mo: Visit date not found  Next physical within 3 mo: Visit date not found  Prescriber OR PCP: Sylvia Lugo MD  Last diagnosis associated with med order: 1. Benign essential HTN  - irbesartan (AVAPRO) 150 MG tablet [Pharmacy Med Name: IRBESARTAN 150 MG TABS 150 Tablet]; TAKE 1 TABLET DAILY BY MOUTH FOR HIGH BLOOD PRESSURE // IB HNUB NOJ 1 LUB PAB ROXANNE NTSHAV SIAB  Dispense: 30 tablet; Refill: 4    2. Gastroesophageal reflux disease without esophagitis  - omeprazole (PRILOSEC) 20 MG capsule [Pharmacy Med Name: OMEPRAZOLE 20 MG CPDR 20 Capsule]; TAKE 1 CAPSULE 30 MINUTES BEFORE FIRST MEAL DAILY FOR STOMACH // 1 HNUB NOJ 1 LUB UA NTEJ NOJ TSHAIS PAB ROXANNE MOB PLAB/MOB NCAUJ PLAB  Dispense: 30 capsule; Refill: 11    If protocol passes may refill for 12 months if within 3 months of last provider visit (or a total of 15 months).

## 2021-06-26 NOTE — PROGRESS NOTES
Progress Notes by Flor Cali, PharmD at 10/23/2018  9:00 AM     Author: Flor Cali, PharmD Service: -- Author Type: Pharmacist    Filed: 10/24/2018  8:31 AM Encounter Date: 10/23/2018 Status: Signed    : Flor Cali, PharmD (Pharmacist)       MT Initial Encounter  Assessment & Plan                                                       1. Essential hypertension  Uncontrolled x 3 occasions here. Mix of borderline and elevated BPs at previous PCP office. Per patient preference, we will try an alternative to nifedipine as tablet is difficult to swallow.   -Stopped nifedipine. I did call pharmacy to notify   -Started losartan/HCTZ 100/12.5 mg tablet  -Recheck BP and labs in 2 weeks    2. Moderate persistent asthma, unspecified whether complicated  Uncontrolled ACT Total Score: 10 (10/19/2018 10:33 AM). We did review inhalers in detail today. Discussed inhaler technique. Encouraged to use spacer.   -Continue current therapy  -Will continue to provide education  -Recommend PCV23 at follow up visit      3. GERD (gastroesophageal reflux disease)  Controlled per pt report. Risk for aggravation with NSAID use. Encouraged close monitoring for GI upset  -Continue current therapy    4. Essential Hypertriglyceridemia  Stable. Calculated 10-year ASCVD risk is 18.3%. Based on this, he would qualify for moderate to high intensity statin  -Continue aspirin 81 mg daily  - Continue tricor 145 mg daily  -Future, consider stopping tricor and instead starting moderate intensity statin therapy     5. Major Depression, Single Episode. Insomnia  Stable per patient report. Uncontrolled per PHQ9. We did discuss long term risks of benzodiazepine use. Encouraged to use trazodone more frequently if it works  -Continue current therapy    6. Chronic left-sided low back pain without sciatica  Stable. Undergoing work up with [facility]  -Continue current therapy  -Encouraged diclofenac with food. Recommend reducing NSAIDs if able  "due to hx of ulcer in the past    Medication Management:   All maintenance medications refilled per CPA    Follow Up  2 week BP will be checked at [facility]. Patient will see PCP in 3 weeks. 6 weeks with MTM      Subjective & Objective                                                     Venita Sanchez is a 63 y.o. male coming in for an initial visit for Medication Therapy Management. He was referred to me from Sylvia Lugo MD for medication review. Patient recently established care with Dr. Lugo. Brought in all of his medications today, which included:   ASA 81 mg   Duloxetine 60 mg daily  Nifedipine ER 90 mg daily  Fenofibrate 145 mg daily (reports taking for > 5 years)  Montelukast 10 mg daily  Diclofenac  mg daily PRN (taking almost every day with food_  Gabapentin 300 mg 2 two times a day PRN  Trazodone 50 mg HS PRN  Omeprazole 20 mg AM   MV daily  Clonazepam is not present today but he reports that he is still using every other day at bedtime for sleep (alternates with trazodone). \"green pill\"  Meds are set up by the patient in a pillbox, which works well for him. Pillbox isn't here today. Sometimes daughter in law helps with meds. He has a general idea what each med is for. He can read [place], and rx bottles are written in [place] which helps.     HTN: currently on nifedipine ER 90 mg daily. Reports taking it every day and is adamant he isn't missing doses. He doesn't like the pill because it is quite large and he has difficulty swallowing it. He would like something different. No HA, no blurry vision, no lightheadedness, no chest pain. He reports not trying any other BP meds before this one, which was confirmed with [facility]. No hx of angioedema per pt report.   BP Readings from Last 3 Encounters:   10/23/18 158/90   10/22/18 152/71   10/19/18 (!) 152/94     HLD: On Tricor 145 mg daily for \"years\". He isn't sure if he has ever taken a statin before.     Asthma: Identifies Symbicort two times a " "day every day \"no matter what\". Ventolin as needed. Using nebs two times a day-three times a day on average. Reports regularly taking his montelukast. Inhaler technique briefly reviewed verbally (no demo inhaler available) and is appropriate. He does have hx of stable pleural effusion and fibrothorax, following with pulmonary. He did get his flu shot.     Sleep: Alternates between clonazepam and trazodone. Clonazepam works better. Sleeping well as long as he takes a sleep aid.     Pain: Recently met with spine center, undergoing workup. Hx of moderate degenerative disc height loss L5-S1 with ventral interbody spurring. Last visit, no med changes were advised. He continues on gabapentin 1-2 caps two times a day and diclofenac  mg daily. Also has PRN APAP available. He feels that his meds help. No stomach upset with diclofenac. Reports taking with food. Hx of stomach ulcer \">20 years ago\". All meds are helpful, he doesn't believe one is more assistive than the other    Mood: Duloxetine 60 mg for \"years'. Reports his depression is stable. PHQ-9 Total Score: 19 (10/19/2018 10:33 AM)      Outside labs from 3/23/18:     PMH: reviewed in EPIC   Allergies/ADRs: reviewed in EPIC   Alcohol: reviewed in EPIC   Tobacco:   History   Smoking Status   ? Never Smoker   Smokeless Tobacco   ? Never Used     Today's Vitals:   Vitals:    10/23/18 0931   BP: 158/90   Pulse: 85     ----------------    Much or all of the text in this note was generated through the use of Dragon Dictate voice-to-text software. Errors in spelling or words which seem out of context are unintentional. Sound alike errors, in particular, may have escaped editing.    The patient declined an after visit summary    I spent 45 minutes with this patient today.   All changes were made via collaborative practice agreement with Sylvia Lugo MD. A copy of the visit note was provided to the patient's provider.     Flor Cali, PharmD, BCACP  MTM Pharmacist "   [facility]         Current Outpatient Prescriptions   Medication Sig Dispense Refill   ? acetaminophen (MAPAP ARTHRITIS PAIN) 650 MG CR tablet Take 650 mg by mouth every 8 (eight) hours as needed for pain.     ? albuterol (VENTOLIN HFA) 90 mcg/actuation inhaler Inhale 2 puffs every 4 (four) hours as needed for wheezing or shortness of breath. 1 Inhaler 11   ? aspirin 81 MG EC tablet Take 1 tablet (81 mg total) by mouth daily. 100 tablet 11   ? budesonide-formoterol (SYMBICORT) 160-4.5 mcg/actuation inhaler Inhale 2 puffs 2 (two) times a day. 1 Inhaler 11   ? clonazePAM (KLONOPIN) 1 MG tablet Take 1 mg by mouth 2 (two) times a day as needed for anxiety.     ? diclofenac sodium ER (VOLTAREN XR) 100 mg 24 hr tablet Take 100 mg by mouth daily.     ? DULoxetine (CYMBALTA) 60 MG capsule Take 1 capsule (60 mg total) by mouth daily. 90 capsule 4   ? fenofibrate (TRICOR) 145 MG tablet Take 1 tablet (145 mg total) by mouth daily. 90 tablet 4   ? gabapentin (NEURONTIN) 300 MG capsule Take 300 mg by mouth 2 (two) times a day.     ? ipratropium-albuterol (DUO-NEB) 0.5-2.5 mg/3 mL nebulizer Take 3 mL by nebulization 4 (four) times a day. 360 mL 11   ? losartan-hydrochlorothiazide (HYZAAR) 100-12.5 mg per tablet Take 1 tablet by mouth daily. 30 tablet 11   ? montelukast (SINGULAIR) 10 mg tablet Take 1 tablet (10 mg total) by mouth at bedtime. 30 tablet 11   ? multivitamin-Ca-iron-minerals (TAB A GERARDO) 18-0.4 mg Tab Take 1 tablet by mouth daily. 90 tablet 4   ? omeprazole (PRILOSEC) 20 MG capsule Take 1 capsule (20 mg total) by mouth daily before breakfast. 90 capsule 4   ? traZODone (DESYREL) 50 MG tablet Take 1 tablet (50 mg total) by mouth at bedtime. 30 tablet 6     No current facility-administered medications for this visit.

## 2021-06-27 NOTE — PROGRESS NOTES
Progress Notes by Bry Corona, Rodney at 5/1/2019  9:00 AM     Author: Bry Corona, Rodney Service: -- Author Type: Pharmacist    Filed: 5/1/2019 10:39 AM Encounter Date: 5/1/2019 Status: Signed    : Bry Corona PharmD (Pharmacist)       MT Follow Up Encounter  Assessment & Plan                                                     Moderate persistent asthma, unspecified whether complicated - Needs improvement - Today, pt reports correct scheduling and dosing of inhalers. Reviewed technique with each of pt's devices and corrected the following points:      Fluticasone technique: Poor   Not shaking before inhalation    Not breathing in as he sprays, causing some medication to drip out.      Incruse technique: Appropriate   Reviewed to breath out before inhalation and hold breath after.     Symbicort/Ventolin: Appropriate   Reviewed to breath out before inhalation and hold breath after.       Pain: Partially improved - pt reports pain is well controlled with current therapy. Expect additional relief with increase in duloxetine dose (see below). Will send Acetaminophen refills, per pt request.   -Increase Duloxetine, as below   -Sent refills of Acetaminophen 650 mg Q8H PRN      Mood/Sleep: Needs Improvement- patient has low mood due to persistent pulmonary issues. Also having persistent anxiety. May benefit from Increasing dose of Duloxetine. Additionally, pt is not using Clonazepam as prescribed and is going half of the month without medication. Will send a note to psych provider to inform. Suspect increase in Duloxetine will help to reduce anxiety somewhat.    -Increase duloxtine to 120 mg at bedtime     Heartburn: Needs improvement -return of symptoms may be due to pt being out of medication. Will refill.   -Sent refill of Ranitidine 150 mg two times a day PRN     Essential hypertension: Stable - Blood pressure at goal of less than 130/80.    -Continue current therapy.           Follow Up:   Return in  about 1 month (around 6/1/2019) for Medication Management Pharmacist.      Subjective & Objective                                                     Venita Sanchez is a 63 y.o. male coming in for a follow up visit for Medication Therapy Management. He was referred to me from Sylvia Lugo MD for medication review. Joined by professional [employer] .       Medication Adherence:  Daughter-in-law sets up meds in a pill box and calls for refills. Pt reports she is a nurse. Denies missed doses.     Asthma/Allergies: Prescribed Incruse Ellipta 62.5 mg 1 puff daily, montelukast 10 mg daily, Loratadine 10 mg daily, ipratropium-albuterol 0.5-2.5 mg four times a day PRN, fluticasone 50 mcg spray 1 spray each nostril daily, Symbicort 160-4.5 mcg 2 puffs two times a day, and albuterol HFA 90 mcg Q4H PRN. Pt did have all of these medications at today's visit.     Is using Loratadine and Montelukast daily. Using Flonase 1 spray each nostril daily. Reports bitter taste after inhalation. Using Duoneb 2-4 times per day PRN. Today was able to correctly identify rescue vs maintenance inhaler.     Had pt demonstrate technique for HFA inhaler, Ellipta inhaler, and nasal spray.       Still having shortness of breath. Waking up at night with shortness of breath and wheezing.     ACT Total Score: (!) 6 (3/21/2019  1:00 PM)      Pain: Using Acetaminophen 650 mg three times a day PRN, Duloxetine 60 mg daily, Gabapentin 300 mg two times a day.      Reports pain in back is okay after having received injections at spine clinic. Denies pain anywhere else.  Feels like medication are helping with the tingling nerve pains.     Requesting refills of Acetaminophen.    Mood/Sleep: Using Duloxetine 60 mg daily and Trazodone 50 mg at bedtime. Also prescribed Clonazepam 1 mg daily but using two times a day - not at visit today- per  last filled for #30 on 4/4 so pt likely has been out for a couple of weeks. Following with a psychiatrist  outside of [facility]. Not sleeping very well - tossing and turning but states this is likely due to difficulty breathing. Mood is still low due to concerns about lungs and ability to breathe. Feels worried that he's been told there aren't many options for him.     PHQ-9 Total Score: 8 (11/12/2018 10:00 AM)              Heartburn: Prescribed Ranitidine 150 mg two times a day PRN - previously stated symptoms were well controlled, but today reports continued symptoms. Requesting refills today.     HTN: currently using losartan-HCTZ 100-12.5 mg daily . No HA, no blurry vision, no lightheadedness, no chest pain.    BP Readings from Last 3 Encounters:   05/01/19 128/76   04/23/19 124/68   03/21/19 122/74     Results for orders placed or performed in visit on 11/12/18   Basic Metabolic Panel   Result Value Ref Range    Sodium 140 136 - 145 mmol/L    Potassium 4.5 3.5 - 5.0 mmol/L    Chloride 108 (H) 98 - 107 mmol/L    CO2 22 22 - 31 mmol/L    Anion Gap, Calculation 10 5 - 18 mmol/L    Glucose 101 70 - 125 mg/dL    Calcium 8.5 8.5 - 10.5 mg/dL    BUN 32 (H) 8 - 22 mg/dL    Creatinine 1.39 (H) 0.70 - 1.30 mg/dL    GFR MDRD Af Amer >60 >60 mL/min/1.73m2    GFR MDRD Non Af Amer 52 (L) >60 mL/min/1.73m2       PMH: reviewed in EPIC   Allergies/ADRs: reviewed in EPIC   Alcohol: reviewed in EPIC   Tobacco:   Social History     Tobacco Use   Smoking Status Never Smoker   Smokeless Tobacco Never Used     Today's Vitals:   Vitals:    05/01/19 1020   BP: 128/76     ----------------    Much or all of the text in this note was generated through the use of Dragon Dictate voice-to-text software. Errors in spelling or words which seem out of context are unintentional. Sound alike errors, in particular, may have escaped editing.    The patient was given a summary of these recommendations as an after visit summary    I spent 30 minutes with this patient today.   All changes were made via collaborative practice agreement with Sylvia STRONG  MD Brittney. A copy of the visit note was provided to the patient's provider.     Bry Corona PharmD  Medication Therapy Management (MTM) Pharmacist  [facility]        Current Outpatient Medications   Medication Sig Dispense Refill   ? albuterol (VENTOLIN HFA) 90 mcg/actuation inhaler Inhale 2 puffs every 4 (four) hours as needed for wheezing or shortness of breath. 1 Inhaler 11   ? aspirin 81 MG EC tablet Take 1 tablet (81 mg total) by mouth daily. 100 tablet 11   ? budesonide-formoterol (SYMBICORT) 160-4.5 mcg/actuation inhaler Inhale 2 puffs 2 (two) times a day. 1 Inhaler 11   ? DULoxetine (CYMBALTA) 60 MG capsule Take 2 capsules (120 mg total) by mouth daily. 180 capsule 3   ? fenofibrate (TRICOR) 145 MG tablet Take 1 tablet (145 mg total) by mouth daily. 90 tablet 4   ? fluticasone (FLONASE ALLERGY RELIEF) 50 mcg/actuation nasal spray 1 spray in each nostril daily 16 g 12   ? gabapentin (NEURONTIN) 300 MG capsule Take 1 capsule (300 mg total) by mouth 2 (two) times a day. 60 capsule 3   ? ipratropium-albuterol (DUO-NEB) 0.5-2.5 mg/3 mL nebulizer Take 3 mL by nebulization 4 (four) times a day. 360 mL 11   ? loratadine (CLARITIN) 10 mg tablet Take 1 tablet (10 mg total) by mouth daily. 30 tablet 11   ? losartan-hydrochlorothiazide (HYZAAR) 100-12.5 mg per tablet Take 1 tablet by mouth daily. 30 tablet 11   ? montelukast (SINGULAIR) 10 mg tablet Take 1 tablet (10 mg total) by mouth at bedtime. 30 tablet 11   ? multivitamin-Ca-iron-minerals (TAB A GERARDO) 18-0.4 mg Tab Take 1 tablet by mouth daily. 90 tablet 4   ? traZODone (DESYREL) 50 MG tablet Take 1 tablet (50 mg total) by mouth at bedtime. 30 tablet 6   ? umeclidinium (INCRUSE ELLIPTA) 62.5 mcg/actuation DsDv inhaler Inhale 1 puff daily. 30 each 11   ? acetaminophen (MAPAP ARTHRITIS PAIN) 650 MG CR tablet Take 1 tablet (650 mg total) by mouth every 8 (eight) hours as needed for pain. 100 tablet 3   ? clonazePAM (KLONOPIN) 1 MG tablet Take  1 mg by mouth 2 (two) times a day as needed for anxiety.     ? ranitidine (ZANTAC) 150 MG tablet Take 1 tablet (150 mg total) by mouth 2 (two) times a day as needed for heartburn. 60 tablet 11     No current facility-administered medications for this visit.

## 2021-06-27 NOTE — PROGRESS NOTES
Progress Notes by Bry Corona, PharmD at 5/21/2019 10:30 AM     Author: Bry Corona, PharmD Service: -- Author Type: Pharmacist    Filed: 5/21/2019 11:40 AM Encounter Date: 5/21/2019 Status: Signed    : Bry Corona PharmD (Pharmacist)       [facility] Follow Up Encounter  Assessment & Plan                                                     Moderate persistent asthma, unspecified whether complicated - Improved - pt no longer tasting Fluticasone nasal spray after admin. Reports slight symptom improvement.   -Continue current therapy      Pain: Stable - pt reports good control with current regimen.   -Continue current therapy      Mood/Sleep: Improved - Pt reports improvement in sleep and decreased anxiety with increase in Duloxetine dose.  -Continue current therapy      Heartburn: Improved - pt reports he has medication and it's controlling symptoms well.   -Continue current therapy     Essential hypertension: Stable - Blood pressure at goal of less than 130/80.    -Continue current therapy.     Follow Up:   Return in about 3 months (around 8/21/2019) for Medication Management Pharmacist.      Subjective & Objective                                                     Venita Sanchez is a 63 y.o. male coming in for a follow up visit for Medication Therapy Management. He was referred to me from Sylvia Lugo MD for medication review. Joined by professional Zoe Majeste . Just had PCP appt today, prior to MTM visit.     Medication Adherence:  Daughter-in-law sets up meds in a pill box and calls for refills. Pt reports she is a nurse. Denies missed doses. No meds at visit.     Asthma/Allergies: Prescribed Incruse Ellipta 62.5 mg 1 puff daily, montelukast 10 mg daily, Loratadine 10 mg daily, ipratropium-albuterol 0.5-2.5 mg four times a day PRN, fluticasone 50 mcg spray 1 spray each nostril daily, Symbicort 160-4.5 mcg 2 puffs two times a day, and albuterol HFA 90 mcg Q4H PRN.     No longer having bitter taste  after using Fluticasone. Does feel like this has improved allergy symptoms.     Pain: Using Acetaminophen 650 mg three times a day PRN, Duloxetine 60 mg 2 caps daily, Gabapentin 300 mg two times a day.      Reports pain in back is okay after having received injections at spine clinic. Denies pain anywhere else.  Feels like medication are helping with the tingling nerve pains.     Mood/Sleep: Using Duloxetine 60 mg 2 caps daily and Trazodone 50 mg at bedtime. Reports he is now using once daily. Has noticed an improvement in sleep and decrease in racing thoughts. Reports he is now using Clonazepam 1 mg once daily vs two times a day. Does have psych f/u scheduled but cannot recall the date.             Heartburn: Prescribed Ranitidine 150 mg two times a day PRN. Symptoms well controlled with medication.     HTN: currently using losartan-HCTZ 100-12.5 mg daily . No HA, no blurry vision, no lightheadedness, no chest pain.    BP Readings from Last 3 Encounters:   05/21/19 122/79   05/01/19 128/76   04/23/19 124/68     Results for orders placed or performed in visit on 11/12/18   Basic Metabolic Panel   Result Value Ref Range    Sodium 140 136 - 145 mmol/L    Potassium 4.5 3.5 - 5.0 mmol/L    Chloride 108 (H) 98 - 107 mmol/L    CO2 22 22 - 31 mmol/L    Anion Gap, Calculation 10 5 - 18 mmol/L    Glucose 101 70 - 125 mg/dL    Calcium 8.5 8.5 - 10.5 mg/dL    BUN 32 (H) 8 - 22 mg/dL    Creatinine 1.39 (H) 0.70 - 1.30 mg/dL    GFR MDRD Af Amer >60 >60 mL/min/1.73m2    GFR MDRD Non Af Amer 52 (L) >60 mL/min/1.73m2       PMH: reviewed in EPIC   Allergies/ADRs: reviewed in EPIC   Alcohol: reviewed in EPIC   Tobacco:   Social History     Tobacco Use   Smoking Status Never Smoker   Smokeless Tobacco Never Used     Today's Vitals:   There were no vitals filed for this visit.  ----------------    Much or all of the text in this note was generated through the use of Dragon Dictate voice-to-text software. Errors in spelling or words  which seem out of context are unintentional. Sound alike errors, in particular, may have escaped editing.    The patient was given a summary of these recommendations as an after visit summary    I spent 15 minutes with this patient today.   All changes were made via collaborative practice agreement with Sylvia Lugo MD. A copy of the visit note was provided to the patient's provider.     Bry Corona, PharmD  Medication Therapy Management (MTM) Pharmacist  [facility]        Current Outpatient Medications   Medication Sig Dispense Refill   ? acetaminophen (MAPAP ARTHRITIS PAIN) 650 MG CR tablet Take 1 tablet (650 mg total) by mouth every 8 (eight) hours as needed for pain. 100 tablet 3   ? albuterol (VENTOLIN HFA) 90 mcg/actuation inhaler Inhale 2 puffs every 4 (four) hours as needed for wheezing or shortness of breath. 1 Inhaler 11   ? aspirin 81 MG EC tablet Take 1 tablet (81 mg total) by mouth daily. 100 tablet 11   ? budesonide-formoterol (SYMBICORT) 160-4.5 mcg/actuation inhaler Inhale 2 puffs 2 (two) times a day. 1 Inhaler 11   ? clonazePAM (KLONOPIN) 1 MG tablet Take 1 mg by mouth 2 (two) times a day as needed for anxiety.     ? DULoxetine (CYMBALTA) 60 MG capsule Take 2 capsules (120 mg total) by mouth daily. 180 capsule 3   ? fenofibrate (TRICOR) 145 MG tablet Take 1 tablet (145 mg total) by mouth daily. 90 tablet 4   ? fluticasone (FLONASE ALLERGY RELIEF) 50 mcg/actuation nasal spray 1 spray in each nostril daily 16 g 12   ? gabapentin (NEURONTIN) 300 MG capsule Take 1 capsule (300 mg total) by mouth 2 (two) times a day. 60 capsule 3   ? ipratropium-albuterol (DUO-NEB) 0.5-2.5 mg/3 mL nebulizer Take 3 mL by nebulization 4 (four) times a day. 360 mL 11   ? loratadine (CLARITIN) 10 mg tablet Take 1 tablet (10 mg total) by mouth daily. 30 tablet 11   ? losartan-hydrochlorothiazide (HYZAAR) 100-12.5 mg per tablet Take 1 tablet by mouth daily. 30 tablet 11   ? montelukast (SINGULAIR) 10  mg tablet Take 1 tablet (10 mg total) by mouth at bedtime. 30 tablet 11   ? multivitamin-Ca-iron-minerals (TAB A GERARDO) 18-0.4 mg Tab Take 1 tablet by mouth daily. 90 tablet 4   ? ranitidine (ZANTAC) 150 MG tablet Take 1 tablet (150 mg total) by mouth 2 (two) times a day as needed for heartburn. 60 tablet 11   ? traZODone (DESYREL) 50 MG tablet TAKE 1 TABLET DAILY AT BEDTIME FOR SLEEP AID // 1 HNUB NOJ 1 LUB THAUM MUS PW PAB KOM TSAUG ZOG 90 tablet 3   ? umeclidinium (INCRUSE ELLIPTA) 62.5 mcg/actuation DsDv inhaler Inhale 1 puff daily. 30 each 11     No current facility-administered medications for this visit.

## 2021-06-27 NOTE — PROGRESS NOTES
Progress Notes by Bry Corona, PharmD at 1/22/2019  9:30 AM     Author: Bry Corona, PharmD Service: -- Author Type: Pharmacist    Filed: 1/22/2019 11:29 AM Encounter Date: 1/22/2019 Status: Signed    : Bry Corona PharmD (Pharmacist)       [facility] Initial Encounter  Assessment & Plan                                                       1. Essential hypertension - Stable   Blood pressure at goal of less than 130/80.  Continue current therapy.     2. Moderate persistent asthma, unspecified whether complicated -needs improvement  Reviewed that Symbicort is a maintenance medication.  Patient would not notice the effect of it right away.  Would benefit from decreasing back to prescribed dose  -Decrease Symbicort to 2 puffs twice daily    3. Essential Hypertriglyceridemia  Stable. Calculated 10-year ASCVD risk is 18.3%. Based on this, he would qualify for moderate to high intensity statin  -Continue aspirin 81 mg daily  - Continue tricor 145 mg daily  -Future, consider stopping tricor and instead starting moderate intensity statin therapy     4. Major Depression, Single Episode. Insomnia  Stable per patient report.  No PHQ 9 today.  Mood is related to lung condition, will continue to follow-up after upcoming pulmonology visit  -Continue current therapy    5. Chronic left-sided low back pain without sciatica  Needs improvement-patient has improved with corticosteroid injections but patient would benefit from continuing gabapentin.  Given history of previous ulcers and no plan indicated for diclofenac use, would recommend holding off at this time.  -Pharmacy states pt ran out of refills. Will send new Rx.   -Stop diclofenac    Follow Up:   February with PCP  March with pulmonology  April with MTM pharmacist    Subjective & Objective                                                     Venita Sanchez is a 63 y.o. male coming in for an initial visit for Medication Therapy Management. He was referred to me from Sylvia  "LIGIA Lugo MD for medication review. Joined by professional [employer] .       Medication Adherence:   Brought in all of his medications today, which included:   ASA 81 mg   Duloxetine 60 mg daily  Losartan/HCTZ 100-12.5 mg   Fenofibrate 145 mg daily (reports taking for > 5 years)  Montelukast 10 mg daily  Trazodone 50 mg HS PRN  Omeprazole 20 mg AM   MV daily  Clonazepam 1 mg   Symbicort   Venotolin  Albuterol Nebs    Not at visit   Diclofenac  mg daily PRN (taking almost every day with food)   Gabapentin 300 mg 2 two times a day PRN       Previous note states he used to use a pill box, but he states he never did. Would like one today. Sometimes daughter in law helps with meds. He has a general idea what each med is for. He can read [employer], and rx bottles are written in [employer] which helps.       HTN: currently using losartan-HCTZ 100-12.5 mg daily . No HA, no blurry vision, no lightheadedness, no chest pain.    BP Readings from Last 3 Encounters:   01/22/19 122/78   01/14/19 130/70   12/12/18 130/78       HLD: On Tricor 145 mg daily for \"years\". He isn't sure if he has ever taken a statin before.     Lab Results   Component Value Date    CHOL 187 04/16/2012    CHOL 187 10/20/2011    CHOL 124 07/14/2011     Lab Results   Component Value Date    HDL 37 (L) 04/16/2012    HDL 40 10/20/2011    HDL 30 (L) 07/14/2011     Lab Results   Component Value Date    LDLCALC 95 04/16/2012    LDLCALC   Invalid, Triglyceride >300 10/20/2011    LDLCALC 47 07/14/2011     Lab Results   Component Value Date    TRIG 276 (H) 04/16/2012    TRIG 452 (H) 10/20/2011    TRIG 236 (H) 07/14/2011     No components found for: CHOLHDL       Asthma: Identifies Symbicort 3 puffs two times a day every day \"no matter what\". Doesn't feel like Symbicort works when he uses it. Using Ventolin as needed and feels it's helpful right away. Using nebs three to four times daily. Symptoms have been worse in the last month or so because he had a cold.  " "Reports regularly taking his montelukast. He does have hx of stable pleural effusion and fibrothorax, following with pulmonary.      Pain: Does not have gabapentin or diclofenac at visit. Got cortisone shots in November and back pain has been much better. Spine care visit notes indicate to continue Gabapentin. Do not indicate plan for diclofenac. Patient does have a history of ulcers but was previously doing okay with Diclofenac.      Mood: Duloxetine 60 mg for \"years'. Reports his depression is stable. PHQ-9 Total Score: 8 (11/12/2018 10:00 AM)  Been worried about his lungs. Feels worried that he's been told there aren't many options for him.       Outside labs from 3/23/18:     PMH: reviewed in EPIC   Allergies/ADRs: reviewed in EPIC   Alcohol: reviewed in EPIC   Tobacco:   Social History     Tobacco Use   Smoking Status Never Smoker   Smokeless Tobacco Never Used     Today's Vitals:   Vitals:    01/22/19 1112   BP: 122/78   Weight: 193 lb (87.5 kg)     ----------------    Much or all of the text in this note was generated through the use of Dragon Dictate voice-to-text software. Errors in spelling or words which seem out of context are unintentional. Sound alike errors, in particular, may have escaped editing.    The patient declined an after visit summary    I spent 30 minutes with this patient today.   All changes were made via collaborative practice agreement with Sylvia Lugo MD. A copy of the visit note was provided to the patient's provider.     Bry Corona, PharmD  Medication Therapy Management (MTM) Pharmacist  [facility]        Current Outpatient Medications   Medication Sig Dispense Refill   ? acetaminophen (MAPAP ARTHRITIS PAIN) 650 MG CR tablet Take 650 mg by mouth every 8 (eight) hours as needed for pain.     ? albuterol (VENTOLIN HFA) 90 mcg/actuation inhaler Inhale 2 puffs every 4 (four) hours as needed for wheezing or shortness of breath. 1 Inhaler 11   ? aspirin 81 MG " EC tablet Take 1 tablet (81 mg total) by mouth daily. 100 tablet 11   ? budesonide-formoterol (SYMBICORT) 160-4.5 mcg/actuation inhaler Inhale 2 puffs 2 (two) times a day. 1 Inhaler 11   ? clonazePAM (KLONOPIN) 1 MG tablet Take 1 mg by mouth 2 (two) times a day as needed for anxiety.     ? diclofenac sodium ER (VOLTAREN XR) 100 mg 24 hr tablet Take 100 mg by mouth daily.     ? DULoxetine (CYMBALTA) 60 MG capsule Take 1 capsule (60 mg total) by mouth daily. 90 capsule 4   ? fenofibrate (TRICOR) 145 MG tablet Take 1 tablet (145 mg total) by mouth daily. 90 tablet 4   ? gabapentin (NEURONTIN) 300 MG capsule Take 300 mg by mouth 2 (two) times a day.     ? ipratropium-albuterol (DUO-NEB) 0.5-2.5 mg/3 mL nebulizer Take 3 mL by nebulization 4 (four) times a day. 360 mL 11   ? losartan-hydrochlorothiazide (HYZAAR) 100-12.5 mg per tablet Take 1 tablet by mouth daily. 30 tablet 11   ? montelukast (SINGULAIR) 10 mg tablet Take 1 tablet (10 mg total) by mouth at bedtime. 30 tablet 11   ? multivitamin (ONE A DAY) per tablet Take 1 tablet by mouth. Take 1 tablet by mouth.     ? multivitamin-Ca-iron-minerals (TAB A GERARDO) 18-0.4 mg Tab Take 1 tablet by mouth daily. 90 tablet 4   ? NIFEdipine (PROCARDIA XL) 90 MG 24 hr tablet Take 90 mg by mouth. Take 90 mg by mouth.     ? omeprazole (PRILOSEC) 20 MG capsule Take 1 capsule (20 mg total) by mouth daily before breakfast. 90 capsule 4   ? traZODone (DESYREL) 50 MG tablet Take 1 tablet (50 mg total) by mouth at bedtime. 30 tablet 6     No current facility-administered medications for this visit.

## 2021-07-03 NOTE — ADDENDUM NOTE
Addendum Note by Bry Graham, PharmSOL at 9/17/2019 10:30 AM     Author: Bry Graham PharmD Service: -- Author Type: Pharmacist    Filed: 9/18/2019  3:18 PM Encounter Date: 9/17/2019 Status: Signed    : Bry Graham PharmD (Pharmacist)    Addended by: BRY GRAHAM on: 9/18/2019 03:18 PM        Modules accepted: Orders

## 2021-07-04 NOTE — LETTER
Letter by Martir Quinn MD at      Author: Martir Quinn MD Service: -- Author Type: --    Filed:  Encounter Date: 6/2/2021 Status: (Other)         Sylvia Lguo MD  [address] [place] [id number]                                  June 2, 2021    Patient: Venita Sanchez   MR Number: [id number]   YOB: 1955   Date of Visit: 6/2/2021     Dear Dr. Brittney MD:    I saw Venita Sanchez at the [facility]. Below are the relevant portions of my assessment and plan of care.    If you have questions, please do not hesitate to call me. I look forward to following Venita along with you.    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     Chronic dyspnea, chronic left calcified pleural effusion/fibrothorax, Pseudomonas airway colonization, chronic rhinosinusitis: Venita had worsening dyspnea and left chest pain in October 2020 and was hospitalized again at [place] for 5 nights in December 2020, where they actually placed a left pleural pigtail catheter into the collection, which predictably showed a chronic chylothorax, as this effusion has been present for decades, at least since he immigrated in [place], and is surrounded by a calcified rind. Of course, the lung failed to fully expand with this pigtail catheter, which was subsequently removed. He has had left lateral chest pain since this. He has no fevers, chills, or weight loss. It has been repeatedly sampled and found not to show malignant cells or infection. He is now havingracentesis; there is a calcified fibrothorax component, and I imagine repeat thoracentesis through this could contribute to pain. I have low  "suspicion for any new development, but will obtain CXR at patient's request to ensure no new changes since his [facility] hospitalization. Recall that, at baseline, he has chronic exertional dyspnea and cough. Restrictive physiology on PFTs but may have an asthma component and has clinically responded to asthma therapy. Has a loculated left pleural effusion with partially calcified thickened rind, stable radiographically since 2014 and known to have been present since at least 2004 when the patient immigrated; notes he was \"very ill with a virus\" at age 10 and hospitalized, told he had a lung problem. These chronic, stable loculated effusions with calcified rind/fibrothorax are fairly common in the [place]  population, likely a sequela of past parasitic, bacterial or mycobacterial infection.      Plan:  - CXR PA and lateral; I will call him with the result  - heating pad to left lateral chest two times a day as needed for chest wall pain  - continue budesonide-formoterol 160-4.5 mcg two inhalations two times a day; rinse/gargle/spit water after use; previously provided holding chamber with instruction on use  - continue tiotropium respimat 2.5 mcg two inhalations daily  - albuterol HFA as needed  - continue montelukast 10 mg at bedtime  - continue loratadine 10 mg daily  - continue nasal fluticasone one spray in each nostril daily  - surgery would be morbid and unlikely to be successful at achieving left lung reexpansion  - again offered pulmonary rehabilitation which he declines due to transportation limitations  - annual high-dose influenza vaccination  - received Pneumovax-23 in January 2019; should receive Prevnar-13 due now that he is 65, and booster of Pneumovax-23 in 2024; will discuss further at follow-up  - received his first Pfizer-BioNTech COVID-19 vaccine, scheduled for the second  - follow up in one year or sooner if needed  - he apparently also has follow-up scheduled in the [facility] clinic on " "[address]  - encouraged him to call any time with questions or concerning symptoms    Martir Quinn MD  Pulmonary and Critical Care Medicine  [facility]  Cell [phone]  Office [phone]  Pager [phone]  (he/him/his)    CCx: chronic dyspnea, chronic left calcified pleural effusion/fibrothorax, Pseudomonas airway colonization, chronic rhinosinusitis    HPI: 65 year old male never smoker with a history of 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 Venita had worsening dyspnea and left chest pain in October 2020 and was hospitalized again at [facility] for 5 nights in December 2020, where he underwent yet another left thoracentesis, which predictably showed a chronic chylothorax, as this effusion has been present for decades, at least since he immigrated in 2004, and is surrounded by a calcified rind. He has no fevers, chills, or weight loss. It has been repeatedly sampled and found not to show malignant cells or infection. He is now having left posterolateral chest wall pain since the most recent left thoracentesis; there is a calcified fibrothorax component, and I imagine repeat thoracentesis through this could contribute to pain. I have low suspicion for any new development, but will obtain CXR. Recall that, at baseline, he has chronic exertional dyspnea and cough. Restrictive physiology on PFTs but may have an asthma component and has clinically responded to asthma therapy. Has a loculated left pleural effusion with partially calcified thickened rind, stable radiographically since 2014 and known to have been present since at least 2004 when the patient immigrated; notes he was \"very ill with a virus\" at age 10 and hospitalized, told he had a lung problem. These chronic, stable loculated effusions with calcified rind/fibrothorax are fairly common in the [place]  population, likely a " sequela of past parasitic, bacterial or mycobacterial infection.     ROS:  A 12-system review was obtained and was negative with the exception of the symptoms endorsed in the history of present illness.    PMH:  Chronic loculated left pleural effusion with calcified rind (fibrothorax) with chronic exudative changes of chylothorax on laboratory analysis  GERD  Possible chronic rhinosinusitis  Possible asthma  CKD  HTN    PSH:  No past surgical history on file.    Allergies:  Allergies   Allergen Reactions   ? Niacin Other (See Comments)   ? Niacin Preparations      GI side effects         Family HX:  No family history on file.    Social Hx:  Social History     Socioeconomic History   ? Marital status:      Spouse name: Not on file   ? Number of children: Not on file   ? Years of education: Not on file   ? Highest education level: Not on file   Occupational History   ? Not on file   Social Needs   ? Financial resource strain: Not on file   ? Food insecurity     Worry: Not on file     Inability: Not on file   ? Transportation needs     Medical: Not on file     Non-medical: Not on file   Tobacco Use   ? Smoking status: Never Smoker   ? Smokeless tobacco: Never Used   Substance and Sexual Activity   ? Alcohol use: No   ? Drug use: No   ? Sexual activity: Yes     Partners: Female     Birth control/protection: Post-menopausal   Lifestyle   ? Physical activity     Days per week: Not on file     Minutes per session: Not on file   ? Stress: Not on file   Relationships   ? Social connections     Talks on phone: Not on file     Gets together: Not on file     Attends Buddhist service: Not on file     Active member of club or organization: Not on file     Attends meetings of clubs or organizations: Not on file     Relationship status: Not on file   ? Intimate partner violence     Fear of current or ex partner: Not on file     Emotionally abused: Not on file     Physically abused: Not on file     Forced sexual activity:  Not on file   Other Topics Concern   ? Not on file   Social History Narrative    Lives with wife and kids       Current Meds:  Current Outpatient Medications   Medication Sig Dispense Refill   ? acetaminophen (MAPAP ARTHRITIS PAIN) 650 MG CR tablet Take 1 tablet (650 mg total) by mouth every 8 (eight) hours as needed for pain. 100 tablet 3   ? albuterol (PROAIR HFA;PROVENTIL HFA;VENTOLIN HFA) 90 mcg/actuation inhaler Inhale 2 puffs every 4 (four) hours as needed for wheezing or shortness of breath. 1 Inhaler 11   ? aspirin 81 MG EC tablet TAKE 1 TABLET BY MOUTH DAILY FOR STROKE PREVENTION // IB HNUB NOJ 1 LUB PAB KOM NTSHAV KHIAV ZOO 30 tablet 11   ? blood pressure monitor Kit Use to check blood pressure daily 1 each 0   ? clonazePAM (KLONOPIN) 1 MG tablet Take 1 mg by mouth at bedtime.      ? DULoxetine (CYMBALTA) 60 MG capsule TAKE 2 CAPSULE DAILY // 1 HNUB NOJ 2 LUB 60 capsule 11   ? fenofibrate (TRICOR) 145 MG tablet TAKE 1 TABLET BY MOUTH DAILY FOR CHOLESTEROL // IB HNUB NOJ 1 LUB PAB ROXANNE NTSHAV MUAJ ROJ 30 tablet 1   ? fluticasone propionate (FLONASE) 50 mcg/actuation nasal spray Apply 1 spray into each nostril daily as needed for rhinitis.     ? gabapentin (NEURONTIN) 300 MG capsule Take 1 capsule (300 mg total) by mouth 3 (three) times a day. 270 capsule 3   ? ipratropium-albuteroL (DUO-NEB) 0.5-2.5 mg/3 mL nebulizer Take 3 mL by nebulization 4 (four) times a day as needed. 480 mL 3   ? irbesartan (AVAPRO) 150 MG tablet TAKE 1 TABLET DAILY BY MOUTH FOR HIGH BLOOD PRESSURE // IB HNUB NOJ 1 LUB PAB ROXANNE NTSHAV SIAB 90 tablet 4   ? loratadine (CLARITIN) 10 mg tablet Take 1 tablet (10 mg total) by mouth daily. 30 tablet 11   ? montelukast (SINGULAIR) 10 mg tablet Take 1 tablet (10 mg total) by mouth at bedtime. 30 tablet 11   ? omeprazole (PRILOSEC) 20 MG capsule TAKE 1 CAPSULE 30 MINUTES BEFORE FIRST MEAL DAILY FOR STOMACH // 1 HNUB NOJ 1 LUB UA NTEJ NOJ TSHAIS PAB ROXANNE MOB PLAB/MOB NCAUJ PLAB 30 capsule 11   ?  SPIRIVA RESPIMAT 2.5 mcg/actuation Mist INHALE 2 PUFFS BY MOUTH DAILY./ IB HNUB NQUS 2 PAS TXHUA HNUB 4 g 11   ? SYMBICORT 160-4.5 mcg/actuation inhaler INHALE 2 PUFFS TWICE DAILY. RINSE MOUTH AND SPIT OUT AFTERWARDS // 1 ZAUG NQUS 2 PAS, 1 HNU SIV 2 ZAUG. YAUG NCAUJ THAUM SIV TAS. 1 Inhaler 11   ? TAB-A-GERARDO per tablet TAKE 1 TABLET BY MOUTH DAILY // IB HNUB NOJ 1 LUB 30 tablet 11     No current facility-administered medications for this visit.        Physical Exam:  /82   Pulse 76   Wt 189 lb 4.8 oz (85.9 kg)   SpO2 97% Comment: RA  BMI 30.79 kg/m    Gen: alert, oriented, no distress  HEENT: nasal turbinates are unremarkable, no oropharyngeal lesions, no cervical or supraclavicular lymphadenopathy  CV: RRR, no M/G/R  Resp: diminished breath sounds left mid-low lung fields  Abd: soft, nontender, no palpable organomegaly  Skin: no apparent rashes  Ext: no cyanosis, clubbing or edema  Neuro: alert, nonfocal    Labs:  Left pleural fluid analysis March 2020 and December 2020 at [facility] showed chronic exudate with elevated triglyceride level, negative for malignancy and infection    Imaging studies:  Chest CT with contrast (February 2020, [facility]):  FINDINGS:   A 12.8 x 8.1 x 14.9 cm (AP x lateral x SI; approximately 850ml) loculated,  complex fluid collection within the left pleural space demonstrates thin  peripheral septations, areas of internal fat (series 3, image 246) and higher  density heterogeneous internal components (series 3, image 313). No evidence of  internal gas or inflammatory changes surrounding this mass to suggest  acute/aggressive infectious process, with preservation of the extrapleural fat  plane.  There is some  leftward mediastinal shift suggesting chronic fibrosis  and atelectasis in the left upper and lower lobes. No evidence of central  obstructing lesion.     Mildly prominent mediastinal and hilar lymph nodes, some of which are calcified.  These may be benign/reactive but are  indeterminate by CT criteria. The central  left upper lobe bronchus appears obstructed (series 3 image 179) but no definite  endobronchial lesion is identified.    There are areas of probably chronic atelectasis in the right middle and lower  lobes with some peripheral bronchial mucous plugging.     Probable right adrenal adenoma. Probable 1.1 cm cyst superior left kidney  (series 5, image 84). No worrisome osseous lesions.    COMPARISON REPORT:  Images from the 06/18/2018 and 04/10/2014 examinations are now available. The  complex left pleural abnormality which currently measures 12.8 x 8.1 x 14.9 cm  has slightly increased in volume since the prior exams, measuring approximately  12.5 x 7.7 x 14 cm on 06/18/2019 and 11.8 x 7.0 x 13 cm on 04/10/2014. This is  approximately 850 mL currently compared with 750 on 06/18/2018 and 550 on  04/10/2014. Peripheral calcification has slightly increased but the  heterogeneous internal density is similar, with no new definite soft tissue  components. Compressive atelectasis in the left lung and shift the mediastinum  is also slightly greater.    The minimal lack of change over many years is highly suggestive of a benign  abnormality but the CT appearance is indeterminate. Given several other  left-sided pleural calcifications, a chronic/old empyema particularly by  atypical agents such as tuberculosis is most likely. A seroma or lymphangioma  might also be considered. Given some minimal fatty elements, a atypical  hamartoma could be potentially considered although the extensive fluid component  is unusual.    The probable right adrenal adenoma is unchanged since 06/18/2018, but slightly  more apparent than on 04/10/2014.     1. Loculated, complex fluid collection with scattered peripheral calcification  in the left pleural space measures 12.8 x 8.1 x 14.9 cm. Given lack of  inflammatory or aggressive features and reported presence dating back at least  to outside CTA chest  06/08/2018, this mass is likely benign. This may be due to  previous trauma or infection and would be of usual for malignancy; however,  cannot entirely exclude the possibility of chronic low-grade infection such as  tuberculous empyema. No findings to suggest an acute infectious or aggressive  neoplastic process.  2.  Probable small right adrenal adenoma.    CT chest (December 2020, [facility]):  - images directly reviewed, formal interpretation follows:  Since prior imaging, a new left chest pigtail catheter is in place with  substantial decrease in size of the loculated left pleural effusion seen  previously.  There is some ex vacuo pneumothorax with an extensive thick-walled rind along  the periphery of this cavity with areas of calcification.  Partial reexpansion of the left lower lobe.  Scattered bronchial wall thickening, lower lobe hypoinflation, and air trapping  is at least partly related to the phase of respiration (but there may be  reactive airways disease and/or an element of bronchitis).  Aortic valvular minimal coronary artery calcification.     TTE (August 2018):  - Normal left ventricular size and systolic performance with a visually estimated ejection fraction of 60%.  - There is mild aortic insufficiency.  - Normal right ventricular size and systolic performance.     Pulmonary Function Testing  August 2018  FEV1/FVC is 74% and is normal.  FEV1 is 1.41L (55%) predicted and is reduced.  FVC is 1.91L (60%) predicted and reduced.  There was no improvement in spirometry after a single inhaled dose of bronchodilator.  TLC is 3.57L (58%) predicted and is reduced.  RV is 1.40L (60%) predicted and is reduced.  DLCO is 16.54ml/min/hg (64%) predicted and is reduced when it is corrected for hemoglobin.  Flow volume loops indicate no abnormalities.     October 2019  FEV1/FVC is 80 and is normal.  FEV1 is 56% predicted and is reduced.  FVC is 56% predicted and reduced.  There was no no improvement in spirometry  after a single inhaled does of bronchodilator.  TLC is 56% predicted and is reduced.  RV is 63% predicted and is reduced.  DLCO is 58% predicted and is reduced when it   is corrected for hemoglobin.

## 2021-07-04 NOTE — LETTER
Letter by Martir Quinn MD at      Author: Martir Quinn MD Service: -- Author Type: --    Filed:  Encounter Date: 2021 Status: (Other)       2021    Dear Dr. Lugo,    See below for documentation of a telephone conversation that I had with Venita Sanchez ( 1955). Please feel free to call me at any time if needed.    Sincerely,    Martir Quinn MD  Pulmonary and Critical Care Medicine  [facility]  Cell [phone]  Office [phone]  Pager [phone]  (he/him/his)    Pulmonary Telephone Note     As expected, the CXR looks essentially unchanged from prior. Compared to imaging performed 7 years ago, appears almost identical. Called Venita and discussed this via [facility] . Will plan to follow up in one year. Encouraged him to call any time with questions or concerning symptoms.

## 2021-07-06 VITALS
SYSTOLIC BLOOD PRESSURE: 128 MMHG | OXYGEN SATURATION: 97 % | DIASTOLIC BLOOD PRESSURE: 82 MMHG | BODY MASS INDEX: 30.79 KG/M2 | WEIGHT: 189.3 LBS | HEART RATE: 76 BPM

## 2021-07-15 ENCOUNTER — HOSPITAL ENCOUNTER (EMERGENCY)
Facility: HOSPITAL | Age: 66
Discharge: HOME | End: 2021-07-15
Attending: EMERGENCY MEDICINE | Admitting: EMERGENCY MEDICINE
Payer: COMMERCIAL

## 2021-07-15 VITALS
WEIGHT: 190 LBS | BODY MASS INDEX: 30.9 KG/M2 | DIASTOLIC BLOOD PRESSURE: 94 MMHG | TEMPERATURE: 99.2 F | SYSTOLIC BLOOD PRESSURE: 169 MMHG | HEART RATE: 98 BPM | RESPIRATION RATE: 18 BRPM | OXYGEN SATURATION: 94 %

## 2021-07-15 DIAGNOSIS — H11.31 SUBCONJUNCTIVAL HEMORRHAGE OF RIGHT EYE: ICD-10-CM

## 2021-07-15 PROCEDURE — 99282 EMERGENCY DEPT VISIT SF MDM: CPT

## 2021-07-15 NOTE — DISCHARGE INSTRUCTIONS
You appear to have a subconjunctival hemorrhage today.  You may use a warm pack intermittently to help this resolve.  Follow-up with your primary care doctor as an outpatient and return if you have any worsening symptoms or other concerns.

## 2021-07-15 NOTE — ED PROVIDER NOTES
EMERGENCY DEPARTMENT ENCOUnter      NAME: Venita Sanchez  AGE: 65 year old male  YOB: 1955  MRN: [id number]  EVALUATION DATE & TIME: 7/15/2021  6:29 PM    PCP: No primary care provider on file.    ED PROVIDER: Josue Rivas DO      Chief Complaint   Patient presents with     Eye Problem         FINAL IMPRESSION:  1. Subconjunctival hemorrhage of right eye          ED COURSE & MEDICAL DECISION MAKIN:35 I met with patient to gather history and perform initial exam. ED course and treatment plan was discussed.    The patient presented to the emergency department today with complaints of redness of the right eye.  This happened spontaneously earlier today.  He notes some mild discomfort in the right eye.  On exam he has an obvious significant subconjunctival hemorrhage on the right with mild scleral edema.  This is likely the cause of his mild discomfort.  He has no symptoms to suggest an intraocular pathology.  At this time I feel he can be safely discharged home.  I will provide him with ophthalmology contact information if he would like to follow-up with them.  He has been instructed to apply warm compresses intermittently as well as to return if there are worsening symptoms or other concerns.  He and family are comfortable with this plan.    At the conclusion of the encounter I discussed the results of all of the tests and the disposition. The questions were answered. The patient or family acknowledged understanding and was agreeable with the care plan.          =================================================================    HPI        Venita Sanchez is a 65 year old male with a pertinent history of GERD who presents to this ED via walk in for evaluation of eye problem.    Patient's son acted as an , he reports that patient had developed hemorrhage with blurry vision and itchiness in right eye earlier today. At present, he can feel some fullness in the lateral aspect of the right  eye. States that he checks his blood pressure regularly and is usually low but today reports it being high. Does take his medications regularly and has taken his morning dose today but not his evening dose yet. Denies headache. No other complaints at this time.       REVIEW OF SYSTEMS     Constitutional:  Denies fever or chills  HENT:  Denies sore throat. Positive for right side eye problem.   Respiratory:  Denies cough or shortness of breath   Cardiovascular:  Denies chest pain or palpitations  GI:  Denies abdominal pain, nausea, or vomiting  Musculoskeletal:  Denies any new extremity pain   Skin:  Denies rash   Neurologic:  Denies headache, focal weakness or sensory changes    All other systems reviewed and are negative      PAST MEDICAL HISTORY:  Past Medical History:   Diagnosis Date     Community acquired pneumonia of right lower lobe of lung      Fibrothorax      GERD (gastroesophageal reflux disease)      Moderate persistent asthma      Pleural effusion on left        PAST SURGICAL HISTORY:  No past surgical history on file.        CURRENT MEDICATIONS:    acetaminophen (MAPAP ARTHRITIS PAIN) 650 MG CR tablet  albuterol (PROAIR HFA;PROVENTIL HFA;VENTOLIN HFA) 90 mcg/actuation inhaler  aspirin 81 MG EC tablet  blood pressure monitor Kit  clonazePAM (KLONOPIN) 1 MG tablet  DULoxetine (CYMBALTA) 60 MG capsule  fenofibrate (TRICOR) 145 MG tablet  fluticasone propionate (FLONASE) 50 mcg/actuation nasal spray  gabapentin (NEURONTIN) 300 MG capsule  ipratropium-albuteroL (DUO-NEB) 0.5-2.5 mg/3 mL nebulizer  irbesartan (AVAPRO) 150 MG tablet  irbesartan (AVAPRO) 150 MG tablet  loratadine (CLARITIN) 10 mg tablet  montelukast (SINGULAIR) 10 mg tablet  omeprazole (PRILOSEC) 20 MG capsule  omeprazole (PRILOSEC) 20 MG capsule  SPIRIVA RESPIMAT 2.5 mcg/actuation Mist  SYMBICORT 160-4.5 mcg/actuation inhaler  TAB-A-GERARDO per tablet        ALLERGIES:  Allergies   Allergen Reactions     Niacin Other (See Comments)     Niacin  Preparations [Niacin] Unknown     GI side effects         FAMILY HISTORY:  No family history on file.    SOCIAL HISTORY:   Social History     Socioeconomic History     Marital status:      Spouse name: Not on file     Number of children: Not on file     Years of education: Not on file     Highest education level: Not on file   Occupational History     Not on file   Tobacco Use     Smoking status: Never Smoker     Smokeless tobacco: Never Used   Substance and Sexual Activity     Alcohol use: No     Drug use: No     Sexual activity: Yes     Partners: Female     Birth control/protection: Post-menopausal   Other Topics Concern     Not on file   Social History Narrative    Lives with wife and kids     Social Determinants of Health     Financial Resource Strain:      Difficulty of Paying Living Expenses:    Food Insecurity:      Worried About Running Out of Food in the Last Year:      Ran Out of Food in the Last Year:    Transportation Needs:      Lack of Transportation (Medical):      Lack of Transportation (Non-Medical):    Physical Activity:      Days of Exercise per Week:      Minutes of Exercise per Session:    Stress:      Feeling of Stress :    Social Connections:      Frequency of Communication with Friends and Family:      Frequency of Social Gatherings with Friends and Family:      Attends Orthodoxy Services:      Active Member of Clubs or Organizations:      Attends Club or Organization Meetings:      Marital Status:    Intimate Partner Violence:      Fear of Current or Ex-Partner:      Emotionally Abused:      Physically Abused:      Sexually Abused:        VITALS:  Patient Vitals for the past 24 hrs:   BP Temp Temp src Pulse Resp SpO2 Weight   07/15/21 1915 (!) 169/94 -- -- -- -- -- --   07/15/21 1900 (!) 177/97 -- -- -- -- -- --   07/15/21 1846 (!) 165/91 -- -- -- -- -- --   07/15/21 1826 (!) 196/107 99.2  F (37.3  C) Oral 98 18 94 % 86.2 kg (190 lb)       PHYSICAL EXAM    VITAL SIGNS: BP (!) 169/94    Pulse 98   Temp 99.2  F (37.3  C) (Oral)   Resp 18   Wt 86.2 kg (190 lb)   SpO2 94%   BMI 30.90 kg/m     Constitutional:  Well developed, Well nourished,  HENT:  Normocephalic, Atraumatic, Oropharynx moist, Nose normal.   Neck:  Normal range of motion, Supple, No stridor.   Eyes:  EOMI, Conjunctiva normal, No discharge. Right eye subconjunctival hemorrhage that spares the medial aspect of the right sclera. Small scleral edema.   Respiratory:  Breathing comfortably, No respiratory distress,    Cardiovascular:  Normal pulses   Musculoskeletal:  No edema or cyanosis, no deformities  Integument:  Dry, No erythema, No rash.  Neurologic:  Alert & oriented x 3, No obvious focal deficits noted.   Psychiatric:  Affect normal, Judgment normal, Mood normal.          I, Arlen Gongora, am serving as a scribe to document services personally performed by Dr. Rivas based on my observation and the provider's statements to me. I, Josue Rivas, DO attest that Arlen Gongora is acting in a scribe capacity, has observed my performance of the services and has documented them in accordance with my direction.    Josue Rivas, DO  Emergency Medicine  [facility] EMERGENCY DEPARTMENT  [address] [facility] [id number] [phone]  Dept: [phone]     Josue Rivas MD  07/15/21 5501

## 2021-07-15 NOTE — ED TRIAGE NOTES
Patient woke up this morning with his right eye itching. He then noticed his eye was red. Vision is slightly blurry. He reports moderate pain. Denies a headache. No know injury.

## 2021-07-23 ENCOUNTER — PATIENT OUTREACH (OUTPATIENT)
Dept: GERIATRIC MEDICINE | Facility: CLINIC | Age: 66
End: 2021-07-23

## 2021-07-26 ENCOUNTER — OFFICE VISIT (OUTPATIENT)
Dept: FAMILY MEDICINE | Facility: CLINIC | Age: 66
End: 2021-07-26
Payer: COMMERCIAL

## 2021-07-26 VITALS
TEMPERATURE: 97.6 F | BODY MASS INDEX: 31.71 KG/M2 | WEIGHT: 195 LBS | DIASTOLIC BLOOD PRESSURE: 105 MMHG | OXYGEN SATURATION: 97 % | HEART RATE: 97 BPM | SYSTOLIC BLOOD PRESSURE: 175 MMHG

## 2021-07-26 DIAGNOSIS — I10 BENIGN ESSENTIAL HTN: Primary | ICD-10-CM

## 2021-07-26 DIAGNOSIS — Z60.3 LANGUAGE BARRIER AFFECTING HEALTH CARE: ICD-10-CM

## 2021-07-26 DIAGNOSIS — H11.31 SUBCONJUNCTIVAL HEMORRHAGE OF RIGHT EYE: ICD-10-CM

## 2021-07-26 DIAGNOSIS — N18.32 STAGE 3B CHRONIC KIDNEY DISEASE (H): ICD-10-CM

## 2021-07-26 DIAGNOSIS — E78.1 PURE HYPERGLYCERIDEMIA: ICD-10-CM

## 2021-07-26 DIAGNOSIS — Z75.8 LANGUAGE BARRIER AFFECTING HEALTH CARE: ICD-10-CM

## 2021-07-26 DIAGNOSIS — E87.5 HYPERKALEMIA: ICD-10-CM

## 2021-07-26 DIAGNOSIS — R73.9 STEROID-INDUCED HYPERGLYCEMIA: ICD-10-CM

## 2021-07-26 DIAGNOSIS — F32.1 MODERATE MAJOR DEPRESSION (H): ICD-10-CM

## 2021-07-26 DIAGNOSIS — T38.0X5A STEROID-INDUCED HYPERGLYCEMIA: ICD-10-CM

## 2021-07-26 LAB
ALBUMIN SERPL-MCNC: 3.7 G/DL (ref 3.5–5)
ALP SERPL-CCNC: 70 U/L (ref 45–120)
ALT SERPL W P-5'-P-CCNC: 36 U/L (ref 0–45)
ANION GAP SERPL CALCULATED.3IONS-SCNC: 12 MMOL/L (ref 5–18)
AST SERPL W P-5'-P-CCNC: 23 U/L (ref 0–40)
BILIRUB SERPL-MCNC: 0.8 MG/DL (ref 0–1)
BUN SERPL-MCNC: 32 MG/DL (ref 8–22)
CALCIUM SERPL-MCNC: 9.3 MG/DL (ref 8.5–10.5)
CHLORIDE BLD-SCNC: 107 MMOL/L (ref 98–107)
CHOLEST SERPL-MCNC: 195 MG/DL
CO2 SERPL-SCNC: 23 MMOL/L (ref 22–31)
CREAT SERPL-MCNC: 2.17 MG/DL (ref 0.7–1.3)
FASTING STATUS PATIENT QL REPORTED: NO
GFR SERPL CREATININE-BSD FRML MDRD: 31 ML/MIN/1.73M2
GLUCOSE BLD-MCNC: 90 MG/DL (ref 70–125)
HBA1C MFR BLD: 5.7 % (ref 0–5.6)
HDLC SERPL-MCNC: 40 MG/DL
LDLC SERPL CALC-MCNC: 104 MG/DL
POTASSIUM BLD-SCNC: 5.8 MMOL/L (ref 3.5–5)
PROT SERPL-MCNC: 7.2 G/DL (ref 6–8)
SODIUM SERPL-SCNC: 142 MMOL/L (ref 136–145)
TRIGL SERPL-MCNC: 255 MG/DL
TSH SERPL DL<=0.005 MIU/L-ACNC: 1.33 UIU/ML (ref 0.3–5)

## 2021-07-26 PROCEDURE — 36415 COLL VENOUS BLD VENIPUNCTURE: CPT | Performed by: FAMILY MEDICINE

## 2021-07-26 PROCEDURE — 83036 HEMOGLOBIN GLYCOSYLATED A1C: CPT | Performed by: FAMILY MEDICINE

## 2021-07-26 PROCEDURE — 99215 OFFICE O/P EST HI 40 MIN: CPT | Performed by: FAMILY MEDICINE

## 2021-07-26 PROCEDURE — 80053 COMPREHEN METABOLIC PANEL: CPT | Performed by: FAMILY MEDICINE

## 2021-07-26 PROCEDURE — 84443 ASSAY THYROID STIM HORMONE: CPT | Performed by: FAMILY MEDICINE

## 2021-07-26 PROCEDURE — 80061 LIPID PANEL: CPT | Performed by: FAMILY MEDICINE

## 2021-07-26 RX ORDER — MULTIPLE VITAMINS W/ MINERALS TAB 9MG-400MCG
TAB ORAL
COMMUNITY
Start: 2019-10-31 | End: 2021-07-26

## 2021-07-26 RX ORDER — LOSARTAN POTASSIUM AND HYDROCHLOROTHIAZIDE 12.5; 1 MG/1; MG/1
1 TABLET ORAL DAILY
Qty: 90 TABLET | Refills: 1 | Status: SHIPPED | OUTPATIENT
Start: 2021-07-26 | End: 2021-12-16

## 2021-07-26 SDOH — SOCIAL STABILITY - SOCIAL INSECURITY: ACCULTURATION DIFFICULTY: Z60.3

## 2021-07-26 ASSESSMENT — PATIENT HEALTH QUESTIONNAIRE - PHQ9: SUM OF ALL RESPONSES TO PHQ QUESTIONS 1-9: 22

## 2021-07-26 ASSESSMENT — ASTHMA QUESTIONNAIRES
ACT_TOTALSCORE: 13
QUESTION_3 LAST FOUR WEEKS HOW OFTEN DID YOUR ASTHMA SYMPTOMS (WHEEZING, COUGHING, SHORTNESS OF BREATH, CHEST TIGHTNESS OR PAIN) WAKE YOU UP AT NIGHT OR EARLIER THAN USUAL IN THE MORNING: FOUR OR MORE NIGHTS A WEEK
QUESTION_5 LAST FOUR WEEKS HOW WOULD YOU RATE YOUR ASTHMA CONTROL: SOMEWHAT CONTROLLED
QUESTION_4 LAST FOUR WEEKS HOW OFTEN HAVE YOU USED YOUR RESCUE INHALER OR NEBULIZER MEDICATION (SUCH AS ALBUTEROL): TWO OR THREE TIMES PER WEEK
QUESTION_2 LAST FOUR WEEKS HOW OFTEN HAVE YOU HAD SHORTNESS OF BREATH: THREE TO SIX TIMES A WEEK
QUESTION_1 LAST FOUR WEEKS HOW MUCH OF THE TIME DID YOUR ASTHMA KEEP YOU FROM GETTING AS MUCH DONE AT WORK, SCHOOL OR AT HOME: SOME OF THE TIME

## 2021-07-26 NOTE — PROGRESS NOTES
[employer] [facility] IN-OFFICE Visit  Phone : Carina    Chief Complaint:  Chief Complaint   Patient presents with     eyes red and also want to re-check my kidneys       Assessment/Plan:  Benign essential HTN  BP Readings from Last 3 Encounters:   07/26/21 (!) 175/105   07/15/21 (!) 169/94   06/02/21 128/82        Uncontrolled today.  Plan for bloodpressure management includes ongoing focus on healthy DASH type diet and increased activity, encouraged to avoid tobacco products and limit alcohol use, stress reduction, will stop irbesartan and switch back to losartan/hctz 100/12.5mg daily (this switch happened during covid pandemic due to shortage of meds).  Follow-up with mtm shortly to recheck bp and K and Cr elevated below.  If not improving then consider imaging renal arteries next.  Labwork and meds ordered and reviewed as below  Potassium   Date Value Ref Range Status   07/26/2021 5.8 (H) 3.5 - 5.0 mmol/L Final      Creatinine   Date Value Ref Range Status   07/26/2021 2.17 (H) 0.70 - 1.30 mg/dL Final        - Comprehensive metabolic panel (BMP + Alb, Alk Phos, ALT, AST, Total. Bili, TP)  - Med Therapy Management Referral  - losartan-hydrochlorothiazide (HYZAAR) 100-12.5 MG tablet  Dispense: 90 tablet; Refill: 1  - Comprehensive metabolic panel (BMP + Alb, Alk Phos, ALT, AST, Total. Bili, TP)    Stage 3b chronic kidney disease  Continue focus on glucose, lipid and blood pressure control.  Continue to monitor for anemia and vitamin D.  Pt counselled on avoiding NSAIDS and other over-the-counter medications without talking with doctor first.  Discussed importance of hydration.  Continue labs worsening recently.  Pt is dehyrated.  Will add hydrochlorothiazide back to help with hyperkalemia and will have pt recheck with mtm in next 1-2 weeks.  Consider Renal artery stenosis next.    Creatinine   Date Value Ref Range Status   07/26/2021 2.17 (H) 0.70 - 1.30 mg/dL Final     GFR Estimate   Date  Value Ref Range Status   07/26/2021 31 (L) >60 mL/min/1.73m2 Final     Comment:     As of July 11, 2021, eGFR is calculated by the CKD-EPI creatinine equation, without race adjustment. eGFR can be influenced by muscle mass, exercise, and diet. The reported eGFR is an estimation only and is only applicable if the renal function is stable.   01/18/2021 39 (L) >60 mL/min/1.73m2 Final     Hemoglobin   Date Value Ref Range Status   12/30/2020 13.3 (L) 14.0 - 18.0 g/dL Final     No results found for: SMR460, BCBR331, [id number], VITD3, D2VIT, D3VIT, DTOT, [id number], [id number], [id number], [id number], [id number], [id number]   Hemoglobin A1C   Date Value Ref Range Status   07/26/2021 5.7 (H) 0.0 - 5.6 % Final     Comment:     Normal <5.7%   Prediabetes 5.7-6.4%    Diabetes 6.5% or higher     Note: Adopted from ADA consensus guidelines.     LDL Cholesterol Calculated   Date Value Ref Range Status   07/26/2021 104 <=129 mg/dL Final     LDL Cholesterol Direct   Date Value Ref Range Status   10/20/2011 62 <130 mg/dL Final       - Comprehensive metabolic panel (BMP + Alb, Alk Phos, ALT, AST, Total. Bili, TP)  - losartan-hydrochlorothiazide (HYZAAR) 100-12.5 MG tablet  Dispense: 90 tablet; Refill: 1  - Comprehensive metabolic panel (BMP + Alb, Alk Phos, ALT, AST, Total. Bili, TP)    Moderate major depression (H)  Pt continues to have depression mostly related to his breathing limitations on his life, covid isolation,etc.  Continue cymbalta 120mg daily.  Pt declines cognitive therapy or adjust ment in meds.  Reviewed clonazepam to use at night to help with sleep and anxiety related to his SoB.    - Med Therapy Management Referral  - TSH  - TSH    Essential Hypertriglyceridemia  Lipids elevated- should be on tricor 145mg daily.  Consider adding statin if he is taking tricor consistently. LSM reviewed.    - Lipid Profile (Chol, Trig, HDL, LDL calc)  - Lipid Profile (Chol, Trig, HDL, LDL calc)    Steroid-induced  hyperglycemia  Continue to monitor for diabetes annually.    - Comprehensive metabolic panel (BMP + Alb, Alk Phos, ALT, AST, Total. Bili, TP)  - Hemoglobin A1c  - Comprehensive metabolic panel (BMP + Alb, Alk Phos, ALT, AST, Total. Bili, TP)  - Hemoglobin A1c    Language barrier affecting health care    Subconjunctival hemorrhage of right eye  Mildly asymptomatic but very anxiety producing to patient.  Pt would like help getting in with eye doctor.    - Adult Eye Referral    Hyperkalemia  Restarting low dose hydrochlorothiazide.  Will help pt schedule mtm in next 1-2 weeks to recheck.  Consider EKG at that time.      Return in about 3 months (around 10/26/2021) for Routine preventive.      Diagnosis or treatment significantly limited by social determinants of health - LANGUAGE, TRANSPORTATION ISSUES    53 minutes spent on the date of the encounter doing chart review, history and exam, documentation and further activities as outlined.    Patient Education/AVS:  There are no Patient Instructions on file for this visit.    HPI:   Venita Sanchez is a 65 year old male and presents to clinic today for the following health issues red eye, kidney check.      Pt notes that he had an itchy eye and took a nap and when he woke up had a stinging sensation in his right eye.  Noted that he couldn't see well out of the right eye.  His family brought him to the ED for checkup as below.  Has been using warm compresses.  Better but not resolved.  Blurry vision is a little better but back to normal.  Mildly itchy at this point.  Has been a couple of years since his last eye exam with the right cataract removal.      Blood pressure- sometimes takes meds and bp goes low and sometimes bp is high.  Takes in am or pm.  Use to be on losartan/hctz but switched to irbesartan 150mg daily due to shortage of meds during covid.      Depression- really struggling to breath and this makes everything hard for him and makes him not enjoy life.  Taking  depression pill and asthma/breathing pills-meds daily.  Doesn't want to do mental health therapy.  Hard to sleep at night due to the breathing difficulties- not any worse at night.  Exhausted during the day and wants to nap but as soon as he lays down his body aches and hard to sleep.  Notes he tried a medicine to help him sleep but he was allergic to it so this was stopped because it caused aches and pains in hands and feet.       Did take his meds and nebs this morning.      History summarized from1-2:ED visit 7/15/21 for subconjunctival hemorrhage right eye.  Follow-up as needed with eye doctor.  Warm compresses.  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  Restrictive PFTs but asthma component responding to therapy.  Heating pad to chest wall as needed for pain.  symbicort 2 puffs bid.  spiriva 2 daily.  Albuterol prn.  singluair daily. loratidine daily.  Nasal steroid daily.  Non surgical.  Pulmonary rehab recommended but has transportation difficulties.  Flu vaccine annually otherwise shots utd.  Follow-up in 1 year.    Old Records-1: Outside allergies, meds, problems and immunizations were reconciled as needed from [facility]  Radiology tests reviewed-1: stable CXR with left fibrothorax.    Lab tests reviewed-1: 1/2021.        Social History     Social History Narrative    Lives with wife and kids       Physical Exam:  BP (!) 175/105   Pulse 97   Temp 97.6  F (36.4  C) (Temporal)   Wt 88.5 kg (195 lb)   SpO2 97%   BMI 31.71 kg/m   Body mass index is 31.71 kg/m . No LMP for male patient.  Vital signs reviewed  Wt Readings from Last 3 Encounters:   07/26/21 88.5 kg (195 lb)   07/15/21 86.2 kg (190 lb)   06/02/21 85.9 kg (189 lb 4.8 oz)       KIM-7 SCORE 1/6/2020   Total Score 6         PHQ 1/6/2020 12/22/2020 7/26/2021   PHQ-9 Total Score 5 22 22   Q9: Thoughts of better  off dead/self-harm past 2 weeks Not at all Several days Several days     PHQ-2 Score:     No flowsheet data found.      All normal as below except abnormalities include: some dyspnea with speaking and wheezing with inspiration.  Frequent coughing with talking.    General is a  65 year old male sitting comfortably in no apparent distress wearing a mask.  HEENT:  TM are clear bilaterally Eye exam normal   Neck: Supple without lymphadenopathy or thyromegaly  CV: Regular rate and rhythm S1S2 without rubs, murmurs or gallops,   Lungs: Clear to auscultation bilaterally  Abd:  +BS, soft NT/ND,  No masses or organomegaly  Extremities: Warm, No Edema, 2+ Pedal and radial pulses bilaterally  Skin: No lesions or rashes noted  Neuro/MSK: Able to ambulate around the exam room with equal movement, strength and normal coordination of the upper and lower extremeties symmetrically    Sylvia Lugo MD  [facility]

## 2021-07-26 NOTE — LETTER
July 27, 2021      Venita Sanchez  [address] [place] [address]        Dear ,    We are writing to inform you of your test results.    Your kidneys are a little worse this check.  Be sure you are NOT taking any ibuprofen.  Be sure you are getting water to drink every day.  If your blood pressure and kidney tests are not better with the new blood pressure pills we may want to get an ultrasound of your kidneys to make sure they are getting good blood flow.      Your potassium is a little high- please schedule a visit with our pharmacist next week to review your meds and recheck your labs again.  This should get better with the new blood pressure pill.      Your cholesterol is a lot higher than last check.  Be sure you are taking your Tricor daily we may want to add another cholesterol pill to help you stay healthy as you are at high risk of having damage to your body from high cholesterol.      Normal thyroid.   No diabetes.      The 10-year ASCVD risk score (Chasitykrupa ALFRED Jr., et al., 2013) is: 27%    Values used to calculate the score:      Age: 65 years      Sex: Male      Is Non- : No      Diabetic: No      Tobacco smoker: No      Systolic Blood Pressure: 175 mmHg      Is BP treated: Yes      HDL Cholesterol: 40 mg/dL      Total Cholesterol: 195 mg/dL      Resulted Orders   Comprehensive metabolic panel (BMP + Alb, Alk Phos, ALT, AST, Total. Bili, TP)   Result Value Ref Range    Sodium 142 136 - 145 mmol/L    Potassium 5.8 (H) 3.5 - 5.0 mmol/L    Chloride 107 98 - 107 mmol/L    Carbon Dioxide (CO2) 23 22 - 31 mmol/L    Anion Gap 12 5 - 18 mmol/L    Urea Nitrogen 32 (H) 8 - 22 mg/dL    Creatinine 2.17 (H) 0.70 - 1.30 mg/dL    Calcium 9.3 8.5 - 10.5 mg/dL    Glucose 90 70 - 125 mg/dL    Alkaline Phosphatase 70 45 - 120 U/L    AST 23 0 - 40 U/L    ALT 36 0 - 45 U/L    Protein Total 7.2 6.0 - 8.0 g/dL    Albumin 3.7 3.5 - 5.0 g/dL    Bilirubin Total 0.8 0.0 - 1.0 mg/dL    GFR  Estimate 31 (L) >60 mL/min/1.73m2      Comment:      As of July 11, 2021, eGFR is calculated by the CKD-EPI creatinine equation, without race adjustment. eGFR can be influenced by muscle mass, exercise, and diet. The reported eGFR is an estimation only and is only applicable if the renal function is stable.   Lipid Profile (Chol, Trig, HDL, LDL calc)   Result Value Ref Range    Cholesterol 195 <=199 mg/dL    Triglycerides 255 (H) <=149 mg/dL    Direct Measure HDL 40 >=40 mg/dL      Comment:      HDL Cholesterol Reference Range:     0-2 years:   No reference ranges established for patients under 2 years old  at [facility] for lipid analytes.    2-8 years:  Greater than 45 mg/dL     18 years and older:   Female: Greater than or equal to 50 mg/dL   Male:   Greater than or equal to 40 mg/dL    LDL Cholesterol Calculated 104 <=129 mg/dL    Patient Fasting > 8hrs? No    Hemoglobin A1c   Result Value Ref Range    Hemoglobin A1C 5.7 (H) 0.0 - 5.6 %      Comment:      Normal <5.7%   Prediabetes 5.7-6.4%    Diabetes 6.5% or higher     Note: Adopted from ADA consensus guidelines.   TSH   Result Value Ref Range    TSH 1.33 0.30 - 5.00 uIU/mL       If you have any questions or concerns, please call the clinic at the number listed above.       Sincerely,      Sylvia Lugo MD

## 2021-07-27 PROBLEM — E87.5 HYPERKALEMIA: Status: ACTIVE | Noted: 2021-07-27

## 2021-07-27 ASSESSMENT — ASTHMA QUESTIONNAIRES: ACT_TOTALSCORE: 13

## 2021-07-29 NOTE — TELEPHONE ENCOUNTER
Sputum culture shows growth of pseudomonas. Patient was discharged home with zithromax and Omnicef which is not sufficient coverage for pseudomonas. Called patient's son who states patient's breathing is better. Ordered Levo 750 mg q 48 hours x 10 days (renal dosing). Recommend patient's son to have patient see PCP in the next 3 days to monitor improvement with antibxs. Son agreed with plan and stated he will  the antibx today.   
with insomnia  continue home lamictal, buspar, effexor, remeron and doxepin
with insomnia  continue home lamictal, buspar, effexor, remeron and doxepin

## 2021-08-03 PROBLEM — A41.9 SEPSIS (H): Status: RESOLVED | Noted: 2020-01-22 | Resolved: 2020-01-28

## 2021-08-03 NOTE — PROGRESS NOTES
Medication Therapy Management (MTM) Encounter    ASSESSMENT:                            Medication Adherence/Access: Med use appropriate based on fill history. Continue to monitor.       Hypertension: BP today at goal <140/90. Will recheck BMP to monitor Cr and K+.       Chronic Dyspnea: Pt using Spiriva more than prescribed thus going part of the month without the medication. Discussed directions for maintenance inhaler. If having acute shortness of breath - pt instructed on using Albuterol or Duoneb. Consistent access and use of Spiriva may help improve symptoms.     HFA inhaler technique was fair. Not exhaling before the inhalation, was breathing in too quickly and not timed well with actuation. Posture hunched. Was able to correct with some education. Continue to monitor technique.       Allergies: Continued congestion - does not have flonase inhaler. Will refill today.     GERD: No bottle at visit. Continued symptoms of indigestion. Called pharmacy to verify the June dispense - Should have enough supply until Mid-September. Pt to look for medication at home - not provided via AVS for children to help. If unable to find, pt to call back to clinic. May need to increase to 40 mg caps to bypass insurance refill dates.     Mood:  Pain: Continued depression/anxiety and chronic pain. Historically lower levels of Vitamin D - will recheck today. Target levels 45-60 for mood/chronic pain. Does not have Clonazepam available for PRN use. May benefit from having this available for mood, anxiety, pain, and breathlessness, especially at night.         Hyperlipidemia: Not discussed today due to time, but with age 40-75 + LDL  + ASCVD risk score >7.5%, indicated for statin use. Review at follow-up.     PLAN:                            -Repeat BMP   -Use Spiriva as directed - 2 puffs once daily.   -For shortness of breath throughout the day, use Albuterol or Duoneb   -Inhaler technique education   -Refill Flonase  -Vitamin  "D level   -Consider Clonazepam refill (PharmD to discuss with PCP).       Follow-up: Return in about 4 weeks (around 9/1/2021) for Medication Management Pharmacist, in person.      SUBJECTIVE/OBJECTIVE:                          Venita Sanchez is a 65 year old male coming in for an initial visit. He was referred to me from Sylvia Lugo . [employer]   in person (ID# [id number]).      Reason for visit: \"the shortness of breath has been hard for me.\"    Allergies/ADRs: Reviewed in chart  Past Medical History: Reviewed in chart  Tobacco: He reports that he has never smoked. He has never used smokeless tobacco.  Alcohol: Social History    Substance and Sexual Activity      Alcohol use: No         Medication Adherence/Access: Brings pills to the visit, but did not bring inhalers. States he has 4 different kinds.     Children set a med box at home.     Gabapentin, Aspirin, Duloxetine, Montelukast, Loratadine from 7/21   Losartan-hydrochlorothiazide, Fenofibrate 7/26     Missing omeprazole, clonazepam, flonase.      Albuterol filled 6/21, 7/21, 5/24   Spirivia filled 5/22, 6/21, 7/21   Symbicort 5/24, 6/21, 7/21   Duoneb 5/22, 6/21, 7/21       Hypertension: Prescribed losartan-hydrochlorothiazide 100-12.5 mg daily.     Since last BMP, notes that he's been drinking more water. Finishes 2-3 qts per day.       BP Readings from Last 3 Encounters:   08/04/21 132/83   07/26/21 (!) 175/105   07/15/21 (!) 169/94      Pulse Readings from Last 3 Encounters:   08/04/21 85   07/26/21 97   07/15/21 98     Wt Readings from Last 3 Encounters:   07/26/21 195 lb (88.5 kg)   07/15/21 190 lb (86.2 kg)   06/02/21 189 lb 4.8 oz (85.9 kg)        Last Comprehensive Metabolic Panel:  Sodium   Date Value Ref Range Status   07/26/2021 142 136 - 145 mmol/L Final     Potassium   Date Value Ref Range Status   07/26/2021 5.8 (H) 3.5 - 5.0 mmol/L Final     Chloride   Date Value Ref Range Status   07/26/2021 107 98 - 107 mmol/L Final "     Carbon Dioxide (CO2)   Date Value Ref Range Status   07/26/2021 23 22 - 31 mmol/L Final     Anion Gap   Date Value Ref Range Status   07/26/2021 12 5 - 18 mmol/L Final     Glucose   Date Value Ref Range Status   07/26/2021 90 70 - 125 mg/dL Final     Urea Nitrogen   Date Value Ref Range Status   07/26/2021 32 (H) 8 - 22 mg/dL Final     Creatinine   Date Value Ref Range Status   07/26/2021 2.17 (H) 0.70 - 1.30 mg/dL Final     GFR Estimate   Date Value Ref Range Status   07/26/2021 31 (L) >60 mL/min/1.73m2 Final     Comment:     As of July 11, 2021, eGFR is calculated by the CKD-EPI creatinine equation, without race adjustment. eGFR can be influenced by muscle mass, exercise, and diet. The reported eGFR is an estimation only and is only applicable if the renal function is stable.   01/18/2021 39 (L) >60 mL/min/1.73m2 Final     Calcium   Date Value Ref Range Status   07/26/2021 9.3 8.5 - 10.5 mg/dL Final        Chronic Dyspnea: Prescribed albuterol HFA 90 mcg 2 puffs every 4 hours as needed, ipratropium-albuterol 0.5-2.5 mg 4 times daily as needed,  montelukast 10 mg daily, Spiriva Respimat 2.5 mcg 2 puffs daily, Symbicort 160-4.5 mcg 2 puffs twice daily    Showed demo inhalers  Symbicort - uses 2 puffs twice daily    Albuterol - only when having a hard time breathing e.g. at night or when laying down and with walking.    Spiriva - Reports 2 puffs twice daily. Notes he runs out before he gets his next refill.  Notes that sometimes chest gets so tight that he uses the inhaler more often.     Doesn't use Duoneb very often.       Allergies: Prescribed Flonase 50 mcg 1 spray each nostril daily as needed Loratadine 10 mg daily, montelukast 10 mg daily.     Does not have Flonase. Ran out. No dispenses in the last year.     Does have congestion, itchy nose and throat. Denies post-nasal drip.     GERD: Prescribed omeprazole 20 mg daily.     Denies burning, but feels really full with even small amounts of food. Feels like  "it takes a really long time to digest food and feels uncomfortable.    Filled 6/23 for 90 day supply. Thinks he ran out.       Mood:-Duloxetine 60 mg 2 caps daily, clonazepam 1 mg at bedtime    No Clonazepam at visit. No dispenses in [facility] in the last year.     Mood has been depressed due to chronic dyspnea.  Not sleeping well.     Notes anxiety - worries about dying from shortness of breath when shortness of breath gets severe. Unable to state how often it happens. Sometimes couple days in a row, other times weeks apart.     \"Taking mood medicine and that's helping me\"        Pain: Prescribed acetaminophen 650 mg every 8 hours as needed, duloxetine 60 mg 2 caps daily, gabapentin 300 mg 3 times daily    Pain comes and goes. Tries not to pay attention to the pain \"otherwise it makes me miserable because I have pain all over my body\"     Denies burning, numbness. Feels really weak. Doesn't have the energy to lift body up.     Soreness when sitting too long. All over the body.        Ref. Range 8/22/2019 10:53   Vitamin D, Total (25-Hydroxy) Latest Ref Range: 30.0 - 80.0 ng/mL 34.8         Hyperlipidemia: Fenofibrate 145 mg daily, aspirin 81 mg daily    Recent Labs   Lab Test 07/26/21  0937 05/21/19  1028   CHOL 195 109   HDL 40 32*    42   TRIG 255* 173*     The 10-year ASCVD risk score (Chasitykrupa ALFRED Jr., et al., 2013) is: 17.3%    Values used to calculate the score:      Age: 65 years      Sex: Male      Is Non- : No      Diabetic: No      Tobacco smoker: No      Systolic Blood Pressure: 132 mmHg      Is BP treated: Yes      HDL Cholesterol: 40 mg/dL      Total Cholesterol: 195 mg/dL         Today's Vitals: /83   Pulse 85   ----------------      I spent 60 minutes with this patient today. All changes were made via collaborative practice agreement with Sylvia Lugo. A copy of the visit note was provided to the patient's primary care provider.    The patient was given a summary of these " recommendations.     Bry Corona, PharmD  Medication Therapy Management (MTM) Pharmacist  [facility]         Medication Therapy Recommendations  Allergic rhinitis due to pollen    Current Medication: fluticasone (FLONASE) 50 MCG/ACT nasal spray   Rationale: Medication product not available - Adherence - Adherence   Recommendation: Provide Adherence Intervention   Status: Accepted per CPA         Essential hypertension    Current Medication: losartan-hydrochlorothiazide (HYZAAR) 100-12.5 MG tablet   Rationale: Medication requires monitoring - Needs additional monitoring   Recommendation: Order Lab   Status: Accepted per CPA   Note: BMP         Moderate major depression (H)    Current Medication: DULoxetine (CYMBALTA) 60 MG capsule   Rationale: Medication requires monitoring - Needs additional monitoring   Recommendation: Order Lab   Status: Accepted per CPA   Note: Vit D level         Moderate persistent asthma    Current Medication: SPIRIVA RESPIMAT 2.5 mcg/actuation Mist   Rationale: Does not understand instructions - Adherence - Adherence   Recommendation: Provide Education   Status: Accepted per CPA          Current Medication: SYMBICORT 160-4.5 mcg/actuation inhaler   Rationale: Incorrect administration   Recommendation: Provide Education   Status: Accepted per CPA

## 2021-08-04 ENCOUNTER — APPOINTMENT (OUTPATIENT)
Dept: INTERPRETER SERVICES | Facility: CLINIC | Age: 66
End: 2021-08-04
Payer: COMMERCIAL

## 2021-08-04 ENCOUNTER — OFFICE VISIT (OUTPATIENT)
Dept: PHARMACY | Facility: CLINIC | Age: 66
End: 2021-08-04
Attending: FAMILY MEDICINE
Payer: COMMERCIAL

## 2021-08-04 ENCOUNTER — LAB (OUTPATIENT)
Dept: LAB | Facility: CLINIC | Age: 66
End: 2021-08-04
Payer: COMMERCIAL

## 2021-08-04 VITALS — SYSTOLIC BLOOD PRESSURE: 132 MMHG | DIASTOLIC BLOOD PRESSURE: 83 MMHG | HEART RATE: 85 BPM

## 2021-08-04 DIAGNOSIS — K21.9 GASTROESOPHAGEAL REFLUX DISEASE WITHOUT ESOPHAGITIS: ICD-10-CM

## 2021-08-04 DIAGNOSIS — F32.9 MAJOR DEPRESSIVE DISORDER WITH SINGLE EPISODE, REMISSION STATUS UNSPECIFIED: ICD-10-CM

## 2021-08-04 DIAGNOSIS — E78.1 PURE HYPERGLYCERIDEMIA: ICD-10-CM

## 2021-08-04 DIAGNOSIS — I10 ESSENTIAL HYPERTENSION: ICD-10-CM

## 2021-08-04 DIAGNOSIS — M54.50 CHRONIC LEFT-SIDED LOW BACK PAIN WITHOUT SCIATICA: ICD-10-CM

## 2021-08-04 DIAGNOSIS — E55.9 VITAMIN D DEFICIENCY: ICD-10-CM

## 2021-08-04 DIAGNOSIS — J30.1 ALLERGIC RHINITIS DUE TO POLLEN, UNSPECIFIED SEASONALITY: ICD-10-CM

## 2021-08-04 DIAGNOSIS — I10 ESSENTIAL HYPERTENSION: Primary | ICD-10-CM

## 2021-08-04 DIAGNOSIS — J45.40 MODERATE PERSISTENT ASTHMA, UNSPECIFIED WHETHER COMPLICATED: ICD-10-CM

## 2021-08-04 DIAGNOSIS — G89.29 CHRONIC LEFT-SIDED LOW BACK PAIN WITHOUT SCIATICA: ICD-10-CM

## 2021-08-04 LAB
ANION GAP SERPL CALCULATED.3IONS-SCNC: 12 MMOL/L (ref 5–18)
BUN SERPL-MCNC: 39 MG/DL (ref 8–22)
CALCIUM SERPL-MCNC: 9.6 MG/DL (ref 8.5–10.5)
CHLORIDE BLD-SCNC: 105 MMOL/L (ref 98–107)
CO2 SERPL-SCNC: 21 MMOL/L (ref 22–31)
CREAT SERPL-MCNC: 2.17 MG/DL (ref 0.7–1.3)
GFR SERPL CREATININE-BSD FRML MDRD: 31 ML/MIN/1.73M2
GLUCOSE BLD-MCNC: 92 MG/DL (ref 70–125)
POTASSIUM BLD-SCNC: 5.6 MMOL/L (ref 3.5–5)
SODIUM SERPL-SCNC: 138 MMOL/L (ref 136–145)

## 2021-08-04 PROCEDURE — 82306 VITAMIN D 25 HYDROXY: CPT

## 2021-08-04 PROCEDURE — 99605 MTMS BY PHARM NP 15 MIN: CPT | Performed by: PHARMACIST

## 2021-08-04 PROCEDURE — 36415 COLL VENOUS BLD VENIPUNCTURE: CPT

## 2021-08-04 PROCEDURE — 99607 MTMS BY PHARM ADDL 15 MIN: CPT | Performed by: PHARMACIST

## 2021-08-04 PROCEDURE — 80048 BASIC METABOLIC PNL TOTAL CA: CPT

## 2021-08-04 RX ORDER — FLUTICASONE PROPIONATE 50 MCG
2 SPRAY, SUSPENSION (ML) NASAL DAILY
Qty: 16 G | Refills: 11 | Status: SHIPPED | OUTPATIENT
Start: 2021-08-04 | End: 2023-05-10

## 2021-08-04 NOTE — PATIENT INSTRUCTIONS
Recommendations from today's MTM visit:                                                    MTM (medication therapy management) is a service provided by a clinical pharmacist designed to help you get the most of out of your medicines.   Today we reviewed what your medicines are for, how to know if they are working, that your medicines are safe and how to make your medicine regimen as easy as possible.      1. Restart Flonase (Fluticasone) nasal spray. Use 2 sprays in each nostril once daily.     2. Use Spiriva Respimat 2 puffs once daily. Do not use more than 2 puffs per day.     3. If you are having shortness of breath, use the Albuterol (blue inhaler) or ipratropium-albuterol 0.5-2.5 mg  (Liquid that goes into the machine) as needed.     4. Check at home for a supply of Omeprazole, the medicine for your stomach. If you are unable to find this, call the clinic and let me know. I will work with the pharmacy to get you an additional supply.     5. I will ask Dr. Lugo to send a refill of the medication for anxiety to use as needed.     Follow-up: Return in about 4 weeks (around 9/1/2021) for Medication Management Pharmacist, in person.    It was great to speak with you today.  I value your experience and would be very thankful for your time with providing feedback on our clinic survey. You may receive a survey via email or text message in the next few days.     To schedule another MTM appointment, please call the clinic directly or you may call the MTM scheduling line at [phone] or toll-free at [phone].     My Clinical Pharmacist's contact information:                                                      Please feel free to contact me with any questions or concerns you have.      Bry Corona, PharmD  Medication Therapy Management (MTM) Pharmacist  [facility]

## 2021-08-04 NOTE — Clinical Note
Does not have Clonazepam available. I was unable to find any recent dispenses in outside dispense history or the . With his anxiety related to shortness of breath, I do think having a low dose would be appropriate for him. Based on his other med use, minimal safety concerns.

## 2021-08-05 ENCOUNTER — TELEPHONE (OUTPATIENT)
Dept: FAMILY MEDICINE | Facility: CLINIC | Age: 66
End: 2021-08-05

## 2021-08-05 DIAGNOSIS — I10 BENIGN ESSENTIAL HTN: Primary | ICD-10-CM

## 2021-08-05 LAB — DEPRECATED CALCIDIOL+CALCIFEROL SERPL-MC: 27 UG/L (ref 30–80)

## 2021-08-05 RX ORDER — ERGOCALCIFEROL 1.25 MG/1
50000 CAPSULE, LIQUID FILLED ORAL WEEKLY
Qty: 12 CAPSULE | Refills: 1 | Status: SHIPPED | OUTPATIENT
Start: 2021-08-05 | End: 2022-01-10

## 2021-08-05 NOTE — TELEPHONE ENCOUNTER
Called and relayed message to patient.        ----- Message from Bry Corona PharmD sent at 8/5/2021 11:38 AM CDT -----  Please call patient and inform him that his vitamin D level was low. I have sent a prescription for once weekly vitamin D supplement to help increase his levels. We will plan to recheck levels in about 3 months.

## 2021-08-05 NOTE — TELEPHONE ENCOUNTER
Discussing Clonazepam refills with PCP.       The patient should have a supply of Omeprazole at home. It was filled on 6/23 for a 90 day supply. Pt was asked to check for the supply at home and call if he was unable to find it. Can we please call pt back and confirm whether or not he found the supply? If not able to find, let me know, I will need to work with pharmacy to get it to go through insurance or make a medicine adjustment.     Bry Corona, PharmD  Medication Therapy Management (MTM) Pharmacist  [facility]

## 2021-08-05 NOTE — TELEPHONE ENCOUNTER
Reason for Call:  Medication or medication refill:    Do you use a [facility]?  Name of the pharmacy and phone number for the current request:  [facility] on [address]    Name of the medication requested:  clonazepam 1 mg  Omeprazole 20 mg    Other request: pt was  Seen by Bry  and asked to call back if he  wants to start these medications he would like to start meds please     Can we leave a detailed message on this number? YES    Phone number patient can be reached at: Home number on file [phone] (home)    Best Time: anytime    Call taken on 8/5/2021 at 11:39 AM by Christen Sweeney

## 2021-08-06 RX ORDER — CLONAZEPAM 1 MG/1
1 TABLET ORAL DAILY PRN
Qty: 30 TABLET | Refills: 1 | Status: SHIPPED | OUTPATIENT
Start: 2021-08-06 | End: 2021-09-21

## 2021-08-16 ENCOUNTER — HOSPITAL ENCOUNTER (OUTPATIENT)
Dept: ULTRASOUND IMAGING | Facility: HOSPITAL | Age: 66
Discharge: HOME | End: 2021-08-16
Attending: FAMILY MEDICINE | Admitting: FAMILY MEDICINE
Payer: COMMERCIAL

## 2021-08-16 DIAGNOSIS — I10 BENIGN ESSENTIAL HTN: ICD-10-CM

## 2021-08-16 PROCEDURE — 76770 US EXAM ABDO BACK WALL COMP: CPT

## 2021-08-16 PROCEDURE — 93975 VASCULAR STUDY: CPT

## 2021-08-18 ENCOUNTER — TELEPHONE (OUTPATIENT)
Dept: FAMILY MEDICINE | Facility: CLINIC | Age: 66
End: 2021-08-18

## 2021-08-18 NOTE — TELEPHONE ENCOUNTER
RELAYED MESSAGE TO SON MESSAGE UNDERSTOOD.        ----- Message from Ban Navarro MD sent at 8/17/2021  4:03 PM CDT -----  Please let the patient know that his kidney ultrasound is normal.  No sign of kidney problems or narrowing of the arteries that go to the kidneys, called the renal arteries.

## 2021-08-20 DIAGNOSIS — J45.40 MODERATE PERSISTENT ASTHMA: ICD-10-CM

## 2021-08-20 DIAGNOSIS — J45.40 MODERATE PERSISTENT ASTHMA, UNSPECIFIED WHETHER COMPLICATED: ICD-10-CM

## 2021-08-20 RX ORDER — ALBUTEROL SULFATE 90 UG/1
2 AEROSOL, METERED RESPIRATORY (INHALATION) EVERY 4 HOURS PRN
Qty: 18 G | Refills: 11 | Status: SHIPPED | OUTPATIENT
Start: 2021-08-20 | End: 2022-07-13

## 2021-08-24 RX ORDER — BUDESONIDE AND FORMOTEROL FUMARATE DIHYDRATE 160; 4.5 UG/1; UG/1
AEROSOL RESPIRATORY (INHALATION)
Qty: 10.2 G | Refills: 11 | Status: SHIPPED | OUTPATIENT
Start: 2021-08-24 | End: 2022-08-10

## 2021-08-24 NOTE — TELEPHONE ENCOUNTER
"Routing refill request to provider for review/approval because:  ACT score  Early refill request    Last Written Prescription Date:  9/21/20  Last Fill Quantity: 1,  # refills: 11   Last office visit provider:  7/26/21     Requested Prescriptions   Pending Prescriptions Disp Refills     SYMBICORT 160-4.5 MCG/ACT Inhaler [Pharmacy Med Name: SYMBICORT 160-4.5 MCG -4.5 Aerosol] 10.2 g 11     Sig: INHALE 2 PUFFS TWICE DAILY. RINSE MOUTH AND SPIT OUT AFTERWARDS // 1 ZAUG NQUS 2 PAS, 1 HNU SIV 2 ZAUG. YAUG NCAUJ THAUM SIV TAS.       Inhaled Steroids Protocol Failed - 8/20/2021 10:58 AM        Failed - Asthma control assessment score within normal limits in last 6 months     Please review ACT score.           Passed - Patient is age 12 or older        Passed - Medication is active on med list        Passed - Recent (6 mo) or future (30 days) visit within the authorizing provider's specialty     Patient had office visit in the last 6 months or has a visit in the next 30 days with authorizing provider or within the authorizing provider's specialty.  See \"Patient Info\" tab in inbasket, or \"Choose Columns\" in Meds & Orders section of the refill encounter.           Long-Acting Beta Agonist Inhalers Protocol  Failed - 8/20/2021 10:58 AM        Failed - Asthma control assessment score within normal limits in last 6 months     Please review ACT score.           Passed - Patient is age 12 or older        Passed - Medication is active on med list        Passed - Recent (6 mo) or future (30 days) visit within the authorizing provider's specialty     Patient had office visit in the last 6 months or has a visit in the next 30 days with authorizing provider or within the authorizing provider's specialty.  See \"Patient Info\" tab in inbasket, or \"Choose Columns\" in Meds & Orders section of the refill encounter.                 Santaigo Larry RN 08/24/21 1:01 PM  "

## 2021-09-03 ENCOUNTER — OFFICE VISIT (OUTPATIENT)
Dept: PHARMACY | Facility: CLINIC | Age: 66
End: 2021-09-03
Payer: COMMERCIAL

## 2021-09-03 ENCOUNTER — APPOINTMENT (OUTPATIENT)
Dept: INTERPRETER SERVICES | Facility: CLINIC | Age: 66
End: 2021-09-03
Payer: COMMERCIAL

## 2021-09-03 VITALS — HEART RATE: 90 BPM | SYSTOLIC BLOOD PRESSURE: 148 MMHG | DIASTOLIC BLOOD PRESSURE: 92 MMHG

## 2021-09-03 DIAGNOSIS — K21.9 GASTROESOPHAGEAL REFLUX DISEASE WITHOUT ESOPHAGITIS: ICD-10-CM

## 2021-09-03 DIAGNOSIS — E78.1 PURE HYPERGLYCERIDEMIA: ICD-10-CM

## 2021-09-03 DIAGNOSIS — I10 ESSENTIAL HYPERTENSION: Primary | ICD-10-CM

## 2021-09-03 DIAGNOSIS — F32.9 MAJOR DEPRESSIVE DISORDER WITH SINGLE EPISODE, REMISSION STATUS UNSPECIFIED: ICD-10-CM

## 2021-09-03 DIAGNOSIS — M54.50 CHRONIC LEFT-SIDED LOW BACK PAIN WITHOUT SCIATICA: ICD-10-CM

## 2021-09-03 DIAGNOSIS — G89.29 CHRONIC LEFT-SIDED LOW BACK PAIN WITHOUT SCIATICA: ICD-10-CM

## 2021-09-03 DIAGNOSIS — J45.40 MODERATE PERSISTENT ASTHMA, UNSPECIFIED WHETHER COMPLICATED: ICD-10-CM

## 2021-09-03 DIAGNOSIS — J30.1 ALLERGIC RHINITIS DUE TO POLLEN, UNSPECIFIED SEASONALITY: ICD-10-CM

## 2021-09-03 DIAGNOSIS — E55.9 VITAMIN D DEFICIENCY: ICD-10-CM

## 2021-09-03 PROCEDURE — 99607 MTMS BY PHARM ADDL 15 MIN: CPT | Performed by: PHARMACIST

## 2021-09-03 PROCEDURE — 99606 MTMS BY PHARM EST 15 MIN: CPT | Performed by: PHARMACIST

## 2021-09-03 NOTE — PROGRESS NOTES
Medication Therapy Management (MTM) Encounter    ASSESSMENT:                            Medication Adherence/Access: Adherence seems to be mostly appropriate with oral medications, still some confusion around inhalers.     Pt does admit to skipping doses and concerns about kidney function. Reviewed that risk with medications is significantly lower than risk from pt skipping medications and having elevated BP or worsening of other conditions.       Hypertension: BP today above goal <140/90 x 2. Likely adherence related. No adjustments. Pt declined follow-up MTM visit. Will try to reach out to patient in 2-3 weeks and see if he's willing to come in for MA visit for vitals recheck.       Chronic Dyspnea: Continued shortness of breath and  difficulty with inhaler adherence. Inconsistent report of Spiriva use. At last visit, pt was using more than prescribed and running out too soon. Today, either using PRN or not at all as recent inhaler still has all 60 doses remaining. Again reviewed prescribed dosing.     Pt also wanted to discontinue Nebs. Discussed that Albuterol inhaler can continue to be his go-to rescue inhaler for convenience, but if shortness of breath not improving with albuterol use, to then try inhaler.       Allergies: Continued congestion - Not using Flonase daily. Reviewed that uncontrolled allergies can worsen breathing.       GERD: No bottle at visit. Continued symptoms of indigestion. Outside dispense history indicates it was recently filled. Pt may need to call pharmacy regarding delivery of medication.     Mood:  Pain: Reports mood is better, but continued difficulty with pain and sleep. Appears pt has only used 6 tab s of Clonazepam (despite report of daily use). Reviewed indication of medication and direction for use. Encouraged pt to use as needed at bedtime to help with anxiety, sleep, and muscle stiffness.       Hyperlipidemia: Not discussed today due to time, but with age 40-75 + LDL  +  ASCVD risk score >7.5%, indicated for statin use. There was an overlap in refill of Fibrate which may explain why pt still has a higher quantity remaining. Declined labs today. Consider repeat at follow-up.     PLAN:                            -Pt to use Spiriva 2 puffs once daily, every day.   -Use Albuterol as needed, if not effective, use Duoneb   -Pt to use Flonase daily as previously prescribed   -Clonazepam education provided       Follow-up: Return in about 2 months (around 11/3/2021) for Next Scheduled visit with PCP.  Declined MTM follow-up     SUBJECTIVE/OBJECTIVE:                          Venita Sanchez is a 65 year old male coming in for a follow up visit. He was referred to me from Sylvia Lugo . [employer]   by phone (ID# [email or web address]).  Today's visit is a follow-up MTM visit from 8/4/2021.     Reason for visit: No questions today.       Allergies/ADRs: Reviewed in chart  Past Medical History: Reviewed in chart  Tobacco: He reports that he has never smoked. He has never used smokeless tobacco.  Alcohol: Social History    Substance and Sexual Activity      Alcohol use: No         Medication Adherence/Access: Brings pills to the visit, but did not bring inhalers. States he has 4 different kinds.     Children set a med box at home.     Aspirin, Loratadine, Montelukast, MVI, Duloxetine - 8/20 #30   Fenofibrate 8/4 #30 - mostly full   Clonazepam 8/9 #30 -- mostly full  Gabapentin #270 from 7/21   Losartan-hydrochlorothiazide #90, Fenofibrate 7/26   Flonase, Spiriva, Symbicort, and Duoneb   Vit D #12 8/5 - 9 remain     No albuterol inhaler, omeprazole     At the end of the visit, pt states he sometimes will skip a dose of medicines every so often because he feels like there are too many and worries they will damage his kidneys.       Hypertension: Prescribed losartan-hydrochlorothiazide 100-12.5 mg daily.     Since last BMP, notes that he's been drinking more water. Finishes 2-3 qts per day.      States his home checks are usually lower in the 130s.     BP Readings from Last 3 Encounters:   09/03/21 (!) 148/92   08/04/21 132/83   07/26/21 (!) 175/105      Pulse Readings from Last 3 Encounters:   09/03/21 90   08/04/21 85   07/26/21 97     Wt Readings from Last 3 Encounters:   07/26/21 195 lb (88.5 kg)   07/15/21 190 lb (86.2 kg)   06/02/21 189 lb 4.8 oz (85.9 kg)        Last Comprehensive Metabolic Panel:  Sodium   Date Value Ref Range Status   08/04/2021 138 136 - 145 mmol/L Final     Potassium   Date Value Ref Range Status   08/04/2021 5.6 (H) 3.5 - 5.0 mmol/L Final     Chloride   Date Value Ref Range Status   08/04/2021 105 98 - 107 mmol/L Final     Carbon Dioxide (CO2)   Date Value Ref Range Status   08/04/2021 21 (L) 22 - 31 mmol/L Final     Anion Gap   Date Value Ref Range Status   08/04/2021 12 5 - 18 mmol/L Final     Glucose   Date Value Ref Range Status   08/04/2021 92 70 - 125 mg/dL Final     Urea Nitrogen   Date Value Ref Range Status   08/04/2021 39 (H) 8 - 22 mg/dL Final     Creatinine   Date Value Ref Range Status   08/04/2021 2.17 (H) 0.70 - 1.30 mg/dL Final     GFR Estimate   Date Value Ref Range Status   08/04/2021 31 (L) >60 mL/min/1.73m2 Final     Comment:     As of July 11, 2021, eGFR is calculated by the CKD-EPI creatinine equation, without race adjustment. eGFR can be influenced by muscle mass, exercise, and diet. The reported eGFR is an estimation only and is only applicable if the renal function is stable.   01/18/2021 39 (L) >60 mL/min/1.73m2 Final     Calcium   Date Value Ref Range Status   08/04/2021 9.6 8.5 - 10.5 mg/dL Final        Chronic Dyspnea: Prescribed albuterol HFA 90 mcg 2 puffs every 4 hours as needed, ipratropium-albuterol 0.5-2.5 mg 4 times daily as needed,  montelukast 10 mg daily, Spiriva Respimat 2.5 mcg 2 puffs daily, Symbicort 160-4.5 mcg 2 puffs twice daily    Since last MTM visit, has decreased Spiriva to PRN vs once daily as instructed.     Later states  "he's using Spiriva daily as instructed, but filled on 8/20 - still has 60/60 puffs remaining. Says he has another one at home that he has been using but that was filled on 7/21 for 30 day supply. (had some oral meds that were filled and delivered at the same time - e.g. aspirin only has 14/30 tabs remaining).     No albuterol at the visit today. States he has at home.     Questions need for Duoneb - the neb isn't very convenient.       Allergies: Prescribed Flonase 50 mcg 2 spray each nostril daily as needed Loratadine 10 mg daily, montelukast 10 mg daily.     Allergies are off and on - worse with changes in the weather. Some days using Flonase 1 puff each nostril twice daily. Other days, not using at all.       GERD: Prescribed omeprazole 20 mg daily.     Still having heartburn.   Filled 8/30 for 90 day supply - not at visit. States pharmacy didn't deliver. States sometimes he uses his wife's medication.       Mood:-Duloxetine 60 mg 2 caps daily, clonazepam 1 mg at bedtime    States mood is okay.   Sleep is \"so-so\" - continues to have body aches.     States he's using Clonazepam once daily - unsure what this medicine is for.            Pain: Prescribed acetaminophen 650 mg every 8 hours as needed, duloxetine 60 mg 2 caps daily, gabapentin 300 mg 3 times daily    Continues to have muscle pain and stiffness, especially at night that's preventing him from sleeping well.       Low Vitamin D: Prescribed Vitamin D2 50,000 units once weekly.     Vitamin D Deficiency Screening Results:  Lab Results   Component Value Date    VITDT 27 (L) 08/04/2021       Hyperlipidemia: Fenofibrate 145 mg daily, aspirin 81 mg daily    Recent Labs   Lab Test 07/26/21  0937 05/21/19  1028   CHOL 195 109   HDL 40 32*    42   TRIG 255* 173*       The 10-year ASCVD risk score ([place] Citykrupa ALFRED Jr., et al., 2013) is: 20.8%    Values used to calculate the score:      Age: 65 years      Sex: Male      Is Non- : No      " Diabetic: No      Tobacco smoker: No      Systolic Blood Pressure: 148 mmHg      Is BP treated: Yes      HDL Cholesterol: 40 mg/dL      Total Cholesterol: 195 mg/dL         Today's Vitals: BP (!) 148/92   Pulse 90   ----------------      I spent 60 minutes with this patient today. All changes were made via collaborative practice agreement with Sylvia Lugo. A copy of the visit note was provided to the patient's primary care provider.    The patient was given a summary of these recommendations.     Bry Corona, PharmD  Medication Therapy Management (MTM) Pharmacist  [facility]         Medication Therapy Recommendations  Allergic rhinitis due to pollen    Current Medication: fluticasone (FLONASE) 50 MCG/ACT nasal spray   Rationale: Does not understand instructions - Adherence - Adherence   Recommendation: Provide Education   Status: Accepted per CPA         Major depressive disorder with single episode, remission status unspecified    Current Medication: clonazePAM (KLONOPIN) 1 MG tablet   Rationale: Does not understand instructions - Adherence - Adherence   Recommendation: Provide Education   Status: Accepted per CPA         Moderate persistent asthma    Current Medication: SPIRIVA RESPIMAT 2.5 mcg/actuation Mist   Rationale: Patient forgets to take - Adherence - Adherence   Recommendation: Provide Education   Status: Accepted per CPA          Current Medication: ipratropium-albuteroL (DUO-NEB) 0.5-2.5 mg/3 mL nebulizer   Rationale: Patient prefers not to take - Adherence - Adherence   Recommendation: Provide Education   Status: Accepted per CPA

## 2021-09-17 DIAGNOSIS — E78.1 PURE HYPERGLYCERIDEMIA: ICD-10-CM

## 2021-09-17 RX ORDER — FENOFIBRATE 145 MG/1
TABLET, COATED ORAL
Qty: 30 TABLET | Refills: 4 | Status: SHIPPED | OUTPATIENT
Start: 2021-09-17 | End: 2022-02-08

## 2021-09-17 NOTE — TELEPHONE ENCOUNTER
"Fenofibrate (TRICOR)    Last Written Prescription Date:  5/26/2021  Last Fill Quantity: 30,  # refills: 1   Last office visit provider: CARO with Dr. Lugo on 7/26/2021     Requested Prescriptions   Pending Prescriptions Disp Refills     fenofibrate (TRICOR) 145 MG tablet [Pharmacy Med Name: FENOFIBRATE 145 MG TABS 145 Tablet] 30 tablet 1     Sig: TAKE 1 TABLET BY MOUTH DAILY FOR CHOLESTEROL // IB HNUB NOJ 1 LUB PAB ROXANNE NTSHAV MUAJ ROJ       Fibrates Passed - 9/17/2021 10:23 AM        Passed - Lipid panel on file in past 12 months     Recent Labs   Lab Test 07/26/21  0937   CHOL 195   TRIG 255*   HDL 40                  Passed - No abnormal creatine kinase in past 12 months     Recent Labs   Lab Test 01/22/20  1320   CKT 87                Passed - Recent (12 mo) or future (30 days) visit within the authorizing provider's specialty     Patient has had an office visit with the authorizing provider or a provider within the authorizing providers department within the previous 12 mos or has a future within next 30 days. See \"Patient Info\" tab in inbasket, or \"Choose Columns\" in Meds & Orders section of the refill encounter.              Passed - Medication is active on med list        Passed - Patient is age 18 or older             Hoang Burrell RN 09/17/21 11:32 AM  "

## 2021-09-21 DIAGNOSIS — F32.1 MODERATE MAJOR DEPRESSION (H): ICD-10-CM

## 2021-09-21 RX ORDER — CLONAZEPAM 1 MG/1
TABLET ORAL
Qty: 30 TABLET | Refills: 1 | Status: SHIPPED | OUTPATIENT
Start: 2021-09-21 | End: 2021-11-17

## 2021-09-21 NOTE — TELEPHONE ENCOUNTER
Clonazepam/ KLONOPIN    Routing refill request to provider for review/approval because:  Drug not on the [facility] refill protocol     Last Written Prescription Date:  8/06/2021  Last Fill Quantity: 30,  # refills: 1   Last office visit provider: CARO with Dr. Lugo on 7/26/2021     Requested Prescriptions   Pending Prescriptions Disp Refills     clonazePAM (KLONOPIN) 1 MG tablet [Pharmacy Med Name: CLONAZEPAM 1 MG TABS 1 Tablet] 30 tablet 1     Sig: TAKE 1 TABLET DAILY AS NEEDED FOR ANXIETY // IB HNUB NOJ 1 LUB YOG CEEB       There is no refill protocol information for this order          Hoang Burrell RN 09/21/21 11:25 AM

## 2021-09-27 DIAGNOSIS — J45.40 MODERATE PERSISTENT ASTHMA, UNSPECIFIED WHETHER COMPLICATED: ICD-10-CM

## 2021-09-27 RX ORDER — IPRATROPIUM BROMIDE AND ALBUTEROL SULFATE 2.5; .5 MG/3ML; MG/3ML
3 SOLUTION RESPIRATORY (INHALATION) 4 TIMES DAILY PRN
Qty: 480 ML | Refills: 3 | Status: SHIPPED | OUTPATIENT
Start: 2021-09-27 | End: 2022-02-07

## 2021-10-15 NOTE — PROGRESS NOTES
[facility] Six-Month Telephone Assessment    6 month telephone assessment completed on 7/23/21.    ER visits: Yes -  [facility]  Hospitalizations: No  TCU stays: No  Significant health status changes: None reported.   Falls/Injuries: No  ADL/IADL changes: No  Changes in services: No    Caregiver Assessment follow up:  N/A    Goals: See POC in chart for goal progress documentation.      Will see member in 6 months for an annual health risk assessment.   Encouraged member to call CC with any questions or concerns in the meantime.       JUAN Syed  [facility]  [phone]

## 2021-10-18 DIAGNOSIS — F32.9 MAJOR DEPRESSIVE DISORDER, SINGLE EPISODE, UNSPECIFIED: ICD-10-CM

## 2021-10-18 DIAGNOSIS — I10 ESSENTIAL HYPERTENSION: ICD-10-CM

## 2021-10-19 PROBLEM — F32.9 MAJOR DEPRESSION: Status: ACTIVE | Noted: 2020-01-28

## 2021-10-19 RX ORDER — ASPIRIN 81 MG/1
TABLET, COATED ORAL
Qty: 90 TABLET | Refills: 3 | Status: SHIPPED | OUTPATIENT
Start: 2021-10-19 | End: 2022-10-17

## 2021-10-19 RX ORDER — MULTIVITAMIN WITH FOLIC ACID 400 MCG
TABLET ORAL
Qty: 90 TABLET | Refills: 3 | Status: SHIPPED | OUTPATIENT
Start: 2021-10-19 | End: 2022-10-14

## 2021-10-19 NOTE — TELEPHONE ENCOUNTER
"  Disp Refills Start End NATHANIEL    TAB-A-GERARDO per tablet 30 tablet 11 11/10/2020  No   Sig: TAKE 1 TABLET BY MOUTH DAILY // IB HNUB NOJ 1 LUB   Sent to pharmacy as: Tab-A-Gerardo tablet (multivitamin)   E-Prescribing Status: Receipt confirmed by pharmacy (11/10/2020 12:43 PM CST)       TAB-A-GERARDO per tablet [[id number]]    Electronically signed by: Sylvia Lugo MD on 11/10/20 1243 Status: Active   Ordering user: Sylvia Lugo MD 11/10/20 1243 Authorized by: Sylvia Lugo MD   Frequency:  11/10/20 - Until Discontinued Released by: Sylvia Lugo MD 11/10/20 1243   Diagnoses  Major depressive disorder, single episode, unspecified [F32.9]     Last Written Prescription Date:  11/9/20  Last Fill Quantity: 30,  # refills: 11   Last office visit provider:  7/26/21     Last Written Prescription Date:  11/10/20  Last Fill Quantity: 30,  # refills: 11   Last office visit provider:  7/26/21     Requested Prescriptions   Pending Prescriptions Disp Refills     ASPIRIN LOW DOSE 81 MG EC tablet [Pharmacy Med Name: ASPIRIN LOW DOSE 81 MG TBEC 81 Tablet] 30 tablet 11     Sig: TAKE 1 TABLET BY MOUTH DAILY FOR STROKE PREVENTION // IB HNUB NOJ 1 LUB PAB KOM NTSHAV KHIAV ZOO       Analgesics (Non-Narcotic Tylenol and ASA Only) Passed - 10/18/2021 12:33 PM        Passed - Recent (12 mo) or future (30 days) visit within the authorizing provider's specialty     Patient has had an office visit with the authorizing provider or a provider within the authorizing providers department within the previous 12 mos or has a future within next 30 days. See \"Patient Info\" tab in inbasket, or \"Choose Columns\" in Meds & Orders section of the refill encounter.              Passed - Patient is age 20 years or older     If ASA is flagged for ages under 20 years old. Forward to provider for confirmation Ryes Syndrome is not a concern.              Passed - Medication is active on med list           Multiple Vitamin (TAB-A-GERARDO) TABS " "[Pharmacy Med Name: TAB-A-GERARDO TABS Tablet] 30 tablet 11     Sig: TAKE 1 TABLET BY MOUTH DAILY // IB HNUB NOJ 1 LUB       Vitamin Supplements (Adult) Protocol Failed - 10/18/2021 12:33 PM        Failed - Medication is active on med list        Passed - High dose Vitamin D not ordered        Passed - Recent (12 mo) or future (30 days) visit within the authorizing provider's specialty     Patient has had an office visit with the authorizing provider or a provider within the authorizing providers department within the previous 12 mos or has a future within next 30 days. See \"Patient Info\" tab in inbasket, or \"Choose Columns\" in Meds & Orders section of the refill encounter.                   Lima Mchugh RN 10/19/21 1:19 PM  "

## 2021-11-15 DIAGNOSIS — J32.9 CHRONIC SINUSITIS, UNSPECIFIED LOCATION: ICD-10-CM

## 2021-11-15 DIAGNOSIS — J45.40 MODERATE PERSISTENT ASTHMA WITHOUT COMPLICATION: ICD-10-CM

## 2021-11-15 DIAGNOSIS — F32.1 MODERATE MAJOR DEPRESSION (H): ICD-10-CM

## 2021-11-15 RX ORDER — LORATADINE 10 MG/1
10 TABLET ORAL DAILY
Qty: 30 TABLET | Refills: 11 | Status: SHIPPED | OUTPATIENT
Start: 2021-11-15 | End: 2022-11-28

## 2021-11-17 RX ORDER — CLONAZEPAM 1 MG/1
TABLET ORAL
Qty: 30 TABLET | Refills: 1 | Status: SHIPPED | OUTPATIENT
Start: 2021-11-17 | End: 2022-01-13

## 2021-11-17 NOTE — TELEPHONE ENCOUNTER
Routing refill request to provider for review/approval because:  Drug not on the [facility] refill protocol - controlled medication    Last Written Prescription Date:  9/21/2021  Last Fill Quantity: 30,  # refills: 1   Last office visit provider:  7/26/2021     Requested Prescriptions   Pending Prescriptions Disp Refills     clonazePAM (KLONOPIN) 1 MG tablet [Pharmacy Med Name: CLONAZEPAM 1 MG TABS 1 Tablet] 30 tablet 1     Sig: TAKE 1 TABLET DAILY AS NEEDED FOR ANXIETY // IB HNUB NOJ 1 LUB YOG CEEB       There is no refill protocol information for this order          Alicja Bledsoe RN 11/16/21 8:14 PM

## 2021-11-26 ENCOUNTER — PATIENT OUTREACH (OUTPATIENT)
Dept: GERIATRIC MEDICINE | Facility: CLINIC | Age: 66
End: 2021-11-26
Payer: COMMERCIAL

## 2021-11-27 ASSESSMENT — PATIENT HEALTH QUESTIONNAIRE - PHQ9: SUM OF ALL RESPONSES TO PHQ QUESTIONS 1-9: 13

## 2021-12-03 ENCOUNTER — PATIENT OUTREACH (OUTPATIENT)
Dept: GERIATRIC MEDICINE | Facility: CLINIC | Age: 66
End: 2021-12-03
Payer: COMMERCIAL

## 2021-12-03 NOTE — LETTER
[address], [address]  [place] [address]  Phone:  [phone]  Fax:  [phone]      December 3, 2021    RACHELL TRIMBLE  [address] [place] [address]    Dear Rachell Fisher,    Enclosed is a copy of your completed PCA Assessment and Service Plan.  This is for your records and no action is required by you.  If you have additional questions regarding your assessment please contact me at [phone]. If you feel that your needs are not being met, please contact the Clinical Supervisor at [phone].    Sincerely,    JUAN Syed  [phone]  [email or web address]              Enclosure:  Completed PCA assessment

## 2021-12-13 NOTE — PROGRESS NOTES
[facility] Care Coordination Contact      [facility]:  Emailed completed PCA assessment to [facility].  Faxed copy of PCA assessment to [facility] Agency and mailed copy to member.  Faxed MD Communication to PCP.     Mailed POC signature page and  PCA signature page to member to sign and return asap.    Cely Mejia  [facility]  Case Management Specialist  [phone]

## 2021-12-21 ENCOUNTER — OFFICE VISIT (OUTPATIENT)
Dept: FAMILY MEDICINE | Facility: CLINIC | Age: 66
End: 2021-12-21
Payer: COMMERCIAL

## 2021-12-21 VITALS
WEIGHT: 187 LBS | HEIGHT: 65 IN | OXYGEN SATURATION: 97 % | DIASTOLIC BLOOD PRESSURE: 92 MMHG | BODY MASS INDEX: 31.16 KG/M2 | TEMPERATURE: 98.5 F | HEART RATE: 96 BPM | SYSTOLIC BLOOD PRESSURE: 152 MMHG

## 2021-12-21 DIAGNOSIS — K21.9 GASTROESOPHAGEAL REFLUX DISEASE WITHOUT ESOPHAGITIS: ICD-10-CM

## 2021-12-21 DIAGNOSIS — J90 CHRONIC PLEURAL EFFUSION: ICD-10-CM

## 2021-12-21 DIAGNOSIS — E87.5 HYPERKALEMIA: ICD-10-CM

## 2021-12-21 DIAGNOSIS — K59.00 CONSTIPATION, UNSPECIFIED CONSTIPATION TYPE: ICD-10-CM

## 2021-12-21 DIAGNOSIS — N18.32 STAGE 3B CHRONIC KIDNEY DISEASE (H): ICD-10-CM

## 2021-12-21 DIAGNOSIS — R10.12 LUQ ABDOMINAL PAIN: Primary | ICD-10-CM

## 2021-12-21 LAB
ALBUMIN SERPL-MCNC: 3.9 G/DL (ref 3.5–5)
ALP SERPL-CCNC: 60 U/L (ref 45–120)
ALT SERPL W P-5'-P-CCNC: 26 U/L (ref 0–45)
ANION GAP SERPL CALCULATED.3IONS-SCNC: 12 MMOL/L (ref 5–18)
AST SERPL W P-5'-P-CCNC: 16 U/L (ref 0–40)
BASOPHILS # BLD AUTO: 0 10E3/UL (ref 0–0.2)
BASOPHILS NFR BLD AUTO: 0 %
BILIRUB SERPL-MCNC: 0.7 MG/DL (ref 0–1)
BUN SERPL-MCNC: 40 MG/DL (ref 8–22)
CALCIUM SERPL-MCNC: 9.9 MG/DL (ref 8.5–10.5)
CHLORIDE BLD-SCNC: 105 MMOL/L (ref 98–107)
CO2 SERPL-SCNC: 23 MMOL/L (ref 22–31)
CREAT SERPL-MCNC: 2.23 MG/DL (ref 0.7–1.3)
EOSINOPHIL # BLD AUTO: 0.1 10E3/UL (ref 0–0.7)
EOSINOPHIL NFR BLD AUTO: 1 %
ERYTHROCYTE [DISTWIDTH] IN BLOOD BY AUTOMATED COUNT: 12.9 % (ref 10–15)
GFR SERPL CREATININE-BSD FRML MDRD: 32 ML/MIN/1.73M2
GLUCOSE BLD-MCNC: 81 MG/DL (ref 70–125)
HCT VFR BLD AUTO: 47.8 % (ref 40–53)
HGB BLD-MCNC: 15.6 G/DL (ref 13.3–17.7)
IMM GRANULOCYTES # BLD: 0 10E3/UL
IMM GRANULOCYTES NFR BLD: 0 %
LYMPHOCYTES # BLD AUTO: 2.2 10E3/UL (ref 0.8–5.3)
LYMPHOCYTES NFR BLD AUTO: 25 %
MCH RBC QN AUTO: 29.3 PG (ref 26.5–33)
MCHC RBC AUTO-ENTMCNC: 32.6 G/DL (ref 31.5–36.5)
MCV RBC AUTO: 90 FL (ref 78–100)
MONOCYTES # BLD AUTO: 0.8 10E3/UL (ref 0–1.3)
MONOCYTES NFR BLD AUTO: 9 %
NEUTROPHILS # BLD AUTO: 5.9 10E3/UL (ref 1.6–8.3)
NEUTROPHILS NFR BLD AUTO: 65 %
PHOSPHATE SERPL-MCNC: 4.6 MG/DL (ref 2.5–4.5)
PLATELET # BLD AUTO: 149 10E3/UL (ref 150–450)
POTASSIUM BLD-SCNC: 4.6 MMOL/L (ref 3.5–5)
PROT SERPL-MCNC: 7.5 G/DL (ref 6–8)
PTH-INTACT SERPL-MCNC: 44 PG/ML (ref 10–86)
RBC # BLD AUTO: 5.33 10E6/UL (ref 4.4–5.9)
SODIUM SERPL-SCNC: 140 MMOL/L (ref 136–145)
WBC # BLD AUTO: 9.1 10E3/UL (ref 4–11)

## 2021-12-21 PROCEDURE — 85025 COMPLETE CBC W/AUTO DIFF WBC: CPT | Performed by: FAMILY MEDICINE

## 2021-12-21 PROCEDURE — 36415 COLL VENOUS BLD VENIPUNCTURE: CPT | Performed by: FAMILY MEDICINE

## 2021-12-21 PROCEDURE — 99214 OFFICE O/P EST MOD 30 MIN: CPT | Mod: 25 | Performed by: FAMILY MEDICINE

## 2021-12-21 PROCEDURE — 84100 ASSAY OF PHOSPHORUS: CPT | Performed by: FAMILY MEDICINE

## 2021-12-21 PROCEDURE — 80053 COMPREHEN METABOLIC PANEL: CPT | Performed by: FAMILY MEDICINE

## 2021-12-21 PROCEDURE — 83970 ASSAY OF PARATHORMONE: CPT | Performed by: FAMILY MEDICINE

## 2021-12-21 PROCEDURE — 91300 PR COVID VAC PFIZER DIL RECON 30 MCG/0.3 ML IM: CPT | Performed by: FAMILY MEDICINE

## 2021-12-21 PROCEDURE — 0004A PR COVID VAC PFIZER DIL RECON 30 MCG/0.3 ML IM: CPT | Performed by: FAMILY MEDICINE

## 2021-12-21 ASSESSMENT — MIFFLIN-ST. JEOR: SCORE: 1555.11

## 2021-12-21 NOTE — PROGRESS NOTES
[facility] Home Visit Assessment     Home visit for Health Risk Assessment with Venita Sanchez completed on November 26, 2021    Assessment completed over the phone due to COVID-19.    Type of residence:: Private home - stairs  Current living arrangement:: I live in a private home with family     Assessment completed with:: Patient    Current Care Plan  Member currently receiving the following home care services:     Member currently receiving the following community resources: PCA      Medication Review  Medication reconciliation completed in Epic: If no, please explain Assessment completed over the phone due to COVID-19.  Medication set-up & administration: Family/informal caregiver sets up weekly.  Self-administers medications.  Medication Risk Assessment Medication (1 or more, place referral to MTM): N/A: No risk factors identified  MTM Referral Placed: No: No risk factors idenified    Mental/Behavioral Health   Depression Screening:   PHQ-2 Total Score (Adult) - Positive if 3 or more points; Administer PHQ-9 if positive: (!) 6  PHQ-9 Total Score: 13    Mental health DX:: Yes   Mental health DX how managed:: Outpatient Counseling,Medication    Falls Assessment:   Fallen 2 or more times in the past year?: No   Any fall with injury in the past year?: No    ADL/IADL Dependencies:   Dependent ADLs:: Ambulation-cane,Bathing,Dressing,Eating,Grooming,Transfers,Toileting  Dependent IADLs:: Cleaning,Cooking,Laundry,Shopping,Meal Preparation,Medication Management,Money Management,Transportation    [facility] sponsored benefits: Shared information re: Silver Sneakers/gym memberships, ASA, Calcium +D.    PCA Assessment completed at visit: Yes Annual PCA assessment indicated 25 units per day of PCA. This is the same as the previous assessment.     Elderly Waiver Eligibility: Yes, but member declines EW services; will not open to EW    Care Plan & Recommendations: Member will continue with PCA services. Member  will call  [facility] with any questions, concerns, or changes in needs.     See [facility] for detailed assessment information.    Follow-Up Plan: Member informed of future contact, plan to f/u with member with a 6 month telephone assessment.  Contact information shared with member and family, encouraged member to call with any questions or concerns at any time.    [facility] care continuum providers: Please refer to Health Care Home on the [facility] Problem List to view this patient's [facility] Care Plan Summary.    JUAN Syed  [facility]  [phone]

## 2021-12-21 NOTE — PROGRESS NOTES
OFFICE VISIT - FAMILY MEDICINE     ASSESSMENT AND PLAN       ICD-10-CM    1. LUQ abdominal pain  R10.12 **CBC with platelets differential FUTURE 2mo     **Comprehensive metabolic panel FUTURE 2mo     XR Chest 2 Views     XR Abdomen 2 Views     **CBC with platelets differential FUTURE 2mo     **Comprehensive metabolic panel FUTURE 2mo   2. Stage 3b chronic kidney disease (H)  N18.32 Renal panel (Alb, BUN, Ca, Cl, CO2, Creat, Gluc, Phos, K, Na)     Parathyroid Hormone Intact     Parathyroid Hormone Intact     Phosphorus     CANCELED: Renal panel (Alb, BUN, Ca, Cl, CO2, Creat, Gluc, Phos, K, Na)   3. Chronic pleural effusion  J90 XR Chest 2 Views     XR Abdomen 2 Views    Differential diagnosis of left upper quadrant pain discussed included diverticular pain, constipation, gastritis etc. we did some lab work today, CBC did show a normal white blood cell count, abdominal x-ray independently reviewed did show Nonobstructive bowel gas pattern, no air-fluid level.  A chest x-ray did show chronic Left pleural effusion, no sign of rib fracture.  Symptomatic care discussed with patient, encourage good hydration, high-fiber diet, trial of PPI, follow-up in a couple weeks if still not improving.  CKD, will get some additional lab work today, will need long-term follow-up with nephrologist.  Chronic pleural effusion appears stable.  Followed by pulmonologist for    CHIEF COMPLAINT   Abdominal Pain (left side )       JEANA Sanchez is a 66 year old male.  No Patient Care Coordination Note on file.    Has been experiencing left upper quadrant pain for the past couple of weeks, mild to moderate cramping, comes and go denies any nausea or vomiting, no blood in the stool, occasional constipation.  No recent fever or chills.  Has been seen pulmonologist for chronic pleural effusion, denies any shortness of breath.  He also has chronic kidney disease, with baseline creatinine around 2.1  Was seen at [place] several months ago, was  "instructed to follow-up with nephrologist but has not done so.      Review of Systems As per HPI, otherwise negative.    OBJECTIVE   BP (!) 152/92 (BP Location: Right arm, Patient Position: Sitting, Cuff Size: Adult Regular)   Pulse 96   Temp 98.5  F (36.9  C)   Ht 1.651 m (5' 5\")   Wt 84.8 kg (187 lb)   SpO2 97%   BMI 31.12 kg/m    Physical Exam  Constitutional:       Appearance: Normal appearance.   HENT:      Head: Normocephalic and atraumatic.   Cardiovascular:      Rate and Rhythm: Normal rate and regular rhythm.   Pulmonary:      Effort: Pulmonary effort is normal.      Breath sounds: Normal breath sounds.   Abdominal:      General: There is no distension.      Tenderness: There is abdominal tenderness (Epigastric and left upper quadrant area.). There is no right CVA tenderness, left CVA tenderness or guarding.   Musculoskeletal:      Cervical back: Normal range of motion and neck supple.   Neurological:      General: No focal deficit present.      Mental Status: He is alert and oriented to person, place, and time.   Psychiatric:         Behavior: Behavior normal.         Thought Content: Thought content normal.         Judgment: Judgment normal.         PFSH   No family history on file.  Social History     Socioeconomic History     Marital status:      Spouse name: Not on file     Number of children: Not on file     Years of education: Not on file     Highest education level: Not on file   Occupational History     Not on file   Tobacco Use     Smoking status: Never Smoker     Smokeless tobacco: Never Used   Substance and Sexual Activity     Alcohol use: No     Drug use: No     Sexual activity: Yes     Partners: Female     Birth control/protection: Post-menopausal   Other Topics Concern     Not on file   Social History Narrative    Lives with wife and kids     Social Determinants of Health     Financial Resource Strain: Not on file   Food Insecurity: Not on file   Transportation Needs: Not on file "   Physical Activity: Not on file   Stress: Not on file   Social Connections: Not on file   Intimate Partner Violence: Not on file   Housing Stability: Not on file       PMSH   [unfilled]  Past Surgical History:   Procedure Laterality Date     CATARACT EXTRACTION BILATERAL W/ ANTERIOR VITRECTOMY Right        RESULTS/CONSULTS (Lab/Rad)     Recent Results (from the past 168 hour(s))   CBC with platelets and differential   Result Value Ref Range    WBC Count 9.1 4.0 - 11.0 10e3/uL    RBC Count 5.33 4.40 - 5.90 10e6/uL    Hemoglobin 15.6 13.3 - 17.7 g/dL    Hematocrit 47.8 40.0 - 53.0 %    MCV 90 78 - 100 fL    MCH 29.3 26.5 - 33.0 pg    MCHC 32.6 31.5 - 36.5 g/dL    RDW 12.9 10.0 - 15.0 %    Platelet Count 149 (L) 150 - 450 10e3/uL    % Neutrophils 65 %    % Lymphocytes 25 %    % Monocytes 9 %    % Eosinophils 1 %    % Basophils 0 %    % Immature Granulocytes 0 %    Absolute Neutrophils 5.9 1.6 - 8.3 10e3/uL    Absolute Lymphocytes 2.2 0.8 - 5.3 10e3/uL    Absolute Monocytes 0.8 0.0 - 1.3 10e3/uL    Absolute Eosinophils 0.1 0.0 - 0.7 10e3/uL    Absolute Basophils 0.0 0.0 - 0.2 10e3/uL    Absolute Immature Granulocytes 0.0 <=0.4 10e3/uL     US Renal Complete w Doppler Complete  Narrative: EXAM:  1. RENAL ULTRASOUND   2. RENAL DUPLEX  LOCATION: [facility]  DATE: 8/16/2021    INDICATION: Uncontrolled BP - assess bilateral renal arteries for stenosis.  COMPARISON: None.  TECHNIQUE: Duplex imaging is performed utilizing gray-scale, two-dimensional images, and color-flow imaging. Doppler waveform analysis and spectral Doppler imaging is also performed.    FINDINGS:     RIGHT KIDNEY: 11.2 x 4.7 x 4.2 cm. No hydronephrosis. Small hypoechoic collection is noted in the lateral aspect of the right kidney measuring 1.1 x 1 x 0.9 cm.    LEFT KIDNEY 11.5 x 4.7 x 4.2 cm. No hydronephrosis. Hypoechoic collection at the inferior aspect of the left kidney is 1.3 x 1.1 x 1.1 cm. This appears to be a simple cyst.      BLADDER: Normal.    RENAL DUPLEX:  The right renal artery is patent. The distal main renal artery velocity is 112/33 cm/s, mid renal artery is 101/27 cm/s, and proximal renal artery is 124/35 cm/s.    The proximal abdominal aorta is 1.6 cm and mid abdominal aorta 1.9 cm, and distal abdominal aorta 1.7 cm. The right common iliac artery is 1.2 cm and the left common iliac artery is 1.1 cm.     The left renal artery is patent. The proximal main renal artery velocity is 57 cm/s, main renal artery is 87/17 cm/s, and distal left main renal artery 70/19 cm/s.    Waveform analysis demonstrates sharp systolic upstroke in both renal arterial systems. No parvus tardus pattern.    The bladder shape is somewhat irregular, though no obvious focal wall thickening.    RENAL VELOCITIES (cm/s)  RIGHT SYSTEM  Proximal Renal Artery: Velocity 124, Ratio 2.2, RI N/A  Mid Renal Artery: Velocity 101, Ratio 1.8, RI N/A  Distal Renal Artery: Velocity 112, Ratio 2.0, RI N/A  Upper Arcuate: Velocity 28, RI 0.6  Middle Arcuate: Velocity 42, RI 0.6  Lower Arcuate: Velocity 43, RI 0.6  Normal Ratio <3.5, RI <0.75    LEFT SYSTEM  Proximal Renal Artery: Velocity 57, Ratio 1.0, RI N/A  Mid Renal Artery: Velocity 87, Ratio 1.5, RI N/A  Distal Renal Artery: Velocity 70, Ratio 1.3, RI N/A  Upper Arcuate: Velocity 32, RI 0.6  Middle Arcuate: Velocity 56, RI 0.7  Lower Arcuate: Velocity 37, RI 0.7  Normal Ratio <3.5, RI <0.75  Impression: IMPRESSION:   1.  No ultrasound evidence of renal arterial stenosis.   2.  Hypoechoic collections in both kidneys, small simple cyst in the left kidney and minimally complex hypoechoic collection in the right kidney.  3.  No hydronephrosis of either kidney.     [unfilled]    HEALTH MAINTENANCE / SCREENING   [unfilled], [unfilled],[unfilled]  Immunization History   Administered Date(s) Administered     COVID-19,PF,Pfizer (12+ Yrs) 05/22/2021, 06/19/2021     Flu, Unspecified 11/01/2020      HepA-Adult 11/07/2019     Hepatitis B Immunity: Titer 05/29/2019     Influenza Quad, Recombinant, pf(RIV4) (Flublok) 11/07/2019     Influenza Vaccine IM > 6 months Valent IIV4 (Alfuria,Fluzone) 09/27/2018     Influenza Vaccine, 6+MO IM (QUADRIVALENT W/PRESERVATIVES) 09/29/2010, 12/27/2012     Pneumococcal 23 valent 01/14/2019     TD (ADULT, 7+) 10/19/2018     Td (Adult), Adsorbed 05/25/2004, 10/04/2004     Td,adult,historic,unspecified 04/28/2008     Tdap (Adacel,Boostrix) 04/28/2008     Typhoid Oral 10/29/2019     Varicella 05/25/2004, 10/04/2004     Health Maintenance   Topic     MICROALBUMIN      PARATHYROID      PHOSPHORUS      ANNUAL REVIEW OF HM ORDERS      ADVANCE CARE PLANNING      COPD ACTION PLAN      DEPRESSION ACTION PLAN      ZOSTER IMMUNIZATION (1 of 2)     MEDICARE ANNUAL WELLNESS VISIT      AORTIC ANEURYSM SCREENING (SYSTEM ASSIGNED)      COVID-19 Vaccine (3 - Booster for Pfizer series)     FALL RISK ASSESSMENT      PHQ-9      LIPID      BMP      HEMOGLOBIN      Pneumococcal Vaccine: 65+ Years (2 of 2 - PPSV23)     DTAP/TDAP/TD IMMUNIZATION (3 - Td or Tdap)     COLORECTAL CANCER SCREENING      SPIROMETRY      HEPATITIS C SCREENING      INFLUENZA VACCINE      URINALYSIS      ALK PHOS      IPV IMMUNIZATION      MENINGITIS IMMUNIZATION      HEPATITIS B IMMUNIZATION      Review of external notes as documented elsewhere in note  25 minutes spent on the date of the encounter doing chart review, review of outside records, review of test results, interpretation of tests, patient visit and documentation          Carolann Marcano MD  [facility]   This transcription uses voice recognition software, which may contain typographical errors.

## 2021-12-22 ENCOUNTER — TELEPHONE (OUTPATIENT)
Dept: FAMILY MEDICINE | Facility: CLINIC | Age: 66
End: 2021-12-22
Payer: COMMERCIAL

## 2021-12-22 NOTE — TELEPHONE ENCOUNTER
Central Prior Authorization Team   Phone: [phone]      PA Initiation    Medication: Metamucil cap-Initiated  Insurance Company: [employer] - Phone [phone] Fax [phone]  Pharmacy Filling the Rx: [facility] [place], [place] - [address] [place] [address]  Filling Pharmacy Phone: [phone]  Filling Pharmacy Fax:    Start Date: 12/22/2021

## 2021-12-22 NOTE — TELEPHONE ENCOUNTER
Per [facility] Metamucil is not covered by [facility]. ALTERNATIVE IS NEEDED.

## 2021-12-23 ENCOUNTER — LAB (OUTPATIENT)
Dept: LAB | Facility: CLINIC | Age: 66
End: 2021-12-23
Payer: COMMERCIAL

## 2021-12-23 DIAGNOSIS — K21.9 GASTROESOPHAGEAL REFLUX DISEASE WITHOUT ESOPHAGITIS: ICD-10-CM

## 2021-12-23 PROCEDURE — 87338 HPYLORI STOOL AG IA: CPT

## 2021-12-23 NOTE — TELEPHONE ENCOUNTER
PRIOR AUTHORIZATION DENIED    Medication: Metamucil cap-DENIED    Denial Date: 12/22/2021    Denial Rational: Patient must have a history of trial & failure to the formulary alternative(s) or have a contraindication or intolerance to the formulary alternatives: Psyllium Fiber 0.52g caps.  Called pharmacy to see if they were able to switch, they were still not able to get a paid claim but they have other NDCs that they will try.  Or they will also see if the patient wants to just pay out of pocket.          Appeal Information:

## 2021-12-23 NOTE — TELEPHONE ENCOUNTER
PA denied for Metamucil cap  .    Message sent to Dr Lugo for review / alternative medication    Reina Logan RN  [facility]

## 2021-12-23 NOTE — TELEPHONE ENCOUNTER
Call pt- can buy this over the counter if it is working well for him    Generic okay.     Letme know if this is going to be too expensive     Sylvia Lugo

## 2021-12-24 ENCOUNTER — PATIENT OUTREACH (OUTPATIENT)
Dept: GERIATRIC MEDICINE | Facility: CLINIC | Age: 66
End: 2021-12-24
Payer: COMMERCIAL

## 2021-12-24 LAB — H PYLORI AG STL QL IA: NEGATIVE

## 2021-12-24 NOTE — LETTER
December 24, 2021    RACHELL TRIMBLE  [address] [place] [address]        Dear Rachell Fisher:    At [facility], we are dedicated to improving your health and well-being. Enclosed is the Comprehensive Care Plan that we developed with you on 11/26/21. Please review the Care Plan carefully.    As a reminder, some of the things we discussed at your visit include:    Your physical and mental health    Ways to reduce falls    Health care needs you may have    Don t forget to contact your care coordinator if you:    Have been hospitalized or plan to be hospitalized     Have had a fall     Have experienced a change in physical health    Are experiencing emotional problems     If you do not agree with your Care Plan, have questions about it, or have experienced a change in your needs, please call me at [phone]. If you are hearing impaired, please call the [place] [employer] at 837 or [phone] (ewzrwb-dv-rdwdjy relay service).    Sincerely,        JUAN Syed  [phone]  [email or web address]      [id number] IA ([id number])     [id number] (11/18)

## 2021-12-27 ENCOUNTER — TELEPHONE (OUTPATIENT)
Dept: FAMILY MEDICINE | Facility: CLINIC | Age: 66
End: 2021-12-27
Payer: COMMERCIAL

## 2021-12-27 NOTE — TELEPHONE ENCOUNTER
Called and spoke with patient relay to message below.        ----- Message from Carolann Marcano MD sent at 12/24/2021  1:32 PM CST -----  Kidney function abnormal but stable I will have you follow with a kidney specialist to discuss long-term management, expect a call to schedule I.

## 2021-12-28 ENCOUNTER — PATIENT OUTREACH (OUTPATIENT)
Dept: GERIATRIC MEDICINE | Facility: CLINIC | Age: 66
End: 2021-12-28
Payer: COMMERCIAL

## 2021-12-28 NOTE — LETTER
December 28, 2021    RACHELL TRIMBLE  [address] [place] [address]      Dear Rachell Fisher:    As a member of [facility] ([facility] you are provided a care coordinator. I will be your new care coordinator as of 1/1/2022. I will be calling you soon to see how you are doing and determine your needs.    If you have any questions, please feel free to call me at [phone]. If you reach my voice mail, please leave a message and your phone number. If you are hearing impaired, please call the [place] [employer] at 755 or [phone] (dbvmia-vq-lzbmqn relay service).    I look forward to speaking with you soon.    Sincerely,          Lexy Espinoza RN    E-Mail:  [email or web address]  Phone: [phone]      Jailyn [facility]          [facility]  [id number] DHS Approved ([id number])  [id number] (11/18)

## 2021-12-28 NOTE — PROGRESS NOTES
[facility] Care Coordination Contact    Internal CC change effective 1/1/2022.  Mailed member CC Change letter.  Additional tasks to be completed by CMS include: update database & EPIC, enter CC Change in MMIS, and move member files on Q drive.    Yoli Berman  Case Management Specialist  [facility]  [phone]

## 2021-12-29 NOTE — PROGRESS NOTES
[facility] Care Coordination Contact    Received after visit chart from care coordinator.  Completed following tasks: Mailed copy of care plan to client     Mailed address and phone number of [facility] and [facility] to member.    Cely Mejia  [facility]  Case Management Specialist  [phone]

## 2022-01-10 DIAGNOSIS — F32.1 MODERATE MAJOR DEPRESSION (H): ICD-10-CM

## 2022-01-10 DIAGNOSIS — J45.40 MODERATE PERSISTENT ASTHMA, UNSPECIFIED WHETHER COMPLICATED: ICD-10-CM

## 2022-01-10 DIAGNOSIS — E55.9 VITAMIN D DEFICIENCY: ICD-10-CM

## 2022-01-10 RX ORDER — MONTELUKAST SODIUM 10 MG/1
10 TABLET ORAL AT BEDTIME
Qty: 30 TABLET | Refills: 11 | Status: SHIPPED | OUTPATIENT
Start: 2022-01-10 | End: 2023-01-18

## 2022-01-10 RX ORDER — ERGOCALCIFEROL 1.25 MG/1
CAPSULE, LIQUID FILLED ORAL
Qty: 12 CAPSULE | Refills: 1 | Status: SHIPPED | OUTPATIENT
Start: 2022-01-10 | End: 2022-04-15

## 2022-01-12 DIAGNOSIS — N18.32 STAGE 3B CHRONIC KIDNEY DISEASE (H): ICD-10-CM

## 2022-01-12 DIAGNOSIS — I10 BENIGN ESSENTIAL HTN: ICD-10-CM

## 2022-01-13 RX ORDER — IRBESARTAN 150 MG/1
TABLET ORAL
COMMUNITY
Start: 2021-11-15 | End: 2023-04-28 | Stop reason: ALTCHOICE

## 2022-01-13 RX ORDER — CLONAZEPAM 1 MG/1
TABLET ORAL
Qty: 20 TABLET | Refills: 0 | Status: SHIPPED | OUTPATIENT
Start: 2022-01-13 | End: 2022-02-04

## 2022-01-13 NOTE — TELEPHONE ENCOUNTER
Routing refill request to provider for review/approval because:  Controlled substance request    Last Written Prescription Date:  11/17/21  Last Fill Quantity: 30,  # refills: 1   Last office visit provider:  12/21/21     Requested Prescriptions   Pending Prescriptions Disp Refills     clonazePAM (KLONOPIN) 1 MG tablet [Pharmacy Med Name: CLONAZEPAM 1 MG TABS 1 Tablet] 30 tablet 1     Sig: TAKE 1 TABLET DAILY AS NEEDED FOR ANXIETY // IB HNUB NOJ 1 LUB YOG CEEB       There is no refill protocol information for this order          Santiago Larry RN 01/13/22 8:14 AM

## 2022-01-14 NOTE — TELEPHONE ENCOUNTER
"Routing refill request to provider for review/approval because:  Refill request too early  Labs out of range: creatinine  High blood pressure    Last Written Prescription Date:  12/16/2021  Last Fill Quantity: 90,  # refills: 0   Last office visit provider:  Carolann Marcano MD on 12/21/2021     Requested Prescriptions   Pending Prescriptions Disp Refills     losartan-hydrochlorothiazide (HYZAAR) 100-12.5 MG tablet [Pharmacy Med Name: LOSARTAN-HCTZ 100-12.5 MG T 100-12.5 Tablet] 90 tablet 1     Sig: TAKE 1 TABLET BY MOUTH DAILY // IB HNUB NOJ 1 LUB       Angiotensin-II Receptors Failed - 1/12/2022 10:14 AM        Failed - Last blood pressure under 140/90 in past 12 months     BP Readings from Last 3 Encounters:   12/21/21 (!) 152/92 09/03/21 (!) 148/92 08/04/21 132/83                 Failed - Normal serum creatinine on file in past 12 months     Recent Labs   Lab Test 12/21/21  1123   CR 2.23*       Ok to refill medication if creatinine is low          Passed - Recent (12 mo) or future (30 days) visit within the authorizing provider's specialty     Patient has had an office visit with the authorizing provider or a provider within the authorizing providers department within the previous 12 mos or has a future within next 30 days. See \"Patient Info\" tab in inbasket, or \"Choose Columns\" in Meds & Orders section of the refill encounter.              Passed - Medication is active on med list        Passed - Patient is age 18 or older        Passed - Normal serum potassium on file in past 12 months     Recent Labs   Lab Test 12/21/21  1123   POTASSIUM 4.6                   Diuretics (Including Combos) Protocol Failed - 1/12/2022 10:14 AM        Failed - Blood pressure under 140/90 in past 12 months     BP Readings from Last 3 Encounters:   12/21/21 (!) 152/92 09/03/21 (!) 148/92 08/04/21 132/83                 Failed - Normal serum creatinine on file in past 12 months     Recent Labs   Lab Test 12/21/21  1123   CR " "2.23*              Passed - Recent (12 mo) or future (30 days) visit within the authorizing provider's specialty     Patient has had an office visit with the authorizing provider or a provider within the authorizing providers department within the previous 12 mos or has a future within next 30 days. See \"Patient Info\" tab in inbasket, or \"Choose Columns\" in Meds & Orders section of the refill encounter.              Passed - Medication is active on med list        Passed - Patient is age 18 or older        Passed - Normal serum potassium on file in past 12 months     Recent Labs   Lab Test 12/21/21  1123   POTASSIUM 4.6                    Passed - Normal serum sodium on file in past 12 months     Recent Labs   Lab Test 12/21/21  1123                      Lesia Lerma RN 01/14/22 3:46 PM  "

## 2022-01-17 RX ORDER — LOSARTAN POTASSIUM AND HYDROCHLOROTHIAZIDE 12.5; 1 MG/1; MG/1
TABLET ORAL
Qty: 30 TABLET | Refills: 1 | Status: SHIPPED | OUTPATIENT
Start: 2022-01-17 | End: 2023-04-28 | Stop reason: ALTCHOICE

## 2022-01-19 ENCOUNTER — TELEPHONE (OUTPATIENT)
Dept: FAMILY MEDICINE | Facility: CLINIC | Age: 67
End: 2022-01-19
Payer: COMMERCIAL

## 2022-01-19 NOTE — TELEPHONE ENCOUNTER
Pt refuse appt for now as he haven't been well, per pt he will call us back to schedule appt for mtm. Completing task.

## 2022-01-19 NOTE — TELEPHONE ENCOUNTER
MTM referral from: [facility] visit (referral by provider)    MTM referral outreach attempt #2 on January 19, 2022 at 11:35 AM      Outcome: Patient not reachable after several attempts, will route to MTM Pharmacist/Provider as an FYI.  MT scheduling number is [phone].  Thank you for the referral.    Logan Parker, MTM coordinator

## 2022-01-20 ENCOUNTER — APPOINTMENT (OUTPATIENT)
Dept: INTERPRETER SERVICES | Facility: CLINIC | Age: 67
End: 2022-01-20
Payer: COMMERCIAL

## 2022-01-20 NOTE — TELEPHONE ENCOUNTER
Contacted patient using [employer]  and relayed message from Dr Lugo.    Patient agrees with plan and will buy OTC generic medication.    No further action needed.    Reina Logan RN  [facility]

## 2022-02-02 DIAGNOSIS — F32.1 MODERATE MAJOR DEPRESSION (H): ICD-10-CM

## 2022-02-04 RX ORDER — CLONAZEPAM 1 MG/1
TABLET ORAL
Qty: 20 TABLET | Refills: 0 | Status: SHIPPED | OUTPATIENT
Start: 2022-02-04 | End: 2022-02-25

## 2022-02-04 NOTE — TELEPHONE ENCOUNTER
Routing refill request to provider for review/approval because:  Controlled Substance Request     Last Written Prescription Date:  1/13/22  Last Fill Quantity: 20,  # refills: 0   Last office visit provider:  12/12/21    Requested Prescriptions   Pending Prescriptions Disp Refills     clonazePAM (KLONOPIN) 1 MG tablet [Pharmacy Med Name: CLONAZEPAM 1 MG TABS 1 Tablet] 20 tablet 0     Sig: TAKE 1 TABLET DAILY AS NEEDED FOR ANXIETY // IB HNUB NOJ 1 LUB YOG CEEB       There is no refill protocol information for this order          Catrachita Bull RN 02/04/22 1:40 PM

## 2022-02-07 DIAGNOSIS — J45.40 MODERATE PERSISTENT ASTHMA, UNSPECIFIED WHETHER COMPLICATED: ICD-10-CM

## 2022-02-07 DIAGNOSIS — E78.1 PURE HYPERGLYCERIDEMIA: ICD-10-CM

## 2022-02-07 RX ORDER — IPRATROPIUM BROMIDE AND ALBUTEROL SULFATE 2.5; .5 MG/3ML; MG/3ML
3 SOLUTION RESPIRATORY (INHALATION) 4 TIMES DAILY PRN
Qty: 480 ML | Refills: 3 | Status: SHIPPED | OUTPATIENT
Start: 2022-02-07 | End: 2022-07-13

## 2022-02-08 RX ORDER — FENOFIBRATE 145 MG/1
TABLET, COATED ORAL
Qty: 90 TABLET | Refills: 0 | Status: SHIPPED | OUTPATIENT
Start: 2022-02-08 | End: 2022-03-08

## 2022-02-08 NOTE — TELEPHONE ENCOUNTER
Pt refuse appt for now as he haven't been well and do not want appt done at the moment, per pt he will call us back to schedule appt for mtm when he is ready to do so.  Completing task.

## 2022-02-09 DIAGNOSIS — E78.1 PURE HYPERGLYCERIDEMIA: ICD-10-CM

## 2022-02-09 NOTE — TELEPHONE ENCOUNTER
"Last Written Prescription Date:  9/17/2021  Last Fill Quantity: 30,  # refills: 4   Last office visit provider:  12/21/2021 Dr. Marcano     Requested Prescriptions   Pending Prescriptions Disp Refills     fenofibrate (TRICOR) 145 MG tablet [Pharmacy Med Name: FENOFIBRATE 145 MG TABS 145 Tablet] 30 tablet 4     Sig: TAKE 1 TABLET BY MOUTH DAILY FOR CHOLESTEROL // IB HNUB NOJ 1 LUB PAB ROXANNE NTSHAV MUAJ ROJ       Fibrates Passed - 2/7/2022  9:40 AM        Passed - Lipid panel on file in past 12 months     Recent Labs   Lab Test 07/26/21  0937   CHOL 195   TRIG 255*   HDL 40                  Passed - No abnormal creatine kinase in past 12 months     Recent Labs   Lab Test 01/22/20  1320   CKT 87                Passed - Recent (12 mo) or future (30 days) visit within the authorizing provider's specialty     Patient has had an office visit with the authorizing provider or a provider within the authorizing providers department within the previous 12 mos or has a future within next 30 days. See \"Patient Info\" tab in inbasket, or \"Choose Columns\" in Meds & Orders section of the refill encounter.              Passed - Medication is active on med list        Passed - Patient is age 18 or older             Tika Laughlin RN 02/08/22 10:28 PM  "

## 2022-02-12 RX ORDER — FENOFIBRATE 145 MG/1
TABLET, COATED ORAL
Qty: 30 TABLET | Refills: 4 | OUTPATIENT
Start: 2022-02-12

## 2022-02-12 NOTE — TELEPHONE ENCOUNTER
Refused refill request as duplicate. Filled on 2/8/2022.  Mirlande Scott RN   02/12/22 3:31 PM  [facility] Nurse Advisor

## 2022-02-23 DIAGNOSIS — F32.1 MODERATE MAJOR DEPRESSION (H): ICD-10-CM

## 2022-02-23 NOTE — TELEPHONE ENCOUNTER
Routing refill request to provider for review/approval because:  Controlled substance request.    Last Written Prescription Date:  2/4/22  Last Fill Quantity: 20,  # refills: 0   Last office visit provider:  12/21/21     Requested Prescriptions   Pending Prescriptions Disp Refills     clonazePAM (KLONOPIN) 1 MG tablet [Pharmacy Med Name: CLONAZEPAM 1 MG TABS 1 Tablet] 20 tablet 0     Sig: TAKE 1 TABLET DAILY AS NEEDED FOR ANXIETY // IB HNUB NOJ 1 LUB YOG CEEB       There is no refill protocol information for this order          Lima Mchugh RN 02/23/22 3:24 PM

## 2022-02-25 RX ORDER — CLONAZEPAM 1 MG/1
TABLET ORAL
Qty: 20 TABLET | Refills: 0 | Status: SHIPPED | OUTPATIENT
Start: 2022-02-25 | End: 2022-04-06

## 2022-03-04 NOTE — TELEPHONE ENCOUNTER
Left message #1 at [phone]. Postponing task out to a week and will try again. If patient returns call back, please help patient schedule an appointment per message below. Thanks!

## 2022-03-11 NOTE — TELEPHONE ENCOUNTER
Left message #1 at [phone] with patients son. He was at work, will call back at a later time with patient to schedule appt. Postponing task out to 1 week and will try again if appt has not been scheduled. If patient calls back, please schedule appt as stated below. Thanks.

## 2022-03-22 NOTE — TELEPHONE ENCOUNTER
Left message #2 at [phone]. Sending letter out and postponing task out to 2 weeks and will try again if an appointment hasn't been made. If patient returns call back, please help patient schedule an appointment per message below. Thanks!

## 2022-04-04 DIAGNOSIS — F32.1 MODERATE MAJOR DEPRESSION (H): ICD-10-CM

## 2022-04-04 DIAGNOSIS — F32.9 MAJOR DEPRESSIVE DISORDER, SINGLE EPISODE, UNSPECIFIED: ICD-10-CM

## 2022-04-04 DIAGNOSIS — Z76.0 ENCOUNTER FOR MEDICATION REFILL: Primary | ICD-10-CM

## 2022-04-04 DIAGNOSIS — J45.40 MODERATE PERSISTENT ASTHMA WITHOUT COMPLICATION: ICD-10-CM

## 2022-04-04 RX ORDER — TIOTROPIUM BROMIDE INHALATION SPRAY 3.12 UG/1
SPRAY, METERED RESPIRATORY (INHALATION)
Qty: 4 G | Refills: 3 | Status: SHIPPED | OUTPATIENT
Start: 2022-04-04 | End: 2022-08-08

## 2022-04-05 NOTE — TELEPHONE ENCOUNTER
"Routing refill request to provider for review/approval because:  Controlled substance request  Klonopin  Last Written Prescription Date:  2/25/22  Last Fill Quantity: 20,  # refills: 0   Last office visit provider:  12/21/21     Requested Prescriptions   Pending Prescriptions Disp Refills     DULoxetine (CYMBALTA) 60 MG capsule [Pharmacy Med Name: DULOXETINE HCL 60 MG CPEP 60 Capsule] 60 capsule 11     Sig: TAKE 2 CAPSULE DAILY // 1 HNUB NOJ 2 LUB       Serotonin-Norepinephrine Reuptake Inhibitors  Failed - 4/4/2022  1:29 PM        Failed - Blood pressure under 140/90 in past 12 months     BP Readings from Last 3 Encounters:   12/21/21 (!) 152/92   09/03/21 (!) 148/92   08/04/21 132/83                 Failed - PHQ-9 score of less than 5 in past 6 months     Please review last PHQ-9 score.           Passed - Medication is active on med list        Passed - Patient is age 18 or older        Passed - Recent (6 mo) or future (30 days) visit within the authorizing provider's specialty     Patient had office visit in the last 6 months or has a visit in the next 30 days with authorizing provider or within the authorizing provider's specialty.  See \"Patient Info\" tab in inbasket, or \"Choose Columns\" in Meds & Orders section of the refill encounter.               clonazePAM (KLONOPIN) 1 MG tablet [Pharmacy Med Name: CLONAZEPAM 1 MG TABS 1 Tablet] 20 tablet 0     Sig: TAKE 1 TABLET DAILY AS NEEDED FOR ANXIETY // IB HNUB NOJ 1 LUB YOG CEEB       There is no refill protocol information for this order          Kiah Hinds RN 04/05/22 2:58 PM  "

## 2022-04-05 NOTE — TELEPHONE ENCOUNTER
"Routing refill request to provider for review/approval because:  BP warning  PHQ9  Early fill requested    Duloxetine:   Last Written Prescription Date:  4/26/21  Last Fill Quantity: 60,  # refills: 11   Last office visit provider:  12/21/21     Requested Prescriptions   Pending Prescriptions Disp Refills     DULoxetine (CYMBALTA) 60 MG capsule [Pharmacy Med Name: DULOXETINE HCL 60 MG CPEP 60 Capsule] 60 capsule 11     Sig: TAKE 2 CAPSULE DAILY // 1 HNUB NOJ 2 LUB       Serotonin-Norepinephrine Reuptake Inhibitors  Failed - 4/4/2022  1:29 PM        Failed - Blood pressure under 140/90 in past 12 months     BP Readings from Last 3 Encounters:   12/21/21 (!) 152/92   09/03/21 (!) 148/92   08/04/21 132/83                 Failed - PHQ-9 score of less than 5 in past 6 months     Please review last PHQ-9 score.           Passed - Medication is active on med list        Passed - Patient is age 18 or older        Passed - Recent (6 mo) or future (30 days) visit within the authorizing provider's specialty     Patient had office visit in the last 6 months or has a visit in the next 30 days with authorizing provider or within the authorizing provider's specialty.  See \"Patient Info\" tab in inbasket, or \"Choose Columns\" in Meds & Orders section of the refill encounter.               clonazePAM (KLONOPIN) 1 MG tablet [Pharmacy Med Name: CLONAZEPAM 1 MG TABS 1 Tablet] 20 tablet 0     Sig: TAKE 1 TABLET DAILY AS NEEDED FOR ANXIETY // IB HNUB NOJ 1 LUB YOG CEEB       There is no refill protocol information for this order          Kiah Hinds RN 04/05/22 2:57 PM  "

## 2022-04-06 RX ORDER — DULOXETIN HYDROCHLORIDE 60 MG/1
CAPSULE, DELAYED RELEASE ORAL
Qty: 180 CAPSULE | Refills: 3 | Status: SHIPPED | OUTPATIENT
Start: 2022-04-06 | End: 2023-04-18

## 2022-04-06 RX ORDER — CLONAZEPAM 1 MG/1
TABLET ORAL
Qty: 20 TABLET | Refills: 0 | Status: SHIPPED | OUTPATIENT
Start: 2022-04-06 | End: 2022-05-20

## 2022-04-08 ENCOUNTER — PATIENT OUTREACH (OUTPATIENT)
Dept: GERIATRIC MEDICINE | Facility: CLINIC | Age: 67
End: 2022-04-08
Payer: COMMERCIAL

## 2022-04-08 NOTE — PROGRESS NOTES
[facility] Care Coordination Contact      [facility] Six-Month Telephone Assessment    6 month telephone assessment completed on 4/8/22.    ER visits: No  Hospitalizations: No  TCU stays: No  Significant health status changes: None  Falls/Injuries: No  ADL/IADL changes: No  Changes in services: No    Caregiver Assessment follow up:  N/A    Goals: See POC in chart for goal progress documentation.      Will see member in 6 months for an annual health risk assessment.   Encouraged member to call CC with any questions or concerns in the meantime.     Lexy Espinoza RN  [facility]  [phone]

## 2022-04-15 ENCOUNTER — OFFICE VISIT (OUTPATIENT)
Dept: FAMILY MEDICINE | Facility: CLINIC | Age: 67
End: 2022-04-15
Payer: COMMERCIAL

## 2022-04-15 VITALS
OXYGEN SATURATION: 97 % | BODY MASS INDEX: 31.91 KG/M2 | SYSTOLIC BLOOD PRESSURE: 138 MMHG | HEART RATE: 90 BPM | DIASTOLIC BLOOD PRESSURE: 84 MMHG | TEMPERATURE: 99.3 F | WEIGHT: 191.75 LBS

## 2022-04-15 DIAGNOSIS — E78.1 PURE HYPERGLYCERIDEMIA: ICD-10-CM

## 2022-04-15 DIAGNOSIS — N18.32 STAGE 3B CHRONIC KIDNEY DISEASE (H): Primary | ICD-10-CM

## 2022-04-15 DIAGNOSIS — F32.1 MODERATE MAJOR DEPRESSION (H): ICD-10-CM

## 2022-04-15 DIAGNOSIS — I10 BENIGN ESSENTIAL HTN: ICD-10-CM

## 2022-04-15 DIAGNOSIS — E55.9 VITAMIN D DEFICIENCY: ICD-10-CM

## 2022-04-15 DIAGNOSIS — J94.1 FIBROTHORAX: ICD-10-CM

## 2022-04-15 DIAGNOSIS — L84 CALLUS OF FOOT: ICD-10-CM

## 2022-04-15 DIAGNOSIS — J42 CHRONIC BRONCHITIS, UNSPECIFIED CHRONIC BRONCHITIS TYPE (H): ICD-10-CM

## 2022-04-15 LAB
ALBUMIN SERPL-MCNC: 3.6 G/DL (ref 3.5–5)
ALP SERPL-CCNC: 67 U/L (ref 45–120)
ALT SERPL W P-5'-P-CCNC: 24 U/L (ref 0–45)
ANION GAP SERPL CALCULATED.3IONS-SCNC: 7 MMOL/L (ref 5–18)
AST SERPL W P-5'-P-CCNC: 17 U/L (ref 0–40)
BILIRUB SERPL-MCNC: 0.7 MG/DL (ref 0–1)
BUN SERPL-MCNC: 24 MG/DL (ref 8–22)
CALCIUM SERPL-MCNC: 8.7 MG/DL (ref 8.5–10.5)
CHLORIDE BLD-SCNC: 108 MMOL/L (ref 98–107)
CHOLEST SERPL-MCNC: 119 MG/DL
CO2 SERPL-SCNC: 23 MMOL/L (ref 22–31)
CREAT SERPL-MCNC: 2.14 MG/DL (ref 0.7–1.3)
CREAT UR-MCNC: 148 MG/DL
ERYTHROCYTE [DISTWIDTH] IN BLOOD BY AUTOMATED COUNT: 12.3 % (ref 10–15)
FASTING STATUS PATIENT QL REPORTED: NO
GFR SERPL CREATININE-BSD FRML MDRD: 33 ML/MIN/1.73M2
GLUCOSE BLD-MCNC: 83 MG/DL (ref 70–125)
HBA1C MFR BLD: 5.6 % (ref 0–5.6)
HCT VFR BLD AUTO: 48.6 % (ref 40–53)
HDLC SERPL-MCNC: 31 MG/DL
HGB BLD-MCNC: 15.7 G/DL (ref 13.3–17.7)
LDLC SERPL CALC-MCNC: 47 MG/DL
MCH RBC QN AUTO: 29 PG (ref 26.5–33)
MCHC RBC AUTO-ENTMCNC: 32.3 G/DL (ref 31.5–36.5)
MCV RBC AUTO: 90 FL (ref 78–100)
MICROALBUMIN UR-MCNC: 355.7 MG/DL (ref 0–1.99)
MICROALBUMIN/CREAT UR: 2403.4 MG/G CR
PLATELET # BLD AUTO: 159 10E3/UL (ref 150–450)
POTASSIUM BLD-SCNC: 4.5 MMOL/L (ref 3.5–5)
PROT SERPL-MCNC: 7.7 G/DL (ref 6–8)
RBC # BLD AUTO: 5.42 10E6/UL (ref 4.4–5.9)
SODIUM SERPL-SCNC: 138 MMOL/L (ref 136–145)
TRIGL SERPL-MCNC: 203 MG/DL
WBC # BLD AUTO: 6.9 10E3/UL (ref 4–11)

## 2022-04-15 PROCEDURE — 83036 HEMOGLOBIN GLYCOSYLATED A1C: CPT | Performed by: FAMILY MEDICINE

## 2022-04-15 PROCEDURE — 85027 COMPLETE CBC AUTOMATED: CPT | Performed by: FAMILY MEDICINE

## 2022-04-15 PROCEDURE — 36415 COLL VENOUS BLD VENIPUNCTURE: CPT | Performed by: FAMILY MEDICINE

## 2022-04-15 PROCEDURE — 80061 LIPID PANEL: CPT | Performed by: FAMILY MEDICINE

## 2022-04-15 PROCEDURE — 80053 COMPREHEN METABOLIC PANEL: CPT | Performed by: FAMILY MEDICINE

## 2022-04-15 PROCEDURE — 99214 OFFICE O/P EST MOD 30 MIN: CPT | Performed by: FAMILY MEDICINE

## 2022-04-15 PROCEDURE — 82043 UR ALBUMIN QUANTITATIVE: CPT | Performed by: FAMILY MEDICINE

## 2022-04-15 RX ORDER — TRIAMCINOLONE ACETONIDE 5 MG/G
OINTMENT TOPICAL
Qty: 30 G | Refills: 11 | Status: SHIPPED | OUTPATIENT
Start: 2022-04-15 | End: 2023-03-22

## 2022-04-15 RX ORDER — ERGOCALCIFEROL 1.25 MG/1
50000 CAPSULE, LIQUID FILLED ORAL WEEKLY
Qty: 12 CAPSULE | Refills: 4 | Status: SHIPPED | OUTPATIENT
Start: 2022-04-15 | End: 2023-04-18

## 2022-04-15 NOTE — PROGRESS NOTES
[facility] IN-OFFICE Visit  In Person : Carina    Chief Complaint:  Chief Complaint   Patient presents with     Follow Up       Assessment/Plan:  Stage 3b chronic kidney disease (H)  Needs to establish care with nephrology in [facility].  Labs as below.  Pt seems to be on 2 ARBs- will contact pharmacy for meds filled in past 3 months and work to clean up meds.    - Albumin Random Urine Quantitative with Creat Ratio  - Comprehensive metabolic panel (BMP + Alb, Alk Phos, ALT, AST, Total. Bili, TP)  - Hemoglobin A1c  - CBC with platelets  - Lipid Profile (Chol, Trig, HDL, LDL calc)  - Adult Nephrology  Referral  - Comprehensive metabolic panel (BMP + Alb, Alk Phos, ALT, AST, Total. Bili, TP)  - Hemoglobin A1c  - CBC with platelets  - Lipid Profile (Chol, Trig, HDL, LDL calc)  - Albumin Random Urine Quantitative with Creat Ratio    Chronic bronchitis, unspecified chronic bronchitis type (H)  Continue duonebs, albuterol, sybicort and spiriva.  Follow-up with pulmonary this spring.    - Adult Pulmonary Medicine Referral    Moderate major depression (H)  Stable- pt notes general lonliness but does live with his 2 sons.  Has tried adult [facility] [employer] but does not enjoy large groups of people.  Continue duloxetine 120mg daily.  Declines cognitive therapy.   PHQ-9 score:      Benign essential HTN  BP Readings from Last 3 Encounters:   04/15/22 138/84   12/21/21 (!) 152/92   09/03/21 (!) 148/92        borderline controlled today.  Plan for bloodpressure management includes ongoing focus on healthy DASH type diet and increased activity, encouraged to avoid tobacco products and limit alcohol use, stress reduction, looks like pt may be on both hyzaar 100/12.5mg daily and avapro 150mg daily?  Will request med list from pharmacy.  Hydrochlorothiazide helpful for hyperkalemia noted in the past.  Consider stopping avapro and adding amlodipine.  Nephrology consult appreciated. Labwork and meds  ordered and reviewed as below  Potassium   Date Value Ref Range Status   04/15/2022 4.5 3.5 - 5.0 mmol/L Final      Creatinine   Date Value Ref Range Status   04/15/2022 2.14 (H) 0.70 - 1.30 mg/dL Final        Fibrothorax  As above with inhalers and pulmonary   - Adult Pulmonary Medicine Referral    Essential Hypertriglyceridemia  Continue tricor 145mg daily  - Lipid Profile (Chol, Trig, HDL, LDL calc)  - Lipid Profile (Chol, Trig, HDL, LDL calc)    Vitamin D deficiency  Refill as requested.    - vitamin D2 (ERGOCALCIFEROL) 02262 units (1250 mcg) capsule  Dispense: 12 capsule; Refill: 4    Callus of foot  - triamcinolone (KENALOG) 0.5 % external ointment  Dispense: 30 g; Refill: 11  - Emollient (AMLACTIN ULTRA) CREA  Dispense: 60 g; Refill: 11      Return in about 3 months (around 7/15/2022) for Office Visit.    Patient Education/AVS:  There are no Patient Instructions on file for this visit.    HPI:   Venita Sanchez is a 66 year old male and presents to clinic today for the following health issues follow-up and lab check.     htn with hyperkalemia- has 2 different bp meds on file and pt notes that he is taking 2 bp meds.  loartan hydrochlorothiazide 100/12.5mg and avapro 150mg daily that both have been filled this winter with 90 day supply.      CKD- taking both avapro and hyzaar likely but pt did not bring hi his meds to review today.      Depression- taking duloxetine daily.  Klonopin 1mg a couple times a week. Most worried about his SOB and how frustrating it is to have this chronic problem that won't get better.  Sleeping well at night.  Does feel lonely.   Lives with 2 sons, wife lives with other kids.  1 of his sons is home with him.  Has tried adult day center but likes his privacy and quiet.      Has not follow-up with mtm as recommended    Has not follow-up with nephrology as recommended - did have appointment scheduled but on the day he was scheduled it was too snowy and wanted an appointment closer to   [place]      Chronic pleural effusion/scarring- using his nebulizer 2-3x/day.  Uses the blue inhaler a couple times a day 2 puffs each.  Helps open up his airway and relieves his SOB.  Red inhaler 2 puffs bid and really helps. Capsule inhaler. Singulair hs.     Checking bp at home based on recall today- 120-140s      History summarized from1-2:Dr Marcano 12/2021 for LUQ pain.  Cr was continuing to increase to referred to nephrology but no appoints noted in chart.    Old Records-1: Outside allergies, meds, problems and immunizations were reconciled as needed from [facility]  Radiology tests reviewed-1: renal us- no ABELARDO, no hydronephrosis.  Bilateral cysts simple in left and minimally complex in right.   Lab tests reviewed-1: 12/2021elevated phos, Cr 2.23, K 4.6,hgb 15.6      Social History     Social History Narrative    Lives with 2 sons, wife lives with other kids, 1 of his sons is home with him most of the time       Physical Exam:  /84   Pulse 90   Temp 99.3  F (37.4  C)   Wt 87 kg (191 lb 12 oz)   SpO2 97%   BMI 31.91 kg/m   Body mass index is 31.91 kg/m . No LMP for male patient.  Vital signs reviewed  Wt Readings from Last 3 Encounters:   04/15/22 87 kg (191 lb 12 oz)   12/21/21 84.8 kg (187 lb)   07/26/21 88.5 kg (195 lb)       PHQ-2 Score:     PHQ-2 ( 1999 [employer]) 11/26/2021   Q1: Little interest or pleasure in doing things 3   Q2: Feeling down, depressed or hopeless 3   PHQ-2 Score 6     PHQ-9 score:    PHQ 11/26/2021   PHQ-9 Total Score 13   Q9: Thoughts of better off dead/self-harm past 2 weeks Not at all       All normal as below except abnormalities include: both heels have thick cracked callous with mild inflammation and tenderness.  Left lung fields with decreased but stable breath sounds.    General is a  66 year old male sitting comfortably in no apparent distress wearing a mask.  HEENT:  TM are clear bilaterally Eye exam normal   Neck: Supple without lymphadenopathy or thyromegaly  CV:  Regular rate and rhythm S1S2 without rubs, murmurs or gallops,   Lungs: Clear to auscultation right  Abd:  +BS, soft NT/ND,  No masses or organomegaly  Extremities: Warm, No Edema, 2+ Pedal and radial pulses bilaterally  Skin: No lesions or rashes noted  Neuro/MSK: Able to ambulate around the exam room with equal movement, strength and normal coordination of the upper and lower extremeties symmetrically    Sylvia Lugo MD  [facility]

## 2022-04-18 ENCOUNTER — TELEPHONE (OUTPATIENT)
Dept: FAMILY MEDICINE | Facility: CLINIC | Age: 67
End: 2022-04-18
Payer: COMMERCIAL

## 2022-04-18 NOTE — TELEPHONE ENCOUNTER
CMA contacted [facility]. Spoke to pharmacy technician and requested document. They will fax to [phone].

## 2022-04-18 NOTE — TELEPHONE ENCOUNTER
Message  Received: 2 days ago  Sylvia Lugo MD [facility] Care Team Pool  Call pharmacy and request to fax his meds filled over past 3 months to review.

## 2022-04-19 NOTE — TELEPHONE ENCOUNTER
Vidal is calling to ask who called her uncle from this clinic yesterday I dont see a encounter that anyone called patient

## 2022-04-20 NOTE — PROGRESS NOTES
[facility] Care Coordination Contact    Faxed signed pca sig page to [employer] and provider.     Cely Mejia  Care Management Specialist  [facility]  [phone]

## 2022-05-02 ENCOUNTER — TELEPHONE (OUTPATIENT)
Dept: FAMILY MEDICINE | Facility: CLINIC | Age: 67
End: 2022-05-02
Payer: COMMERCIAL

## 2022-05-02 NOTE — RESULT ENCOUNTER NOTE
Team - please call patient with results and send result letter with this information if patient desires for their records.     Your kidneys are still poor but about the same- not worse- I hope you were able to schedule with a kidney doctor in the [facility] soon.     Cholesterol is doing about the same     Normal blood counts- no anemia.

## 2022-05-02 NOTE — TELEPHONE ENCOUNTER
----- Message from Sylvia Lugo MD sent at 5/2/2022  2:54 PM CDT -----  Team - please call patient with results and send result letter with this information if patient desires for their records.     Your kidneys are still poor but about the same- not worse- I hope you were able to schedule with a kidney doctor in the [facility] soon.     Cholesterol is doing about the same     Normal blood counts- no anemia.

## 2022-05-02 NOTE — LETTER
May 9, 2022      Venita Sanchez  [address] [place] [address]        Dear ,    We are writing to inform you of your test results.    Your kidneys are still poor but about the same- not worse- I hope you were able to schedule with a kidney doctor in the [facility] area soon.      Cholesterol is doing about the same      Normal blood counts- no anemia.     Resulted Orders   Comprehensive metabolic panel (BMP + Alb, Alk Phos, ALT, AST, Total. Bili, TP)   Result Value Ref Range    Sodium 138 136 - 145 mmol/L    Potassium 4.5 3.5 - 5.0 mmol/L    Chloride 108 (H) 98 - 107 mmol/L    Carbon Dioxide (CO2) 23 22 - 31 mmol/L    Anion Gap 7 5 - 18 mmol/L    Urea Nitrogen 24 (H) 8 - 22 mg/dL    Creatinine 2.14 (H) 0.70 - 1.30 mg/dL    Calcium 8.7 8.5 - 10.5 mg/dL    Glucose 83 70 - 125 mg/dL    Alkaline Phosphatase 67 45 - 120 U/L    AST 17 0 - 40 U/L    ALT 24 0 - 45 U/L    Protein Total 7.7 6.0 - 8.0 g/dL    Albumin 3.6 3.5 - 5.0 g/dL    Bilirubin Total 0.7 0.0 - 1.0 mg/dL    GFR Estimate 33 (L) >60 mL/min/1.73m2      Comment:      Effective December 21, 2021 eGFRcr in adults is calculated using the 2021 CKD-EPI creatinine equation which includes age and gender (Chey et al., NE, DOI: 10.1056/BEZFob6156587)   Hemoglobin A1c   Result Value Ref Range    Hemoglobin A1C 5.6 0.0 - 5.6 %      Comment:      Normal <5.7%   Prediabetes 5.7-6.4%    Diabetes 6.5% or higher     Note: Adopted from ADA consensus guidelines.   CBC with platelets   Result Value Ref Range    WBC Count 6.9 4.0 - 11.0 10e3/uL    RBC Count 5.42 4.40 - 5.90 10e6/uL    Hemoglobin 15.7 13.3 - 17.7 g/dL    Hematocrit 48.6 40.0 - 53.0 %    MCV 90 78 - 100 fL    MCH 29.0 26.5 - 33.0 pg    MCHC 32.3 31.5 - 36.5 g/dL    RDW 12.3 10.0 - 15.0 %    Platelet Count 159 150 - 450 10e3/uL   Lipid Profile (Chol, Trig, HDL, LDL calc)   Result Value Ref Range    Cholesterol 119 <=199 mg/dL    Triglycerides 203 (H) <=149 mg/dL    Direct Measure HDL 31 (L) >=40  mg/dL      Comment:      HDL Cholesterol Reference Range:     0-2 years:   No reference ranges established for patients under 2 years old  at [email or web address] for lipid analytes.    2-8 years:  Greater than 45 mg/dL     18 years and older:   Female: Greater than or equal to 50 mg/dL   Male:   Greater than or equal to 40 mg/dL    LDL Cholesterol Calculated 47 <=129 mg/dL    Patient Fasting > 8hrs? No    Albumin Random Urine Quantitative with Creat Ratio   Result Value Ref Range    Microalbumin Urine mg/dL 355.70 (H) 0.00 - 1.99 mg/dL    Creatinine Urine mg/dL 148 mg/dL    Microalbumin Urine mg/g Cr 2,403.4 (H) <=19.9 mg/g Cr    Narrative    Microalbumin, Random Urine   <2.0 mg/dL . . . . . . . . Normal   3.0-30.0 mg/dL . . . . . . Microalbuminuria   >30.0 mg/dL . . . . . .  . Clinical Proteinuria     Microalbumin/Creatinine Ratio, Random Urine   <20 mg/g . . . . .. . . . Normal    mg/g . . . . . . . Microalbuminuria   >300 mg/g . . . . . . . . Clinical Proteinuria       If you have any questions or concerns, please call the clinic at the number listed above.       Sincerely,          Dr. Lugo

## 2022-05-03 ENCOUNTER — MEDICAL CORRESPONDENCE (OUTPATIENT)
Dept: HEALTH INFORMATION MANAGEMENT | Facility: CLINIC | Age: 67
End: 2022-05-03

## 2022-05-03 ENCOUNTER — TRANSFERRED RECORDS (OUTPATIENT)
Dept: HEALTH INFORMATION MANAGEMENT | Facility: CLINIC | Age: 67
End: 2022-05-03
Payer: COMMERCIAL

## 2022-05-04 ENCOUNTER — APPOINTMENT (OUTPATIENT)
Dept: INTERPRETER SERVICES | Facility: CLINIC | Age: 67
End: 2022-05-04
Payer: COMMERCIAL

## 2022-05-19 DIAGNOSIS — F32.1 MODERATE MAJOR DEPRESSION (H): ICD-10-CM

## 2022-05-19 DIAGNOSIS — Z76.0 ENCOUNTER FOR MEDICATION REFILL: ICD-10-CM

## 2022-05-20 RX ORDER — CLONAZEPAM 1 MG/1
TABLET ORAL
Qty: 20 TABLET | Refills: 0 | Status: SHIPPED | OUTPATIENT
Start: 2022-05-20 | End: 2023-06-07

## 2022-05-20 NOTE — TELEPHONE ENCOUNTER
Routing refill request to provider for review/approval because:  Drug not on the [facility] refill protocol     Last Written Prescription Date:  4/6/22  Last Fill Quantity: 20,  # refills: 0   Last office visit provider:  4/15/22     Requested Prescriptions   Pending Prescriptions Disp Refills     clonazePAM (KLONOPIN) 1 MG tablet [Pharmacy Med Name: CLONAZEPAM 1 MG TABS 1 Tablet] 20 tablet 0     Sig: TAKE 1 TABLET DAILY AS NEEDED FOR ANXIETY // IB HNUB NOJ 1 LUB YOG CEEB       There is no refill protocol information for this order          Shelbi Gavin, RN 05/19/22 8:19 PM

## 2022-06-23 ENCOUNTER — HOSPITAL ENCOUNTER (OUTPATIENT)
Dept: MRI IMAGING | Facility: HOSPITAL | Age: 67
Discharge: HOME | End: 2022-06-23
Attending: PHYSICIAN ASSISTANT | Admitting: PHYSICIAN ASSISTANT
Payer: COMMERCIAL

## 2022-06-23 DIAGNOSIS — N28.1 RENAL CYST: ICD-10-CM

## 2022-06-23 DIAGNOSIS — N28.89 RENAL MASS OF UNKNOWN NATURE: ICD-10-CM

## 2022-06-23 DIAGNOSIS — R80.9 PROTEINURIA: ICD-10-CM

## 2022-06-23 DIAGNOSIS — I10 HTN (HYPERTENSION): ICD-10-CM

## 2022-06-23 DIAGNOSIS — N18.30 CKD (CHRONIC KIDNEY DISEASE), STAGE III (H): ICD-10-CM

## 2022-06-23 PROCEDURE — 255N000002 HC RX 255 OP 636: Performed by: PHYSICIAN ASSISTANT

## 2022-06-23 PROCEDURE — 74183 MRI ABD W/O CNTR FLWD CNTR: CPT

## 2022-06-23 PROCEDURE — A9585 GADOBUTROL INJECTION: HCPCS | Performed by: PHYSICIAN ASSISTANT

## 2022-06-23 RX ORDER — GADOBUTROL 604.72 MG/ML
8 INJECTION INTRAVENOUS ONCE
Status: COMPLETED | OUTPATIENT
Start: 2022-06-23 | End: 2022-06-23

## 2022-06-23 RX ADMIN — GADOBUTROL 8 ML: 604.72 INJECTION INTRAVENOUS at 08:09

## 2022-06-27 ENCOUNTER — OFFICE VISIT (OUTPATIENT)
Dept: PULMONOLOGY | Facility: OTHER | Age: 67
End: 2022-06-27
Attending: FAMILY MEDICINE
Payer: COMMERCIAL

## 2022-06-27 VITALS
SYSTOLIC BLOOD PRESSURE: 128 MMHG | DIASTOLIC BLOOD PRESSURE: 86 MMHG | WEIGHT: 179 LBS | OXYGEN SATURATION: 96 % | BODY MASS INDEX: 29.79 KG/M2 | HEART RATE: 86 BPM

## 2022-06-27 DIAGNOSIS — J94.1 FIBROTHORAX: ICD-10-CM

## 2022-06-27 DIAGNOSIS — J42 CHRONIC BRONCHITIS, UNSPECIFIED CHRONIC BRONCHITIS TYPE (H): ICD-10-CM

## 2022-06-27 DIAGNOSIS — J47.9 BRONCHIECTASIS WITHOUT COMPLICATION (H): Primary | ICD-10-CM

## 2022-06-27 PROCEDURE — 99214 OFFICE O/P EST MOD 30 MIN: CPT | Performed by: INTERNAL MEDICINE

## 2022-06-27 RX ORDER — AZITHROMYCIN 250 MG/1
250 TABLET, FILM COATED ORAL
Qty: 36 TABLET | Refills: 3 | Status: SHIPPED | OUTPATIENT
Start: 2022-06-27 | End: 2024-04-18

## 2022-06-27 NOTE — PROGRESS NOTES
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     Chronic dyspnea, chronic left calcified pleural effusion/fibrothorax with adjacent bronchiectasis, Pseudomonas airway colonization, chronic rhinosinusitis: Exertional dyspnea is unchanged, remains significant. Ongoing constant cough with quite a bit of thick white-yellow sputum production. No hemoptysis. Breathing feels better when he can bring up phlegm. Continues to use ICS-LABA and LAMA. Occasionally feels like he has a fever in the evening. Appetite lower when he is coughing a lot. Some foods, eg salty foods, lead to worse cough. Not interested in pulmonary rehab right now. Discussed options including thrice-weekly macrolide, including potential side effects and risks, and he is interested in trying this. Recall that, at baseline, he has chronic exertional dyspnea and cough. Restrictive physiology on PFTs but may have an asthma component and has clinically responded to asthma therapy. Has a loculated left pleural effusion with partially calcified thickened rind, stable radiographically since 2014 and known to have been present since at least 2004 when the patient immigrated; notes he was \"very ill with a virus\" at age 10 and hospitalized, told he had a lung problem. These chronic, stable loculated effusions with calcified rind/fibrothorax are fairly common in the [place]  population, likely a sequela of past parasitic, bacterial or mycobacterial infection. Venita had worsening dyspnea and left chest pain in October 2020 and was hospitalized again at [facility] for 5 nights in December 2020, where they actually placed a left pleural pigtail catheter into the collection, which predictably showed a chronic chylothorax, as this " effusion has been present for decades, at least since he immigrated in 2004, and is surrounded by a calcified rind. Of course, the lung failed to fully expand with this pigtail catheter, which was subsequently removed. He had left lateral chest pain since this hospitalization, which slowly improved.    Plan:  - start azithromycin 250 mg every Monday, Wednesday and Friday  - continue budesonide-formoterol 160-4.5 mcg two inhalations two times a day; rinse/gargle/spit water after use; previously provided holding chamber with instruction on use  - continue tiotropium respimat 2.5 mcg two inhalations daily  - albuterol HFA as needed  - continue montelukast 10 mg at bedtime  - continue loratadine 10 mg daily  - continue nasal fluticasone one spray in each nostril daily  - surgery would be morbid and unlikely to be successful at achieving left lung reexpansion  - again offered pulmonary rehabilitation which he declines but may consider it in the winter  - annual high-dose influenza vaccination  - received Pneumovax-23 in January 2019; should receive Prevnar-13 due now that he is 65, and booster of Pneumovax-23 in 2024; will discuss further at follow-up  - COVID-19 vaccination: Pfizer-EyeNetra  - follow up in 3 months  - encouraged him to call any time with questions or concerning symptoms    Martir Quinn MD  Pulmonary and Critical Care Medicine  [facility]  Office [phone]  Pager [phone]  (he/him/his)    CCx: chronic dyspnea, chronic left calcified pleural effusion/fibrothorax with adjacent bronchiectasis, Pseudomonas airway colonization, chronic rhinosinusitis    HPI: 66 year old male never smoker with a history of chronic loculated left pleural effusion with calcified left fibrothorax with adjacent bronchiectasis, GERD, CKD, HTN, Pseudomonas airway colonization, presenting for follow-up. Exertional dyspnea is unchanged, remains significant. Ongoing constant cough with quite a bit of thick  "white-yellow sputum production. No hemoptysis. Breathing feels better when he can bring up phlegm. Continues to use ICS-LABA and LAMA. Occasionally feels like he has a fever in the evening. Appetite lower when he is coughing a lot. Some foods, eg salty foods, lead to worse cough. Not interested in pulmonary rehab right now. Discussed options including thrice-weekly macrolide, including potential side effects and risks, and he is interested in trying this. Recall that, at baseline, he has chronic exertional dyspnea and cough. Restrictive physiology on PFTs but may have an asthma component and has clinically responded to asthma therapy. Has a loculated left pleural effusion with partially calcified thickened rind, stable radiographically since 2014 and known to have been present since at least 2004 when the patient immigrated; notes he was \"very ill with a virus\" at age 10 and hospitalized, told he had a lung problem. These chronic, stable loculated effusions with calcified rind/fibrothorax are fairly common in the [place]  population, likely a sequela of past parasitic, bacterial or mycobacterial infection. Venita had worsening dyspnea and left chest pain in October 2020 and was hospitalized again at [place] for 5 nights in December 2020, where they actually placed a left pleural pigtail catheter into the collection, which predictably showed a chronic chylothorax, as this effusion has been present for decades, at least since he immigrated in 2004, and is surrounded by a calcified rind. Of course, the lung failed to fully expand with this pigtail catheter, which was subsequently removed. He had left lateral chest pain since this hospitalization, which slowly improved.    ROS:  A 12-system review was obtained and was negative with the exception of the symptoms endorsed in the history of present illness.    PMH:  Chronic loculated left pleural effusion with calcified rind (fibrothorax) with chronic exudative changes of " chylothorax on laboratory analysis and adjacent bronchiectasis  GERD  Possible chronic rhinosinusitis  Possible asthma  CKD  HTN    PSH:  Past Surgical History:   Procedure Laterality Date     CATARACT EXTRACTION BILATERAL W/ ANTERIOR VITRECTOMY Right        Allergies:  Allergies   Allergen Reactions     Niacin Other (See Comments)       Family HX:  No family history on file.    Social Hx:  Social History     Socioeconomic History     Marital status:      Spouse name: Not on file     Number of children: Not on file     Years of education: Not on file     Highest education level: Not on file   Occupational History     Not on file   Tobacco Use     Smoking status: Never Smoker     Smokeless tobacco: Never Used   Substance and Sexual Activity     Alcohol use: No     Drug use: No     Sexual activity: Yes     Partners: Female     Birth control/protection: Post-menopausal   Other Topics Concern     Not on file   Social History Narrative    Lives with 2 sons, wife lives with other kids, 1 of his sons is home with him most of the time     Social Determinants of Health     Financial Resource Strain: Not on file   Food Insecurity: Not on file   Transportation Needs: Not on file   Physical Activity: Not on file   Stress: Not on file   Social Connections: Not on file   Intimate Partner Violence: Not on file   Housing Stability: Not on file       Current Meds:  Current Outpatient Medications   Medication Sig Dispense Refill     albuterol (PROAIR HFA/PROVENTIL HFA/VENTOLIN HFA) 108 (90 Base) MCG/ACT inhaler Inhale 2 puffs into the lungs every 4 hours as needed for shortness of breath / dyspnea or wheezing 18 g 11     ASPIRIN LOW DOSE 81 MG EC tablet TAKE 1 TABLET BY MOUTH DAILY FOR STROKE PREVENTION // IB HNUB NOJ 1 LUB PAB KOM NTSHAV KHIAV ZOO 90 tablet 3     azithromycin (ZITHROMAX) 250 MG tablet Take 1 tablet (250 mg) by mouth Every Mon, Wed, Fri Morning 36 tablet 3     blood pressure monitor Kit [BLOOD PRESSURE MONITOR  KIT] Use to check blood pressure daily 1 each 0     clonazePAM (KLONOPIN) 1 MG tablet TAKE 1 TABLET DAILY AS NEEDED FOR ANXIETY // IB HNUB NOJ 1 LUB YOG CEEB 20 tablet 0     DULoxetine (CYMBALTA) 60 MG capsule TAKE 2 CAPSULE DAILY // 1 HNUB NOJ 2  capsule 3     Emollient (AMLACTIN ULTRA) CREA Externally apply topically daily Apply to feet daily 60 g 11     fenofibrate (TRICOR) 145 MG tablet TAKE 1 TABLET BY MOUTH DAILY FOR CHOLESTEROL // IB HNUB NOJ 1 LUB PAB ROXANNE NTSHAV MUAJ ROJ 90 tablet 4     fluticasone (FLONASE) 50 MCG/ACT nasal spray Spray 2 sprays into both nostrils daily 16 g 11     gabapentin (NEURONTIN) 300 MG capsule TAKE 1 CAPSULE 3 TIMES DAILY // IB ZAUG NOJ 1 LUB, 1 HNUB NOJ 3 ZAUG 270 capsule 3     ipratropium - albuterol 0.5 mg/2.5 mg/3 mL (DUONEB) 0.5-2.5 (3) MG/3ML neb solution Take 1 vial (3 mLs) by nebulization 4 times daily as needed for shortness of breath / dyspnea or wheezing 480 mL 3     irbesartan (AVAPRO) 150 MG tablet        loratadine (CLARITIN) 10 MG tablet Take 1 tablet (10 mg) by mouth daily 30 tablet 11     losartan-hydrochlorothiazide (HYZAAR) 100-12.5 MG tablet TAKE 1 TABLET BY MOUTH DAILY // IB HNUB NOJ 1 LUB 30 tablet 1     montelukast (SINGULAIR) 10 MG tablet Take 1 tablet (10 mg) by mouth At Bedtime 30 tablet 11     Multiple Vitamin (TAB-A-GERARDO) TABS TAKE 1 TABLET BY MOUTH DAILY // IB HNUB NOJ 1 LUB 90 tablet 3     omeprazole (PRILOSEC) 20 MG DR capsule [OMEPRAZOLE (PRILOSEC) 20 MG CAPSULE] TAKE 1 CAPSULE 30 MINUTES BEFORE FIRST MEAL DAILY FOR STOMACH // 1 HNUB NOJ 1 LUB UA NTEJ NOJ TSHAIS PAB ROXANNE MOB PLAB/MOB NCAUJ PLAB 30 capsule 0     psyllium (METAMUCIL/KONSYL) capsule Take 1 capsule by mouth daily 30 capsule 0     SYMBICORT 160-4.5 MCG/ACT Inhaler INHALE 2 PUFFS TWICE DAILY. RINSE MOUTH AND SPIT OUT AFTERWARDS // 1 ZAUG NQUS 2 PAS, 1 HNU SIV 2 ZAUG. YAUG NCAUJ THAUM SIV TAS. 10.2 g 11     tiotropium (SPIRIVA RESPIMAT) 2.5 MCG/ACT inhaler [SPIRIVA RESPIMAT 2.5  MCG/ACTUATION MIST] INHALE 2 PUFFS BY MOUTH DAILY./ IB HNUB NQUS 2 PAS TXHUA HNUB 4 g 3     triamcinolone (KENALOG) 0.5 % external ointment Apply to heels bid until skin is smooth 30 g 11     vitamin D2 (ERGOCALCIFEROL) 28558 units (1250 mcg) capsule Take 1 capsule (50,000 Units) by mouth once a week 12 capsule 4       Physical Exam:  /86 (BP Location: Right arm, Patient Position: Chair, Cuff Size: Adult Regular)   Pulse 86   Wt 81.2 kg (179 lb)   SpO2 96%   BMI 29.79 kg/m    Gen: alert, oriented, no distress  HEENT: no cervical or supraclavicular lymphadenopathy  CV: RRR, no M/G/R  Resp: diminished left base, with rhonchi left mid to lower lung fields  Skin: no apparent rashes  Ext: no cyanosis, clubbing or edema  Neuro: alert, nonfocal    Labs:  Left pleural fluid analysis March 2020 and December 2020 at [place] showed chronic exudate with elevated triglyceride level, negative for malignancy and infection     Imaging studies:  Chest CT with contrast (February 2020, [place]):  FINDINGS:   A 12.8 x 8.1 x 14.9 cm (AP x lateral x SI; approximately 850ml) loculated,  complex fluid collection within the left pleural space demonstrates thin  peripheral septations, areas of internal fat (series 3, image 246) and higher  density heterogeneous internal components (series 3, image 313). No evidence of  internal gas or inflammatory changes surrounding this mass to suggest  acute/aggressive infectious process, with preservation of the extrapleural fat  plane.  There is some  leftward mediastinal shift suggesting chronic fibrosis  and atelectasis in the left upper and lower lobes. No evidence of central  obstructing lesion.     Mildly prominent mediastinal and hilar lymph nodes, some of which are calcified.  These may be benign/reactive but are indeterminate by CT criteria. The central  left upper lobe bronchus appears obstructed (series 3 image 179) but no definite  endobronchial lesion is identified.    There are areas of  probably chronic atelectasis in the right middle and lower  lobes with some peripheral bronchial mucous plugging.     Probable right adrenal adenoma. Probable 1.1 cm cyst superior left kidney  (series 5, image 84). No worrisome osseous lesions.    COMPARISON REPORT:  Images from the 06/18/2018 and 04/10/2014 examinations are now available. The  complex left pleural abnormality which currently measures 12.8 x 8.1 x 14.9 cm  has slightly increased in volume since the prior exams, measuring approximately  12.5 x 7.7 x 14 cm on 06/18/2019 and 11.8 x 7.0 x 13 cm on 04/10/2014. This is  approximately 850 mL currently compared with 750 on 06/18/2018 and 550 on  04/10/2014. Peripheral calcification has slightly increased but the  heterogeneous internal density is similar, with no new definite soft tissue  components. Compressive atelectasis in the left lung and shift the mediastinum  is also slightly greater.    The minimal lack of change over many years is highly suggestive of a benign  abnormality but the CT appearance is indeterminate. Given several other  left-sided pleural calcifications, a chronic/old empyema particularly by  atypical agents such as tuberculosis is most likely. A seroma or lymphangioma  might also be considered. Given some minimal fatty elements, a atypical  hamartoma could be potentially considered although the extensive fluid component  is unusual.    The probable right adrenal adenoma is unchanged since 06/18/2018, but slightly  more apparent than on 04/10/2014.     1. Loculated, complex fluid collection with scattered peripheral calcification  in the left pleural space measures 12.8 x 8.1 x 14.9 cm. Given lack of  inflammatory or aggressive features and reported presence dating back at least  to outside CTA chest 06/08/2018, this mass is likely benign. This may be due to  previous trauma or infection and would be of usual for malignancy; however,  cannot entirely exclude the possibility of chronic  low-grade infection such as  tuberculous empyema. No findings to suggest an acute infectious or aggressive  neoplastic process.  2.  Probable small right adrenal adenoma.     CT chest (December 2020, [place]):  - images directly reviewed, formal interpretation follows:  Since prior imaging, a new left chest pigtail catheter is in place with  substantial decrease in size of the loculated left pleural effusion seen  previously.  There is some ex vacuo pneumothorax with an extensive thick-walled rind along  the periphery of this cavity with areas of calcification.  Partial reexpansion of the left lower lobe.  Scattered bronchial wall thickening, lower lobe hypoinflation, and air trapping  is at least partly related to the phase of respiration (but there may be  reactive airways disease and/or an element of bronchitis).  Aortic valvular minimal coronary artery calcification.    CXR (December 2021):  - images directly reviewed, formal interpretation follows:  IMPRESSION: Chronic loculated left pleural effusion similar to 6/10/2021 with associated compressive atelectasis in the left lower lung. Right basilar subsegmental atelectasis versus scar unchanged. No pneumothorax nor right-sided pleural effusion. No   acute abnormality.     TTE (August 2018):  - Normal left ventricular size and systolic performance with a visually estimated ejection fraction of 60%.  - There is mild aortic insufficiency.  - Normal right ventricular size and systolic performance.      Pulmonary Function Testing  August 2018  FEV1/FVC is 74% and is normal.  FEV1 is 1.41L (55%) predicted and is reduced.  FVC is 1.91L (60%) predicted and reduced.  There was no improvement in spirometry after a single inhaled dose of bronchodilator.  TLC is 3.57L (58%) predicted and is reduced.  RV is 1.40L (60%) predicted and is reduced.  DLCO is 16.54ml/min/hg (64%) predicted and is reduced when it is corrected for hemoglobin.  Flow volume loops indicate no  abnormalities.     October 2019  FEV1/FVC is 80 and is normal.  FEV1 is 56% predicted and is reduced.  FVC is 56% predicted and reduced.  There was no no improvement in spirometry after a single inhaled does of bronchodilator.  TLC is 56% predicted and is reduced.  RV is 63% predicted and is reduced.  DLCO is 58% predicted and is reduced when it   is corrected for hemoglobin.

## 2022-06-27 NOTE — PATIENT INSTRUCTIONS
It was good to see you in clinic today. This is what we discussed:    Continue your current medications.  Start azithromycin one pill every Monday, Wednesday, and Friday morning.  Stay active with exercise.  I will see you in 3 months.  Contact me with questions or concerns.    Martir Quinn MD  Pulmonary and Critical Care Medicine  [facility]  Office [phone]

## 2022-06-27 NOTE — LETTER
"    6/27/2022         RE: Venita Sanchez  [address] [place] [address]        Dear Colleague,    Thank you for referring your patient, Venita Sanchez, to the [facility]. Please see a copy of my visit note below.    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     Chronic dyspnea, chronic left calcified pleural effusion/fibrothorax with adjacent bronchiectasis, Pseudomonas airway colonization, chronic rhinosinusitis: Exertional dyspnea is unchanged, remains significant. Ongoing constant cough with quite a bit of thick white-yellow sputum production. No hemoptysis. Breathing feels better when he can bring up phlegm. Continues to use ICS-LABA and LAMA. Occasionally feels like he has a fever in the evening. Appetite lower when he is coughing a lot. Some foods, eg salty foods, lead to worse cough. Not interested in pulmonary rehab right now. Discussed options including thrice-weekly macrolide, including potential side effects and risks, and he is interested in trying this. Recall that, at baseline, he has chronic exertional dyspnea and cough. Restrictive physiology on PFTs but may have an asthma component and has clinically responded to asthma therapy. Has a loculated left pleural effusion with partially calcified thickened rind, stable radiographically since 2014 and known to have been present since at least 2004 when the patient immigrated; notes he was \"very ill with a virus\" at age 10 and hospitalized, told he had a lung problem. These chronic, stable loculated effusions with calcified rind/fibrothorax are fairly common in the [place]  population, likely a sequela of past parasitic, bacterial or mycobacterial infection. Venita had worsening " dyspnea and left chest pain in October 2020 and was hospitalized again at [facility] for 5 nights in December 2020, where they actually placed a left pleural pigtail catheter into the collection, which predictably showed a chronic chylothorax, as this effusion has been present for decades, at least since he immigrated in 2004, and is surrounded by a calcified rind. Of course, the lung failed to fully expand with this pigtail catheter, which was subsequently removed. He had left lateral chest pain since this hospitalization, which slowly improved.    Plan:  - start azithromycin 250 mg every Monday, Wednesday and Friday  - continue budesonide-formoterol 160-4.5 mcg two inhalations two times a day; rinse/gargle/spit water after use; previously provided holding chamber with instruction on use  - continue tiotropium respimat 2.5 mcg two inhalations daily  - albuterol HFA as needed  - continue montelukast 10 mg at bedtime  - continue loratadine 10 mg daily  - continue nasal fluticasone one spray in each nostril daily  - surgery would be morbid and unlikely to be successful at achieving left lung reexpansion  - again offered pulmonary rehabilitation which he declines but may consider it in the winter  - annual high-dose influenza vaccination  - received Pneumovax-23 in January 2019; should receive Prevnar-13 due now that he is 65, and booster of Pneumovax-23 in 2024; will discuss further at follow-up  - COVID-19 vaccination: Pfizer-BioNTech  - follow up in 3 months  - encouraged him to call any time with questions or concerning symptoms    Martir Quinn MD  Pulmonary and Critical Care Medicine  [facility]  Office [phone]  Pager [phone]  (he/him/his)    CCx: chronic dyspnea, chronic left calcified pleural effusion/fibrothorax with adjacent bronchiectasis, Pseudomonas airway colonization, chronic rhinosinusitis    HPI: 66 year old male never smoker with a history of chronic loculated left pleural  "effusion with calcified left fibrothorax with adjacent bronchiectasis, GERD, CKD, HTN, Pseudomonas airway colonization, presenting for follow-up. Exertional dyspnea is unchanged, remains significant. Ongoing constant cough with quite a bit of thick white-yellow sputum production. No hemoptysis. Breathing feels better when he can bring up phlegm. Continues to use ICS-LABA and LAMA. Occasionally feels like he has a fever in the evening. Appetite lower when he is coughing a lot. Some foods, eg salty foods, lead to worse cough. Not interested in pulmonary rehab right now. Discussed options including thrice-weekly macrolide, including potential side effects and risks, and he is interested in trying this. Recall that, at baseline, he has chronic exertional dyspnea and cough. Restrictive physiology on PFTs but may have an asthma component and has clinically responded to asthma therapy. Has a loculated left pleural effusion with partially calcified thickened rind, stable radiographically since 2014 and known to have been present since at least 2004 when the patient immigrated; notes he was \"very ill with a virus\" at age 10 and hospitalized, told he had a lung problem. These chronic, stable loculated effusions with calcified rind/fibrothorax are fairly common in the [place]  population, likely a sequela of past parasitic, bacterial or mycobacterial infection. Venita had worsening dyspnea and left chest pain in October 2020 and was hospitalized again at [facility] for 5 nights in December 2020, where they actually placed a left pleural pigtail catheter into the collection, which predictably showed a chronic chylothorax, as this effusion has been present for decades, at least since he immigrated in 2004, and is surrounded by a calcified rind. Of course, the lung failed to fully expand with this pigtail catheter, which was subsequently removed. He had left lateral chest pain since this hospitalization, which slowly " improved.    ROS:  A 12-system review was obtained and was negative with the exception of the symptoms endorsed in the history of present illness.    PMH:  Chronic loculated left pleural effusion with calcified rind (fibrothorax) with chronic exudative changes of chylothorax on laboratory analysis and adjacent bronchiectasis  GERD  Possible chronic rhinosinusitis  Possible asthma  CKD  HTN    PSH:  Past Surgical History:   Procedure Laterality Date     CATARACT EXTRACTION BILATERAL W/ ANTERIOR VITRECTOMY Right        Allergies:  Allergies   Allergen Reactions     Niacin Other (See Comments)       Family HX:  No family history on file.    Social Hx:  Social History     Socioeconomic History     Marital status:      Spouse name: Not on file     Number of children: Not on file     Years of education: Not on file     Highest education level: Not on file   Occupational History     Not on file   Tobacco Use     Smoking status: Never Smoker     Smokeless tobacco: Never Used   Substance and Sexual Activity     Alcohol use: No     Drug use: No     Sexual activity: Yes     Partners: Female     Birth control/protection: Post-menopausal   Other Topics Concern     Not on file   Social History Narrative    Lives with 2 sons, wife lives with other kids, 1 of his sons is home with him most of the time     Social Determinants of Health     Financial Resource Strain: Not on file   Food Insecurity: Not on file   Transportation Needs: Not on file   Physical Activity: Not on file   Stress: Not on file   Social Connections: Not on file   Intimate Partner Violence: Not on file   Housing Stability: Not on file       Current Meds:  Current Outpatient Medications   Medication Sig Dispense Refill     albuterol (PROAIR HFA/PROVENTIL HFA/VENTOLIN HFA) 108 (90 Base) MCG/ACT inhaler Inhale 2 puffs into the lungs every 4 hours as needed for shortness of breath / dyspnea or wheezing 18 g 11     ASPIRIN LOW DOSE 81 MG EC tablet TAKE 1 TABLET  BY MOUTH DAILY FOR STROKE PREVENTION // IB HNUB NOJ 1 LUB PAB KOM NTSHAV KHIAV ZOO 90 tablet 3     azithromycin (ZITHROMAX) 250 MG tablet Take 1 tablet (250 mg) by mouth Every Mon, Wed, Fri Morning 36 tablet 3     blood pressure monitor Kit [BLOOD PRESSURE MONITOR KIT] Use to check blood pressure daily 1 each 0     clonazePAM (KLONOPIN) 1 MG tablet TAKE 1 TABLET DAILY AS NEEDED FOR ANXIETY // IB HNUB NOJ 1 LUB YOG CEEB 20 tablet 0     DULoxetine (CYMBALTA) 60 MG capsule TAKE 2 CAPSULE DAILY // 1 HNUB NOJ 2  capsule 3     Emollient (AMLACTIN ULTRA) CREA Externally apply topically daily Apply to feet daily 60 g 11     fenofibrate (TRICOR) 145 MG tablet TAKE 1 TABLET BY MOUTH DAILY FOR CHOLESTEROL // IB HNUB NOJ 1 LUB PAB ROXANNE NTSHAV MUAJ ROJ 90 tablet 4     fluticasone (FLONASE) 50 MCG/ACT nasal spray Spray 2 sprays into both nostrils daily 16 g 11     gabapentin (NEURONTIN) 300 MG capsule TAKE 1 CAPSULE 3 TIMES DAILY // IB ZAUG NOJ 1 LUB, 1 HNUB NOJ 3 ZAUG 270 capsule 3     ipratropium - albuterol 0.5 mg/2.5 mg/3 mL (DUONEB) 0.5-2.5 (3) MG/3ML neb solution Take 1 vial (3 mLs) by nebulization 4 times daily as needed for shortness of breath / dyspnea or wheezing 480 mL 3     irbesartan (AVAPRO) 150 MG tablet        loratadine (CLARITIN) 10 MG tablet Take 1 tablet (10 mg) by mouth daily 30 tablet 11     losartan-hydrochlorothiazide (HYZAAR) 100-12.5 MG tablet TAKE 1 TABLET BY MOUTH DAILY // IB HNUB NOJ 1 LUB 30 tablet 1     montelukast (SINGULAIR) 10 MG tablet Take 1 tablet (10 mg) by mouth At Bedtime 30 tablet 11     Multiple Vitamin (TAB-A-GERARDO) TABS TAKE 1 TABLET BY MOUTH DAILY // IB HNUB NOJ 1 LUB 90 tablet 3     omeprazole (PRILOSEC) 20 MG DR capsule [OMEPRAZOLE (PRILOSEC) 20 MG CAPSULE] TAKE 1 CAPSULE 30 MINUTES BEFORE FIRST MEAL DAILY FOR STOMACH // 1 HNUB NOJ 1 LUB UA NTEJ NOJ TSHAIS PAB ROXANNE MOB PLAB/MOB NCAUJ PLAB 30 capsule 0     psyllium (METAMUCIL/KONSYL) capsule Take 1 capsule by mouth daily 30  capsule 0     SYMBICORT 160-4.5 MCG/ACT Inhaler INHALE 2 PUFFS TWICE DAILY. RINSE MOUTH AND SPIT OUT AFTERWARDS // 1 ZAUG NQUS 2 PAS, 1 HNU SIV 2 ZAUG. YAUG NCAUJ THAUM SIV TAS. 10.2 g 11     tiotropium (SPIRIVA RESPIMAT) 2.5 MCG/ACT inhaler [SPIRIVA RESPIMAT 2.5 MCG/ACTUATION MIST] INHALE 2 PUFFS BY MOUTH DAILY./ IB HNUB NQUS 2 PAS TXHUA HNUB 4 g 3     triamcinolone (KENALOG) 0.5 % external ointment Apply to heels bid until skin is smooth 30 g 11     vitamin D2 (ERGOCALCIFEROL) 02035 units (1250 mcg) capsule Take 1 capsule (50,000 Units) by mouth once a week 12 capsule 4       Physical Exam:  /86 (BP Location: Right arm, Patient Position: Chair, Cuff Size: Adult Regular)   Pulse 86   Wt 81.2 kg (179 lb)   SpO2 96%   BMI 29.79 kg/m    Gen: alert, oriented, no distress  HEENT: no cervical or supraclavicular lymphadenopathy  CV: RRR, no M/G/R  Resp: diminished left base, with rhonchi left mid to lower lung fields  Skin: no apparent rashes  Ext: no cyanosis, clubbing or edema  Neuro: alert, nonfocal    Labs:  Left pleural fluid analysis March 2020 and December 2020 at [facility] showed chronic exudate with elevated triglyceride level, negative for malignancy and infection     Imaging studies:  Chest CT with contrast (February 2020, [place]):  FINDINGS:   A 12.8 x 8.1 x 14.9 cm (AP x lateral x SI; approximately 850ml) loculated,  complex fluid collection within the left pleural space demonstrates thin  peripheral septations, areas of internal fat (series 3, image 246) and higher  density heterogeneous internal components (series 3, image 313). No evidence of  internal gas or inflammatory changes surrounding this mass to suggest  acute/aggressive infectious process, with preservation of the extrapleural fat  plane.  There is some  leftward mediastinal shift suggesting chronic fibrosis  and atelectasis in the left upper and lower lobes. No evidence of central  obstructing lesion.     Mildly prominent mediastinal and  hilar lymph nodes, some of which are calcified.  These may be benign/reactive but are indeterminate by CT criteria. The central  left upper lobe bronchus appears obstructed (series 3 image 179) but no definite  endobronchial lesion is identified.    There are areas of probably chronic atelectasis in the right middle and lower  lobes with some peripheral bronchial mucous plugging.     Probable right adrenal adenoma. Probable 1.1 cm cyst superior left kidney  (series 5, image 84). No worrisome osseous lesions.    COMPARISON REPORT:  Images from the 06/18/2018 and 04/10/2014 examinations are now available. The  complex left pleural abnormality which currently measures 12.8 x 8.1 x 14.9 cm  has slightly increased in volume since the prior exams, measuring approximately  12.5 x 7.7 x 14 cm on 06/18/2019 and 11.8 x 7.0 x 13 cm on 04/10/2014. This is  approximately 850 mL currently compared with 750 on 06/18/2018 and 550 on  04/10/2014. Peripheral calcification has slightly increased but the  heterogeneous internal density is similar, with no new definite soft tissue  components. Compressive atelectasis in the left lung and shift the mediastinum  is also slightly greater.    The minimal lack of change over many years is highly suggestive of a benign  abnormality but the CT appearance is indeterminate. Given several other  left-sided pleural calcifications, a chronic/old empyema particularly by  atypical agents such as tuberculosis is most likely. A seroma or lymphangioma  might also be considered. Given some minimal fatty elements, a atypical  hamartoma could be potentially considered although the extensive fluid component  is unusual.    The probable right adrenal adenoma is unchanged since 06/18/2018, but slightly  more apparent than on 04/10/2014.     1. Loculated, complex fluid collection with scattered peripheral calcification  in the left pleural space measures 12.8 x 8.1 x 14.9 cm. Given lack of  inflammatory or  aggressive features and reported presence dating back at least  to outside CTA chest 06/08/2018, this mass is likely benign. This may be due to  previous trauma or infection and would be of usual for malignancy; however,  cannot entirely exclude the possibility of chronic low-grade infection such as  tuberculous empyema. No findings to suggest an acute infectious or aggressive  neoplastic process.  2.  Probable small right adrenal adenoma.     CT chest (December 2020, [place]):  - images directly reviewed, formal interpretation follows:  Since prior imaging, a new left chest pigtail catheter is in place with  substantial decrease in size of the loculated left pleural effusion seen  previously.  There is some ex vacuo pneumothorax with an extensive thick-walled rind along  the periphery of this cavity with areas of calcification.  Partial reexpansion of the left lower lobe.  Scattered bronchial wall thickening, lower lobe hypoinflation, and air trapping  is at least partly related to the phase of respiration (but there may be  reactive airways disease and/or an element of bronchitis).  Aortic valvular minimal coronary artery calcification.    CXR (December 2021):  - images directly reviewed, formal interpretation follows:  IMPRESSION: Chronic loculated left pleural effusion similar to 6/10/2021 with associated compressive atelectasis in the left lower lung. Right basilar subsegmental atelectasis versus scar unchanged. No pneumothorax nor right-sided pleural effusion. No   acute abnormality.     TTE (August 2018):  - Normal left ventricular size and systolic performance with a visually estimated ejection fraction of 60%.  - There is mild aortic insufficiency.  - Normal right ventricular size and systolic performance.      Pulmonary Function Testing  August 2018  FEV1/FVC is 74% and is normal.  FEV1 is 1.41L (55%) predicted and is reduced.  FVC is 1.91L (60%) predicted and reduced.  There was no improvement in spirometry  after a single inhaled dose of bronchodilator.  TLC is 3.57L (58%) predicted and is reduced.  RV is 1.40L (60%) predicted and is reduced.  DLCO is 16.54ml/min/hg (64%) predicted and is reduced when it is corrected for hemoglobin.  Flow volume loops indicate no abnormalities.     October 2019  FEV1/FVC is 80 and is normal.  FEV1 is 56% predicted and is reduced.  FVC is 56% predicted and reduced.  There was no no improvement in spirometry after a single inhaled does of bronchodilator.  TLC is 56% predicted and is reduced.  RV is 63% predicted and is reduced.  DLCO is 58% predicted and is reduced when it   is corrected for hemoglobin.      Again, thank you for allowing me to participate in the care of your patient.        Sincerely,        Martir Quinn MD

## 2022-07-13 DIAGNOSIS — J45.40 MODERATE PERSISTENT ASTHMA: ICD-10-CM

## 2022-07-13 DIAGNOSIS — J45.40 MODERATE PERSISTENT ASTHMA, UNSPECIFIED WHETHER COMPLICATED: ICD-10-CM

## 2022-07-13 RX ORDER — IPRATROPIUM BROMIDE AND ALBUTEROL SULFATE 2.5; .5 MG/3ML; MG/3ML
3 SOLUTION RESPIRATORY (INHALATION) 4 TIMES DAILY PRN
Qty: 480 ML | Refills: 3 | Status: SHIPPED | OUTPATIENT
Start: 2022-07-13 | End: 2022-12-27

## 2022-07-13 RX ORDER — ALBUTEROL SULFATE 90 UG/1
2 AEROSOL, METERED RESPIRATORY (INHALATION) EVERY 4 HOURS PRN
Qty: 18 G | Refills: 11 | Status: SHIPPED | OUTPATIENT
Start: 2022-07-13 | End: 2023-06-23

## 2022-07-14 ENCOUNTER — TRANSFERRED RECORDS (OUTPATIENT)
Dept: HEALTH INFORMATION MANAGEMENT | Facility: CLINIC | Age: 67
End: 2022-07-14

## 2022-08-08 DIAGNOSIS — J45.40 MODERATE PERSISTENT ASTHMA, UNSPECIFIED WHETHER COMPLICATED: ICD-10-CM

## 2022-08-08 DIAGNOSIS — J45.40 MODERATE PERSISTENT ASTHMA WITHOUT COMPLICATION: ICD-10-CM

## 2022-08-08 RX ORDER — TIOTROPIUM BROMIDE INHALATION SPRAY 3.12 UG/1
SPRAY, METERED RESPIRATORY (INHALATION)
Qty: 4 G | Refills: 4 | Status: SHIPPED | OUTPATIENT
Start: 2022-08-08 | End: 2022-09-30 | Stop reason: SINTOL

## 2022-08-08 NOTE — TELEPHONE ENCOUNTER
"Routing refill request to provider for review/approval because:  act    Last Written Prescription Date:  8/24/21  Last Fill Quantity: 10.2,  # refills: 11   Last office visit provider:  4/15/22     Requested Prescriptions   Pending Prescriptions Disp Refills     SYMBICORT 160-4.5 MCG/ACT Inhaler [Pharmacy Med Name: SYMBICORT 160-4.5 MCG -4.5 Aerosol] 10.2 g 11     Sig: INHALE 2 PUFFS TWICE DAILY. RINSE MOUTH AND SPIT OUT AFTERWARDS // 1 ZAUG NQUS 2 PAS, 1 HNU SIV 2 ZAUG. YAUG NCAUJ THAUM SIV TAS.       Inhaled Steroids Protocol Failed - 8/8/2022 11:21 AM        Failed - Asthma control assessment score within normal limits in last 6 months     Please review ACT score.           Passed - Patient is age 12 or older        Passed - Medication is active on med list        Passed - Recent (6 mo) or future (30 days) visit within the authorizing provider's specialty     Patient had office visit in the last 6 months or has a visit in the next 30 days with authorizing provider or within the authorizing provider's specialty.  See \"Patient Info\" tab in inbasket, or \"Choose Columns\" in Meds & Orders section of the refill encounter.           Long-Acting Beta Agonist Inhalers Protocol  Failed - 8/8/2022 11:21 AM        Failed - Asthma control assessment score within normal limits in last 6 months     Please review ACT score.           Passed - Patient is age 12 or older        Passed - Medication is active on med list        Passed - Recent (6 mo) or future (30 days) visit within the authorizing provider's specialty     Patient had office visit in the last 6 months or has a visit in the next 30 days with authorizing provider or within the authorizing provider's specialty.  See \"Patient Info\" tab in inbasket, or \"Choose Columns\" in Meds & Orders section of the refill encounter.                 Shelbi Gavin RN 08/08/22 5:22 PM  "

## 2022-08-10 RX ORDER — BUDESONIDE AND FORMOTEROL FUMARATE DIHYDRATE 160; 4.5 UG/1; UG/1
AEROSOL RESPIRATORY (INHALATION)
Qty: 10.2 G | Refills: 11 | Status: SHIPPED | OUTPATIENT
Start: 2022-08-10 | End: 2023-07-25

## 2022-08-17 ENCOUNTER — PATIENT OUTREACH (OUTPATIENT)
Dept: GERIATRIC MEDICINE | Facility: CLINIC | Age: 67
End: 2022-08-17

## 2022-08-17 NOTE — PROGRESS NOTES
CC updated program tasks and targets for [employer] launch.    Lexy Espinoza RN  [facility]  [phone]

## 2022-09-26 ENCOUNTER — TRANSFERRED RECORDS (OUTPATIENT)
Dept: HEALTH INFORMATION MANAGEMENT | Facility: CLINIC | Age: 67
End: 2022-09-26

## 2022-09-28 ENCOUNTER — PATIENT OUTREACH (OUTPATIENT)
Dept: GERIATRIC MEDICINE | Facility: CLINIC | Age: 67
End: 2022-09-28

## 2022-09-28 NOTE — PROGRESS NOTES
Encounter opened due to Regulatory [employer] Update to close FVP Program.    Cely Mejia  [facility]  Case Management Specialist  [phone]

## 2022-09-28 NOTE — PROGRESS NOTES
[facility] Care Coordination Contact    Annual assessment scheduled for Oct 3, 2022 at 10am with member.    Lexy Espinoza RN  [facility]  [phone]

## 2022-09-28 NOTE — PROGRESS NOTES
Encounter opened due to Regulatory [employer] Update to open FVP Program.    Cely Mejia  [facility]  Case Management Specialist  [phone]

## 2022-09-30 ENCOUNTER — OFFICE VISIT (OUTPATIENT)
Dept: PULMONOLOGY | Facility: OTHER | Age: 67
End: 2022-09-30
Payer: COMMERCIAL

## 2022-09-30 VITALS
HEART RATE: 78 BPM | WEIGHT: 182.56 LBS | BODY MASS INDEX: 30.38 KG/M2 | SYSTOLIC BLOOD PRESSURE: 122 MMHG | OXYGEN SATURATION: 98 % | DIASTOLIC BLOOD PRESSURE: 68 MMHG

## 2022-09-30 DIAGNOSIS — J94.1 FIBROTHORAX: ICD-10-CM

## 2022-09-30 DIAGNOSIS — J47.9 BRONCHIECTASIS WITHOUT COMPLICATION (H): Primary | ICD-10-CM

## 2022-09-30 DIAGNOSIS — J45.40 MODERATE PERSISTENT ASTHMA WITHOUT COMPLICATION: ICD-10-CM

## 2022-09-30 PROCEDURE — 99214 OFFICE O/P EST MOD 30 MIN: CPT | Performed by: INTERNAL MEDICINE

## 2022-09-30 NOTE — PROGRESS NOTES
Patient and daughter instructed in use of nebulizer with flutter valve attached.  Also instructed in use of flutter valve alone.   Patient and daughter verbalized understanding.  Patient sent home with nebulizer tubing with flutter device attached.  They has phone numbers to call if any questions.

## 2022-09-30 NOTE — PROGRESS NOTES
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     Chronic dyspnea, chronic left calcified pleural effusion/fibrothorax with adjacent bronchiectasis, Pseudomonas airway colonization, chronic rhinosinusitis: Breathing feels fine, but cough ongoing and bothersome. No fevers or chills. Does not feels that he can bring up enough sputum for sputum sample. He feels that the tiotropium is worsening his cough but likes the budesonide-formoterol. Feels benefit from nebulized ipratropium-albuterol but does not have a flutter valve. Also on azithromycin 250 mg MWF, montelukast, loratadine, nasal fluticasone. Recall that, at baseline, he has chronic exertional dyspnea and cough. Restrictive physiology on PFTs but may have an asthma component and has clinically responded to asthma therapy. Has a loculated left pleural effusion with partially calcified thickened rind, stable radiographically since 2014 and known to have been present since at least 2004 when the patient immigrated; notes he was \"very ill with a virus\" at age 10 and hospitalized, told he had a lung problem. These chronic, stable loculated effusions with calcified rind/fibrothorax are fairly common in the [place]  population, likely a sequela of past parasitic, bacterial or mycobacterial infection. Venita had worsening dyspnea and left chest pain in October 2020 and was hospitalized again at [facility] for 5 nights in December 2020, where they actually placed a left pleural pigtail catheter into the collection, which predictably showed a chronic chylothorax, as this effusion has been present for decades, at least since he immigrated in 2004, and is surrounded by a calcified rind. Of course, the lung failed to fully expand with this " pigtail catheter, which was subsequently removed. He had left lateral chest pain since this hospitalization, which slowly improved.     Plan:  - continue budesonide-formoterol 160-4.5 mcg two inhalations two times a day; rinse/gargle/spit water after use; previously provided holding chamber with instruction on use  - continue azithromycin 250 mg every Monday, Wednesday and Friday  - stop tiotropium respimat  - continue nebulized ipratropium-albuterol QID and provided [employer] flutter valve with instruction to use in-line with nebs  - continue montelukast 10 mg at bedtime  - continue loratadine 10 mg daily  - continue nasal fluticasone one spray in each nostril daily  - albuterol HFA as needed  - surgery would be morbid and unlikely to be successful at achieving left lung reexpansion  - again offered pulmonary rehabilitation which he declines due to transportation/scheduling issues  - annual high-dose influenza vaccination  - keep up to date with pneumococcal vaccination  - COVID-19 vaccination: Pfizer-BioNTech  - follow up in 6 months  - encouraged him to call any time with questions or concerning symptoms    Martir Quinn MD  Pulmonary and Critical Care Medicine  [facility]  Office [phone]  Pager [phone]  (he/him/his)    CCx: chronic dyspnea, chronic left calcified pleural effusion/fibrothorax with adjacent bronchiectasis, Pseudomonas airway colonization, chronic rhinosinusitis    HPI: 67 year old male never smoker with a history of chronic loculated left pleural effusion with calcified left fibrothorax with adjacent bronchiectasis, GERD, CKD, HTN, Pseudomonas airway colonization, presenting for follow-up. Breathing feels fine, but cough ongoing and bothersome. No fevers or chills. Does not feels that he can bring up enough sputum for sputum sample. He feels that the tiotropium is worsening his cough but likes the budesonide-formoterol. Feels benefit from nebulized  "ipratropium-albuterol but does not have a flutter valve. Also on azithromycin 250 mg MWF, montelukast, loratadine, nasal fluticasone. Recall that, at baseline, he has chronic exertional dyspnea and cough. Restrictive physiology on PFTs but may have an asthma component and has clinically responded to asthma therapy. Has a loculated left pleural effusion with partially calcified thickened rind, stable radiographically since 2014 and known to have been present since at least 2004 when the patient immigrated; notes he was \"very ill with a virus\" at age 10 and hospitalized, told he had a lung problem. These chronic, stable loculated effusions with calcified rind/fibrothorax are fairly common in the [place]  population, likely a sequela of past parasitic, bacterial or mycobacterial infection. Venita had worsening dyspnea and left chest pain in October 2020 and was hospitalized again at [facility] for 5 nights in December 2020, where they actually placed a left pleural pigtail catheter into the collection, which predictably showed a chronic chylothorax, as this effusion has been present for decades, at least since he immigrated in 2004, and is surrounded by a calcified rind. Of course, the lung failed to fully expand with this pigtail catheter, which was subsequently removed. He had left lateral chest pain since this hospitalization, which slowly improved.    ROS:  A 12-system review was obtained and was negative with the exception of the symptoms endorsed in the history of present illness.    PMH:  Chronic loculated left pleural effusion with calcified rind (fibrothorax) with chronic exudative changes of chylothorax on laboratory analysis and adjacent bronchiectasis  GERD  Possible chronic rhinosinusitis  Possible asthma  CKD  HTN    PSH:  Past Surgical History:   Procedure Laterality Date     CATARACT EXTRACTION BILATERAL W/ ANTERIOR VITRECTOMY Right        Allergies:  Allergies   Allergen Reactions     Niacin Other (See " Comments)       Family HX:  No family history on file.    Social Hx:  Social History     Socioeconomic History     Marital status:      Spouse name: Not on file     Number of children: Not on file     Years of education: Not on file     Highest education level: Not on file   Occupational History     Not on file   Tobacco Use     Smoking status: Never Smoker     Smokeless tobacco: Never Used   Substance and Sexual Activity     Alcohol use: No     Drug use: No     Sexual activity: Yes     Partners: Female     Birth control/protection: Post-menopausal   Other Topics Concern     Not on file   Social History Narrative    Lives with 2 sons, wife lives with other kids, 1 of his sons is home with him most of the time     Social Determinants of Health     Financial Resource Strain: Not on file   Food Insecurity: Not on file   Transportation Needs: Not on file   Physical Activity: Not on file   Stress: Not on file   Social Connections: Not on file   Intimate Partner Violence: Not on file   Housing Stability: Not on file       Current Meds:  Current Outpatient Medications   Medication Sig Dispense Refill     albuterol (PROAIR HFA/PROVENTIL HFA/VENTOLIN HFA) 108 (90 Base) MCG/ACT inhaler Inhale 2 puffs into the lungs every 4 hours as needed for shortness of breath / dyspnea or wheezing 18 g 11     ASPIRIN LOW DOSE 81 MG EC tablet TAKE 1 TABLET BY MOUTH DAILY FOR STROKE PREVENTION // IB HNUB NOJ 1 LUB PAB KOM NTSHAV KHIAV ZOO 90 tablet 3     azithromycin (ZITHROMAX) 250 MG tablet Take 1 tablet (250 mg) by mouth Every Mon, Wed, Fri Morning 36 tablet 3     blood pressure monitor Kit [BLOOD PRESSURE MONITOR KIT] Use to check blood pressure daily 1 each 0     clonazePAM (KLONOPIN) 1 MG tablet TAKE 1 TABLET DAILY AS NEEDED FOR ANXIETY // IB HNUB NOJ 1 LUB YOG CEEB 20 tablet 0     DULoxetine (CYMBALTA) 60 MG capsule TAKE 2 CAPSULE DAILY // 1 HNUB NOJ 2  capsule 3     Emollient (AMLACTIN ULTRA) CREA Externally apply  topically daily Apply to feet daily 60 g 11     fenofibrate (TRICOR) 145 MG tablet TAKE 1 TABLET BY MOUTH DAILY FOR CHOLESTEROL // IB HNUB NOJ 1 LUB PAB ROXANNE NTSHAV MUAJ ROJ 90 tablet 4     fluticasone (FLONASE) 50 MCG/ACT nasal spray Spray 2 sprays into both nostrils daily 16 g 11     gabapentin (NEURONTIN) 300 MG capsule TAKE 1 CAPSULE 3 TIMES DAILY // IB ZAUG NOJ 1 LUB, 1 HNUB NOJ 3 ZAUG 270 capsule 3     ipratropium - albuterol 0.5 mg/2.5 mg/3 mL (DUONEB) 0.5-2.5 (3) MG/3ML neb solution Take 1 vial (3 mLs) by nebulization 4 times daily as needed for shortness of breath / dyspnea or wheezing 480 mL 3     irbesartan (AVAPRO) 150 MG tablet        loratadine (CLARITIN) 10 MG tablet Take 1 tablet (10 mg) by mouth daily 30 tablet 11     losartan-hydrochlorothiazide (HYZAAR) 100-12.5 MG tablet TAKE 1 TABLET BY MOUTH DAILY // IB HNUB NOJ 1 LUB 30 tablet 1     montelukast (SINGULAIR) 10 MG tablet Take 1 tablet (10 mg) by mouth At Bedtime 30 tablet 11     Multiple Vitamin (TAB-A-GERARDO) TABS TAKE 1 TABLET BY MOUTH DAILY // IB HNUB NOJ 1 LUB 90 tablet 3     omeprazole (PRILOSEC) 20 MG DR capsule [OMEPRAZOLE (PRILOSEC) 20 MG CAPSULE] TAKE 1 CAPSULE 30 MINUTES BEFORE FIRST MEAL DAILY FOR STOMACH // 1 HNUB NOJ 1 LUB UA NTEJ NOJ TSHAIS PAB ROXANNE MOB PLAB/MOB NCAUJ PLAB 30 capsule 0     psyllium (METAMUCIL/KONSYL) capsule Take 1 capsule by mouth daily 30 capsule 0     SYMBICORT 160-4.5 MCG/ACT Inhaler INHALE 2 PUFFS TWICE DAILY. RINSE MOUTH AND SPIT OUT AFTERWARDS // 1 ZAUG NQUS 2 PAS, 1 HNU SIV 2 ZAUG. YAUG NCAUJ THAUM SIV TAS. 10.2 g 11     triamcinolone (KENALOG) 0.5 % external ointment Apply to heels bid until skin is smooth 30 g 11     vitamin D2 (ERGOCALCIFEROL) 30522 units (1250 mcg) capsule Take 1 capsule (50,000 Units) by mouth once a week 12 capsule 4       Physical Exam:  /68 (BP Location: Left arm, Patient Position: Sitting, Cuff Size: Adult Large)   Pulse 78   Wt 82.8 kg (182 lb 9 oz)   SpO2 98%   BMI 30.38  kg/m    Gen: alert, oriented, no distress  HEENT: no cervical or supraclavicular lymphadenopathy  CV: RRR, no M/G/R  Resp: diminished left base, with rhonchi left mid to lower lung fields  Skin: no apparent rashes  Ext: no cyanosis, clubbing or edema  Neuro: alert, nonfocal     Labs:  Left pleural fluid analysis March 2020 and December 2020 at [place] showed chronic exudate with elevated triglyceride level, negative for malignancy and infection     Imaging studies:  Chest CT with contrast (February 2020, [place]):  FINDINGS:   A 12.8 x 8.1 x 14.9 cm (AP x lateral x SI; approximately 850ml) loculated,  complex fluid collection within the left pleural space demonstrates thin  peripheral septations, areas of internal fat (series 3, image 246) and higher  density heterogeneous internal components (series 3, image 313). No evidence of  internal gas or inflammatory changes surrounding this mass to suggest  acute/aggressive infectious process, with preservation of the extrapleural fat  plane.  There is some  leftward mediastinal shift suggesting chronic fibrosis  and atelectasis in the left upper and lower lobes. No evidence of central  obstructing lesion.     Mildly prominent mediastinal and hilar lymph nodes, some of which are calcified.  These may be benign/reactive but are indeterminate by CT criteria. The central  left upper lobe bronchus appears obstructed (series 3 image 179) but no definite  endobronchial lesion is identified.    There are areas of probably chronic atelectasis in the right middle and lower  lobes with some peripheral bronchial mucous plugging.     Probable right adrenal adenoma. Probable 1.1 cm cyst superior left kidney  (series 5, image 84). No worrisome osseous lesions.    COMPARISON REPORT:  Images from the 06/18/2018 and 04/10/2014 examinations are now available. The  complex left pleural abnormality which currently measures 12.8 x 8.1 x 14.9 cm  has slightly increased in volume since the prior  exams, measuring approximately  12.5 x 7.7 x 14 cm on 06/18/2019 and 11.8 x 7.0 x 13 cm on 04/10/2014. This is  approximately 850 mL currently compared with 750 on 06/18/2018 and 550 on  04/10/2014. Peripheral calcification has slightly increased but the  heterogeneous internal density is similar, with no new definite soft tissue  components. Compressive atelectasis in the left lung and shift the mediastinum  is also slightly greater.    The minimal lack of change over many years is highly suggestive of a benign  abnormality but the CT appearance is indeterminate. Given several other  left-sided pleural calcifications, a chronic/old empyema particularly by  atypical agents such as tuberculosis is most likely. A seroma or lymphangioma  might also be considered. Given some minimal fatty elements, a atypical  hamartoma could be potentially considered although the extensive fluid component  is unusual.    The probable right adrenal adenoma is unchanged since 06/18/2018, but slightly  more apparent than on 04/10/2014.     1. Loculated, complex fluid collection with scattered peripheral calcification  in the left pleural space measures 12.8 x 8.1 x 14.9 cm. Given lack of  inflammatory or aggressive features and reported presence dating back at least  to outside CTA chest 06/08/2018, this mass is likely benign. This may be due to  previous trauma or infection and would be of usual for malignancy; however,  cannot entirely exclude the possibility of chronic low-grade infection such as  tuberculous empyema. No findings to suggest an acute infectious or aggressive  neoplastic process.  2.  Probable small right adrenal adenoma.     CT chest (December 2020, [place]):  - images directly reviewed, formal interpretation follows:  Since prior imaging, a new left chest pigtail catheter is in place with  substantial decrease in size of the loculated left pleural effusion seen  previously.  There is some ex vacuo pneumothorax with an  extensive thick-walled rind along  the periphery of this cavity with areas of calcification.  Partial reexpansion of the left lower lobe.  Scattered bronchial wall thickening, lower lobe hypoinflation, and air trapping  is at least partly related to the phase of respiration (but there may be  reactive airways disease and/or an element of bronchitis).  Aortic valvular minimal coronary artery calcification.     CXR (December 2021):  - images directly reviewed, formal interpretation follows:  IMPRESSION: Chronic loculated left pleural effusion similar to 6/10/2021 with associated compressive atelectasis in the left lower lung. Right basilar subsegmental atelectasis versus scar unchanged. No pneumothorax nor right-sided pleural effusion. No   acute abnormality.     TTE (August 2018):  - Normal left ventricular size and systolic performance with a visually estimated ejection fraction of 60%.  - There is mild aortic insufficiency.  - Normal right ventricular size and systolic performance.      Pulmonary Function Testing  August 2018  FEV1/FVC is 74% and is normal.  FEV1 is 1.41L (55%) predicted and is reduced.  FVC is 1.91L (60%) predicted and reduced.  There was no improvement in spirometry after a single inhaled dose of bronchodilator.  TLC is 3.57L (58%) predicted and is reduced.  RV is 1.40L (60%) predicted and is reduced.  DLCO is 16.54ml/min/hg (64%) predicted and is reduced when it is corrected for hemoglobin.  Flow volume loops indicate no abnormalities.     October 2019  FEV1/FVC is 80 and is normal.  FEV1 is 56% predicted and is reduced.  FVC is 56% predicted and reduced.  There was no no improvement in spirometry after a single inhaled does of bronchodilator.  TLC is 56% predicted and is reduced.  RV is 63% predicted and is reduced.  DLCO is 58% predicted and is reduced when it   is corrected for hemoglobin.

## 2022-09-30 NOTE — PATIENT INSTRUCTIONS
It was good to see you in clinic today. This is what we discussed:    Continue Symbicort two inhalations twice daily. Rinse, gargle, and spit water after use.  Stop Spiriva.  Continue the nebulized ipratropium-albuterol (Duoneb) four times daily with the flutter valve.  Continue azithromycin pill every Monday, Wednesday, or Friday.  Continue your other medications.  I will see you in 6 months.  Contact me with questions or concerns.    Martir Quinn MD  Pulmonary and Critical Care Medicine  [facility]  Office [phone]

## 2022-09-30 NOTE — LETTER
"    9/30/2022         RE: Venita Sanchez  [address] [place] [address]        Dear Colleague,    Thank you for referring your patient, Venita Sanchez, to the [facility]. Please see a copy of my visit note below.    Patient and daughter instructed in use of nebulizer with flutter valve attached.  Also instructed in use of flutter valve alone.   Patient and daughter verbalized understanding.  Patient sent home with nebulizer tubing with flutter device attached.  They has phone numbers to call if any questions.     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     Chronic dyspnea, chronic left calcified pleural effusion/fibrothorax with adjacent bronchiectasis, Pseudomonas airway colonization, chronic rhinosinusitis: Breathing feels fine, but cough ongoing and bothersome. No fevers or chills. Does not feels that he can bring up enough sputum for sputum sample. He feels that the tiotropium is worsening his cough but likes the budesonide-formoterol. Feels benefit from nebulized ipratropium-albuterol but does not have a flutter valve. Also on azithromycin 250 mg MWF, montelukast, loratadine, nasal fluticasone. Recall that, at baseline, he has chronic exertional dyspnea and cough. Restrictive physiology on PFTs but may have an asthma component and has clinically responded to asthma therapy. Has a loculated left pleural effusion with partially calcified thickened rind, stable radiographically since 2014 and known to have been present since at least 2004 when the patient immigrated; notes he was \"very ill with a virus\" at age 10 and hospitalized, told he had a lung problem. These chronic, stable loculated effusions with calcified rind/fibrothorax are fairly " common in the [place]  population, likely a sequela of past parasitic, bacterial or mycobacterial infection. Venita had worsening dyspnea and left chest pain in October 2020 and was hospitalized again at [place] for 5 nights in December 2020, where they actually placed a left pleural pigtail catheter into the collection, which predictably showed a chronic chylothorax, as this effusion has been present for decades, at least since he immigrated in 2004, and is surrounded by a calcified rind. Of course, the lung failed to fully expand with this pigtail catheter, which was subsequently removed. He had left lateral chest pain since this hospitalization, which slowly improved.     Plan:  - continue budesonide-formoterol 160-4.5 mcg two inhalations two times a day; rinse/gargle/spit water after use; previously provided holding chamber with instruction on use  - continue azithromycin 250 mg every Monday, Wednesday and Friday  - stop tiotropium respimat  - continue nebulized ipratropium-albuterol QID and provided [employer] flutter valve with instruction to use in-line with nebs  - continue montelukast 10 mg at bedtime  - continue loratadine 10 mg daily  - continue nasal fluticasone one spray in each nostril daily  - albuterol HFA as needed  - surgery would be morbid and unlikely to be successful at achieving left lung reexpansion  - again offered pulmonary rehabilitation which he declines due to transportation/scheduling issues  - annual high-dose influenza vaccination  - keep up to date with pneumococcal vaccination  - COVID-19 vaccination: [employer]  - follow up in 6 months  - encouraged him to call any time with questions or concerning symptoms    Martir Quinn MD  Pulmonary and Critical Care Medicine  [facility]  Office [phone]  Pager [phone]  (he/him/his)    CCx: chronic dyspnea, chronic left calcified pleural effusion/fibrothorax with adjacent bronchiectasis, Pseudomonas airway  "colonization, chronic rhinosinusitis    HPI: 67 year old male never smoker with a history of chronic loculated left pleural effusion with calcified left fibrothorax with adjacent bronchiectasis, GERD, CKD, HTN, Pseudomonas airway colonization, presenting for follow-up. Breathing feels fine, but cough ongoing and bothersome. No fevers or chills. Does not feels that he can bring up enough sputum for sputum sample. He feels that the tiotropium is worsening his cough but likes the budesonide-formoterol. Feels benefit from nebulized ipratropium-albuterol but does not have a flutter valve. Also on azithromycin 250 mg MWF, montelukast, loratadine, nasal fluticasone. Recall that, at baseline, he has chronic exertional dyspnea and cough. Restrictive physiology on PFTs but may have an asthma component and has clinically responded to asthma therapy. Has a loculated left pleural effusion with partially calcified thickened rind, stable radiographically since 2014 and known to have been present since at least 2004 when the patient immigrated; notes he was \"very ill with a virus\" at age 10 and hospitalized, told he had a lung problem. These chronic, stable loculated effusions with calcified rind/fibrothorax are fairly common in the [place]  population, likely a sequela of past parasitic, bacterial or mycobacterial infection. Venita had worsening dyspnea and left chest pain in October 2020 and was hospitalized again at [place] for 5 nights in December 2020, where they actually placed a left pleural pigtail catheter into the collection, which predictably showed a chronic chylothorax, as this effusion has been present for decades, at least since he immigrated in 2004, and is surrounded by a calcified rind. Of course, the lung failed to fully expand with this pigtail catheter, which was subsequently removed. He had left lateral chest pain since this hospitalization, which slowly improved.    ROS:  A 12-system review was obtained and " was negative with the exception of the symptoms endorsed in the history of present illness.    PMH:  Chronic loculated left pleural effusion with calcified rind (fibrothorax) with chronic exudative changes of chylothorax on laboratory analysis and adjacent bronchiectasis  GERD  Possible chronic rhinosinusitis  Possible asthma  CKD  HTN    PSH:  Past Surgical History:   Procedure Laterality Date     CATARACT EXTRACTION BILATERAL W/ ANTERIOR VITRECTOMY Right        Allergies:  Allergies   Allergen Reactions     Niacin Other (See Comments)       Family HX:  No family history on file.    Social Hx:  Social History     Socioeconomic History     Marital status:      Spouse name: Not on file     Number of children: Not on file     Years of education: Not on file     Highest education level: Not on file   Occupational History     Not on file   Tobacco Use     Smoking status: Never Smoker     Smokeless tobacco: Never Used   Substance and Sexual Activity     Alcohol use: No     Drug use: No     Sexual activity: Yes     Partners: Female     Birth control/protection: Post-menopausal   Other Topics Concern     Not on file   Social History Narrative    Lives with 2 sons, wife lives with other kids, 1 of his sons is home with him most of the time     Social Determinants of Health     Financial Resource Strain: Not on file   Food Insecurity: Not on file   Transportation Needs: Not on file   Physical Activity: Not on file   Stress: Not on file   Social Connections: Not on file   Intimate Partner Violence: Not on file   Housing Stability: Not on file       Current Meds:  Current Outpatient Medications   Medication Sig Dispense Refill     albuterol (PROAIR HFA/PROVENTIL HFA/VENTOLIN HFA) 108 (90 Base) MCG/ACT inhaler Inhale 2 puffs into the lungs every 4 hours as needed for shortness of breath / dyspnea or wheezing 18 g 11     ASPIRIN LOW DOSE 81 MG EC tablet TAKE 1 TABLET BY MOUTH DAILY FOR STROKE PREVENTION // IB HNUB NOJ 1  LUB PAB KOM NTSHAV KHIAV ZOO 90 tablet 3     azithromycin (ZITHROMAX) 250 MG tablet Take 1 tablet (250 mg) by mouth Every Mon, Wed, Fri Morning 36 tablet 3     blood pressure monitor Kit [BLOOD PRESSURE MONITOR KIT] Use to check blood pressure daily 1 each 0     clonazePAM (KLONOPIN) 1 MG tablet TAKE 1 TABLET DAILY AS NEEDED FOR ANXIETY // IB HNUB NOJ 1 LUB YOG CEEB 20 tablet 0     DULoxetine (CYMBALTA) 60 MG capsule TAKE 2 CAPSULE DAILY // 1 HNUB NOJ 2  capsule 3     Emollient (AMLACTIN ULTRA) CREA Externally apply topically daily Apply to feet daily 60 g 11     fenofibrate (TRICOR) 145 MG tablet TAKE 1 TABLET BY MOUTH DAILY FOR CHOLESTEROL // IB HNUB NOJ 1 LUB [facility] ROJ 90 tablet 4     fluticasone (FLONASE) 50 MCG/ACT nasal spray Spray 2 sprays into both nostrils daily 16 g 11     gabapentin (NEURONTIN) 300 MG capsule TAKE 1 CAPSULE 3 TIMES DAILY // IB ZAUG NOJ 1 LUB, 1 HNUB NOJ 3 ZAUG 270 capsule 3     ipratropium - albuterol 0.5 mg/2.5 mg/3 mL (DUONEB) 0.5-2.5 (3) MG/3ML neb solution Take 1 vial (3 mLs) by nebulization 4 times daily as needed for shortness of breath / dyspnea or wheezing 480 mL 3     irbesartan (AVAPRO) 150 MG tablet        loratadine (CLARITIN) 10 MG tablet Take 1 tablet (10 mg) by mouth daily 30 tablet 11     losartan-hydrochlorothiazide (HYZAAR) 100-12.5 MG tablet TAKE 1 TABLET BY MOUTH DAILY // IB HNUB NOJ 1 LUB 30 tablet 1     montelukast (SINGULAIR) 10 MG tablet Take 1 tablet (10 mg) by mouth At Bedtime 30 tablet 11     Multiple Vitamin (TAB-A-GERARDO) TABS TAKE 1 TABLET BY MOUTH DAILY // IB HNUB NOJ 1 LUB 90 tablet 3     omeprazole (PRILOSEC) 20 MG DR capsule [OMEPRAZOLE (PRILOSEC) 20 MG CAPSULE] TAKE 1 CAPSULE 30 MINUTES BEFORE FIRST MEAL DAILY FOR STOMACH // 1 HNUB NOJ 1 LUB UA NTEJ NOJ TSHAIS PAB ROXANNE MOB PLAB/MOB NCAUJ PLAB 30 capsule 0     psyllium (METAMUCIL/KONSYL) capsule Take 1 capsule by mouth daily 30 capsule 0     SYMBICORT 160-4.5 MCG/ACT Inhaler INHALE 2  PUFFS TWICE DAILY. RINSE MOUTH AND SPIT OUT AFTERWARDS // 1 ZAUG NQUS 2 PAS, 1 HNU SIV 2 ZAUG. YAUG NCAUJ THAUM SIV TAS. 10.2 g 11     triamcinolone (KENALOG) 0.5 % external ointment Apply to heels bid until skin is smooth 30 g 11     vitamin D2 (ERGOCALCIFEROL) 81303 units (1250 mcg) capsule Take 1 capsule (50,000 Units) by mouth once a week 12 capsule 4       Physical Exam:  /68 (BP Location: Left arm, Patient Position: Sitting, Cuff Size: Adult Large)   Pulse 78   Wt 82.8 kg (182 lb 9 oz)   SpO2 98%   BMI 30.38 kg/m    Gen: alert, oriented, no distress  HEENT: no cervical or supraclavicular lymphadenopathy  CV: RRR, no M/G/R  Resp: diminished left base, with rhonchi left mid to lower lung fields  Skin: no apparent rashes  Ext: no cyanosis, clubbing or edema  Neuro: alert, nonfocal     Labs:  Left pleural fluid analysis March 2020 and December 2020 at [place] showed chronic exudate with elevated triglyceride level, negative for malignancy and infection     Imaging studies:  Chest CT with contrast (February 2020, [place]):  FINDINGS:   A 12.8 x 8.1 x 14.9 cm (AP x lateral x SI; approximately 850ml) loculated,  complex fluid collection within the left pleural space demonstrates thin  peripheral septations, areas of internal fat (series 3, image 246) and higher  density heterogeneous internal components (series 3, image 313). No evidence of  internal gas or inflammatory changes surrounding this mass to suggest  acute/aggressive infectious process, with preservation of the extrapleural fat  plane.  There is some  leftward mediastinal shift suggesting chronic fibrosis  and atelectasis in the left upper and lower lobes. No evidence of central  obstructing lesion.     Mildly prominent mediastinal and hilar lymph nodes, some of which are calcified.  These may be benign/reactive but are indeterminate by CT criteria. The central  left upper lobe bronchus appears obstructed (series 3 image 179) but no  definite  endobronchial lesion is identified.    There are areas of probably chronic atelectasis in the right middle and lower  lobes with some peripheral bronchial mucous plugging.     Probable right adrenal adenoma. Probable 1.1 cm cyst superior left kidney  (series 5, image 84). No worrisome osseous lesions.    COMPARISON REPORT:  Images from the 06/18/2018 and 04/10/2014 examinations are now available. The  complex left pleural abnormality which currently measures 12.8 x 8.1 x 14.9 cm  has slightly increased in volume since the prior exams, measuring approximately  12.5 x 7.7 x 14 cm on 06/18/2019 and 11.8 x 7.0 x 13 cm on 04/10/2014. This is  approximately 850 mL currently compared with 750 on 06/18/2018 and 550 on  04/10/2014. Peripheral calcification has slightly increased but the  heterogeneous internal density is similar, with no new definite soft tissue  components. Compressive atelectasis in the left lung and shift the mediastinum  is also slightly greater.    The minimal lack of change over many years is highly suggestive of a benign  abnormality but the CT appearance is indeterminate. Given several other  left-sided pleural calcifications, a chronic/old empyema particularly by  atypical agents such as tuberculosis is most likely. A seroma or lymphangioma  might also be considered. Given some minimal fatty elements, a atypical  hamartoma could be potentially considered although the extensive fluid component  is unusual.    The probable right adrenal adenoma is unchanged since 06/18/2018, but slightly  more apparent than on 04/10/2014.     1. Loculated, complex fluid collection with scattered peripheral calcification  in the left pleural space measures 12.8 x 8.1 x 14.9 cm. Given lack of  inflammatory or aggressive features and reported presence dating back at least  to outside CTA chest 06/08/2018, this mass is likely benign. This may be due to  previous trauma or infection and would be of usual for  malignancy; however,  cannot entirely exclude the possibility of chronic low-grade infection such as  tuberculous empyema. No findings to suggest an acute infectious or aggressive  neoplastic process.  2.  Probable small right adrenal adenoma.     CT chest (December 2020, [place]):  - images directly reviewed, formal interpretation follows:  Since prior imaging, a new left chest pigtail catheter is in place with  substantial decrease in size of the loculated left pleural effusion seen  previously.  There is some ex vacuo pneumothorax with an extensive thick-walled rind along  the periphery of this cavity with areas of calcification.  Partial reexpansion of the left lower lobe.  Scattered bronchial wall thickening, lower lobe hypoinflation, and air trapping  is at least partly related to the phase of respiration (but there may be  reactive airways disease and/or an element of bronchitis).  Aortic valvular minimal coronary artery calcification.     CXR (December 2021):  - images directly reviewed, formal interpretation follows:  IMPRESSION: Chronic loculated left pleural effusion similar to 6/10/2021 with associated compressive atelectasis in the left lower lung. Right basilar subsegmental atelectasis versus scar unchanged. No pneumothorax nor right-sided pleural effusion. No   acute abnormality.     TTE (August 2018):  - Normal left ventricular size and systolic performance with a visually estimated ejection fraction of 60%.  - There is mild aortic insufficiency.  - Normal right ventricular size and systolic performance.      Pulmonary Function Testing  August 2018  FEV1/FVC is 74% and is normal.  FEV1 is 1.41L (55%) predicted and is reduced.  FVC is 1.91L (60%) predicted and reduced.  There was no improvement in spirometry after a single inhaled dose of bronchodilator.  TLC is 3.57L (58%) predicted and is reduced.  RV is 1.40L (60%) predicted and is reduced.  DLCO is 16.54ml/min/hg (64%) predicted and is reduced when  it is corrected for hemoglobin.  Flow volume loops indicate no abnormalities.     October 2019  FEV1/FVC is 80 and is normal.  FEV1 is 56% predicted and is reduced.  FVC is 56% predicted and reduced.  There was no no improvement in spirometry after a single inhaled does of bronchodilator.  TLC is 56% predicted and is reduced.  RV is 63% predicted and is reduced.  DLCO is 58% predicted and is reduced when it   is corrected for hemoglobin.      Again, thank you for allowing me to participate in the care of your patient.        Sincerely,        Martir Quinn MD

## 2022-10-03 ENCOUNTER — PATIENT OUTREACH (OUTPATIENT)
Dept: GERIATRIC MEDICINE | Facility: CLINIC | Age: 67
End: 2022-10-03

## 2022-10-03 SDOH — ECONOMIC STABILITY: TRANSPORTATION INSECURITY
IN THE PAST 12 MONTHS, HAS LACK OF TRANSPORTATION KEPT YOU FROM MEETINGS, WORK, OR FROM GETTING THINGS NEEDED FOR DAILY LIVING?: NO

## 2022-10-03 SDOH — ECONOMIC STABILITY: INCOME INSECURITY: IN THE LAST 12 MONTHS, WAS THERE A TIME WHEN YOU WERE NOT ABLE TO PAY THE MORTGAGE OR RENT ON TIME?: NO

## 2022-10-03 SDOH — ECONOMIC STABILITY: TRANSPORTATION INSECURITY
IN THE PAST 12 MONTHS, HAS THE LACK OF TRANSPORTATION KEPT YOU FROM MEDICAL APPOINTMENTS OR FROM GETTING MEDICATIONS?: NO

## 2022-10-03 SDOH — ECONOMIC STABILITY: FOOD INSECURITY: WITHIN THE PAST 12 MONTHS, THE FOOD YOU BOUGHT JUST DIDN'T LAST AND YOU DIDN'T HAVE MONEY TO GET MORE.: NEVER TRUE

## 2022-10-03 SDOH — ECONOMIC STABILITY: FOOD INSECURITY: WITHIN THE PAST 12 MONTHS, YOU WORRIED THAT YOUR FOOD WOULD RUN OUT BEFORE YOU GOT MONEY TO BUY MORE.: NEVER TRUE

## 2022-10-03 ASSESSMENT — LIFESTYLE VARIABLES
HOW OFTEN DO YOU HAVE A DRINK CONTAINING ALCOHOL: NEVER
HOW MANY STANDARD DRINKS CONTAINING ALCOHOL DO YOU HAVE ON A TYPICAL DAY: PATIENT DOES NOT DRINK
SKIP TO QUESTIONS 9-10: 1
HOW OFTEN DO YOU HAVE SIX OR MORE DRINKS ON ONE OCCASION: NEVER
AUDIT-C TOTAL SCORE: 0

## 2022-10-03 ASSESSMENT — PATIENT HEALTH QUESTIONNAIRE - PHQ9: SUM OF ALL RESPONSES TO PHQ QUESTIONS 1-9: 7

## 2022-10-03 ASSESSMENT — SOCIAL DETERMINANTS OF HEALTH (SDOH): HOW HARD IS IT FOR YOU TO PAY FOR THE VERY BASICS LIKE FOOD, HOUSING, MEDICAL CARE, AND HEATING?: NOT VERY HARD

## 2022-10-03 NOTE — PROGRESS NOTES
[facility] Care Coordination Contact    Annual HRA schedule for 10/3/22 at 11am.    Lexy Espinoza RN  [facility]  [phone]

## 2022-10-03 NOTE — Clinical Note
Annual health risk assessment completed. No current issues at the moment. See note for further details.  Lexy Espinoza RN [facility] [phone]  abnormal/expand...

## 2022-10-03 NOTE — PROGRESS NOTES
[facility] Care Coordination Contact    [facility] Home Visit Assessment     Home visit for Health Risk Assessment with Venita Sanchez completed on October 3, 2022    Remote assessment due to COVID-19 restriction.    Type of residence:: Private home - no stairs (couple stairs outside of home only)  Current living arrangement:: I live in a private home with family      Assessment completed with:: Patient    Current Care Plan  Member currently receiving the following home care services:     Member currently receiving the following community resources: PCA      Medication Review  Medication reconciliation completed in Epic: If no, please explain member unable to read medication label due to language barrier.  Medication set-up & administration: Family/informal caregiver sets up weekly.  Self-administers medications.  Medication Risk Assessment Medication (1 or more, place referral to MTM): N/A: No risk factors identified  MTM Referral Placed: No: Has family managing medications.    Mental/Behavioral Health   Depression Screening:      PHQ-9 Total Score: 7    Mental health DX:: Yes   Mental health DX how managed:: Outpatient Counseling, Medication    Falls Assessment:   Fallen 2 or more times in the past year?: Yes   Any fall with injury in the past year?: No    ADL/IADL Dependencies:   Dependent ADLs:: Ambulation-walker, Ambulation-cane, Bathing, Dressing, Eating, Grooming, Transfers, Toileting  Dependent IADLs:: Cleaning, Cooking, Laundry, Shopping, Meal Preparation, Medication Management, Money Management, Transportation    [facility] sponsored benefits: Shared information re: Silver Sneakers/gym memberships, ASA, Calcium +D.    PCA Assessment completed at visit: Yes Annual PCA assessment indicated 25 units per day of PCA. This is the same as the previous assessment.     Elderly Waiver Eligibility: Yes, but member declines EW services; will not open to EW    Care Plan & Recommendations: member was  assessed for 6.25 hours/day of PCA services to assist with ADLs and IADLs. He will continue to get informal support from family. No DME need at this time. Declines other EW services at this time.     See [facility] for detailed assessment information.    Follow-Up Plan: Member informed of future contact, plan to f/u with member with a 6 month telephone assessment.  Contact information shared with member and family, encouraged member to call with any questions or concerns at any time.    [facility] care continuum providers: Please see Snapshot and Care Management Flowsheets for Specific details of care plan.    This CC note routed to PCP.    Lexy Espinoza RN  [facility]  [phone]

## 2022-10-11 ENCOUNTER — PATIENT OUTREACH (OUTPATIENT)
Dept: GERIATRIC MEDICINE | Facility: CLINIC | Age: 67
End: 2022-10-11

## 2022-10-11 NOTE — PROGRESS NOTES
[facility] Care Coordination Contact    Received after visit chart from care coordinator.  Completed following tasks: Mailed copy of care plan to client, Mailed copy of POC and PCA signature sheet for member to sign and return in SASE  and Mailed [facility] Safe Medication Disposal      [facility]:  Emailed completed PCA assessment to are.  Faxed copy of PCA assessment to PCA Agency and mailed copy to member.  Faxed MD Communication to PCP.     Cely Mejia  [facility]  Case Management Specialist  [phone]

## 2022-10-11 NOTE — LETTER
October 11, 2022    RACHELL TRIMBLE  [address] [place] [address]        Dear Rachell Fisher:    At [facility], we re dedicated to improving your health and wellness. Enclosed is the Care Plan developed with you on 10/3/22. Please review the Care Plan carefully.    As a reminder, during your visit we talked about:    Ways to manage your physical and mental health    Using health care to maintain and improve your health     Your preventive care needs     Remember to contact your care coordinator if you:    Are hospitalized, or plan to be hospitalized     Have a fall      Have a change in your physical or mental health    Need help finding support or services    If you have questions, or don t agree with your Care Plan, call me at [phone]. You can also call me if your needs change. TTY users, call the [place] [employer] at (639) or 1-863.775.6069 (ezypbj-vd-wxwodg relay service).    Sincerely,          Lexy Espinoza RN    E-Mail:  [email or web address]  Phone: [phone]      [facility]    [id number] accepted    [id number] (07/2022)

## 2022-10-14 DIAGNOSIS — F32.9 MAJOR DEPRESSIVE DISORDER, SINGLE EPISODE, UNSPECIFIED: ICD-10-CM

## 2022-10-14 RX ORDER — MULTIVITAMIN WITH FOLIC ACID 400 MCG
TABLET ORAL
Qty: 90 TABLET | Refills: 2 | Status: SHIPPED | OUTPATIENT
Start: 2022-10-14 | End: 2023-06-27

## 2022-10-14 NOTE — TELEPHONE ENCOUNTER
"Last Written Prescription Date:  10/19/21  Last Fill Quantity: 90,  # refills: 3  Last office visit provider:  4/15/22     Requested Prescriptions   Pending Prescriptions Disp Refills     Multiple Vitamin (TAB-A-GERARDO) TABS [Pharmacy Med Name: TAB-A-GERARDO TABS Tablet] 30 tablet 3     Sig: TAKE 1 TABLET BY MOUTH DAILY // IB HNUB NOJ 1 LUB       Vitamin Supplements (Adult) Protocol Passed - 10/14/2022  2:52 PM        Passed - High dose Vitamin D not ordered        Passed - Recent (12 mo) or future (30 days) visit within the authorizing provider's specialty     Patient has had an office visit with the authorizing provider or a provider within the authorizing providers department within the previous 12 mos or has a future within next 30 days. See \"Patient Info\" tab in inbasket, or \"Choose Columns\" in Meds & Orders section of the refill encounter.              Passed - Medication is active on med list             CARMELLA WAN RN 10/14/22 2:58 PM  "

## 2022-10-17 DIAGNOSIS — I10 ESSENTIAL HYPERTENSION: ICD-10-CM

## 2022-10-17 RX ORDER — ASPIRIN 81 MG/1
TABLET, COATED ORAL
Qty: 30 TABLET | Refills: 1 | Status: SHIPPED | OUTPATIENT
Start: 2022-10-17 | End: 2022-12-27

## 2022-10-17 NOTE — TELEPHONE ENCOUNTER
"Last Written Prescription Date:  10/19/21  Last Fill Quantity: 90,  # refills: 3   Last office visit provider:  4/15/22     Requested Prescriptions   Pending Prescriptions Disp Refills     ASPIRIN LOW DOSE 81 MG EC tablet [Pharmacy Med Name: ASPIRIN LOW DOSE 81 MG TBEC 81 Tablet] 30 tablet 3     Sig: TAKE 1 TABLET BY MOUTH DAILY FOR STROKE PREVENTION // IB HNUB NOJ 1 LUB PAB KOM NTSHAV KHIAV ZOO       Analgesics (Non-Narcotic Tylenol and ASA Only) Passed - 10/17/2022  9:21 AM        Passed - Recent (12 mo) or future (30 days) visit within the authorizing provider's specialty     Patient has had an office visit with the authorizing provider or a provider within the authorizing providers department within the previous 12 mos or has a future within next 30 days. See \"Patient Info\" tab in inbasket, or \"Choose Columns\" in Meds & Orders section of the refill encounter.              Passed - Patient is age 20 years or older     If ASA is flagged for ages under 20 years old. Forward to provider for confirmation Ryes Syndrome is not a concern.              Passed - Medication is active on med list             Shelbi Gavin, RN 10/17/22 4:20 PM  "

## 2022-11-28 DIAGNOSIS — J45.40 MODERATE PERSISTENT ASTHMA WITHOUT COMPLICATION: ICD-10-CM

## 2022-11-28 DIAGNOSIS — J32.9 CHRONIC SINUSITIS, UNSPECIFIED LOCATION: ICD-10-CM

## 2022-11-28 RX ORDER — LORATADINE 10 MG/1
10 TABLET ORAL DAILY
Qty: 30 TABLET | Refills: 11 | Status: SHIPPED | OUTPATIENT
Start: 2022-11-28 | End: 2023-10-12

## 2022-12-09 ENCOUNTER — HOSPITAL ENCOUNTER (OUTPATIENT)
Dept: MRI IMAGING | Facility: HOSPITAL | Age: 67
Discharge: HOME | End: 2022-12-09
Attending: PHYSICIAN ASSISTANT | Admitting: PHYSICIAN ASSISTANT
Payer: COMMERCIAL

## 2022-12-09 PROCEDURE — 255N000002 HC RX 255 OP 636: Performed by: PHYSICIAN ASSISTANT

## 2022-12-09 PROCEDURE — A9585 GADOBUTROL INJECTION: HCPCS | Performed by: PHYSICIAN ASSISTANT

## 2022-12-09 PROCEDURE — 74183 MRI ABD W/O CNTR FLWD CNTR: CPT

## 2022-12-09 RX ORDER — GADOBUTROL 604.72 MG/ML
0.1 INJECTION INTRAVENOUS ONCE
Status: COMPLETED | OUTPATIENT
Start: 2022-12-09 | End: 2022-12-09

## 2022-12-09 RX ADMIN — GADOBUTROL 8 ML: 604.72 INJECTION INTRAVENOUS at 09:20

## 2022-12-13 NOTE — PROGRESS NOTES
[facility] Care Coordination Contact    Faxed signed PCA sig to [facility] and provider.    Cely Mejia  [facility]  Case Management Specialist  [phone]

## 2022-12-26 DIAGNOSIS — I10 ESSENTIAL HYPERTENSION: ICD-10-CM

## 2022-12-26 DIAGNOSIS — M54.50 CHRONIC LEFT-SIDED LOW BACK PAIN WITHOUT SCIATICA: ICD-10-CM

## 2022-12-26 DIAGNOSIS — G89.29 CHRONIC LEFT-SIDED LOW BACK PAIN WITHOUT SCIATICA: ICD-10-CM

## 2022-12-27 DIAGNOSIS — J45.40 MODERATE PERSISTENT ASTHMA, UNSPECIFIED WHETHER COMPLICATED: ICD-10-CM

## 2022-12-27 RX ORDER — ASPIRIN 81 MG/1
TABLET, COATED ORAL
Qty: 30 TABLET | Refills: 3 | Status: SHIPPED | OUTPATIENT
Start: 2022-12-27 | End: 2023-04-18

## 2022-12-27 RX ORDER — IPRATROPIUM BROMIDE AND ALBUTEROL SULFATE 2.5; .5 MG/3ML; MG/3ML
3 SOLUTION RESPIRATORY (INHALATION) 4 TIMES DAILY PRN
Qty: 480 ML | Refills: 3 | Status: SHIPPED | OUTPATIENT
Start: 2022-12-27 | End: 2024-06-04

## 2022-12-27 NOTE — TELEPHONE ENCOUNTER
"Routing refill request to provider for review/approval because:  Drug not on the [facility] refill protocol     Last Written Prescription Date:  12/16/2021  Last Fill Quantity: 270,  # refills: 3   Last office visit provider:  4/15/2022     Requested Prescriptions   Pending Prescriptions Disp Refills     gabapentin (NEURONTIN) 300 MG capsule [Pharmacy Med Name: GABAPENTIN 300 MG CAPS 300 Capsule] 90 capsule 3     Sig: TAKE 1 CAPSULE 3 TIMES DAILY // IB ZAUG NOJ 1 LUB, 1 HNUB NOJ 3 ZAUG       There is no refill protocol information for this order       Last Written Prescription Date:  10/17/2022  Last Fill Quantity: 30,  # refills: 1   Last office visit provider:  4/15/2022          ASPIRIN LOW DOSE 81 MG EC tablet [Pharmacy Med Name: ASPIRIN LOW DOSE 81 MG TBEC 81 Tablet] 30 tablet 1     Sig: TAKE 1 TABLET BY MOUTH DAILY FOR STROKE PREVENTION // IB HNUB NOJ 1 LUB PAB KOM NTSHAV KHIAV ZOO       Analgesics (Non-Narcotic Tylenol and ASA Only) Passed - 12/26/2022  9:48 AM        Passed - Recent (12 mo) or future (30 days) visit within the authorizing provider's specialty     Patient has had an office visit with the authorizing provider or a provider within the authorizing providers department within the previous 12 mos or has a future within next 30 days. See \"Patient Info\" tab in inbasket, or \"Choose Columns\" in Meds & Orders section of the refill encounter.              Passed - Patient is age 20 years or older     If ASA is flagged for ages under 20 years old. Forward to provider for confirmation Ryes Syndrome is not a concern.              Passed - Medication is active on med list             Tess Garrido RN 12/27/22 3:13 PM  "

## 2022-12-28 RX ORDER — GABAPENTIN 300 MG/1
CAPSULE ORAL
Qty: 90 CAPSULE | Refills: 3 | Status: SHIPPED | OUTPATIENT
Start: 2022-12-28 | End: 2023-04-18

## 2023-01-18 DIAGNOSIS — J45.40 MODERATE PERSISTENT ASTHMA, UNSPECIFIED WHETHER COMPLICATED: ICD-10-CM

## 2023-01-18 RX ORDER — MONTELUKAST SODIUM 10 MG/1
10 TABLET ORAL AT BEDTIME
Qty: 30 TABLET | Refills: 11 | Status: SHIPPED | OUTPATIENT
Start: 2023-01-18 | End: 2023-12-05

## 2023-02-06 ENCOUNTER — TRANSFERRED RECORDS (OUTPATIENT)
Dept: HEALTH INFORMATION MANAGEMENT | Facility: CLINIC | Age: 68
End: 2023-02-06

## 2023-02-06 LAB
CREATININE (EXTERNAL): 3.11 MG/DL (ref 0.7–1.35)
GFR ESTIMATED (EXTERNAL): 21 ML/MIN/1.73M2
GLUCOSE (EXTERNAL): 92 MG/DL (ref 65–99)
POTASSIUM (EXTERNAL): 5.2 MMOL/L (ref 3.5–5.3)

## 2023-02-14 ENCOUNTER — PATIENT OUTREACH (OUTPATIENT)
Dept: GERIATRIC MEDICINE | Facility: CLINIC | Age: 68
End: 2023-02-14
Payer: COMMERCIAL

## 2023-02-14 NOTE — PROGRESS NOTES
[facility] Care Coordination Contact    Received voice message from member informing care coordinator that he changed his number to [phone].    POC and EPIC updated.   CMS tasked to update JOSEPH.    Lexy Espinoza RN  [facility]  [phone]

## 2023-02-16 ENCOUNTER — APPOINTMENT (OUTPATIENT)
Dept: INTERPRETER SERVICES | Facility: CLINIC | Age: 68
End: 2023-02-16
Payer: COMMERCIAL

## 2023-03-09 ENCOUNTER — PATIENT OUTREACH (OUTPATIENT)
Dept: GERIATRIC MEDICINE | Facility: CLINIC | Age: 68
End: 2023-03-09
Payer: COMMERCIAL

## 2023-03-09 NOTE — PROGRESS NOTES
[facility] Care Coordination Contact      [facility] Six-Month Telephone Assessment    6 month telephone assessment completed on 3/9/2023.    ER visits: No  Hospitalizations: No  TCU stays: No  Significant health status changes: No  Falls/Injuries: No  ADL/IADL changes: No  Changes in services: No    Caregiver Assessment follow up:  N/A     Goals: See POC in chart for goal progress documentation.      Will see member in 6 months for an annual health risk assessment.   Encouraged member to call CC with any questions or concerns in the meantime.       Lexy Espnioza RN  [facility]  [phone]

## 2023-03-22 DIAGNOSIS — E78.1 PURE HYPERGLYCERIDEMIA: ICD-10-CM

## 2023-03-22 DIAGNOSIS — L84 CALLUS OF FOOT: ICD-10-CM

## 2023-03-22 RX ORDER — TRIAMCINOLONE ACETONIDE 5 MG/G
OINTMENT TOPICAL
Qty: 30 G | Refills: 2 | Status: SHIPPED | OUTPATIENT
Start: 2023-03-22 | End: 2023-09-13

## 2023-03-22 RX ORDER — FENOFIBRATE 145 MG/1
TABLET, COATED ORAL
Qty: 90 TABLET | Refills: 0 | Status: SHIPPED | OUTPATIENT
Start: 2023-03-22 | End: 2023-06-23

## 2023-03-23 NOTE — TELEPHONE ENCOUNTER
"tricor  Last Written Prescription Date:  3/8/22  Last Fill Quantity: 90,  # refills: 4   Last office visit provider:  4/15/22     kenalog  Last Written Prescription Date:  4/15/22  Last Fill Quantity: 30g,  # refills: 11   Last office visit provider:  4/15/22   Requested Prescriptions   Pending Prescriptions Disp Refills     fenofibrate (TRICOR) 145 MG tablet [Pharmacy Med Name: FENOFIBRATE 145 MG TABS 145 Tablet] 90 tablet 4     Sig: TAKE 1 TABLET BY MOUTH DAILY FOR CHOLESTEROL // IB HNUB NOJ 1 LUB PAB ROXANNE NTSHAV MUAJ ROJ       Fibrates Passed - 3/22/2023 11:27 PM        Passed - Lipid panel on file in past 12 months     Recent Labs   Lab Test 04/15/22  1521   CHOL 119   TRIG 203*   HDL 31*   LDL 47               Passed - No abnormal creatine kinase in past 12 months     Recent Labs   Lab Test 01/22/20  1320   CKT 87                Passed - Recent (12 mo) or future (30 days) visit within the authorizing provider's specialty     Patient has had an office visit with the authorizing provider or a provider within the authorizing providers department within the previous 12 mos or has a future within next 30 days. See \"Patient Info\" tab in inbasket, or \"Choose Columns\" in Meds & Orders section of the refill encounter.              Passed - Medication is active on med list        Passed - Patient is age 18 or older           triamcinolone (KENALOG) 0.5 % external ointment [Pharmacy Med Name: TRIAMCINOLONE 0.5% OINTMENT 0.5 Ointment] 30 g 11     Sig: APPLY TO HEELS TWO TIMES A DAY UNTIL SKIN IS SMOOTH./PLEEV 2 ZAUG TXHUA HNUB RAWS LI QHIA       Topical Steroids and Nonsteroidals Protocol Passed - 3/22/2023 11:27 PM        Passed - Patient is age 6 or older        Passed - Authorizing prescriber's most recent note related to this medication read.     If refill request is for ophthalmic use, please forward request to provider for approval.          Passed - High potency steroid not ordered        Passed - Recent (12 mo) or " "future (30 days) visit within the authorizing provider's specialty     Patient has had an office visit with the authorizing provider or a provider within the authorizing providers department within the previous 12 mos or has a future within next 30 days. See \"Patient Info\" tab in inbasket, or \"Choose Columns\" in Meds & Orders section of the refill encounter.              Passed - Medication is active on med list             Deborah Urias RN 03/22/23 11:27 PM  "

## 2023-04-18 DIAGNOSIS — F32.9 MAJOR DEPRESSIVE DISORDER, SINGLE EPISODE, UNSPECIFIED: ICD-10-CM

## 2023-04-18 DIAGNOSIS — I10 ESSENTIAL HYPERTENSION: ICD-10-CM

## 2023-04-18 DIAGNOSIS — G89.29 CHRONIC LEFT-SIDED LOW BACK PAIN WITHOUT SCIATICA: ICD-10-CM

## 2023-04-18 DIAGNOSIS — Z76.0 ENCOUNTER FOR MEDICATION REFILL: ICD-10-CM

## 2023-04-18 DIAGNOSIS — M54.50 CHRONIC LEFT-SIDED LOW BACK PAIN WITHOUT SCIATICA: ICD-10-CM

## 2023-04-18 DIAGNOSIS — E55.9 VITAMIN D DEFICIENCY: ICD-10-CM

## 2023-04-18 RX ORDER — GABAPENTIN 300 MG/1
CAPSULE ORAL
Qty: 90 CAPSULE | Refills: 3 | Status: SHIPPED | OUTPATIENT
Start: 2023-04-18 | End: 2023-06-07

## 2023-04-18 RX ORDER — DULOXETIN HYDROCHLORIDE 60 MG/1
CAPSULE, DELAYED RELEASE ORAL
Qty: 60 CAPSULE | Refills: 3 | Status: SHIPPED | OUTPATIENT
Start: 2023-04-18 | End: 2023-06-07

## 2023-04-18 RX ORDER — ASPIRIN 81 MG/1
TABLET, COATED ORAL
Qty: 30 TABLET | Refills: 3 | Status: SHIPPED | OUTPATIENT
Start: 2023-04-18 | End: 2023-07-25

## 2023-04-18 RX ORDER — ERGOCALCIFEROL 1.25 MG/1
CAPSULE, LIQUID FILLED ORAL
Qty: 4 CAPSULE | Refills: 4 | Status: SHIPPED | OUTPATIENT
Start: 2023-04-18 | End: 2023-08-21

## 2023-04-26 ENCOUNTER — TRANSFERRED RECORDS (OUTPATIENT)
Dept: HEALTH INFORMATION MANAGEMENT | Facility: CLINIC | Age: 68
End: 2023-04-26
Payer: COMMERCIAL

## 2023-04-28 ENCOUNTER — OFFICE VISIT (OUTPATIENT)
Dept: FAMILY MEDICINE | Facility: CLINIC | Age: 68
End: 2023-04-28
Payer: COMMERCIAL

## 2023-04-28 ENCOUNTER — ANCILLARY PROCEDURE (OUTPATIENT)
Dept: GENERAL RADIOLOGY | Facility: CLINIC | Age: 68
End: 2023-04-28
Attending: FAMILY MEDICINE
Payer: COMMERCIAL

## 2023-04-28 VITALS
TEMPERATURE: 98 F | HEIGHT: 65 IN | WEIGHT: 177 LBS | HEART RATE: 85 BPM | BODY MASS INDEX: 29.49 KG/M2 | SYSTOLIC BLOOD PRESSURE: 128 MMHG | DIASTOLIC BLOOD PRESSURE: 78 MMHG | OXYGEN SATURATION: 98 % | RESPIRATION RATE: 16 BRPM

## 2023-04-28 DIAGNOSIS — N18.32 STAGE 3B CHRONIC KIDNEY DISEASE (H): ICD-10-CM

## 2023-04-28 DIAGNOSIS — R63.4 WEIGHT LOSS: ICD-10-CM

## 2023-04-28 DIAGNOSIS — K21.9 GASTROESOPHAGEAL REFLUX DISEASE WITHOUT ESOPHAGITIS: ICD-10-CM

## 2023-04-28 DIAGNOSIS — R10.32 LEFT LOWER QUADRANT PAIN: ICD-10-CM

## 2023-04-28 DIAGNOSIS — Z23 ENCOUNTER FOR IMMUNIZATION: ICD-10-CM

## 2023-04-28 DIAGNOSIS — I10 BENIGN ESSENTIAL HTN: Primary | ICD-10-CM

## 2023-04-28 DIAGNOSIS — E78.1 PURE HYPERGLYCERIDEMIA: ICD-10-CM

## 2023-04-28 DIAGNOSIS — F32.1 MODERATE MAJOR DEPRESSION (H): ICD-10-CM

## 2023-04-28 DIAGNOSIS — J42 CHRONIC BRONCHITIS, UNSPECIFIED CHRONIC BRONCHITIS TYPE (H): ICD-10-CM

## 2023-04-28 LAB
ALBUMIN SERPL BCG-MCNC: 4.5 G/DL (ref 3.5–5.2)
ALP SERPL-CCNC: 73 U/L (ref 40–129)
ALT SERPL W P-5'-P-CCNC: 37 U/L (ref 10–50)
ANION GAP SERPL CALCULATED.3IONS-SCNC: 11 MMOL/L (ref 7–15)
AST SERPL W P-5'-P-CCNC: 24 U/L (ref 10–50)
BILIRUB SERPL-MCNC: 0.5 MG/DL
BUN SERPL-MCNC: 67.5 MG/DL (ref 8–23)
CALCIUM SERPL-MCNC: 9.4 MG/DL (ref 8.8–10.2)
CHLORIDE SERPL-SCNC: 112 MMOL/L (ref 98–107)
CHOLEST SERPL-MCNC: 100 MG/DL
CREAT SERPL-MCNC: 4.84 MG/DL (ref 0.67–1.17)
CREAT UR-MCNC: 83.3 MG/DL
DEPRECATED CALCIDIOL+CALCIFEROL SERPL-MC: 30 UG/L (ref 20–75)
DEPRECATED HCO3 PLAS-SCNC: 14 MMOL/L (ref 22–29)
ERYTHROCYTE [DISTWIDTH] IN BLOOD BY AUTOMATED COUNT: 12.8 % (ref 10–15)
GFR SERPL CREATININE-BSD FRML MDRD: 12 ML/MIN/1.73M2
GLUCOSE SERPL-MCNC: 98 MG/DL (ref 70–99)
HBA1C MFR BLD: 5.9 % (ref 0–5.6)
HCT VFR BLD AUTO: 38.9 % (ref 40–53)
HDLC SERPL-MCNC: 25 MG/DL
HGB BLD-MCNC: 12.7 G/DL (ref 13.3–17.7)
LDLC SERPL CALC-MCNC: 38 MG/DL
MCH RBC QN AUTO: 29.6 PG (ref 26.5–33)
MCHC RBC AUTO-ENTMCNC: 32.6 G/DL (ref 31.5–36.5)
MCV RBC AUTO: 91 FL (ref 78–100)
MICROALBUMIN UR-MCNC: 257 MG/L
MICROALBUMIN/CREAT UR: 308.52 MG/G CR (ref 0–17)
NONHDLC SERPL-MCNC: 75 MG/DL
PLATELET # BLD AUTO: 154 10E3/UL (ref 150–450)
POTASSIUM SERPL-SCNC: 5.8 MMOL/L (ref 3.4–5.3)
PROT SERPL-MCNC: 8.8 G/DL (ref 6.4–8.3)
RBC # BLD AUTO: 4.29 10E6/UL (ref 4.4–5.9)
SODIUM SERPL-SCNC: 137 MMOL/L (ref 136–145)
TRIGL SERPL-MCNC: 184 MG/DL
TSH SERPL DL<=0.005 MIU/L-ACNC: 1.88 UIU/ML (ref 0.3–4.2)
WBC # BLD AUTO: 8.6 10E3/UL (ref 4–11)

## 2023-04-28 PROCEDURE — 71046 X-RAY EXAM CHEST 2 VIEWS: CPT | Mod: TC | Performed by: RADIOLOGY

## 2023-04-28 PROCEDURE — 85027 COMPLETE CBC AUTOMATED: CPT | Performed by: FAMILY MEDICINE

## 2023-04-28 PROCEDURE — 99214 OFFICE O/P EST MOD 30 MIN: CPT | Mod: 25 | Performed by: FAMILY MEDICINE

## 2023-04-28 PROCEDURE — 90677 PCV20 VACCINE IM: CPT | Performed by: FAMILY MEDICINE

## 2023-04-28 PROCEDURE — 36415 COLL VENOUS BLD VENIPUNCTURE: CPT | Performed by: FAMILY MEDICINE

## 2023-04-28 PROCEDURE — 80053 COMPREHEN METABOLIC PANEL: CPT | Performed by: FAMILY MEDICINE

## 2023-04-28 PROCEDURE — 82570 ASSAY OF URINE CREATININE: CPT | Performed by: FAMILY MEDICINE

## 2023-04-28 PROCEDURE — 84443 ASSAY THYROID STIM HORMONE: CPT | Performed by: FAMILY MEDICINE

## 2023-04-28 PROCEDURE — 83036 HEMOGLOBIN GLYCOSYLATED A1C: CPT | Performed by: FAMILY MEDICINE

## 2023-04-28 PROCEDURE — 82043 UR ALBUMIN QUANTITATIVE: CPT | Performed by: FAMILY MEDICINE

## 2023-04-28 PROCEDURE — 90471 IMMUNIZATION ADMIN: CPT | Performed by: FAMILY MEDICINE

## 2023-04-28 PROCEDURE — 80061 LIPID PANEL: CPT | Performed by: FAMILY MEDICINE

## 2023-04-28 PROCEDURE — 82306 VITAMIN D 25 HYDROXY: CPT | Performed by: FAMILY MEDICINE

## 2023-04-28 RX ORDER — AMMONIUM LACTATE 12 G/100G
CREAM TOPICAL
COMMUNITY
Start: 2022-06-15 | End: 2023-09-13

## 2023-04-28 RX ORDER — AMLODIPINE BESYLATE 10 MG/1
10 TABLET ORAL DAILY
COMMUNITY
End: 2023-06-07

## 2023-04-28 RX ORDER — MIRTAZAPINE 7.5 MG/1
7.5 TABLET, FILM COATED ORAL AT BEDTIME
Qty: 30 TABLET | Refills: 3 | Status: SHIPPED | OUTPATIENT
Start: 2023-04-28 | End: 2023-06-07

## 2023-04-28 RX ORDER — LISINOPRIL 2.5 MG/1
TABLET ORAL
COMMUNITY
Start: 2023-04-18 | End: 2023-06-01 | Stop reason: SINTOL

## 2023-04-28 ASSESSMENT — PATIENT HEALTH QUESTIONNAIRE - PHQ9
10. IF YOU CHECKED OFF ANY PROBLEMS, HOW DIFFICULT HAVE THESE PROBLEMS MADE IT FOR YOU TO DO YOUR WORK, TAKE CARE OF THINGS AT HOME, OR GET ALONG WITH OTHER PEOPLE: VERY DIFFICULT
SUM OF ALL RESPONSES TO PHQ QUESTIONS 1-9: 13
SUM OF ALL RESPONSES TO PHQ QUESTIONS 1-9: 13

## 2023-04-28 NOTE — LETTER
23    RE: Venita Sanchez  : 1955    To Whom It May Concern:    Venita Sanchez has been a patient of mine for over 5 years now.  He has significant health conditions that are progressing with time.  He is starting to have limitations in his ability to enjoy his family and life due this his chronic progressive health diagnosis.      His family is trying to have a family reunion this summer in his honor before he gets too sick.  He would like his adopted daughter See Alexis to come visit during this time from [facility].      Please help Venita Sanchez and his family sponsor See Alexis to come visit for a couple weeks this year so that she can see her dad before he gets too sick to interact with her.      Here is a list of his medical problems.  Of note his chronic kidney disease is becoming worse and his chronic lung disease is also progressing limiting his ability to breath and his energy level.     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     Sincerely,      Electronically signed by:  Sylvia Lugo MD

## 2023-04-28 NOTE — LETTER
Venita Sanchez  [address] [place] [address]        May 25, 2023      Dear Venita Fisher,    As a valued [facility] patient, your healthcare needs are our priority.    Your health care team has determined that you are due for an appointment  Pulmonary .   We encourage you to call or schedule an appointment with your primary care provider to discuss your overdue screening and schedule an appointment.     If you already have had your screening performed at another health care facility, please ask that practice to send your results to [facility] [phone] and we will update your health records. This will ensure you receive the best possible care from our providers.      If you have any questions or need help with scheduling, please call the [facility] at [phone].        Yours in health,       Your care team at [facility]

## 2023-04-28 NOTE — PROGRESS NOTES
[facility]  [facility]     Assessment/Plan:  Stage 3b chronic kidney disease (H)  Continue focus on glucose, lipid and blood pressure control.  Continue to monitor for anemia and vitamin D.  Pt counselled on avoiding NSAIDS and other over-the-counter medications without talking with doctor first.  Discussed importance of hydration.  Unclear reason for big jump in his Cr.  Mild bump in K.  will have him hold his lisinopril, push fluids and recheck labs.  May  Need repeat imaging follow-up from renal MRI 12/2022.  May need follow-up with nephrology     Creatinine   Date Value Ref Range Status   04/28/2023 4.84 (H) 0.67 - 1.17 mg/dL Final      GFR Estimate   Date Value Ref Range Status   04/28/2023 12 (L) >60 mL/min/1.73m2 Final     Comment:     eGFR calculated using 2021 CKD-EPI equation.   01/18/2021 39 (L) >60 mL/min/1.73m2 Final     Hemoglobin   Date Value Ref Range Status   04/28/2023 12.7 (L) 13.3 - 17.7 g/dL Final     Vitamin D Deficiency Screening Results:  Lab Results   Component Value Date    VITDT 30 04/28/2023    VITDT 27 (L) 08/04/2021     Hemoglobin A1C   Date Value Ref Range Status   04/28/2023 5.9 (H) 0.0 - 5.6 % Final     Comment:     Normal <5.7%   Prediabetes 5.7-6.4%    Diabetes 6.5% or higher     Note: Adopted from ADA consensus guidelines.     LDL Cholesterol Calculated   Date Value Ref Range Status   04/28/2023 38 <=100 mg/dL Final     LDL Cholesterol Direct   Date Value Ref Range Status   10/20/2011 62 <130 mg/dL Final       - Albumin Random Urine Quantitative with Creat Ratio  - Comprehensive metabolic panel (BMP + Alb, Alk Phos, ALT, AST, Total. Bili, TP)  - CBC with platelets  - Vitamin D Deficiency  - PRIMARY CARE FOLLOW-UP SCHEDULING  - Med Therapy Management Referral  - Primary Care - Care Coordination Referral  - Comprehensive metabolic panel (BMP + Alb, Alk Phos, ALT, AST, Total. Bili, TP)  - CBC with platelets  - Vitamin D Deficiency  -  Albumin Random Urine Quantitative with Creat Ratio  - Basic metabolic panel  (Ca, Cl, CO2, Creat, Gluc, K, Na, BUN)    Pure hyperglyceridemia  Continue tricor 145mg daily  - Lipid panel reflex to direct LDL Non-fasting  - Lipid panel reflex to direct LDL Non-fasting    Chronic bronchitis, unspecified chronic bronchitis type (H)  Follow-up with pulmonary as scheduled.      Moderate major depression (H)  Continue duloxetine 60mg daily  Klonopin prn anxiety- related to breathing   Gabapentin 300mg tid   - Med Therapy Management Referral  - mirtazapine (REMERON) 7.5 MG tablet  Dispense: 30 tablet; Refill: 3    Benign essential HTN  BP Readings from Last 3 Encounters:   05/10/23 118/70   04/28/23 128/78   09/30/22 122/68        controlled today.  Plan for bloodpressure management includes ongoing focus on healthy DASH type diet and increased activity, encouraged to avoid tobacco products and limit alcohol use, stress reduction, stop ace-I at this point. Continue amlodipine 10mg daily  Labwork and meds ordered and reviewed as below  Potassium   Date Value Ref Range Status   04/28/2023 5.8 (H) 3.4 - 5.3 mmol/L Final   04/15/2022 4.5 3.5 - 5.0 mmol/L Final      Creatinine   Date Value Ref Range Status   04/28/2023 4.84 (H) 0.67 - 1.17 mg/dL Final        - PRIMARY CARE FOLLOW-UP SCHEDULING  - Med Therapy Management Referral    Weight loss  Check for thyroid dysfunction, diabetes, lung infection or malignancy  Will check TB blood test later this month with next lab draw- neg 2020  - TSH with free T4 reflex  - Hemoglobin A1c  - XR Chest 2 Views  - TSH with free T4 reflex  - Hemoglobin A1c    Encounter for immunization  - Pneumococcal 20 Valent Conjugate (PCV20)  - COVID-19,PF,PFIZER BOOSTER BIVALENT (12+YRS)    Left lower quadrant pain  Checking labs- consider imaging next.  No contrast     Gastroesophageal reflux disease without esophagitis  - omeprazole (PRILOSEC) 20 MG DR capsule  Dispense: 30 capsule; Refill: 11        Follow-up Visit   Expected date:  Jul 28, 2023 (Approximate)      Follow Up Appointment Details:     Follow-up with whom?: PCP    Follow-Up for what?: Medicare Wellness    Any Additional Chronic Condition Management?:  CKD  Depression  Hypertension  Hyperlipidemia       Welcome or Annual?: Annual Wellness    How?: In Person                    Subjective   Venita Fisher is a 67 year old, presenting for the following health issues:  Anorexia (Going on since March)    Missed pulmonary apt this month- needs to reschedule  Working with nephrology on ckd 3b-4.  Enrolled in their kidney pathway program with follow-up next month.     Margy able to eat well since March.  Has sharp pain in left ribs that makes it painful to eat.  Has had this in the past but was able to eat okay but this time seems different.  Much more tired.  Nauseated easily.  Last seen by pcp 4/2022 and weight 191lb, 7/2021 wt 195lb.   Worried about lungs. Has had LUQ pain - abd xray 2021 with lots of bowel gas noted.  Known chronic left pleural effusion. Normal BM  Not taking PPI     Hard to sleep at night- never sure if he is going to die over night.  Wants niece to come and visit before he dies. He brings in a letter requesting his medical records so that he can sponsor his adopted daughter Mick Espinoza who is 28yo living in [facility] so that they can have a family reunion/celebration of life in his honor before he gets too sick.  He is applying to be her sponsor for her 14 day trip.      Lots of problems with his insurance- hard to get meds, whenever he goes to pharmacy he is told that insurance won't pain.      Declines covid booster         4/28/2023     7:04 AM   Additional Questions   Roomed by Rebekah   Accompanied by daughter     History of Present Illness       Reason for visit:  Loss of appetite    He eats 0-1 servings of fruits and vegetables daily.He consumes 0 sweetened beverage(s) daily.He exercises with enough effort to increase his heart rate 9 or less  "minutes per day.  He exercises with enough effort to increase his heart rate 3 or less days per week.   He is taking medications regularly.    Today's PHQ-9         PHQ-9 Total Score: 13    PHQ-9 Q9 Thoughts of better off dead/self-harm past 2 weeks :   Not at all    How difficult have these problems made it for you to do your work, take care of things at home, or get along with other people: Very difficult         Review of Systems       Objective    /78 (BP Location: Right arm, Patient Position: Sitting, Cuff Size: Adult Large)   Pulse 85   Temp 98  F (36.7  C) (Temporal)   Resp 16   Ht 1.651 m (5' 5\")   Wt 80.3 kg (177 lb)   SpO2 98%   BMI 29.45 kg/m    Body mass index is 29.45 kg/m .  Physical Exam   Wt Readings from Last 3 Encounters:   05/10/23 79.4 kg (175 lb)   04/28/23 80.3 kg (177 lb)   09/30/22 82.8 kg (182 lb 9 oz)       No LMP for male patient.    All normal as below except abnormalities include: decreased to absent breasth sounds in left lung.  mildy tender in LLQ but abd benign otherwise.  No obvious hernia.  No rebound/gaurding/masses, etc.  Hoarse quality to his voice.   General is a  67 year old sitting comfortably in no apparent distress wearing a mask   HEENT:   Eye exams within normal   Neck: Supple without lymphadenopathy or thyromegally  CV: Regular rate and rhythm S1S2 without rubs, murmurs or gallops,   Lungs: Clear to auscultation bilaterally  Abd:  +BS, soft NT/ND,  No masses or organomegally,   Extremities: Warm, No Edema, 2+ Pedal and radial pulses bilaterally  Skin: No lesions or rashes noted  Neuro: Able to ambulate around the exam room with equal movement, strength and normal coordination of the upper and lower extremeties symmetrically    Independent historian: daughter helps with some history details.      History summarized from1-2:Nephrology note from 4/2023 reviewed- amlodipine and lisinopril for bp management.  Elevated hgb related to polycythemia vera.  Enrolled in " kidney care program and follow-up in 6 months.  No concerns for renal cancer at this point.    Old Records-1: Outside allergies, meds, problems and immunizations were reconciled as needed from CareEverywhere  Radiology tests reviewed-1: MRI abd 12/2022 looking at renal mass- stable and benign findings.    Lab tests reviewed-1: [id number]      Sylvia Lugo MD          Prior to immunization administration, verified patients identity using patient s name and date of birth. Please see Immunization Activity for additional information.     Screening Questionnaire for Adult Immunization    Are you sick today?   No   Do you have allergies to medications, food, a vaccine component or latex?   No   Have you ever had a serious reaction after receiving a vaccination?   Yes   Do you have a long-term health problem with heart, lung, kidney, or metabolic disease (e.g., diabetes), asthma, a blood disorder, no spleen, complement component deficiency, a cochlear implant, or a spinal fluid leak?  Are you on long-term aspirin therapy?   Yes   Do you have cancer, leukemia, HIV/AIDS, or any other immune system problem?   No   Do you have a parent, brother, or sister with an immune system problem?   No   In the past 3 months, have you taken medications that affect  your immune system, such as prednisone, other steroids, or anticancer drugs; drugs for the treatment of rheumatoid arthritis, Crohn s disease, or psoriasis; or have you had radiation treatments?   No   Have you had a seizure, or a brain or other nervous system problem?   No   During the past year, have you received a transfusion of blood or blood    products, or been given immune (gamma) globulin or antiviral drug?   No   For women: Are you pregnant or is there a chance you could become       pregnant during the next month?   No   Have you received any vaccinations in the past 4 weeks?   No     Immunization questionnaire was positive for at least one answer.  Notified   Brittney.    Screening performed by Rebekah Luna on 4/28/2023 at 7:07 AM.

## 2023-04-28 NOTE — LETTER
My Depression Action Plan  Name: Venita Sanchez   Date of Birth 1955  Date: 4/28/2023    My doctor: Sylvia Lugo   My clinic: [facility] [address] [place] [id number] [phone]            GREEN    ZONE   Good Control    What it looks like:   Things are going generally well. You have normal ups and downs. You may even feel depressed from time to time, but bad moods usually last less than a day.   What you need to do:  Continue to care for yourself (see self care plan)  Check your depression survival kit and update it as needed  Follow your physician s recommendations including any medication.  Do not stop taking medication unless you consult with your physician first.             YELLOW         ZONE Getting Worse    What it looks like:   Depression is starting to interfere with your life.   It may be hard to get out of bed; you may be starting to isolate yourself from others.  Symptoms of depression are starting to last most all day and this has happened for several days.   You may have suicidal thoughts but they are not constant.   What you need to do:     Call your care team. Your response to treatment will improve if you keep your care team informed of your progress. Yellow periods are signs an adjustment may need to be made.     Continue your self-care.  Just get dressed and ready for the day.  Don't give yourself time to talk yourself out of it.    Talk to someone in your support network.    Open up your Depression Self-Care Plan/Wellness Kit.             RED    ZONE Medical Alert - Get Help    What it looks like:   Depression is seriously interfering with your life.   You may experience these or other symptoms: You can t get out of bed most days, can t work or engage in other necessary activities, you have trouble taking care of basic hygiene, or basic responsibilities, thoughts of suicide or death that will not go away, self-injurious behavior.      What you need to do:  Call your care team and request a same-day appointment. If they are not available (weekends or after hours) call your local crisis line, emergency room or 911.          Depression Self-Care Plan / Wellness Kit    Many people find that medication and therapy are helpful treatments for managing depression. In addition, making small changes to your everyday life can help to boost your mood and improve your wellbeing. Below are some tips for you to consider. Be sure to talk with your medical provider and/or behavioral health consultant if your symptoms are worsening or not improving.     Sleep   Sleep hygiene  means all of the habits that support good, restful sleep. It includes maintaining a consistent bedtime and wake time, using your bedroom only for sleeping or sex, and keeping the bedroom dark and free of distractions like a computer, smartphone, or television.     Develop a Healthy Routine  Maintain good hygiene. Get out of bed in the morning, make your bed, brush your teeth, take a shower, and get dressed. Don t spend too much time viewing media that makes you feel stressed. Find time to relax each day.    Exercise  Get some form of exercise every day. This will help reduce pain and release endorphins, the  feel good  chemicals in your brain. It can be as simple as just going for a walk or doing some gardening, anything that will get you moving.      Diet  Strive to eat healthy foods, including fruits and vegetables. Drink plenty of water. Avoid excessive sugar, caffeine, alcohol, and other mood-altering substances.     Stay Connected with Others  Stay in touch with friends and family members.    Manage Your Mood  Try deep breathing, massage therapy, biofeedback, or meditation. Take part in fun activities when you can. Try to find something to smile about each day.     Psychotherapy  Be open to working with a therapist if your provider recommends it.     Medication  Be sure to take your  medication as prescribed. Most anti-depressants need to be taken every day. It usually takes several weeks for medications to work. Not all medicines work for all people. It is important to follow-up with your provider to make sure you have a treatment plan that is working for you. Do not stop your medication abruptly without first discussing it with your provider.    Crisis Resources   These hotlines are for both adults and children. They and are open 24 hours a day, 7 days a week unless noted otherwise.    National Suicide Prevention Lifeline   988 or 1-769-555-INEI (1447)    Crisis Text Line    www.crisistextline.org  Text HOME to 910112 from anywhere in the [place], anytime, about any type of crisis. A live, trained crisis counselor will receive the text and respond quickly.    [facility] Lifeline for LGBTQ Youth  A national crisis intervention and suicide lifeline for LGBTQ youth under 25. Provides a safe place to talk without judgement. Call 1-265.987.6900; text START to 943757 or visit www.the[employer]project.org to talk to a trained counselor.    For [facility] crisis numbers, visit the [facility] website at:  https://[place].gov/dhs/people-we-serve/adults/health-care/mental-health/resources/crisis-contacts.jsp

## 2023-05-09 ENCOUNTER — PATIENT OUTREACH (OUTPATIENT)
Dept: GERIATRIC MEDICINE | Facility: CLINIC | Age: 68
End: 2023-05-09
Payer: COMMERCIAL

## 2023-05-09 NOTE — PROGRESS NOTES
[facility] Care Coordination Contact    After couple attempts, care coordinator was able to reach member. Talked to member today and assisted him with scheduling Pulmonology visit with Dr. Quinn. Appt is scheduled for 5/10/23 at 10am. Member had missed visit in 2023.  Member reported that when his children assisted to re-scheduled missed visit, the clinic won't let re- schedule since he just saw PCP. Member reported that he is waiting until after a  for a daughter in law before he will make appointment to get dialysis access to start dialysis. The  is May 29th so appointment will have to be some time after that. He understands that he will contact care coordinator to help him make appt sooner if he feels worse.    In the past he had difficulty getting medications from [facility] pharmacy as there was a mistake with his [employer] ID on the [employer] end and [facility] pharmacy was unable to bill health plan for re-fills. CC had assisted him and gotten that corrected on [employer]'s end. Member stated that he hasn't had issues but knows that he needs to contact [facility] customer service if he has issues and for any services he wants to know if [facility] will cover.     Lexy Espinoza RN  [facility]  [phone]

## 2023-05-10 ENCOUNTER — OFFICE VISIT (OUTPATIENT)
Dept: PULMONOLOGY | Facility: CLINIC | Age: 68
End: 2023-05-10
Payer: COMMERCIAL

## 2023-05-10 VITALS
WEIGHT: 175 LBS | DIASTOLIC BLOOD PRESSURE: 70 MMHG | BODY MASS INDEX: 29.12 KG/M2 | OXYGEN SATURATION: 98 % | SYSTOLIC BLOOD PRESSURE: 118 MMHG | HEART RATE: 90 BPM

## 2023-05-10 DIAGNOSIS — J30.1 ALLERGIC RHINITIS DUE TO POLLEN, UNSPECIFIED SEASONALITY: ICD-10-CM

## 2023-05-10 PROCEDURE — 99214 OFFICE O/P EST MOD 30 MIN: CPT | Performed by: INTERNAL MEDICINE

## 2023-05-10 RX ORDER — FLUTICASONE PROPIONATE 50 MCG
2 SPRAY, SUSPENSION (ML) NASAL DAILY
Qty: 16 G | Refills: 11 | Status: SHIPPED | OUTPATIENT
Start: 2023-05-10 | End: 2024-03-18

## 2023-05-10 ASSESSMENT — ASTHMA QUESTIONNAIRES
QUESTION_1 LAST FOUR WEEKS HOW MUCH OF THE TIME DID YOUR ASTHMA KEEP YOU FROM GETTING AS MUCH DONE AT WORK, SCHOOL OR AT HOME: SOME OF THE TIME
QUESTION_5 LAST FOUR WEEKS HOW WOULD YOU RATE YOUR ASTHMA CONTROL: SOMEWHAT CONTROLLED
ACT_TOTALSCORE: 11
QUESTION_4 LAST FOUR WEEKS HOW OFTEN HAVE YOU USED YOUR RESCUE INHALER OR NEBULIZER MEDICATION (SUCH AS ALBUTEROL): ONE OR TWO TIMES PER DAY
ACT_TOTALSCORE: 11
QUESTION_3 LAST FOUR WEEKS HOW OFTEN DID YOUR ASTHMA SYMPTOMS (WHEEZING, COUGHING, SHORTNESS OF BREATH, CHEST TIGHTNESS OR PAIN) WAKE YOU UP AT NIGHT OR EARLIER THAN USUAL IN THE MORNING: FOUR OR MORE NIGHTS A WEEK
QUESTION_2 LAST FOUR WEEKS HOW OFTEN HAVE YOU HAD SHORTNESS OF BREATH: ONCE A DAY

## 2023-05-10 NOTE — PROGRESS NOTES
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     Chronic dyspnea, chronic left calcified pleural effusion/fibrothorax with adjacent bronchiectasis, Pseudomonas airway colonization, chronic rhinosinusitis: Overall Venita has been stable. The left lower chest/flank pain that has been present for over 3 years since a left chest tube at [facility] in 2020 has been improved but still present. Exertional dyspnea stable. He walks but no other major exercise. Has previously declined pulmonary rehab due to transportation issues. Nebs help. Occasional albuterol use. Feels that the cough stabilized since being treated for chronic rhinosinusitis and taking the thrice-weekly azithromycin. Recall that, at baseline, he has chronic exertional dyspnea and cough. Restrictive physiology on PFTs but may have an asthma component and has clinically responded to asthma therapy. Has a loculated left pleural effusion with partially calcified thickened rind, stable radiographically since 2014 and known to have been present since at least 2004 when the patient immigrated; notes he was \"very ill with a virus\" at age 10 and hospitalized, told he had a lung problem. These chronic, stable loculated effusions with calcified rind/fibrothorax are fairly common in the [place]  population, likely a sequela of past parasitic, bacterial or mycobacterial infection. Venita had worsening dyspnea and left chest pain in October 2020 and was hospitalized again at [facility] for 5 nights in December 2020, where they actually placed a left pleural pigtail catheter into the collection, which predictably showed a chronic chylothorax, as this effusion has been present for decades, at least since he immigrated in 2004, and is surrounded by a " calcified rind. Of course, the lung failed to fully expand with this pigtail catheter, which was subsequently removed. He had left lateral chest pain since this hospitalization, which slowly improved. He did not improve with LAMA, which was stopped.     Plan:  - continue budesonide-formoterol 160-4.5 mcg two inhalations two times a day; rinse/gargle/spit water after use; previously provided holding chamber with instruction on use  - continue azithromycin 250 mg every Monday, Wednesday and Friday  - continue nebulized ipratropium-albuterol QID and provided Aer[employer]ika flutter valve with instruction to use in-line with nebs  - continue montelukast 10 mg at bedtime  - continue loratadine 10 mg daily  - continue nasal fluticasone one spray in each nostril daily  - albuterol HFA as needed  - surgery would be morbid and unlikely to be successful at achieving left lung reexpansion  - have previously offered pulmonary rehabilitation several times, which he has declined due to transportation/scheduling issues  - stay up to date with respiratory vaccinations  - follow up in one year  - encouraged him to call any time with questions or concerning symptoms    Martir Quinn MD  Pulmonary and Critical Care Medicine  [facility]  Office [phone]  Pager [phone]  he/him    CCx: chronic dyspnea, chronic left calcified pleural effusion/fibrothorax with adjacent bronchiectasis, Pseudomonas airway colonization, chronic rhinosinusitis    HPI: 67 year old male never smoker with a history of chronic loculated left pleural effusion with calcified left fibrothorax with adjacent bronchiectasis, GERD, CKD, HTN, Pseudomonas airway colonization, presenting for follow-up. Overall Venita has been stable. The left lower chest/flank pain that has been present for over 3 years since a left chest tube at [place] in 2020 has been improved but still present. Exertional dyspnea stable. He walks but no other major exercise. Has  "previously declined pulmonary rehab due to transportation issues. Nebs help. Occasional albuterol use. Feels that the cough stabilized since being treated for chronic rhinosinusitis and taking the thrice-weekly azithromycin. Recall that, at baseline, he has chronic exertional dyspnea and cough. Restrictive physiology on PFTs but may have an asthma component and has clinically responded to asthma therapy. Has a loculated left pleural effusion with partially calcified thickened rind, stable radiographically since 2014 and known to have been present since at least 2004 when the patient immigrated; notes he was \"very ill with a virus\" at age 10 and hospitalized, told he had a lung problem. These chronic, stable loculated effusions with calcified rind/fibrothorax are fairly common in the [place]  population, likely a sequela of past parasitic, bacterial or mycobacterial infection. Venita had worsening dyspnea and left chest pain in October 2020 and was hospitalized again at [place] for 5 nights in December 2020, where they actually placed a left pleural pigtail catheter into the collection, which predictably showed a chronic chylothorax, as this effusion has been present for decades, at least since he immigrated in [place], and is surrounded by a calcified rind. Of course, the lung failed to fully expand with this pigtail catheter, which was subsequently removed. He had left lateral chest pain since this hospitalization, which slowly improved. He did not improve with LAMA, which was stopped.    ROS:  A 12-system review was obtained and was negative with the exception of the symptoms endorsed in the history of present illness.    PMH:  Chronic loculated left pleural effusion with calcified rind (fibrothorax) with chronic exudative changes of chylothorax on laboratory analysis and adjacent bronchiectasis  GERD  Possible chronic rhinosinusitis  Possible asthma  CKD  HTN    PSH:  Past Surgical History:   Procedure Laterality " Date     CATARACT EXTRACTION BILATERAL W/ ANTERIOR VITRECTOMY Right        Allergies:  Allergies   Allergen Reactions     Niacin Other (See Comments)       Family HX:  No family history on file.    Social Hx:  Social History     Socioeconomic History     Marital status:      Spouse name: Not on file     Number of children: Not on file     Years of education: Not on file     Highest education level: Not on file   Occupational History     Not on file   Tobacco Use     Smoking status: Never     Smokeless tobacco: Never   Vaping Use     Vaping status: Never Used   Substance and Sexual Activity     Alcohol use: No     Drug use: No     Sexual activity: Yes     Partners: Female     Birth control/protection: Post-menopausal   Other Topics Concern     Not on file   Social History Narrative    Lives with 2 sons, wife lives with other kids, 1 of his sons is home with him most of the time     Social Determinants of Health     Financial Resource Strain: Low Risk  (10/3/2022)    Overall Financial Resource Strain (CARDIA)      Difficulty of Paying Living Expenses: Not very hard   Food Insecurity: No Food Insecurity (10/3/2022)    Hunger Vital Sign      Worried About Running Out of Food in the Last Year: Never true      Ran Out of Food in the Last Year: Never true   Transportation Needs: No Transportation Needs (10/3/2022)    PRAPARE - Transportation      Lack of Transportation (Medical): No      Lack of Transportation (Non-Medical): No   Physical Activity: Not on file   Stress: Stress Concern Present (10/3/2022)    [employer] [place] [employer] - Occupational Stress Questionnaire      Feeling of Stress : To some extent   Social Connections: Not on file   Intimate Partner Violence: Not on file   Housing Stability: Unknown (10/3/2022)    Housing Stability Vital Sign      Unable to Pay for Housing in the Last Year: No      Number of Places Lived in the Last Year: Not on file      Unstable Housing in the Last Year:  No       Current Meds:  Current Outpatient Medications   Medication Sig Dispense Refill     albuterol (PROAIR HFA/PROVENTIL HFA/VENTOLIN HFA) 108 (90 Base) MCG/ACT inhaler Inhale 2 puffs into the lungs every 4 hours as needed for shortness of breath / dyspnea or wheezing 18 g 11     amLODIPine (NORVASC) 10 MG tablet Take 10 mg by mouth daily       ammonium lactate (AMLACTIN) 12 % external cream APPLY 1-2 GRAMS ON THE SKIN TWICE A DAY./PLEEV 1-2 GRAMS 2 ZAUG TXHUA HNUB RAWS LI QHIA       ASPIRIN LOW DOSE 81 MG EC tablet TAKE 1 TABLET BY MOUTH DAILY FOR STROKE PREVENTION // IB HNUB NOJ 1 LUB PAB KOM NTSHAV KHIAV ZOO 30 tablet 3     azithromycin (ZITHROMAX) 250 MG tablet Take 1 tablet (250 mg) by mouth Every Mon, Wed, Fri Morning 36 tablet 3     blood pressure monitor Kit [BLOOD PRESSURE MONITOR KIT] Use to check blood pressure daily 1 each 0     clonazePAM (KLONOPIN) 1 MG tablet TAKE 1 TABLET DAILY AS NEEDED FOR ANXIETY // IB HNUB NOJ 1 LUB YOG CEEB 20 tablet 0     DULoxetine (CYMBALTA) 60 MG capsule TAKE 2 CAPSULE DAILY // 1 HNUB NOJ 2 LUB 60 capsule 3     Emollient (AMLACTIN ULTRA) CREA Externally apply topically daily Apply to feet daily 60 g 11     fenofibrate (TRICOR) 145 MG tablet TAKE 1 TABLET BY MOUTH DAILY FOR CHOLESTEROL // IB HNUB NOJ 1 LUB PAB ROXANNE NTSHAV MUAJ ROJ 90 tablet 0     fluticasone (FLONASE) 50 MCG/ACT nasal spray Spray 2 sprays into both nostrils daily 16 g 11     gabapentin (NEURONTIN) 300 MG capsule TAKE 1 CAPSULE 3 TIMES DAILY // IB ZAUG NOJ 1 LUB, 1 HNUB NOJ 3 ZAUG 90 capsule 3     ipratropium - albuterol 0.5 mg/2.5 mg/3 mL (DUONEB) 0.5-2.5 (3) MG/3ML neb solution Take 1 vial (3 mLs) by nebulization 4 times daily as needed for shortness of breath or wheezing 480 mL 3     lisinopril (ZESTRIL) 2.5 MG tablet TAKE 1 TABLET DAILY FOR BLOOD PRESSURE OR KIDNEY // 1 HNUB NOJ 1 LUB PAB ROXANNE NTSHAV SIAB LO LUB RAUM.       loratadine (CLARITIN) 10 MG tablet Take 1 tablet (10 mg) by mouth daily 30 tablet  11     mirtazapine (REMERON) 7.5 MG tablet Take 1 tablet (7.5 mg) by mouth At Bedtime 30 tablet 3     montelukast (SINGULAIR) 10 MG tablet Take 1 tablet (10 mg) by mouth At Bedtime 30 tablet 11     Multiple Vitamin (TAB-A-GERARDO) TABS TAKE 1 TABLET BY MOUTH DAILY // IB HNUB NOJ 1 LUB 90 tablet 2     omeprazole (PRILOSEC) 20 MG DR capsule Take 1 capsule (20 mg) by mouth daily 30 capsule 11     SYMBICORT 160-4.5 MCG/ACT Inhaler INHALE 2 PUFFS TWICE DAILY. RINSE MOUTH AND SPIT OUT AFTERWARDS // 1 ZAUG NQUS 2 PAS, 1 HNU SIV 2 ZAUG. YAUG NCAUJ THAUM SIV TAS. 10.2 g 11     triamcinolone (KENALOG) 0.5 % external ointment APPLY TO HEELS TWO TIMES A DAY UNTIL SKIN IS SMOOTH./PLEEV 2 ZAUG TXHUA HNUB RAWS LI QHIA 30 g 2     vitamin D2 (ERGOCALCIFEROL) 28046 units (1250 mcg) capsule TAKE ONE CAPSULE WEEKLY//IB ASTHIV NOJ IB LUB. 4 capsule 4       Physical Exam:  /70 (BP Location: Left arm, Patient Position: Chair, Cuff Size: Adult Regular)   Pulse 90   Wt 79.4 kg (175 lb)   SpO2 98%   BMI 29.12 kg/m    Gen: alert, oriented, no distress  HEENT: nasal mucosa is unremarkable, no oropharyngeal lesions, no cervical or supraclavicular lymphadenopathy  CV: RRR, no M/G/R  Resp: diminished left base, no crackles/wheezes  Skin: no apparent rashes  Ext: no cyanosis, clubbing or edema  Neuro: alert, nonfocal    Labs:  Left pleural fluid analysis March 2020 and December 2020 at [place] showed chronic exudate with elevated triglyceride level, negative for malignancy and infection     Imaging studies:  Chest CT with contrast (February 2020, [place]):  FINDINGS:   A 12.8 x 8.1 x 14.9 cm (AP x lateral x SI; approximately 850ml) loculated,  complex fluid collection within the left pleural space demonstrates thin  peripheral septations, areas of internal fat (series 3, image 246) and higher  density heterogeneous internal components (series 3, image 313). No evidence of  internal gas or inflammatory changes surrounding this mass to  suggest  acute/aggressive infectious process, with preservation of the extrapleural fat  plane.  There is some  leftward mediastinal shift suggesting chronic fibrosis  and atelectasis in the left upper and lower lobes. No evidence of central  obstructing lesion.     Mildly prominent mediastinal and hilar lymph nodes, some of which are calcified.  These may be benign/reactive but are indeterminate by CT criteria. The central  left upper lobe bronchus appears obstructed (series 3 image 179) but no definite  endobronchial lesion is identified.    There are areas of probably chronic atelectasis in the right middle and lower  lobes with some peripheral bronchial mucous plugging.     Probable right adrenal adenoma. Probable 1.1 cm cyst superior left kidney  (series 5, image 84). No worrisome osseous lesions.    COMPARISON REPORT:  Images from the 06/18/2018 and 04/10/2014 examinations are now available. The  complex left pleural abnormality which currently measures 12.8 x 8.1 x 14.9 cm  has slightly increased in volume since the prior exams, measuring approximately  12.5 x 7.7 x 14 cm on 06/18/2019 and 11.8 x 7.0 x 13 cm on 04/10/2014. This is  approximately 850 mL currently compared with 750 on 06/18/2018 and 550 on  04/10/2014. Peripheral calcification has slightly increased but the  heterogeneous internal density is similar, with no new definite soft tissue  components. Compressive atelectasis in the left lung and shift the mediastinum  is also slightly greater.    The minimal lack of change over many years is highly suggestive of a benign  abnormality but the CT appearance is indeterminate. Given several other  left-sided pleural calcifications, a chronic/old empyema particularly by  atypical agents such as tuberculosis is most likely. A seroma or lymphangioma  might also be considered. Given some minimal fatty elements, a atypical  hamartoma could be potentially considered although the extensive fluid component  is  unusual.    The probable right adrenal adenoma is unchanged since 06/18/2018, but slightly  more apparent than on 04/10/2014.     1. Loculated, complex fluid collection with scattered peripheral calcification  in the left pleural space measures 12.8 x 8.1 x 14.9 cm. Given lack of  inflammatory or aggressive features and reported presence dating back at least  to outside CTA chest 06/08/2018, this mass is likely benign. This may be due to  previous trauma or infection and would be of usual for malignancy; however,  cannot entirely exclude the possibility of chronic low-grade infection such as  tuberculous empyema. No findings to suggest an acute infectious or aggressive  neoplastic process.  2.  Probable small right adrenal adenoma.     CT chest (December 2020, [place]):  - images directly reviewed, formal interpretation follows:  Since prior imaging, a new left chest pigtail catheter is in place with  substantial decrease in size of the loculated left pleural effusion seen  previously.  There is some ex vacuo pneumothorax with an extensive thick-walled rind along  the periphery of this cavity with areas of calcification.  Partial reexpansion of the left lower lobe.  Scattered bronchial wall thickening, lower lobe hypoinflation, and air trapping  is at least partly related to the phase of respiration (but there may be  reactive airways disease and/or an element of bronchitis).  Aortic valvular minimal coronary artery calcification.     CXR (April 2023):  - images directly reviewed, formal interpretation follows:  IMPRESSION: Chronic, loculated moderate left effusion is unchanged with associated compressive atelectasis on the left. Some pleural calcifications are also visible. Right lung is clear. Heart and pulmonary vascularity are normal.     TTE (August 2018):  - Normal left ventricular size and systolic performance with a visually estimated ejection fraction of 60%.  - There is mild aortic insufficiency.  - Normal  right ventricular size and systolic performance.      Pulmonary Function Testing  August 2018  FEV1/FVC is 74% and is normal.  FEV1 is 1.41L (55%) predicted and is reduced.  FVC is 1.91L (60%) predicted and reduced.  There was no improvement in spirometry after a single inhaled dose of bronchodilator.  TLC is 3.57L (58%) predicted and is reduced.  RV is 1.40L (60%) predicted and is reduced.  DLCO is 16.54ml/min/hg (64%) predicted and is reduced when it is corrected for hemoglobin.  Flow volume loops indicate no abnormalities.     October 2019  FEV1/FVC is 80 and is normal.  FEV1 is 56% predicted and is reduced.  FVC is 56% predicted and reduced.  There was no no improvement in spirometry after a single inhaled does of bronchodilator.  TLC is 56% predicted and is reduced.  RV is 63% predicted and is reduced.  DLCO is 58% predicted and is reduced when it   is corrected for hemoglobin.

## 2023-05-10 NOTE — PATIENT INSTRUCTIONS
It was good to see you in clinic today. This is what we discussed:    We will continue your current medications without change.  Stay active with exercise.  I will see you in about one year.  Contact me with questions or concerns.    Martir Quinn MD  Pulmonary and Critical Care Medicine  [facility]  Office [phone]

## 2023-05-17 ENCOUNTER — TELEPHONE (OUTPATIENT)
Dept: FAMILY MEDICINE | Facility: CLINIC | Age: 68
End: 2023-05-17
Payer: COMMERCIAL

## 2023-05-17 ENCOUNTER — APPOINTMENT (OUTPATIENT)
Dept: INTERPRETER SERVICES | Facility: CLINIC | Age: 68
End: 2023-05-17
Payer: COMMERCIAL

## 2023-05-17 NOTE — TELEPHONE ENCOUNTER
----- Message from Sylvia Lugo MD sent at 5/17/2023 12:18 PM CDT -----  Team - please call patient with results and send result letter with this information if patient desires for their records.     His kidneys have some damage and are doing much worse.   He seems very dehydrated. I would like him to push fluids and come in later this week or next to recheck his blood tests.  I may need him to see a kidney doctor     I'd like him to get an ultrasound of his belly next     He should hold his lisinopril for now     He has mild anemia     His cholesterol is healthy     No diabetes     Vitamin D is healthy     Normal thyroid test

## 2023-05-17 NOTE — RESULT ENCOUNTER NOTE
Team - please call patient with results and send result letter with this information if patient desires for their records.     His kidneys have some damage and are doing much worse.   He seems very dehydrated. I would like him to push fluids and come in later this week or next to recheck his blood tests.  I may need him to see a kidney doctor     I'd like him to get an ultrasound of his belly next     He should hold his lisinopril for now     He has mild anemia     His cholesterol is healthy     No diabetes     Vitamin D is healthy     Normal thyroid test

## 2023-05-17 NOTE — TELEPHONE ENCOUNTER
Left voicemail #1 for patient to return call. Please transfer return call to RN to go over abnormal results. Used [facility]  [place].

## 2023-05-18 NOTE — TELEPHONE ENCOUNTER
Called and spoke with patient using [employer]  Ke ID#[id number]. Relayed provider message--patient verbalized understanding and agreement with plan, denied questions.     Patient scheduled follow-up appt 6/1/23, declined to come in any sooner as his daughter passed away recently/[facility] preparations.

## 2023-05-23 ENCOUNTER — APPOINTMENT (OUTPATIENT)
Dept: INTERPRETER SERVICES | Facility: CLINIC | Age: 68
End: 2023-05-23
Payer: COMMERCIAL

## 2023-05-24 ENCOUNTER — APPOINTMENT (OUTPATIENT)
Dept: INTERPRETER SERVICES | Facility: CLINIC | Age: 68
End: 2023-05-24
Payer: COMMERCIAL

## 2023-05-25 NOTE — PROGRESS NOTES
Medication Therapy Management (MTM) Encounter    ASSESSMENT:                            Medication Adherence/Access: Overwhelmed with pill burden. Resistant to continuing medications. See detailed plan below.       Hypertension/CKD: blood pressure just at goal < 130/80. Pulse at goal . Hasn't been using Amlodipine. Recommended restarting at lower dose, but patient declined. States home blood pressure at better controlled.       Chronic Dyspnea: Breathing is stable. Prefers not to use Azithromycin. Reviewed indication for chronic rhinosinusitis and that cough had improved with starting. Pt agreeable to continuing.      Allergies: Denies ongoing symptoms. Okay with continuing these medicines.     GERD: Not using PPI daily - okay to continue to keep on hand for PRN use.          Mood: No Clonazepam in the past year. Removed from med list. Notes that mirtazapine was helpful for increasing appetite but he continues to be stressed and worried about his health. Declines using either Duloxetine or Mirtazapine. Taper instructions provided.        Pain:  Doesn't feel gabapentin or duloxetine have been helpful for pain. Declines to continue these medicines. Taper instructions were provided. Will reassess in 1 month, and consider restarting Duloxetine if pain worsens.       Low Vitamin D:  Reviewed that last levels were low normal. Reviewed reduced ability to convert Vit D to active form with CKD. Pt agreeable to continuing weekly supplement.       Hyperlipidemia: Last trigs at goal <300. Reviewed that CKD is a risk factor for development of CAD. Agreeable to continuing aspirin.           PLAN:                            1. Stop Mirtazapine.   2. Reduce Duloxetine 60 mg to 1 cap daily for 4 days (finish the supply you have). Then use Duloxetine 30 mg caps daily for 7 days, then stop.    3. Reduce Gabapentin to 1 cap daily x 3 days, then 1 cap every other day to complete your supply. (9 day taper)   4. Education provided.  Patient agreeable to continuing Vit D and Aspirin.       Follow-up: Return in about 5 weeks (around 7/12/2023) for Medication Management Pharmacist, in person.    SUBJECTIVE/OBJECTIVE:                          Venita Sanchez is a 67 year old male coming in for an initial visit. He was referred to me from Sylvia Lugo. Patient was accompanied by [facility] [employer] [facility]  by phone (ID# [id number]).      Previously seen by MTM in fall of 2021.     Reason for visit: Medication Management. Depression follow-up. No questions from patient.     Allergies/ADRs: Reviewed in chart  Past Medical History: Reviewed in chart  Tobacco: He reports that he has never smoked. He has never used smokeless tobacco.  Alcohol:  reports no history of alcohol use.            Medication Adherence/Access:      Feels overwhelmed by the number of medicines:     Has a bag of medicines that he would like to stop (hasn't stopped yet).   -Gabapentin   -Duloxetine   -Vit D   -Azithromycin - 5/10 #12 - 11 remain.   -Mirtazapine   -Aspirin   -Triamcinolone       Also has:     Sodium Bicarb, Omeprazole, Tab A nicole, Loratadine, Montelukast,     Missing: Amlodipine, Clonazepam, Fenofibrate (last filled 4/18 #90).       Hypertension/CKD: Prescribed Amlodipine 10 mg daily,     Is following with nephrology. Making preparation to eventually start dialysis. Has an appointment next week to discuss port access.     Doesn't have Amlodipine. States he hasn't been using for a couple of months. Declines to restart.     BP Readings from Last 3 Encounters:   06/07/23 130/78   06/01/23 100/80   05/10/23 118/70      Pulse Readings from Last 3 Encounters:   06/07/23 99   06/01/23 88   05/10/23 90     Wt Readings from Last 3 Encounters:   06/01/23 176 lb (79.8 kg)   05/10/23 175 lb (79.4 kg)   04/28/23 177 lb (80.3 kg)        Last Comprehensive Metabolic Panel:  Sodium   Date Value Ref Range Status   06/01/2023 140 136 - 145 mmol/L Final     Potassium   Date Value Ref  Range Status   06/01/2023 4.0 3.4 - 5.3 mmol/L Final   04/15/2022 4.5 3.5 - 5.0 mmol/L Final     Chloride   Date Value Ref Range Status   06/01/2023 108 (H) 98 - 107 mmol/L Final   04/15/2022 108 (H) 98 - 107 mmol/L Final     Carbon Dioxide (CO2)   Date Value Ref Range Status   06/01/2023 20 (L) 22 - 29 mmol/L Final   04/15/2022 23 22 - 31 mmol/L Final     Anion Gap   Date Value Ref Range Status   06/01/2023 12 7 - 15 mmol/L Final   04/15/2022 7 5 - 18 mmol/L Final     Glucose   Date Value Ref Range Status   06/01/2023 135 (H) 70 - 99 mg/dL Final   04/15/2022 83 70 - 125 mg/dL Final     Urea Nitrogen   Date Value Ref Range Status   06/01/2023 52.2 (H) 8.0 - 23.0 mg/dL Final   04/15/2022 24 (H) 8 - 22 mg/dL Final     Creatinine   Date Value Ref Range Status   06/01/2023 3.06 (H) 0.67 - 1.17 mg/dL Final     GFR Estimate   Date Value Ref Range Status   06/01/2023 22 (L) >60 mL/min/1.73m2 Final     Comment:     eGFR calculated using 2021 CKD-EPI equation.   01/18/2021 39 (L) >60 mL/min/1.73m2 Final     Calcium   Date Value Ref Range Status   06/01/2023 9.0 8.8 - 10.2 mg/dL Final          Chronic Dyspnea: Prescribed albuterol HFA 90 mcg 2 puffs every 4 hours as needed, Azithromycin 250 mg three times weekly, ipratropium-albuterol 0.5-2.5 mg 4 times daily as needed,  montelukast 10 mg daily,  Symbicort 160-4.5 mcg 2 puffs twice daily    Doesn't want to continue Azithromycin use.     Breathing is stable. No worse or better.       Allergies: Prescribed Flonase 50 mcg 2 spray each nostril daily as needed Loratadine 10 mg daily, montelukast 10 mg daily.     Allergies are off and on - worse with changes in the weather. Some days using Flonase 1 puff each nostril twice daily. Other days, not using at all.         GERD: Prescribed omeprazole 20 mg daily.     Denies heartburn concerns. Rarely uses this. Only when having heartburn symptoms every few days.         Mood: Prescribed Duloxetine 60 mg 2 caps daily, clonazepam 1 mg  daily as needed, recently started on Mirtazapine 7.5 mg daily at bedtime.     Does have stress related to health. Doesn't think about much other than his health concerns.   Sleep is okay.     Appetite a while ago was not good, but has seen a recent increase in appetite over the last couple of months.     No bottle of clonazepam - hasn't been filled in the last year per  and Outside Dispense history.          Pain: Prescribed duloxetine 60 mg 2 caps daily, gabapentin 300 mg 3 times daily    Continues to have pain when the medicine wears off. Doesn't feel like it's significantly helpful.           Low Vitamin D: Prescribed Vitamin D2 50,000 units once weekly.     Vitamin D Deficiency Screening Results:  Lab Results   Component Value Date    VITDT 30 04/28/2023    VITDT 27 (L) 08/04/2021           Hyperlipidemia: Fenofibrate 145 mg daily, aspirin 81 mg daily    Recent Labs   Lab Test 07/26/21  0937 05/21/19  1028   CHOL 195 109   HDL 40 32*    42   TRIG 255* 173*       The ASCVD Risk score (Gagandeep YEUNG, et al., 2019) failed to calculate for the following reasons:    The valid total cholesterol range is 130 to 320 mg/dL                    Today's Vitals: /78   Pulse 99   ----------------      I spent 60 minutes with this patient today. All changes were made via collaborative practice agreement with Sylvia Lugo MD. A copy of the visit note was provided to the patient's provider(s).    A summary of these recommendations was given to the patient.    Bry Corona, LudivinaD  Medication Therapy Management (MTM) Pharmacist  [facility]        Medication Therapy Recommendations  Chronic left-sided low back pain without sciatica    Current Medication: gabapentin (NEURONTIN) 300 MG capsule   Rationale: Condition refractory to medication - Ineffective medication - Effectiveness   Recommendation: Discontinue Medication   Status: Accepted per CPA         CKD (chronic kidney disease) stage 4, GFR  15-29 ml/min (H)    Current Medication: ASPIRIN LOW DOSE 81 MG EC tablet   Rationale: Patient prefers not to take - Adherence - Adherence   Recommendation: Provide Education   Status: Patient Agreed - Adherence/Education         Major depressive disorder with single episode, remission status unspecified    Current Medication: mirtazapine (REMERON) 7.5 MG tablet (Discontinued)   Rationale: Patient prefers not to take - Adherence - Adherence   Recommendation: Discontinue Medication   Status: Accepted per CPA         Vitamin D deficiency    Current Medication: vitamin D2 (ERGOCALCIFEROL) 18329 units (1250 mcg) capsule   Rationale: Patient prefers not to take - Adherence - Adherence   Recommendation: Provide Education   Status: Patient Agreed - Adherence/Education

## 2023-06-01 ENCOUNTER — OFFICE VISIT (OUTPATIENT)
Dept: FAMILY MEDICINE | Facility: CLINIC | Age: 68
End: 2023-06-01
Payer: COMMERCIAL

## 2023-06-01 ENCOUNTER — VIRTUAL VISIT (OUTPATIENT)
Dept: INTERPRETER SERVICES | Facility: CLINIC | Age: 68
End: 2023-06-01
Payer: COMMERCIAL

## 2023-06-01 VITALS
RESPIRATION RATE: 23 BRPM | DIASTOLIC BLOOD PRESSURE: 80 MMHG | OXYGEN SATURATION: 99 % | HEIGHT: 66 IN | SYSTOLIC BLOOD PRESSURE: 100 MMHG | WEIGHT: 176 LBS | BODY MASS INDEX: 28.28 KG/M2 | HEART RATE: 88 BPM

## 2023-06-01 DIAGNOSIS — Z71.89 ADVANCED DIRECTIVES, COUNSELING/DISCUSSION: ICD-10-CM

## 2023-06-01 DIAGNOSIS — E87.5 HYPERKALEMIA: ICD-10-CM

## 2023-06-01 DIAGNOSIS — N18.4 CKD (CHRONIC KIDNEY DISEASE) STAGE 4, GFR 15-29 ML/MIN (H): Primary | ICD-10-CM

## 2023-06-01 DIAGNOSIS — I10 BENIGN ESSENTIAL HTN: ICD-10-CM

## 2023-06-01 DIAGNOSIS — N18.4 CKD (CHRONIC KIDNEY DISEASE) STAGE 4, GFR 15-29 ML/MIN (H): ICD-10-CM

## 2023-06-01 LAB
ANION GAP SERPL CALCULATED.3IONS-SCNC: 12 MMOL/L (ref 7–15)
ATRIAL RATE - MUSE: 84 BPM
BUN SERPL-MCNC: 52.2 MG/DL (ref 8–23)
CALCIUM SERPL-MCNC: 9 MG/DL (ref 8.8–10.2)
CHLORIDE SERPL-SCNC: 108 MMOL/L (ref 98–107)
CREAT SERPL-MCNC: 3.06 MG/DL (ref 0.67–1.17)
DEPRECATED HCO3 PLAS-SCNC: 20 MMOL/L (ref 22–29)
DIASTOLIC BLOOD PRESSURE - MUSE: NORMAL MMHG
ERYTHROCYTE [DISTWIDTH] IN BLOOD BY AUTOMATED COUNT: 13.9 % (ref 10–15)
GFR SERPL CREATININE-BSD FRML MDRD: 22 ML/MIN/1.73M2
GLUCOSE SERPL-MCNC: 135 MG/DL (ref 70–99)
HCT VFR BLD AUTO: 31.8 % (ref 40–53)
HGB BLD-MCNC: 10.3 G/DL (ref 13.3–17.7)
INTERPRETATION ECG - MUSE: NORMAL
MCH RBC QN AUTO: 29.1 PG (ref 26.5–33)
MCHC RBC AUTO-ENTMCNC: 32.4 G/DL (ref 31.5–36.5)
MCV RBC AUTO: 90 FL (ref 78–100)
P AXIS - MUSE: 48 DEGREES
PLATELET # BLD AUTO: 169 10E3/UL (ref 150–450)
POTASSIUM SERPL-SCNC: 4 MMOL/L (ref 3.4–5.3)
PR INTERVAL - MUSE: 148 MS
QRS DURATION - MUSE: 92 MS
QT - MUSE: 380 MS
QTC - MUSE: 449 MS
R AXIS - MUSE: -23 DEGREES
RBC # BLD AUTO: 3.54 10E6/UL (ref 4.4–5.9)
SODIUM SERPL-SCNC: 140 MMOL/L (ref 136–145)
SYSTOLIC BLOOD PRESSURE - MUSE: NORMAL MMHG
T AXIS - MUSE: 13 DEGREES
VENTRICULAR RATE- MUSE: 84 BPM
WBC # BLD AUTO: 8.3 10E3/UL (ref 4–11)

## 2023-06-01 PROCEDURE — 80048 BASIC METABOLIC PNL TOTAL CA: CPT | Performed by: FAMILY MEDICINE

## 2023-06-01 PROCEDURE — 99215 OFFICE O/P EST HI 40 MIN: CPT | Performed by: FAMILY MEDICINE

## 2023-06-01 PROCEDURE — 93005 ELECTROCARDIOGRAM TRACING: CPT | Performed by: FAMILY MEDICINE

## 2023-06-01 PROCEDURE — 36415 COLL VENOUS BLD VENIPUNCTURE: CPT | Performed by: FAMILY MEDICINE

## 2023-06-01 PROCEDURE — 85027 COMPLETE CBC AUTOMATED: CPT | Performed by: FAMILY MEDICINE

## 2023-06-01 PROCEDURE — 86481 TB AG RESPONSE T-CELL SUSP: CPT | Performed by: FAMILY MEDICINE

## 2023-06-01 RX ORDER — SODIUM BICARBONATE 650 MG/1
TABLET ORAL
COMMUNITY
Start: 2023-05-24

## 2023-06-01 ASSESSMENT — PATIENT HEALTH QUESTIONNAIRE - PHQ9
SUM OF ALL RESPONSES TO PHQ QUESTIONS 1-9: 5
SUM OF ALL RESPONSES TO PHQ QUESTIONS 1-9: 5
10. IF YOU CHECKED OFF ANY PROBLEMS, HOW DIFFICULT HAVE THESE PROBLEMS MADE IT FOR YOU TO DO YOUR WORK, TAKE CARE OF THINGS AT HOME, OR GET ALONG WITH OTHER PEOPLE: NOT DIFFICULT AT ALL

## 2023-06-01 NOTE — PROGRESS NOTES
"  Assessment & Plan     CKD (chronic kidney disease) stage 4, GFR 15-29 ml/min (H)  Pt did stop lisinopril as recommended.  Seems to have started bicarb 2 pills tid.  Check labs as below- anticipate pt will get set up for fistula placement soon.  Check EKG preop.  Follow-up with nephrology tomorrow and next week as scheduled.    - Quantiferon TB Gold Plus  - EKG 12-lead, tracing only  - Basic metabolic panel  (Ca, Cl, CO2, Creat, Gluc, K, Na, BUN)  - CBC with platelets    Benign essential HTN  Well controlled- continue amlodipine 10mg daily     Hyperkalemia  Recheck labs today     Advanced directives, counseling/discussion  No document  Would trust his son Bryon Sanchez as primary or daughter Juan Sanchez as alternative.      67 minutes spent by me on the date of the encounter doing chart review, history and exam, documentation and further activities per the note       BMI:   Estimated body mass index is 28.63 kg/m  as calculated from the following:    Height as of this encounter: 1.67 m (5' 5.75\").    Weight as of this encounter: 79.8 kg (176 lb).     There are no Patient Instructions on file for this visit.    Sylvia Lugo MD  [facility]    Subjective   Venita Fisher is a 67 year old, presenting for the following health issues:  RECHECK    Pt and son both aware of worsening kidney disease.   Aware that he needs a tube put in his body for when he needs dialysis so he is ready.  Reviewed options of transplant, dialysis or declining services.  Pt notes that right now he has a good quality of life and life is worth living.     45yo Daughter-in-law passed away recently and the family has been busy coordinating  and family issues.       Leaning towards dialysis- has follow-up with kidney doctor to review process and get next steps planned.      Did get new med from kidney doctor to take 2 pills tid      Pt notes appetite is doing better.     Long discussion with son and pt today about CKD and " "dialysis, etc.       Lungs stable- no changes to meds.           6/1/2023     1:01 PM   Additional Questions   Roomed by M   Accompanied by son     History of Present Illness       CKD: He is not using over the counter pain medicine.     He eats 4 or more servings of fruits and vegetables daily.He consumes 0 sweetened beverage(s) daily.He exercises with enough effort to increase his heart rate 20 to 29 minutes per day.  He exercises with enough effort to increase his heart rate 4 days per week.   He is taking medications regularly.    Today's PHQ-9         PHQ-9 Total Score: 5    PHQ-9 Q9 Thoughts of better off dead/self-harm past 2 weeks :   Not at all    How difficult have these problems made it for you to do your work, take care of things at home, or get along with other people: Not difficult at all           Review of Systems       Objective    /80 (BP Location: Right arm, Patient Position: Sitting, Cuff Size: Adult Large)   Pulse 88   Resp 23   Ht 1.67 m (5' 5.75\")   Wt 79.8 kg (176 lb)   SpO2 99%   BMI 28.63 kg/m    Body mass index is 28.63 kg/m .  Physical Exam   Wt Readings from Last 3 Encounters:   06/01/23 79.8 kg (176 lb)   05/10/23 79.4 kg (175 lb)   04/28/23 80.3 kg (177 lb)       No LMP for male patient.    All normal as below except abnormalities include: pt appears well, limited exam today. Most of the time was spent discussing renal failure with pt and his son using phone interpter answering questions they had about transplant and dialysis.    General is a  67 year old sitting comfortably in no apparent distress  wearing a mask.  HEENT:  TM are clear bilaterally.  Eye exams within normal   Neck: Supple without lymphadenopathy or thyromegally  CV: Regular rate and rhythm S1S2 without rubs, murmurs or gallops,   Lungs: Clear to auscultation bilaterally  Abd:  +BS, soft NT/ND,  No masses or organomegally,   Extremities: Warm, No Edema, 2+ Pedal and radial pulses bilaterally  Skin: No " lesions or rashes noted  Neuro: Able to ambulate around the exam room with equal movement, strength and normal coordination of the upper and lower extremeties symmetrically      Independent historian: son helps with some details about follow-up planning    History summarized from1-2:Nephrology note- 5/2023 reviewed- Cr is progressing  Pt is close to needing dialysis.  Consider transplant.  Stopping ace-I.      Follow-up with pulmonary 5/2023 reviewed- stable.  Continue same meds.  No surgery indicated.      Old Records-1: Outside allergies, meds, problems and immunizations [facility]  Lab tests reviewed-1: 2023    Sylvia Lugo MD            Prior to immunization administration, verified patients identity using patient s name and date of birth. Please see Immunization Activity for additional information.     Screening Questionnaire for Adult Immunization    Are you sick today?   No   Do you have allergies to medications, food, a vaccine component or latex?   Yes   Have you ever had a serious reaction after receiving a vaccination?   No   Do you have a long-term health problem with heart, lung, kidney, or metabolic disease (e.g., diabetes), asthma, a blood disorder, no spleen, complement component deficiency, a cochlear implant, or a spinal fluid leak?  Are you on long-term aspirin therapy?   No   Do you have cancer, leukemia, HIV/AIDS, or any other immune system problem?   No   Do you have a parent, brother, or sister with an immune system problem?   No   In the past 3 months, have you taken medications that affect  your immune system, such as prednisone, other steroids, or anticancer drugs; drugs for the treatment of rheumatoid arthritis, Crohn s disease, or psoriasis; or have you had radiation treatments?   No   Have you had a seizure, or a brain or other nervous system problem?   No   During the past year, have you received a transfusion of blood or blood    products, or been  given immune (gamma) globulin or antiviral drug?   No   For women: Are you pregnant or is there a chance you could become       pregnant during the next month?   No   Have you received any vaccinations in the past 4 weeks?   No     Immunization questionnaire answers were all negative.      Injection of NA given by RUDI Mcgill MA. Patient instructed to remain in clinic for 15 minutes afterwards, and to report any adverse reactions.     Screening performed by RUDI Mcgill MA on 6/1/2023 at 1:06 PM.

## 2023-06-01 NOTE — PROGRESS NOTES
Pt and son both aware of worsening kidney disease.   Aware that he needs a tube put in his body for when he needs dialysis so he is ready.  Reviewed options of transplant, dialysis or declining services.  Pt notes that right now he has a good quality of life and life is worth living.     43yo Daughter-in-law passed away recently and the family has been busy coordinating  and family issues.       Leaning towards dialysis- has follow-up with kidney doctor to review process and get next steps planned.      Did get new med from kidney doctor to take 2 pills tid      Pt notes appetite is doing better.     Long discussion with son and pt today about CKD and dialysis, etc.       Lungs stable- no changes to meds.

## 2023-06-03 LAB
QUANTIFERON MITOGEN: 10 IU/ML
QUANTIFERON NIL TUBE: 0.2 IU/ML
QUANTIFERON TB1 TUBE: 0.07 IU/ML
QUANTIFERON TB2 TUBE: 0.07

## 2023-06-04 LAB
GAMMA INTERFERON BACKGROUND BLD IA-ACNC: 0.2 IU/ML
M TB IFN-G BLD-IMP: NEGATIVE
M TB IFN-G CD4+ BCKGRND COR BLD-ACNC: 9.8 IU/ML
MITOGEN IGNF BCKGRD COR BLD-ACNC: -0.13 IU/ML
MITOGEN IGNF BCKGRD COR BLD-ACNC: -0.13 IU/ML

## 2023-06-06 DIAGNOSIS — N18.4 ANEMIA DUE TO STAGE 4 CHRONIC KIDNEY DISEASE (H): Primary | ICD-10-CM

## 2023-06-06 DIAGNOSIS — D63.1 ANEMIA DUE TO STAGE 4 CHRONIC KIDNEY DISEASE (H): Primary | ICD-10-CM

## 2023-06-07 ENCOUNTER — TELEPHONE (OUTPATIENT)
Dept: FAMILY MEDICINE | Facility: CLINIC | Age: 68
End: 2023-06-07
Payer: COMMERCIAL

## 2023-06-07 ENCOUNTER — OFFICE VISIT (OUTPATIENT)
Dept: PHARMACY | Facility: CLINIC | Age: 68
End: 2023-06-07
Attending: FAMILY MEDICINE
Payer: COMMERCIAL

## 2023-06-07 ENCOUNTER — APPOINTMENT (OUTPATIENT)
Dept: INTERPRETER SERVICES | Facility: CLINIC | Age: 68
End: 2023-06-07
Payer: COMMERCIAL

## 2023-06-07 VITALS — HEART RATE: 99 BPM | SYSTOLIC BLOOD PRESSURE: 130 MMHG | DIASTOLIC BLOOD PRESSURE: 78 MMHG

## 2023-06-07 DIAGNOSIS — G89.29 CHRONIC LEFT-SIDED LOW BACK PAIN WITHOUT SCIATICA: ICD-10-CM

## 2023-06-07 DIAGNOSIS — E55.9 VITAMIN D DEFICIENCY: ICD-10-CM

## 2023-06-07 DIAGNOSIS — J45.40 MODERATE PERSISTENT ASTHMA, UNSPECIFIED WHETHER COMPLICATED: ICD-10-CM

## 2023-06-07 DIAGNOSIS — J30.1 ALLERGIC RHINITIS DUE TO POLLEN, UNSPECIFIED SEASONALITY: ICD-10-CM

## 2023-06-07 DIAGNOSIS — F32.9 MAJOR DEPRESSIVE DISORDER WITH SINGLE EPISODE, REMISSION STATUS UNSPECIFIED: ICD-10-CM

## 2023-06-07 DIAGNOSIS — N18.4 CKD (CHRONIC KIDNEY DISEASE) STAGE 4, GFR 15-29 ML/MIN (H): Primary | ICD-10-CM

## 2023-06-07 DIAGNOSIS — M54.50 CHRONIC LEFT-SIDED LOW BACK PAIN WITHOUT SCIATICA: ICD-10-CM

## 2023-06-07 DIAGNOSIS — E78.1 PURE HYPERGLYCERIDEMIA: ICD-10-CM

## 2023-06-07 DIAGNOSIS — K21.9 GASTROESOPHAGEAL REFLUX DISEASE WITHOUT ESOPHAGITIS: ICD-10-CM

## 2023-06-07 PROCEDURE — 99607 MTMS BY PHARM ADDL 15 MIN: CPT | Performed by: PHARMACIST

## 2023-06-07 PROCEDURE — 99605 MTMS BY PHARM NP 15 MIN: CPT | Performed by: PHARMACIST

## 2023-06-07 RX ORDER — DULOXETIN HYDROCHLORIDE 30 MG/1
30 CAPSULE, DELAYED RELEASE ORAL AT BEDTIME
Qty: 7 CAPSULE | Refills: 0 | Status: SHIPPED | OUTPATIENT
Start: 2023-06-07 | End: 2024-04-18

## 2023-06-07 RX ORDER — GABAPENTIN 300 MG/1
CAPSULE ORAL
Qty: 6 CAPSULE | Refills: 0
Start: 2023-06-07 | End: 2023-06-27

## 2023-06-07 NOTE — TELEPHONE ENCOUNTER
----- Message from Sylvia Lugo MD sent at 6/6/2023  7:26 PM CDT -----  Team - please call patient with results and send result letter with this information if patient desires for their records.     His kidneys are still not working well but better than last month.      Because his kidneys are not working well you are not able to make blood as well and are getting anemic.  The kidney doctor will help you monitor this.     He does NOT have TB hiding in his body making him sick

## 2023-06-07 NOTE — TELEPHONE ENCOUNTER
Left voicemail for patient to return call to clinic using [employer]  #[phone]. Please relay provider message.

## 2023-06-07 NOTE — RESULT ENCOUNTER NOTE
Team - please call patient with results and send result letter with this information if patient desires for their records.     His kidneys are still not working well but better than last month.      Because his kidneys are not working well you are not able to make blood as well and are getting anemic.  The kidney doctor will help you monitor this.     He does NOT have TB hiding in his body making him sick

## 2023-06-07 NOTE — PATIENT INSTRUCTIONS
"Recommendations from today's MTM visit:                                                           1.  Stop Mirtazapine.   2. Reduce Duloxetine 60 mg to 1 cap daily for 4 days (finish the supply you have). Then use Duloxetine 30 mg caps daily for 7 days, then stop.    3. Reduce Gabapentin to 1 cap daily x 3 days, then 1 cap every other day to complete your supply. (9 day taper)     Follow-up: Return in about 5 weeks (around 7/12/2023) for Medication Management Pharmacist, in person.    It was great speaking with you today.  I value your experience and would be very thankful for your time in providing feedback in our clinic survey. In the next few days, you may receive an email or text message from [employer] with a link to a survey related to your  clinical pharmacist.\"     To schedule another MTM appointment, please call the clinic directly or you may call the MTM scheduling line at [phone] or toll-free at [phone].     My Clinical Pharmacist's contact information:                                                      Please feel free to contact me with any questions or concerns you have.      Bry Corona, PharmD  Medication Therapy Management (MTM) Pharmacist  [facility]      "

## 2023-06-08 NOTE — TELEPHONE ENCOUNTER
Called patient and relayed message. He verbalized understanding, reports he had appointment with nephrology 2 days ago.

## 2023-06-12 DIAGNOSIS — G89.29 CHRONIC LEFT-SIDED LOW BACK PAIN WITHOUT SCIATICA: ICD-10-CM

## 2023-06-12 DIAGNOSIS — M54.50 CHRONIC LEFT-SIDED LOW BACK PAIN WITHOUT SCIATICA: ICD-10-CM

## 2023-06-12 NOTE — TELEPHONE ENCOUNTER
"Routing refill request to provider for review/approval because:  Looks like order is to stop after 7 days    Last Written Prescription Date:  6/7/2023  Last Fill Quantity: 7,  # refills: 0   Last office visit provider:  6/1/2023     Requested Prescriptions   Pending Prescriptions Disp Refills     DULoxetine (CYMBALTA) 30 MG capsule [Pharmacy Med Name: DULOXETINE HCL 30 MG CPEP 30 Capsule] 7 capsule 0     Sig: TAKE 1 CAPSULE (30 MG) BY MOUTH AT BEDTIME FOR 7 DAYS THEN STOP./IB HNUB NOJ 1 LUB TXHUA HMO ROXANNE 7 HNUB.       Serotonin-Norepinephrine Reuptake Inhibitors  Passed - 6/12/2023 11:46 AM        Passed - Blood pressure under 140/90 in past 12 months     BP Readings from Last 3 Encounters:   06/07/23 130/78   06/01/23 100/80   05/10/23 118/70                 Passed - Recent (12 mo) or future (30 days) visit within the authorizing provider's specialty     Patient has had an office visit with the authorizing provider or a provider within the authorizing providers department within the previous 12 mos or has a future within next 30 days. See \"Patient Info\" tab in inbasket, or \"Choose Columns\" in Meds & Orders section of the refill encounter.              Passed - Medication is active on med list        Passed - Patient is age 18 or older             Heather Randhawa RN 06/12/23 6:48 PM  "

## 2023-06-13 RX ORDER — DULOXETIN HYDROCHLORIDE 30 MG/1
CAPSULE, DELAYED RELEASE ORAL
Qty: 7 CAPSULE | Refills: 0 | OUTPATIENT
Start: 2023-06-13

## 2023-06-23 DIAGNOSIS — J45.40 MODERATE PERSISTENT ASTHMA: ICD-10-CM

## 2023-06-23 RX ORDER — ALBUTEROL SULFATE 90 UG/1
2 AEROSOL, METERED RESPIRATORY (INHALATION) EVERY 4 HOURS PRN
Qty: 18 G | Refills: 11 | Status: SHIPPED | OUTPATIENT
Start: 2023-06-23 | End: 2024-05-13

## 2023-07-24 DIAGNOSIS — J45.40 MODERATE PERSISTENT ASTHMA, UNSPECIFIED WHETHER COMPLICATED: ICD-10-CM

## 2023-07-24 DIAGNOSIS — I10 ESSENTIAL HYPERTENSION: ICD-10-CM

## 2023-07-24 NOTE — TELEPHONE ENCOUNTER
"Routing refill request to provider for review/approval because:  ACT score 11 on 5/10/2023  Early refill request for Aspirin    Symbicort 160-4.5mcg/act Inhaler  Last Written Prescription Date:  8/10/2022  Last Fill Quantity: 10.2g,  # refills: 11   Last office visit provider:  6/1/2023     Aspirin Low Dose 81 Mg  Last Written Prescription Date:  4/18/2023  Last Fill Quantity: 30,  # refills: 3   Last office visit: 6/1/2023         Requested Prescriptions   Pending Prescriptions Disp Refills    SYMBICORT 160-4.5 MCG/ACT Inhaler [Pharmacy Med Name: SYMBICORT 160-4.5 MCG -4.5 Aerosol] 10.2 g 11     Sig: INHALE 2 PUFFS TWICE DAILY. RINSE MOUTH AND SPIT OUT AFTERWARDS // 1 ZAUG NQUS 2 PAS, 1 HNU SIV 2 ZAUG. YAUG NCAUJ THAUM SIV TAS.       Inhaled Steroids Protocol Failed - 7/24/2023 12:07 PM        Failed - Asthma control assessment score within normal limits in last 6 months     Please review ACT score.           Passed - Patient is age 12 or older        Passed - Medication is active on med list        Passed - Recent (6 mo) or future (30 days) visit within the authorizing provider's specialty     Patient had office visit in the last 6 months or has a visit in the next 30 days with authorizing provider or within the authorizing provider's specialty.  See \"Patient Info\" tab in inbasket, or \"Choose Columns\" in Meds & Orders section of the refill encounter.           Long-Acting Beta Agonist Inhalers Protocol  Failed - 7/24/2023 12:07 PM        Failed - Asthma control assessment score within normal limits in last 6 months     Please review ACT score.           Passed - Patient is age 12 or older        Passed - Order for Serevent, Striverdi, or Foradil and pt has steroid inhaler        Passed - Medication is active on med list        Passed - Recent (6 mo) or future (30 days) visit within the authorizing provider's specialty     Patient had office visit in the last 6 months or has a visit in the next 30 days with " "authorizing provider or within the authorizing provider's specialty.  See \"Patient Info\" tab in inbasket, or \"Choose Columns\" in Meds & Orders section of the refill encounter.              ASPIRIN LOW DOSE 81 MG EC tablet [Pharmacy Med Name: ASPIRIN LOW DOSE 81 MG TBEC 81 Tablet] 30 tablet 3     Sig: TAKE 1 TABLET BY MOUTH DAILY FOR STROKE PREVENTION // IB HNUB NOJ 1 LUB PAB KOM NTSHAV KHIAV ZOO       Analgesics (Non-Narcotic Tylenol and ASA Only) Passed - 7/24/2023 12:07 PM        Passed - Recent (12 mo) or future (30 days) visit within the authorizing provider's specialty     Patient has had an office visit with the authorizing provider or a provider within the authorizing providers department within the previous 12 mos or has a future within next 30 days. See \"Patient Info\" tab in inbasket, or \"Choose Columns\" in Meds & Orders section of the refill encounter.              Passed - Patient is age 20 years or older     If ASA is flagged for ages under 20 years old. Forward to provider for confirmation Ryes Syndrome is not a concern.              Passed - Medication is active on med list             Yenifer German RN 07/24/23 4:17 PM  "

## 2023-07-25 RX ORDER — ASPIRIN 81 MG/1
TABLET, COATED ORAL
Qty: 30 TABLET | Refills: 11 | Status: SHIPPED | OUTPATIENT
Start: 2023-07-25

## 2023-07-25 RX ORDER — BUDESONIDE AND FORMOTEROL FUMARATE DIHYDRATE 160; 4.5 UG/1; UG/1
AEROSOL RESPIRATORY (INHALATION)
Qty: 10.2 G | Refills: 11 | Status: SHIPPED | OUTPATIENT
Start: 2023-07-25 | End: 2024-06-04

## 2023-08-21 DIAGNOSIS — E55.9 VITAMIN D DEFICIENCY: ICD-10-CM

## 2023-08-21 RX ORDER — ERGOCALCIFEROL 1.25 MG/1
CAPSULE, LIQUID FILLED ORAL
Qty: 4 CAPSULE | Refills: 11 | Status: SHIPPED | OUTPATIENT
Start: 2023-08-21 | End: 2024-07-08

## 2023-08-28 ENCOUNTER — PATIENT OUTREACH (OUTPATIENT)
Dept: GERIATRIC MEDICINE | Facility: CLINIC | Age: 68
End: 2023-08-28
Payer: COMMERCIAL

## 2023-08-28 NOTE — PROGRESS NOTES
[facility] Care Coordination Contact    Called member to schedule annual HRA home visit. HRA has been scheduled for 9/5/23 at 10am.    Lexy Espinoza RN  [facility]  [phone]

## 2023-09-06 ENCOUNTER — TRANSFERRED RECORDS (OUTPATIENT)
Dept: HEALTH INFORMATION MANAGEMENT | Facility: CLINIC | Age: 68
End: 2023-09-06
Payer: COMMERCIAL

## 2023-09-09 ENCOUNTER — PATIENT OUTREACH (OUTPATIENT)
Dept: GERIATRIC MEDICINE | Facility: CLINIC | Age: 68
End: 2023-09-09
Payer: COMMERCIAL

## 2023-09-09 DIAGNOSIS — R06.02 SHORTNESS OF BREATH: Primary | ICD-10-CM

## 2023-09-09 DIAGNOSIS — M62.81 GENERALIZED MUSCLE WEAKNESS: ICD-10-CM

## 2023-09-09 SDOH — ECONOMIC STABILITY: FOOD INSECURITY: WITHIN THE PAST 12 MONTHS, YOU WORRIED THAT YOUR FOOD WOULD RUN OUT BEFORE YOU GOT MONEY TO BUY MORE.: NEVER TRUE

## 2023-09-09 SDOH — ECONOMIC STABILITY: FOOD INSECURITY: WITHIN THE PAST 12 MONTHS, THE FOOD YOU BOUGHT JUST DIDN'T LAST AND YOU DIDN'T HAVE MONEY TO GET MORE.: NEVER TRUE

## 2023-09-09 SDOH — ECONOMIC STABILITY: INCOME INSECURITY: IN THE LAST 12 MONTHS, WAS THERE A TIME WHEN YOU WERE NOT ABLE TO PAY THE MORTGAGE OR RENT ON TIME?: NO

## 2023-09-09 ASSESSMENT — LIFESTYLE VARIABLES
AUDIT-C TOTAL SCORE: 0
HOW OFTEN DO YOU HAVE A DRINK CONTAINING ALCOHOL: NEVER
SKIP TO QUESTIONS 9-10: 1
HOW MANY STANDARD DRINKS CONTAINING ALCOHOL DO YOU HAVE ON A TYPICAL DAY: PATIENT DOES NOT DRINK
HOW OFTEN DO YOU HAVE SIX OR MORE DRINKS ON ONE OCCASION: NEVER

## 2023-09-09 ASSESSMENT — PATIENT HEALTH QUESTIONNAIRE - PHQ9: SUM OF ALL RESPONSES TO PHQ QUESTIONS 1-9: 5

## 2023-09-09 NOTE — PROGRESS NOTES
[facility] Care Coordination Contact    [facility] Home Visit Assessment     Home visit for Health Risk Assessment with Venita Sanchez completed on September 5, 2023    Type of residence:: Private home - no stairs  Current living arrangement:: I live in a private home with family     Assessment completed with:: Patient    Current Care Plan  Member currently receiving the following home care services:     Member currently receiving the following community resources: PCA      Medication Review  Medication reconciliation completed in Epic: Yes  Medication set-up & administration: Independent-does not set up.  Self-administers medications.  Medication Risk Assessment Medication (1 or more, place referral to MTM): N/A: No risk factors identified  MTM Referral Placed: No: No risk factors idenified    Mental/Behavioral Health   Depression Screening:      PHQ-9 Total Score: 5    Mental health DX:: Yes   Mental health DX how managed:: Medication, Outpatient Counseling    Falls Assessment:   Fallen 2 or more times in the past year?: Yes   Any fall with injury in the past year?: No    ADL/IADL Dependencies:   Dependent ADLs:: Ambulation-cane, Bathing, Dressing, Eating, Grooming, Incontinence, Positioning, Transfers, Toileting  Dependent IADLs:: Cleaning, Cooking, Laundry, Shopping, Meal Preparation, Medication Management, Money Management, Transportation    [facility] sponsored benefits: Shared information re: Silver Sneakers/gym memberships, ASA, Calcium +D.    PCA Assessment completed at visit: Yes Annual PCA assessment indicated 6.25 hours per day of PCA. This is the same as the previous assessment.     Elderly Waiver Eligibility: Yes-will open to EW    Care Plan & Recommendations: member will continue with PCA services at 6.25 hrs/day to assist with ADLs and IADLs. Will open him to waiver for extended equipment. Member needs walker with seat due to shortness of breath and needing frequent rest when  walking.    See [facility]CC for detailed assessment information.    Follow-Up Plan: Member informed of future contact, plan to f/u with member with a 6 month telephone assessment.  Contact information shared with member and family, encouraged member to call with any questions or concerns at any time.    [place] care continuum providers: Please see Snapshot and Care Management Flowsheets for Specific details of care plan.    This CC note routed to PCP, Sylvia Lugo.    Lexy Espinoza RN  [facility]  [phone]

## 2023-09-13 PROBLEM — M62.81 GENERALIZED MUSCLE WEAKNESS: Status: ACTIVE | Noted: 2023-09-13

## 2023-09-13 NOTE — TELEPHONE ENCOUNTER
Pt should schedule follow-up with myself and mtm regarding his meds and due for follow-up     Need a diagnosis for his walker

## 2023-09-15 NOTE — PROGRESS NOTES
Called and informed member to schedule visit with PCP for walker and med review. Member states understanding.    Lexy Espinoza RN  [facility]  [phone]

## 2023-09-18 ENCOUNTER — PATIENT OUTREACH (OUTPATIENT)
Dept: GERIATRIC MEDICINE | Facility: CLINIC | Age: 68
End: 2023-09-18
Payer: COMMERCIAL

## 2023-09-18 NOTE — PROGRESS NOTES
[facility] Care Coordination Contact    Received after visit chart from care coordinator.  Completed following tasks: Mailed copy of care plan to client, Mailed Safe Medication Disposal , Mailed [facility] Leave Behind Letter, and Updated services in Database  , Provider Signature - No POC Shared:  Member indicates that they do not want their POC shared with any EW providers.      [facility]:  Emailed completed PCA assessment to [facility].  Faxed copy of PCA assessment to PCA Agency and mailed copy to member.  Faxed MD Communication to PCP.     Aiyana Blanco  Care Management Specialist  [facility]  [phone]

## 2023-09-18 NOTE — LETTER
September 18, 2023    RACHELL TRIMBLE  [address] [place] [address]        Dear Rachell Fisher:    At [facility], we re dedicated to improving your health and wellness. Enclosed is the Care Plan developed with you on 09/05/2023. Please review the Care Plan carefully.    As a reminder, during your visit we talked about:  Ways to manage your physical and mental health  Using health care to maintain and improve your health   Your preventive care needs     Remember to contact your care coordinator if you:  Are hospitalized, or plan to be hospitalized   Have a fall    Have a change in your physical or mental health  Need help finding support or services    If you have questions, or don t agree with your Care Plan, call me at [phone]. You can also call me if your needs change. TTY users, call the [place] [employer] at (492) or 1-782.695.4755 (dgrwmo-yl-xogtpi relay service).    Sincerely,          Lexy Espinoza RN    E-Mail:  [email or web address]  Phone: [phone]      [facility]    [id number] accepted    [id number] (07/2022)

## 2023-10-12 DIAGNOSIS — M54.50 CHRONIC LEFT-SIDED LOW BACK PAIN WITHOUT SCIATICA: ICD-10-CM

## 2023-10-12 DIAGNOSIS — J45.40 MODERATE PERSISTENT ASTHMA WITHOUT COMPLICATION: ICD-10-CM

## 2023-10-12 DIAGNOSIS — G89.29 CHRONIC LEFT-SIDED LOW BACK PAIN WITHOUT SCIATICA: ICD-10-CM

## 2023-10-12 DIAGNOSIS — J32.9 CHRONIC SINUSITIS, UNSPECIFIED LOCATION: ICD-10-CM

## 2023-10-12 RX ORDER — LORATADINE 10 MG/1
10 TABLET ORAL DAILY
Qty: 30 TABLET | Refills: 5 | Status: SHIPPED | OUTPATIENT
Start: 2023-10-12 | End: 2024-03-18

## 2023-10-12 RX ORDER — GABAPENTIN 300 MG/1
CAPSULE ORAL
Qty: 90 CAPSULE | Refills: 3 | Status: SHIPPED | OUTPATIENT
Start: 2023-10-12 | End: 2024-01-24

## 2023-11-14 ENCOUNTER — TRANSFERRED RECORDS (OUTPATIENT)
Dept: HEALTH INFORMATION MANAGEMENT | Facility: CLINIC | Age: 68
End: 2023-11-14
Payer: COMMERCIAL

## 2023-12-05 DIAGNOSIS — J45.40 MODERATE PERSISTENT ASTHMA, UNSPECIFIED WHETHER COMPLICATED: ICD-10-CM

## 2023-12-05 RX ORDER — MONTELUKAST SODIUM 10 MG/1
10 TABLET ORAL AT BEDTIME
Qty: 30 TABLET | Refills: 11 | Status: SHIPPED | OUTPATIENT
Start: 2023-12-05

## 2024-01-24 DIAGNOSIS — G89.29 CHRONIC LEFT-SIDED LOW BACK PAIN WITHOUT SCIATICA: ICD-10-CM

## 2024-01-24 DIAGNOSIS — M54.50 CHRONIC LEFT-SIDED LOW BACK PAIN WITHOUT SCIATICA: ICD-10-CM

## 2024-01-24 RX ORDER — GABAPENTIN 300 MG/1
CAPSULE ORAL
Qty: 90 CAPSULE | Refills: 3 | Status: SHIPPED | OUTPATIENT
Start: 2024-01-24 | End: 2024-05-13

## 2024-02-02 DIAGNOSIS — Z12.11 COLON CANCER SCREENING: ICD-10-CM

## 2024-02-16 ENCOUNTER — ORDERS ONLY (AUTO-RELEASED) (OUTPATIENT)
Dept: FAMILY MEDICINE | Facility: CLINIC | Age: 69
End: 2024-02-16
Payer: COMMERCIAL

## 2024-02-16 DIAGNOSIS — Z12.11 COLON CANCER SCREENING: ICD-10-CM

## 2024-02-21 ENCOUNTER — PATIENT OUTREACH (OUTPATIENT)
Dept: GERIATRIC MEDICINE | Facility: CLINIC | Age: 69
End: 2024-02-21
Payer: COMMERCIAL

## 2024-02-21 NOTE — PROGRESS NOTES
[facility] Care Coordination Contact      [facility] Six-Month Telephone Assessment    6 month telephone assessment completed on 2/21/24.    ER visits: No  Hospitalizations: No  TCU stays: No  Significant health status changes: no  Falls/Injuries: Yes: 1 fall in September. Injured knee and waiting to see PCP since knee pain is not getting better.  ADL/IADL changes: No  Changes in services: No    Caregiver Assessment follow up:  n/a    Goals: See POC in chart for goal progress documentation.      Will see member in 6 months for an annual health risk assessment.   Encouraged member to call CC with any questions or concerns in the meantime.     Lexy Espinoza RN  [facility]  [phone]

## 2024-02-28 ENCOUNTER — TELEPHONE (OUTPATIENT)
Dept: FAMILY MEDICINE | Facility: CLINIC | Age: 69
End: 2024-02-28
Payer: COMMERCIAL

## 2024-02-28 DIAGNOSIS — R19.5 POSITIVE COLORECTAL CANCER SCREENING USING COLOGUARD TEST: Primary | ICD-10-CM

## 2024-02-28 LAB — NONINV COLON CA DNA+OCC BLD SCRN STL QL: POSITIVE

## 2024-02-28 NOTE — RESULT ENCOUNTER NOTE
Team - please call patient with results and send result letter with this information if patient desires for their records.     His stool test was positive for the DNA that causes colon cancer   This means he May have colon cancer   The next step is to do a colonoscopy- this is very important to do  If we find it now it may be very small and won't make him sick  I have ordered this test  Please help him get this scheduled and help family with prep instructions when scheduled

## 2024-02-28 NOTE — TELEPHONE ENCOUNTER
Attempted to call patient & son Shayy--left voicemail for patient, unable to leave voicemail for son.   If either call back, please relay message below from Dr. Lugo.      ----- Message from Sylvia Lugo MD sent at 2/28/2024  8:26 AM CST -----  Team - please call patient with results and send result letter with this information if patient desires for their records.     His stool test was positive for the DNA that causes colon cancer   This means he May have colon cancer   The next step is to do a colonoscopy- this is very important to do  If we find it now it may be very small and won't make him sick  I have ordered this test  Please help him get this scheduled and help family with prep instructions when scheduled

## 2024-02-29 NOTE — TELEPHONE ENCOUNTER
Second attempt: Attempted to contact patient (left voicemail requesting return call) and son, Shayy (unable to leave message), with help of [place]  ID [id number].     If either calls back, please relay provider message.    ----- Message from Sylvia Lugo MD sent at 2/28/2024  8:26 AM CST -----  Team - please call patient with results and send result letter with this information if patient desires for their records.      His stool test was positive for the DNA that causes colon cancer   This means he May have colon cancer   The next step is to do a colonoscopy- this is very important to do  If we find it now it may be very small and won't make him sick  I have ordered this test  Please help him get this scheduled and help family with prep instructions when scheduled

## 2024-03-01 NOTE — TELEPHONE ENCOUNTER
Contacted patient using an  and relayed message below.  Gave patient phone number to [facility] to schedule an Colonoscopy.  Also transferred call to [facility] with  on the line  Faxed referral and + Cologuard test to [facility].  No further action needed.    Reina Logan RN  [facility]    ---- Message from Sylvia Lugo MD sent at 2/28/2024  8:26 AM CST -----  Team - please call patient with results and send result letter with this information if patient desires for their records.      His stool test was positive for the DNA that causes colon cancer   This means he May have colon cancer   The next step is to do a colonoscopy- this is very important to do  If we find it now it may be very small and won't make him sick  I have ordered this test  Please help him get this scheduled and help family with prep instructions when scheduled

## 2024-03-18 DIAGNOSIS — J45.40 MODERATE PERSISTENT ASTHMA WITHOUT COMPLICATION: ICD-10-CM

## 2024-03-18 DIAGNOSIS — J32.9 CHRONIC SINUSITIS, UNSPECIFIED LOCATION: ICD-10-CM

## 2024-03-18 DIAGNOSIS — J30.1 ALLERGIC RHINITIS DUE TO POLLEN, UNSPECIFIED SEASONALITY: ICD-10-CM

## 2024-03-18 RX ORDER — LORATADINE 10 MG/1
10 TABLET ORAL DAILY
Qty: 30 TABLET | Refills: 2 | Status: SHIPPED | OUTPATIENT
Start: 2024-03-18 | End: 2024-06-04

## 2024-03-18 RX ORDER — FLUTICASONE PROPIONATE 50 MCG
2 SPRAY, SUSPENSION (ML) NASAL DAILY
Qty: 16 G | Refills: 2 | Status: SHIPPED | OUTPATIENT
Start: 2024-03-18 | End: 2024-06-04

## 2024-03-27 ENCOUNTER — TRANSFERRED RECORDS (OUTPATIENT)
Dept: HEALTH INFORMATION MANAGEMENT | Facility: CLINIC | Age: 69
End: 2024-03-27
Payer: COMMERCIAL

## 2024-04-17 ENCOUNTER — TELEPHONE (OUTPATIENT)
Dept: FAMILY MEDICINE | Facility: CLINIC | Age: 69
End: 2024-04-17
Payer: COMMERCIAL

## 2024-04-17 ENCOUNTER — APPOINTMENT (OUTPATIENT)
Dept: INTERPRETER SERVICES | Facility: CLINIC | Age: 69
End: 2024-04-17
Payer: COMMERCIAL

## 2024-04-17 NOTE — TELEPHONE ENCOUNTER
Reason for Call:  Appointment Request    Patient requesting this type of appt: Pre-op    Requested provider:  Any provider    Reason patient unable to be scheduled: Not within requested timeframe    When does patient want to be seen/preferred time: 3-7 days    Comments: Pt has a colonoscopy on 4/26, needs a pre op. PCP schedule is fully booked. But pt has an annual wellness visit tomorrow with PCP, was hoping to see if PCP can also do a pre op with that. Please call pt's daughter Juan. She does not have (C2C on file) but she will call her dad for verbal consent. Thanks!    Okay to leave a detailed message?: Yes at Other phone number:  [phone]    Call taken on 4/17/2024 at 9:39 AM by Umberto Gongora

## 2024-04-18 ENCOUNTER — OFFICE VISIT (OUTPATIENT)
Dept: FAMILY MEDICINE | Facility: CLINIC | Age: 69
End: 2024-04-18
Payer: COMMERCIAL

## 2024-04-18 ENCOUNTER — ANCILLARY PROCEDURE (OUTPATIENT)
Dept: GENERAL RADIOLOGY | Facility: CLINIC | Age: 69
End: 2024-04-18
Attending: FAMILY MEDICINE
Payer: COMMERCIAL

## 2024-04-18 VITALS
HEIGHT: 66 IN | OXYGEN SATURATION: 99 % | DIASTOLIC BLOOD PRESSURE: 74 MMHG | TEMPERATURE: 97.7 F | BODY MASS INDEX: 28.28 KG/M2 | SYSTOLIC BLOOD PRESSURE: 112 MMHG | RESPIRATION RATE: 19 BRPM | HEART RATE: 79 BPM | WEIGHT: 176 LBS

## 2024-04-18 DIAGNOSIS — J94.1 FIBROTHORAX: ICD-10-CM

## 2024-04-18 DIAGNOSIS — N18.4 ANEMIA DUE TO STAGE 4 CHRONIC KIDNEY DISEASE (H): ICD-10-CM

## 2024-04-18 DIAGNOSIS — G89.29 CHRONIC RIGHT-SIDED THORACIC BACK PAIN: ICD-10-CM

## 2024-04-18 DIAGNOSIS — R19.5 POSITIVE COLORECTAL CANCER SCREENING USING COLOGUARD TEST: ICD-10-CM

## 2024-04-18 DIAGNOSIS — Z59.41 FOOD INSECURITY: ICD-10-CM

## 2024-04-18 DIAGNOSIS — Z00.00 ROUTINE GENERAL MEDICAL EXAMINATION AT A HEALTH CARE FACILITY: ICD-10-CM

## 2024-04-18 DIAGNOSIS — R06.83 SNORING: ICD-10-CM

## 2024-04-18 DIAGNOSIS — N18.4 CKD (CHRONIC KIDNEY DISEASE) STAGE 4, GFR 15-29 ML/MIN (H): Primary | ICD-10-CM

## 2024-04-18 DIAGNOSIS — E78.1 PURE HYPERGLYCERIDEMIA: ICD-10-CM

## 2024-04-18 DIAGNOSIS — G89.29 CHRONIC LEFT-SIDED LOW BACK PAIN WITHOUT SCIATICA: ICD-10-CM

## 2024-04-18 DIAGNOSIS — F32.1 MODERATE MAJOR DEPRESSION (H): ICD-10-CM

## 2024-04-18 DIAGNOSIS — M54.50 CHRONIC LEFT-SIDED LOW BACK PAIN WITHOUT SCIATICA: ICD-10-CM

## 2024-04-18 DIAGNOSIS — D63.1 ANEMIA DUE TO STAGE 4 CHRONIC KIDNEY DISEASE (H): ICD-10-CM

## 2024-04-18 DIAGNOSIS — M54.6 CHRONIC RIGHT-SIDED THORACIC BACK PAIN: ICD-10-CM

## 2024-04-18 DIAGNOSIS — Z01.818 PREOP GENERAL PHYSICAL EXAM: ICD-10-CM

## 2024-04-18 DIAGNOSIS — Z00.00 ENCOUNTER FOR MEDICARE ANNUAL WELLNESS EXAM: ICD-10-CM

## 2024-04-18 DIAGNOSIS — J42 CHRONIC BRONCHITIS, UNSPECIFIED CHRONIC BRONCHITIS TYPE (H): ICD-10-CM

## 2024-04-18 DIAGNOSIS — I10 BENIGN ESSENTIAL HTN: ICD-10-CM

## 2024-04-18 LAB
ERYTHROCYTE [DISTWIDTH] IN BLOOD BY AUTOMATED COUNT: 13 % (ref 10–15)
HBA1C MFR BLD: 5.8 % (ref 0–5.6)
HCT VFR BLD AUTO: 44.4 % (ref 40–53)
HGB BLD-MCNC: 14.2 G/DL (ref 13.3–17.7)
MCH RBC QN AUTO: 29.1 PG (ref 26.5–33)
MCHC RBC AUTO-ENTMCNC: 32 G/DL (ref 31.5–36.5)
MCV RBC AUTO: 91 FL (ref 78–100)
PLATELET # BLD AUTO: 148 10E3/UL (ref 150–450)
RBC # BLD AUTO: 4.88 10E6/UL (ref 4.4–5.9)
WBC # BLD AUTO: 8.1 10E3/UL (ref 4–11)

## 2024-04-18 PROCEDURE — T1013 SIGN LANG/ORAL INTERPRETER: HCPCS | Mod: U4

## 2024-04-18 PROCEDURE — 36415 COLL VENOUS BLD VENIPUNCTURE: CPT | Performed by: FAMILY MEDICINE

## 2024-04-18 PROCEDURE — 93005 ELECTROCARDIOGRAM TRACING: CPT | Performed by: FAMILY MEDICINE

## 2024-04-18 PROCEDURE — 99214 OFFICE O/P EST MOD 30 MIN: CPT | Mod: 25 | Performed by: FAMILY MEDICINE

## 2024-04-18 PROCEDURE — 99397 PER PM REEVAL EST PAT 65+ YR: CPT | Mod: 25 | Performed by: FAMILY MEDICINE

## 2024-04-18 PROCEDURE — 96372 THER/PROPH/DIAG INJ SC/IM: CPT | Performed by: FAMILY MEDICINE

## 2024-04-18 PROCEDURE — 80061 LIPID PANEL: CPT | Performed by: FAMILY MEDICINE

## 2024-04-18 PROCEDURE — 91320 SARSCV2 VAC 30MCG TRS-SUC IM: CPT | Performed by: FAMILY MEDICINE

## 2024-04-18 PROCEDURE — 85027 COMPLETE CBC AUTOMATED: CPT | Performed by: FAMILY MEDICINE

## 2024-04-18 PROCEDURE — 71046 X-RAY EXAM CHEST 2 VIEWS: CPT | Mod: TC | Performed by: RADIOLOGY

## 2024-04-18 PROCEDURE — 83036 HEMOGLOBIN GLYCOSYLATED A1C: CPT | Performed by: FAMILY MEDICINE

## 2024-04-18 PROCEDURE — 82570 ASSAY OF URINE CREATININE: CPT | Performed by: FAMILY MEDICINE

## 2024-04-18 PROCEDURE — 90480 ADMN SARSCOV2 VAC 1/ONLY CMP: CPT | Performed by: FAMILY MEDICINE

## 2024-04-18 PROCEDURE — 82043 UR ALBUMIN QUANTITATIVE: CPT | Performed by: FAMILY MEDICINE

## 2024-04-18 PROCEDURE — 90678 RSV VACC PREF BIVALENT IM: CPT | Performed by: FAMILY MEDICINE

## 2024-04-18 PROCEDURE — G0103 PSA SCREENING: HCPCS | Performed by: FAMILY MEDICINE

## 2024-04-18 PROCEDURE — 80053 COMPREHEN METABOLIC PANEL: CPT | Performed by: FAMILY MEDICINE

## 2024-04-18 PROCEDURE — T1013 SIGN LANG/ORAL INTERPRETER: HCPCS | Mod: U3

## 2024-04-18 RX ORDER — LISINOPRIL 2.5 MG/1
TABLET ORAL
Status: ON HOLD | COMMUNITY
Start: 2024-02-22 | End: 2024-04-26

## 2024-04-18 RX ORDER — LIDOCAINE PAIN RELIEF 40 MG/1000MG
PATCH TOPICAL
COMMUNITY
Start: 2024-04-15

## 2024-04-18 SDOH — HEALTH STABILITY: PHYSICAL HEALTH: ON AVERAGE, HOW MANY DAYS PER WEEK DO YOU ENGAGE IN MODERATE TO STRENUOUS EXERCISE (LIKE A BRISK WALK)?: 2 DAYS

## 2024-04-18 SDOH — ECONOMIC STABILITY - FOOD INSECURITY: FOOD INSECURITY: Z59.41

## 2024-04-18 ASSESSMENT — PATIENT HEALTH QUESTIONNAIRE - PHQ9
10. IF YOU CHECKED OFF ANY PROBLEMS, HOW DIFFICULT HAVE THESE PROBLEMS MADE IT FOR YOU TO DO YOUR WORK, TAKE CARE OF THINGS AT HOME, OR GET ALONG WITH OTHER PEOPLE: NOT DIFFICULT AT ALL
SUM OF ALL RESPONSES TO PHQ QUESTIONS 1-9: 12
SUM OF ALL RESPONSES TO PHQ QUESTIONS 1-9: 12

## 2024-04-18 ASSESSMENT — SOCIAL DETERMINANTS OF HEALTH (SDOH): HOW OFTEN DO YOU GET TOGETHER WITH FRIENDS OR RELATIVES?: ONCE A WEEK

## 2024-04-18 NOTE — COMMUNITY RESOURCES LIST (ENGLISH)
April 18, 2024           YOUR PERSONALIZED LIST OF SERVICES & PROGRAMS           NAVIGATION    Eligibility Screening      [facility] application assistance [facility]  [address] [place] [address] (Distance: 2.5 miles)  Phone: [phone]  Language: English, German  Fee: Free      [facility] application assistance  [address] [place] [address] (Distance: 2.5 miles)  Language: English  Fee: Free      [employer]  Phone: [phone]  Website: https://www.[employer] [facility].org/about-us/assister-program/navigators/index.jsp  Language: English  Hours: Mon 8:00 AM - 4:00 PM Tue 8:00 AM - 4:00 PM Wed 8:00 AM - 4:00 PM Thu 8:00 AM - 4:00 PM        ASSISTANCE    Nutrition Benefits      [facility] application assistance [facility]  [address] [place] [address] (Distance: 2.5 miles)  Phone: [phone]  Language: English, German  Fee: Free      House - Family Centers  [address] [place] [address] (Distance: 2.9 miles)  Phone: [phone]  Website: https://www.[facility].org  Language: English, Hmong, German  Fee: Free  Accessibility: Ada accessible, Blind accommodation, Deaf or hard of hearing, Translation services      [facility] [employer]  Phone: [phone]  Website: https://www.Cargo.io.org/programs/market-bucks/  Language: English  Hours: Mon 10:00 AM - 5:00 PM Tue 10:00 AM - 5:00 PM Wed 10:00 AM - 5:00 PM Thu 10:00 AM - 5:00 PM Fri 10:00 AM - 5:00 PM  Fee: Self pay    [facility]      Community Services - Food Shelf  [address] [address] (Distance: 2.6 miles)  Phone: [phone]  Website: http://www.PenteoSurround.Tradeshift  Language: English, German, Belarusian, Moldovan, Hmong  Fee: Free  Accessibility: Translation services      [facility]  [address] [place] [address]  (Distance: 0.7 miles)  Phone: [phone]  Website: https://[facility].org/find-support/partner-organizations/housing-assistance/  Language: English  Fee: Free  Accessibility: Ada accessible      [facility] - [facility]  Phone: (778) 107-1005  Website: https://www.advocateforTransport Pharmaceuticals.Ontodia  Language: English, Yoruba  Hours: Mon 8:00 AM - 5:00 PM Tue 8:00 AM - 5:00 PM Wed 8:00 AM - 5:00 PM Thu 8:00 AM - 5:00 PM Fri 8:00 AM - 5:00 PM  Fee: Free  Accessibility: Ada accessible        & SHELTER    Case Management      [facility] - Housing search assistance  [address] [place] [address] (Distance: 6.9 miles)  Phone: [phone]  Language: English, South Korean, Beninese, Hmong  Fee: Free  Accessibility: Ada accessible, Blind accommodation, Deaf or hard of hearing, Translation services      [facility]  [address] [place] [address] (Distance: 7.1 miles)  Phone: [phone]  Language: English  Fee: Free  Accessibility: Translation services, Ada accessible      [employer] - Housing Stabilization Services  Phone: [phone] [phone]  Website: https://homebasemn.com/  Language: English  Hours: Mon 8:00 AM - 4:00 PM Tue 8:00 AM - 4:00 PM Wed 8:00 AM - 4:00 PM Thu 8:00 AM - 4:00 PM Fri 8:00 AM - 4:00 PM  Fee: Free  Accessibility: Blind accommodation, Deaf or hard of hearing  Transportation Options: Free transportation    Payment Assistance      [facility] - Rent Payment Assistance - [facility]  [address] [place] [address] (Distance: 3.3 miles)  Phone: [phone]  Language: English  Fee: Free      [employer] - Keep the Key  Phone: (318) [phone]  Website: https://www.klg27-qripunfferhfzy.org/programs.html  Language: English  Hours: Mon 7:00 AM - 7:00 PM Tue 7:00 AM - 7:00 PM Wed 7:00 AM - 7:00 PM Thu 7:00 AM - 7:00 PM Fri 7:00 AM - 7:00 PM  Fee: Free      House -  Housing Stability  Phone: [phone] [phone]  Website: https://www.[facility].org  Language: English, Hmong, Mosotho  Hours: Mon 8:00 AM - 8:00 PM Tue 8:00 AM - 8:00 PM Wed 8:00 AM - 8:00 PM Thu 8:00 AM - 8:00 PM Fri 8:00 AM - 8:00 PM  Fee: Free  Accessibility: Ada accessible, Blind accommodation, Deaf or hard of hearing, Translation services    Mediation & Eviction Prevention      [facility] - Mortgage Foreclosure Prevention  [address] [place] [address] (Distance: 8.4 miles)  Phone: [phone]  Language: English  Fee: Free  Accessibility: Ada accessible      [employer] [facility] - Foreclosure Prevention  [address] [place] [address] (Distance: 5.8 miles)  Phone: [phone]  Website: http://www.[facility].org  Language: English, Mosotho, Hmong  Fee: Free, Sliding scale  Accessibility: Blind accommodation, Ada accessible      Line - Tenant Rights / Eviction Prevention  Website: https://homeTennessee Hospitals at Curlie.org/h-bzxm-om-/  Language: English, Mosotho            Medical Transportation, (NEMT)      [employer] - Transportation to medical appointments  [address] [address] (Distance: 4.9 miles)  Phone: [phone]  Website: https://www.Rover.com/  Language: English  Fee: Self pay, Insurance      [facility] Nurse Program - Transportation to medical appointments  [address] [place] [address] (Distance: 8.8 miles)  Phone: [phone]) [phone]  Website: http://NeighborMDpeacemn.org/  Language: English, Mosotho, Colombian  Fee: Sliding scale  Accessibility: Translation services      Social Service [facility] - Neighbor to Neighbor Program  Phone: (285) 314-4671  Email: eliezer@[facility].org  Website: https://www.[facility].org/services/older-adults/-services/neighbor-to-neighbor  Language: English  Hours: Mon 8:00 AM - 5:00 PM Tue 8:00 AM - 5:00 PM Wed 8:00 AM - 5:00 PM Thu 8:00 AM - 5:00 PM Fri 8:00 AM - 5:00  PM  Fee: Insurance, Self pay  Accessibility: Deaf or hard of hearing, Blind accommodation, Translation services    Expense Assistance      [place] - Transit Link  [address] [place] [address] (Distance: 4.6 miles)  Phone: (253) [phone]  Website: https://[facility] Designs/Transportation/Services/Transit-Link.aspx  Language: English  Fee: Self pay      Transit - [place] - Transit Assistance Program (TAP) - Transportation expense assistance  [address] [place] [address] (Distance: 4.6 miles)  Language: English, Nepalese  Fee: Free, Sliding scale, Self pay  Accessibility: Translation services      - Dislocated Worker/Adult [employer] Employment Program  Phone: (534) 697-3156  Email: patrice@[employer]  Website: https://[employer]/services/employment-services/dislocated-worker-program/  Language: English, Swedish  Hours: Mon 8:00 AM - 4:30 PM Tue 8:00 AM - 4:30 PM Wed 8:00 AM - 4:30 PM Thu 8:00 AM - 4:30 PM Fri 8:00 AM - 4:30 PM  Fee: Free  Accessibility: Ada accessible    Coordination      [employer] (TO) - Free or low-cost transportation  [address] [place] [address] (Distance: 3.5 miles)  Phone: [phone]) [phone]  Website: http://www.[facility]  Language: English  Fee: Free, Sliding scale  Transportation Options: Free transportation      [facility] Free Bus and Gas Cards - Free or low-cost transportation  [address] [place] [address] (Distance: 3.3 miles)  Phone: [phone]  Language: English  Fee: Free      - Services  Phone: (768) 755-2166  Website: https://Symptify/#howitworks-section  Hours: Sun 12:00 AM - 11:45 PM Mon 12:00 AM - 11:45 PM Tue 12:00 AM - 11:45 PM Wed 12:00 AM - 11:45 PM Thu 12:00 AM - 11:45 PM Fri 12:00 AM - 11:45 PM Sat 12:00 AM - 11:45 PM  Fee: Self pay  Accessibility: Ada accessible, Blind accommodation, Deaf or hard of hearing               IMPORTANT NUMBERS & WEBSITES        Emergency Services  911  .   United  Way  211 http://211unitedway.org  .   Poison Control  (971) 478-2634 http://[place]poison.org http://wis[place]poison.org  .     Suicide and Crisis Lifeline  988 http://988lifeline.org  .   Childhelp [place] Child Abuse Hotline  695.985.8599 http://Childhelphotline.org   .   [place] Sexual Assault Hotline  (183) 373-6591 (HOPE) http://Meineng Energyn.org   .     [place] Runaway Safeline  (870) 100-2607 (RUNAWAY) http://BBspaceruCrave.com.V[employer] Media  .   Pregnancy & Postpartum Support  Call/text 372-997-8208  [place]: http://ppsupportmn.org  [place]: http://psichapters.com/wi  .   Substance Abuse National Helpline ([facility])  527-848-HELP (7574) http://Findtreatment.gov   .                DISCLAIMER: These resources have been generated via the Aras Platform. Aras does not endorse any service providers mentioned in this resource list. Aras does not guarantee that the services mentioned in this resource list will be available to you or will improve your health or wellness.    [facility]

## 2024-04-18 NOTE — COMMUNITY RESOURCES LIST (ENGLISH)
April 18, 2024           YOUR PERSONALIZED LIST OF SERVICES & PROGRAMS           NAVIGATION    Eligibility Screening      [facility] application assistance [facility]  [address] [place] [address] (Distance: 2.5 miles)  Phone: [phone]  Language: English, Khmer  Fee: Free      [facility] application assistance  [address] [place] [address] (Distance: 2.5 miles)  Language: English  Fee: Free      [employer] - Navigators  Phone: [phone]  Website: https://www.[facility].org/about-us/assister-program/navigators/index.jsp  Language: English  Hours: Mon 8:00 AM - 4:00 PM Tue 8:00 AM - 4:00 PM Wed 8:00 AM - 4:00 PM Thu 8:00 AM - 4:00 PM        ASSISTANCE    Nutrition Benefits      [facility] application assistance [facility]  [address] [place] [address] (Distance: 2.5 miles)  Phone: [phone]  Language: English, Khmer  Fee: Free      [facility]  [address] [place] [address] (Distance: 2.9 miles)  Phone: [phone]  Website: https://www.[facility].org  Language: English, Hmong, Khmer  Fee: Free  Accessibility: Ada accessible, Blind accommodation, Deaf or hard of hearing, Translation services      [employer] [place] - SNAP (formerly food stamps) Screening and Application help  Phone: [phone]  Website: https://www.hungersolutions.org/programs/[place]-food-helpline/  Language: English  Hours: Mon 10:00 AM - 5:00 PM Tue 10:00 AM - 5:00 PM Wed 10:00 AM - 5:00 PM Thu 10:00 AM - 5:00 PM Fri 10:00 AM - 5:00 PM  Fee: Free  Accessibility: Ada accessible, Blind accommodation, Deaf or hard of hearing, Translation services    [facility]      Community Services - Food Shelf  [address] [facility] [address] [place] [address] (Distance: 2.6 miles)  Phone: [phone]  Website: http://www.[facility].org  Language: English, Khmer, Cymro, German,  Hmong  Fee: Free  Accessibility: Translation services      [facility]  [address] [place] [address] (Distance: 0.7 miles)  Phone: [phone]  Website: https://[facility].org/find-support/partner-organizations/housing-assistance/  Language: English  Fee: Free  Accessibility: Ada accessible      [facility] - [facility] Veterans  Phone: [phone]  Website: https://www.advocateforVhall.Gumhouse  Language: English, Croatian  Hours: Mon 8:00 AM - 5:00 PM Tue 8:00 AM - 5:00 PM Wed 8:00 AM - 5:00 PM Thu 8:00 AM - 5:00 PM Fri 8:00 AM - 5:00 PM  Fee: Free  Accessibility: Ada accessible        & SHELTER    Case Management      [facility] Housing search assistance  [address] [place] [address] (Distance: 6.9 miles)  Phone: [phone]  Language: English, Lebanese, Algerian, Hmong  Fee: Free  Accessibility: Ada accessible, Blind accommodation, Deaf or hard of hearing, Translation services      [facility] Access  [address] [place] [address] (Distance: 7.1 miles)  Phone: [phone]  Language: English  Fee: Free  Accessibility: Translation services, Ada accessible      [employer] - Housing Stabilization Services  Phone: [phone]  Website: https://homebasemn.com/  Language: English  Hours: Mon 8:00 AM - 4:00 PM Tue 8:00 AM - 4:00 PM Wed 8:00 AM - 4:00 PM Thu 8:00 AM - 4:00 PM Fri 8:00 AM - 4:00 PM  Fee: Free  Accessibility: Blind accommodation, Deaf or hard of hearing  Transportation Options: Free transportation    Payment Assistance      [facility] - Rent Payment Assistance [facility]  [address] [place] [address] (Distance: 3.3 miles)  Phone: [phone]  Language: English  Fee: Free      30-[employer] - Keep the Key  Phone: [phone]  Website: https://www.tow03-ltqptshbxjpgoj.org/programs.html  Language: English  Hours:  Mon 7:00 AM - 7:00 PM Tue 7:00 AM - 7:00 PM Wed 7:00 AM - 7:00 PM Thu 7:00 AM - 7:00 PM Fri 7:00 AM - 7:00 PM  Fee: Free      House - Housing Stability  Phone: [phone]  Website: https://www.[facility].org  Language: English, Hmong, Bolivian  Hours: Mon 8:00 AM - 8:00 PM Tue 8:00 AM - 8:00 PM Wed 8:00 AM - 8:00 PM Thu 8:00 AM - 8:00 PM Fri 8:00 AM - 8:00 PM  Fee: Free  Accessibility: Ada accessible, Blind accommodation, Deaf or hard of hearing, Translation services    Mediation & Eviction Prevention      [facility] Humanity - Mortgage Foreclosure Prevention  [address] [place] [address] (Distance: 8.4 miles)  Phone: [phone]  Language: English  Fee: Free  Accessibility: Ada accessible      [employer] - Foreclosure Prevention  [address] [place] [address] (Distance: 5.8 miles)  Phone: [phone]  Website: http://www.[facility].org  Language: English, Bolivian, Hmong  Fee: Free, Sliding scale  Accessibility: Blind accommodation, Ada accessible      Line - Tenant Rights / Eviction Prevention  Website: https://[facility].org/x-xoxa-cp-/  Language: English, Bolivian            Medical Transportation, (NEMT)      [facility] - Transportation to medical appointments  [address] [address] (Distance: 4.9 miles)  Phone: [phone]  Website: https://www.Glassful/  Language: English  Fee: Self pay, Insurance      [facility] Nurse Program - Transportation to medical appointments  [address] [place] [address] (Distance: 8.8 miles)  Phone: [phone]  Website: http://The Black Tuxmn.org/  Language: English, Bolivian, Pitcairn Islander  Fee: Sliding scale  Accessibility: Translation services      Social Service [facility] - Neighbor to Neighbor Program  Phone: [phone]  Email: [email or web address]  Website: https://www.[facility].org/services/older-adults/-services/neighbor-to-neighbor   Language: English  Hours: Mon 8:00 AM - 5:00 PM Tue 8:00 AM - 5:00 PM Wed 8:00 AM - 5:00 PM Thu 8:00 AM - 5:00 PM Fri 8:00 AM - 5:00 PM  Fee: Insurance, Self pay  Accessibility: Deaf or hard of hearing, Blind accommodation, Translation services    Expense Assistance      [place] - Transit Link  [address] [place] [address] (Distance: 4.6 miles)  Phone: [phone]  Website: https://SuVolta/Transportation/Services/Transit-Link.aspx  Language: English  Fee: Self pay      Transit - [place] - Transit Assistance Program (TAP) - Transportation expense assistance  [address] [place] [address] (Distance: 4.6 miles)  Language: English, Nepalese  Fee: Free, Sliding scale, Self pay  Accessibility: Translation services      - Dislocated Worker/Adult [employer] Employment Program  Phone: [phone]  Email: patrice@Ansible  Website: https://Ansible/services/employment-services/dislocated-worker-program/  Language: English, Sammarinese  Hours: Mon 8:00 AM - 4:30 PM Tue 8:00 AM - 4:30 PM Wed 8:00 AM - 4:30 PM Thu 8:00 AM - 4:30 PM Fri 8:00 AM - 4:30 PM  Fee: Free  Accessibility: Ada accessible    Coordination      [employer] (TO) - Free or low-cost transportation  [address] [place] [address] (Distance: 3.5 miles)  Phone: [phone]  Website: http://www.[facility] HospitalTTi Turner Technology Instruments  Language: English  Fee: Free, Sliding scale  Transportation Options: Free transportation      [facility] - Free Bus and Gas Cards - Free or low-cost transportation  [address] [place] [address] (Distance: 3.3 miles)  Phone: [phone]  Language: English  Fee: Free      - Services  Phone: [phone]  Website: https://Pipelinefx/#howitworks-section  Hours: Sun 12:00 AM - 11:45 PM Mon 12:00 AM - 11:45 PM Tue 12:00 AM - 11:45 PM Wed 12:00 AM - 11:45 PM Thu 12:00 AM - 11:45 PM Fri 12:00 AM - 11:45 PM Sat 12:00 AM - 11:45 PM  Fee: Self pay  Accessibility: Ada accessible,  Blind accommodation, Deaf or hard of hearing               IMPORTANT NUMBERS & WEBSITES        Emergency Services  911  .   [employer]  211 http://211unitedway.org  .   Poison Control  (354) 514-4292 http://mnpoison.org http://wisconsinpoison.org  .     Suicide and Crisis Lifeline  988 http://984The Honest Companyline.org  .   Childhelp [place] Child Abuse Hotline  976.256.9961 http://Childhelphotline.org   .   [place] Sexual Assault Hotline  (999) 258-5431 (HOPE) http://Buzzoolen.org   .     [place] Runaway Safeline  (676) 292-5216 (RUNAWAY) http://SkatazruG-volution.HexAirbot  .   Pregnancy & Postpartum Support  Call/text 929-962-3456  [place]: http://ppsupportmn.org  [place]: http://psichapters.com/wi  .   Substance Abuse National Helpline ([facility])  479-147-HELP (0199) http://Findtreatment.gov   .                DISCLAIMER: These resources have been generated via the Dotstudioz Platform. Dotstudioz does not endorse any service providers mentioned in this resource list. Dotstudioz does not guarantee that the services mentioned in this resource list will be available to you or will improve your health or wellness.    [facility]

## 2024-04-18 NOTE — Clinical Note
Please call nephrology and let them know that he is still on lisinopril 2.5mg daily from his pharmacy.  Is this okay with them?

## 2024-04-18 NOTE — COMMUNITY RESOURCES LIST (ENGLISH)
April 18, 2024           YOUR PERSONALIZED LIST OF SERVICES & PROGRAMS           NAVIGATION    Eligibility Screening      [facility] application assistance [facility]  [address] [place] [address] (Distance: 2.5 miles)  Phone: [phone]  Language: English, Kinyarwanda  Fee: Free      [facility] application assistance  [address] [place] [address] (Distance: 2.5 miles)  Language: English  Fee: Free      Sure - Navigators  Phone: [phone]  Website: https://www.[facility].org/about-us/assister-program/navigators/index.jsp  Language: English  Hours: Mon 8:00 AM - 4:00 PM Tue 8:00 AM - 4:00 PM Wed 8:00 AM - 4:00 PM Thu 8:00 AM - 4:00 PM        ASSISTANCE    Nutrition Benefits      [facility] application assistance [facility]  [address] [place] [address] (Distance: 2.5 miles)  Phone: [phone]  Language: English, Kinyarwanda  Fee: Free      [place] [facility] Centers  [address] [place] [address] (Distance: 2.9 miles)  Phone: [phone]  Website: https://www.[facility].org  Language: English, Hmong, Kinyarwanda  Fee: Free  Accessibility: Ada accessible, Blind accommodation, Deaf or hard of hearing, Translation services      [employer] [place] - SNAP (formerly food stamps) Screening and Application help  Phone: [phone] [phone]  Website: https://www.hungersolutions.org/programs/[place]-food-helpline/  Language: English  Hours: Mon 10:00 AM - 5:00 PM Tue 10:00 AM - 5:00 PM Wed 10:00 AM - 5:00 PM Thu 10:00 AM - 5:00 PM Fri 10:00 AM - 5:00 PM  Fee: Free  Accessibility: Ada accessible, Blind accommodation, Deaf or hard of hearing, Translation services    [facility]  [address] [place] [address] (Distance: 0.7 miles)  Phone: [phone]  Website:  https://[facility].org/find-support/partner-organizations/housing-assistance/  Language: English  Fee: Free  Accessibility: Ada accessible      Community Services - Food Shelf  [address] [place] [address] (Distance: 2.6 miles)  Phone: [phone]  Website: http://www.iSquare  Language: English, Malawian, Australian, Tuvaluan, Hmong  Fee: Free  Accessibility: Translation services      Health Advisors - Advocate for Veterans  Phone: [phone]  Website: https://www.advocateforveCuraxis Pharmaceutical.Blaze DFM  Language: English, Malawian  Hours: Mon 8:00 AM - 5:00 PM Tue 8:00 AM - 5:00 PM Wed 8:00 AM - 5:00 PM Thu 8:00 AM - 5:00 PM Fri 8:00 AM - 5:00 PM  Fee: Free  Accessibility: Ada accessible        & SHELTER    Case Management      [facility] - Housing search assistance  [address] [place] [address] (Distance: 6.9 miles)  Phone: [phone]  Language: English, Australian, Frisian, Hmong  Fee: Free  Accessibility: Ada accessible, Blind accommodation, Deaf or hard of hearing, Translation services      [facility] - Coordinated Access  [address] [place] [address] (Distance: 7.1 miles)  Phone: [phone]  Language: English  Fee: Free  Accessibility: Translation services, Ada accessible      [employer] - Housing Stabilization Services  Phone: [phone]  Website: https://homebasemn.com/  Language: English  Hours: Mon 8:00 AM - 4:00 PM Tue 8:00 AM - 4:00 PM Wed 8:00 AM - 4:00 PM Thu 8:00 AM - 4:00 PM Fri 8:00 AM - 4:00 PM  Fee: Free  Accessibility: Blind accommodation, Deaf or hard of hearing  Transportation Options: Free transportation    Payment Assistance      [facility] - Rent Payment Assistance - [facility]  [address] [place] [address] (Distance: 3.3 miles)  Phone: [phone]  Language: English  Fee: Free      30-Days Foundation - Keep the Key  Phone: [phone]  Website:  https://www.loy11-tojocvypqcpcwt.org/programs.html  Language: English  Hours: Mon 7:00 AM - 7:00 PM Tue 7:00 AM - 7:00 PM Wed 7:00 AM - 7:00 PM Thu 7:00 AM - 7:00 PM Fri 7:00 AM - 7:00 PM  Fee: Free      House - Housing Stability  Phone: [phone]  Website: https://www.[facility].org  Language: English, Hmong, Moroccan  Hours: Mon 8:00 AM - 8:00 PM Tue 8:00 AM - 8:00 PM Wed 8:00 AM - 8:00 PM Thu 8:00 AM - 8:00 PM Fri 8:00 AM - 8:00 PM  Fee: Free  Accessibility: Ada accessible, Blind accommodation, Deaf or hard of hearing, Translation services    Mediation & Eviction Prevention      [facility] for Humanity - Mortgage Foreclosure Prevention  [address] [place] [address] (Distance: 8.4 miles)  Phone: [phone]  Language: English  Fee: Free  Accessibility: Ada accessible      Home Partners - Foreclosure Prevention  [address] [place] [address] (Distance: 5.8 miles)  Phone: [phone]  Website: http://www.[facility] HospitalGlobialners.org  Language: English, Moroccan, Hmong  Fee: Free, Sliding scale  Accessibility: Blind accommodation, Ada accessible      Line - Tenant Rights / Eviction Prevention  Website: https://[facility].org/m-ohrk-jv-/  Language: English, Moroccan            Medical Transportation, (NEMT)      [facility] - Transportation to medical appointments  [address] [address] (Distance: 4.9 miles)  Phone: [phone]  Website: https://www.Recurrent Energy/  Language: English  Fee: Self pay, Insurance      [facility] Nurse Program - Transportation to medical appointments  [address] [place] [address] (Distance: 8.8 miles)  Phone: [phone]  Website: http://ourladyofpeMcLaren [facility]umb Region.org/  Language: English, Moroccan, Lithuanian  Fee: Sliding scale  Accessibility: Translation services      [facility] [place] - Neighbor to Neighbor Program  Phone: [phone]  Email: [email or web address]  Website:  https://www.lsn.org/services/older-adults/-services/neighbor-to-neighbor  Language: English  Hours: Mon 8:00 AM - 5:00 PM Tue 8:00 AM - 5:00 PM Wed 8:00 AM - 5:00 PM Thu 8:00 AM - 5:00 PM Fri 8:00 AM - 5:00 PM  Fee: Insurance, Self pay  Accessibility: Deaf or hard of hearing, Blind accommodation, Translation services    Expense Assistance      [place] - Transit Link  [address] [place] [address] (Distance: 4.6 miles)  Phone: [phone]  Website: https://Mantara/Transportation/Services/Transit-Link.aspx  Language: English  Fee: Self pay      Transit - [place] - Transit Assistance Program (TAP)  Transportation expense assistance  [address] [place] [address] (Distance: 4.6 miles)  Language: English, Estonian  Fee: Free, Sliding scale, Self pay  Accessibility: Translation services      - Dislocated Worker/Adult [employer] Employment Program  Phone: [phone] [phone]  Email: [email or web address]  Website: https://DyMynd/services/employment-services/dislocated-worker-program/  Language: English, Gambian  Hours: Mon 8:00 AM - 4:30 PM Tue 8:00 AM - 4:30 PM Wed 8:00 AM - 4:30 PM Thu 8:00 AM - 4:30 PM Fri 8:00 AM - 4:30 PM  Fee: Free  Accessibility: Ada [facility]      [employer] (TO) - Free or low-cost transportation  [address] [place] [address] (Distance: 3.5 miles)  Phone: [phone]  Website: http://www.[facility].org  Language: English  Fee: Free, Sliding scale  Transportation Options: Free transportation      [facility] Free Bus and Gas Cards - Free or low-cost transportation  [address] [place] [address] (Distance: 3.3 miles)  Phone: [phone]  Language: English  Fee: Free      - Services  Phone: [phone]  Website: https://ULTRA Testing/#howitworks-section  Hours: Sun 12:00 AM - 11:45 PM Mon 12:00 AM - 11:45 PM Tue 12:00 AM - 11:45 PM Wed 12:00 AM - 11:45 PM Thu 12:00 AM - 11:45 PM Fri 12:00 AM  - 11:45 PM Sat 12:00 AM - 11:45 PM  Fee: Self pay  Accessibility: Ada accessible, Blind accommodation, Deaf or hard of hearing               IMPORTANT NUMBERS & WEBSITES        Emergency Services  911  .   [facility]  211 http://211unitedway.org  .   Poison Control  (783) 422-1380 http://mnpoison.org http://wisconsinpoison.org  .     Suicide and Crisis Lifeline  988 http://988SampleBoardline.org  .   Childhelp [place] Child Abuse Hotline  717.468.6180 http://Childhelphotline.org   .   [place] Sexual Assault Hotline  (291) 689-1668 (HOPE) http://Spawn Labsn.org   .     National Runaway Safeline  (808) 619-2448 (RUNAWAY) http://Anna-Rita Sloss EnterprisesruGoodRx.Synergy Hub  .   Pregnancy & Postpartum Support  Call/text 297-445-2825  [place]: http://ppsupportmn.org  [place]: http://psichapters.com/wi  .   Substance Abuse National Helpline ([facility])  254-073-HELP (3033) http://Findtreatment.gov   .                DISCLAIMER: These resources have been generated via the Portfolium Platform. Portfolium does not endorse any service providers mentioned in this resource list. Portfolium does not guarantee that the services mentioned in this resource list will be available to you or will improve your health or wellness.    [facility]

## 2024-04-18 NOTE — COMMUNITY RESOURCES LIST (ENGLISH)
April 18, 2024           YOUR PERSONALIZED LIST OF SERVICES & PROGRAMS           NAVIGATION    Eligibility Screening      [facility] application assistance [facility]  [address] [place] [address] (Distance: 2.5 miles)  Phone: [phone]  Language: English, Swedish  Fee: Free      [facility] application assistance  [address] [place] [address] (Distance: 2.5 miles)  Language: English  Fee: Free      [employer]  Phone: [phone]  Website: https://www.[facility].org/about-us/assister-program/navigators/index.jsp  Language: English  Hours: Mon 8:00 AM - 4:00 PM Tue 8:00 AM - 4:00 PM Wed 8:00 AM - 4:00 PM Thu 8:00 AM - 4:00 PM        ASSISTANCE    Nutrition Benefits      [facility] application assistance [facility]  [address] [place] [address] (Distance: 2.5 miles)  Phone: [phone]  Language: English, Swedish  Fee: Free      [facility]  [address] [place] [address] (Distance: 2.9 miles)  Phone: [phone]  Website: https://www.[facility].org  Language: English, Hmong, Swedish  Fee: Free  Accessibility: Ada accessible, Blind accommodation, Deaf or hard of hearing, Translation services      [employer] [place] - SNAP (formerly food stamps) Screening and Application help  Phone: [phone]  Website: https://www.hungersolutions.org/programs/[place]-food-helpline/  Language: English  Hours: Mon 10:00 AM - 5:00 PM Tue 10:00 AM - 5:00 PM Wed 10:00 AM - 5:00 PM Thu 10:00 AM - 5:00 PM Fri 10:00 AM - 5:00 PM  Fee: Free  Accessibility: Ada accessible, Blind accommodation, Deaf or hard of hearing, Translation services    [facility] - Food Shelf  [address] [facility] [address] [place] [address] (Distance: 2.6 miles)  Phone: [phone]  Website: http://www.[facility].org  Language: English, Swedish, Belarusian, Albanian,  Hmong  Fee: Free  Accessibility: Translation services      [facility] [address]  [address] [place] [address] (Distance: 0.7 miles)  Phone: [phone]  Website: https://[facility].org/find-support/partner-organizations/housing-assistance/  Language: English  Fee: Free  Accessibility: Ada accessible      [facility] [employer] - [employer] [employer]  Phone: [phone]  Website: https://www.advocateforParaEngine.EPIS  Language: English, Macedonian  Hours: Mon 8:00 AM - 5:00 PM Tue 8:00 AM - 5:00 PM Wed 8:00 AM - 5:00 PM Thu 8:00 AM - 5:00 PM Fri 8:00 AM - 5:00 PM  Fee: Free  Accessibility: Ada accessible        & SHELTER    Case Management      [facility] - Housing search assistance  [address] [place] [address] (Distance: 6.9 miles)  Phone: [phone]  Language: English, Haitian, Peruvian, Hmong  Fee: Free  Accessibility: Ada accessible, Blind accommodation, Deaf or hard of hearing, Translation services      [facility]  [address] [place] [address] (Distance: 7.1 miles)  Phone: [phone]  Language: English  Fee: Free  Accessibility: Translation services, Ada accessible      [employer] - Housing Stabilization Services  Phone: [phone]  Website: https://[employer].EPIS/  Language: English  Hours: Mon 8:00 AM - 4:00 PM Tue 8:00 AM - 4:00 PM Wed 8:00 AM - 4:00 PM Thu 8:00 AM - 4:00 PM Fri 8:00 AM - 4:00 PM  Fee: Free  Accessibility: Blind accommodation, Deaf or hard of hearing  Transportation Options: Free transportation    Payment Assistance      [facility] - Rent Payment Assistance [facility]  [address] [place] [address] (Distance: 3.3 miles)  Phone: [phone]  Language: English  Fee: Free      House - Housing Stability  Phone: [phone] [phone]  Website: https://www.[facility].org  Language: English, Hmong, Macedonian  Hours: Mon 8:00  AM - 8:00 PM Tue 8:00 AM - 8:00 PM Wed 8:00 AM - 8:00 PM Thu 8:00 AM - 8:00 PM Fri 8:00 AM - 8:00 PM  Fee: Free  Accessibility: Ada accessible, Blind accommodation, Deaf or hard of hearing, Translation services      [employer] - [employer]  Phone: [phone]  Website: https://www.gsd52-acmggkpmybqwia.org/programs.html  Language: English  Hours: Mon 7:00 AM - 7:00 PM Tue 7:00 AM - 7:00 PM Wed 7:00 AM - 7:00 PM Thu 7:00 AM - 7:00 PM Fri 7:00 AM - 7:00 PM  Fee: Free    Mediation & Eviction Prevention      [facility] HumanSocialDefender - Mortgage Foreclosure Prevention  [address] [place] [address] (Distance: 8.4 miles)  Phone: [phone]  Language: English  Fee: Free  Accessibility: Ada accessible      [employer] - Foreclosure Prevention  [address] [place] [address] (Distance: 5.8 miles)  Phone: [phone]  Website: http://www.[facility].org  Language: English, Danish, Hmong  Fee: Free, Sliding scale  Accessibility: Blind accommodation, Ada accessible      [facility] - Tenant Rights / Eviction Prevention  Website: https://homelinemn.org/a-rslg-yz-/  Language: English, Danish            Medical Transportation, (NEMT)      [facility] - Transportation to medical appointments  [address] [address] (Distance: 4.9 miles)  Phone: [phone]  Website: https://www.[employer]/  Language: English  Fee: Self pay, Insurance      [facility] Nurse Program - Transportation to medical appointments  [address] [place] [address] (Distance: 8.8 miles)  Phone: [phone]  Website: http://[employer]mn.org/  Language: English, Danish, Tongan  Fee: Sliding scale  Accessibility: Translation services      Social Service [facility] - Neighbor to Neighbor Program  Phone: [phone]  Email: [email or web address]  Website: https://www.[facility].org/services/older-adults/-services/neighbor-to-neighbor   Language: English  Hours: Mon 8:00 AM - 5:00 PM Tue 8:00 AM - 5:00 PM Wed 8:00 AM - 5:00 PM Thu 8:00 AM - 5:00 PM Fri 8:00 AM - 5:00 PM  Fee: Insurance, Self pay  Accessibility: Deaf or hard of hearing, Blind accommodation, Translation services    Expense Assistance      [place] - Transit Link  [address] [place] [address] (Distance: 4.6 miles)  Phone: [phone]  Website: https://[employer]/Transportation/Services/Transit-Link.aspx  Language: English  Fee: Self pay      [facility] - [place] - Transit Assistance Program (TAP) - Transportation expense assistance  [address] [place] [address] (Distance: 4.6 miles)  Language: English, Malaysian  Fee: Free, Sliding scale, Self pay  Accessibility: Translation services      - Dislocated Worker/Adult [employer] Employment Program  Phone: [phone]) [phone]  Email: patrice@[employer]  Website: https://[employer]/services/employment-services/dislocated-worker-program/  Language: English, Malian  Hours: Mon 8:00 AM - 4:30 PM Tue 8:00 AM - 4:30 PM Wed 8:00 AM - 4:30 PM Thu 8:00 AM - 4:30 PM Fri 8:00 AM - 4:30 PM  Fee: Free  Accessibility: Ada accessible    Coordination      [employer] (TO) - Free or low-cost transportation  [address] [place] [address] (Distance: 3.5 miles)  Phone: [phone]  Website: http://www.[facility] HospitalHalfbrick Studios  Language: English  Fee: Free, Sliding scale  Transportation Options: Free transportation      [facility] - Free Bus and Gas Cards - Free or low-cost transportation  [address] [place] [address] (Distance: 3.3 miles)  Phone: [phone]  Language: English  Fee: Free      - Services  Phone: [phone]  Website: https://[employer]/#howitworks-section  Hours: Sun 12:00 AM - 11:45 PM Mon 12:00 AM - 11:45 PM Tue 12:00 AM - 11:45 PM Wed 12:00 AM - 11:45 PM Thu 12:00 AM - 11:45 PM Fri 12:00 AM - 11:45 PM Sat 12:00 AM - 11:45 PM  Fee: Self pay  Accessibility: Ada accessible,  Blind accommodation, Deaf or hard of hearing               IMPORTANT NUMBERS & WEBSITES        Emergency Services  911  .   [employer] Way  211 http://211unitedway.org  .   Poison Control  (741) 364-6292 http://[place]poison.org http://wis[place]poison.org  .     Suicide and Crisis Lifeline  988 http://984ZinMobiline.org  .   Childhelp [place] Child Abuse Hotline  363.277.1737 http://Childhelphotline.org   .   [place] Sexual Assault Hotline  (305) 931-3925 (HOPE) http://Honeyn.org   .     [place] Runaway Safeline  (797) 575-1474 (RUNAWAY) http://BEETmobileruMatchpoint Careers.Vistaar  .   Pregnancy & Postpartum Support  Call/text 091-652-9094  [place]: http://ppsupportmn.org  [place]: http://psichapters.com/wi  .   Substance Abuse National Helpline ([facility])  915-663-HELP (3284) http://Findtreatment.gov   .                DISCLAIMER: These resources have been generated via the [employer]. [employer] does not endorse any service providers mentioned in this resource list. [employer] does not guarantee that the services mentioned in this resource list will be available to you or will improve your health or wellness.    [facility]

## 2024-04-18 NOTE — PROGRESS NOTES
Preoperative Evaluation  [facility] [address] [place] [address]  Phone: [phone]  Fax: [phone]  Primary Provider: Jumana Lugo  Pre-op Performing Provider:    JUMANA LUGO  VIDEO,   Apr 18, 2024       Venita Fisher is a 68 year old, presenting for the following:  Pre-Op Exam and Wellness Visit        4/18/2024    12:04 PM   Additional Questions   Roomed by M   Accompanied by son     Surgical Information  Surgery/Procedure: Colonosopy  Surgery Location: [facility]   Surgeon: Nelly  Surgery Date: 4/26/24  Time of Surgery: unknow  Where patient plans to recover: At home with family  Fax number for surgical facility: Note does not need to be faxed, will be available electronically in Epic.    Assessment & Plan     The proposed surgical procedure is considered LOW risk.    CKD (chronic kidney disease) stage 4, GFR 15-29 ml/min (H)  Working with nephrology- continues on lisinopril 2.5mg daily  will make sure nephrology knows about this . Hold on am of surgery   - Albumin Random Urine Quantitative with Creat Ratio  - CBC with platelets  - Comprehensive metabolic panel (BMP + Alb, Alk Phos, ALT, AST, Total. Bili, TP)  - Hemoglobin A1c  - CBC with platelets  - Comprehensive metabolic panel (BMP + Alb, Alk Phos, ALT, AST, Total. Bili, TP)  - Hemoglobin A1c  - Albumin Random Urine Quantitative with Creat Ratio    Pure hyperglyceridemia  On fenofibrate 145mg daily   - Lipid panel reflex to direct LDL Non-fasting  - Lipid panel reflex to direct LDL Non-fasting    Preop general physical exam  Okay for colonoscopy as scheduled.   - EKG 12-lead ([facility] and [facility] Only)    Chronic bronchitis, unspecified chronic bronchitis type (H)  Stable- will help schedule annual pulmonary follow-up later this spring.  CXR preop is stable.  Use albuterol inhaler or nebs on am of colonoscopy.  Continue symbicort 2 puffs bid.  Vaccines today   - Adult Pulmonary Medicine   Referral  - XR Chest 2 Views    Moderate major depression (H)  Doing well off meds per pt- weaned off last summer and doing well.      Positive colorectal cancer screening using Cologuard test  Colonoscopy scheduled for next week.  Will have RN call family to help with successful prep     Benign essential HTN  BP Readings from Last 3 Encounters:   04/18/24 112/74   06/07/23 130/78   06/01/23 100/80        controlled today.  Plan for bloodpressure management includes ongoing focus on healthy DASH type diet and increased activity, encouraged to avoid tobacco products and limit alcohol use, stress reduction, currently on lisinopril 2.5mg daily.  Labwork and meds ordered and reviewed as below  Potassium   Date Value Ref Range Status   06/01/2023 4.0 3.4 - 5.3 mmol/L Final   04/15/2022 4.5 3.5 - 5.0 mmol/L Final      Creatinine   Date Value Ref Range Status   06/01/2023 3.06 (H) 0.67 - 1.17 mg/dL Final          Anemia due to stage 4 chronic kidney disease (H)  Hemoglobin   Date Value Ref Range Status   04/18/2024 14.2 13.3 - 17.7 g/dL Final   Doing well- resolved.  Pt doesn't think he is doing injections with his kidney clinic but not sure.      Fibrothorax  Stable- follow-up with lung doctor this spring.    - Adult Pulmonary Medicine  Referral  - XR Chest 2 Views    Snoring  SEKOU likely-   - Adult Sleep Eval & Management  Referral    Chronic left-sided low back pain without sciatica  Stable- tylenol as needed. Declines referral or PT    Chronic right-sided thoracic back pain  Stable- tylenol as needed.  Gabapentin tid     Encounter for Medicare annual wellness exam    - PSA, screen  - PSA, screen    Food insecurity  - Primary Care - Care Coordination Referral        Possible Sleep Apnea:        4/18/2024    12:53 PM   STOP-Bang Total Score   Total Score 6   Risk Stratification 5 - 8: High Risk for SEKOU          Risks and Recommendations  The patient has the following additional risks and recommendations  for perioperative complications:  Pulmonary:    - Incentive spirometry post-op  - use nebs prior to colonoscopy Am of at home   Obstructive Sleep Apnea:   Likely- referral placed  Anemia/Bleeding/Clotting:    - Anemia and does not require treatment prior to surgery. Monitor hemoglobin postoperatively    Antiplatelet or Anticoagulation Medication Instructions   - aspirin: Discontinue aspirin 7-10 days prior to procedure to reduce bleeding risk. It should be resumed postoperatively.     Additional Medication Instructions  Patient is to take all scheduled medications on the day of surgery EXCEPT for modifications listed below:    Recommendation  APPROVAL GIVEN to proceed with proposed procedure, without further diagnostic evaluation.      Subjective       HPI related to upcoming procedure: colonoscopy- has positive cologard screening test     Here with his son today  Did get meds and instructions for prep but don't really understand how to do this.      No CPAP- has O2 that he uses as needed.      Working with Nephrology- reviewed notes.   Most recent visit not available for review 3/2024  Per son no dialysis yet.  Next fu 6/3/24.    Taking lisinopril 2.5mg daily rx [facility]     Last pulmonary follow-up Spring 2023- no follow-up scheduled yet   Breathing stable   Symbicort 2 puffs bid   Singulair 10mg hs   Restarted claritin and flonase daily this spring   Albuterol as needed- using this about 2x/week   Using nebulizer 2-3x/day     PHQ-9 score:        4/18/2024    11:45 AM   PHQ   PHQ-9 Total Score 12   Q9: Thoughts of better off dead/self-harm past 2 weeks Not at all     Use to be on duloxetine 30mg daily but tapered last summer as he didn't want to take tests.  Also use to be on mirtazapine.  Reviewed MTM 6/2023.    Has continued on gabapentin 300mg tid.       Continues on tricor 145mg daily    Constipation-             4/18/2024    11:50 AM   Preop Questions   1. Have you ever had a heart attack or  stroke? No   2. Have you ever had surgery on your heart or blood vessels, such as a stent placement, a coronary artery bypass, or surgery on an artery in your head, neck, heart, or legs? No   3. Do you have chest pain with activity? No   4. Do you have a history of  heart failure? No   5. Do you currently have a cold, bronchitis or symptoms of other infection? No   6. Do you have a cough, shortness of breath, or wheezing? YES - Known COPD with chronic bronchitis and pleural effusion/fibrothorax managed with inhalers and monitored by pulmonary    7. Do you or anyone in your family have previous history of blood clots? No   8. Do you or does anyone in your family have a serious bleeding problem such as prolonged bleeding following surgeries or cuts? No   9. Have you ever had problems with anemia or been told to take iron pills? No   10. Have you had any abnormal blood loss such as black, tarry or bloody stools? No   11. Have you ever had a blood transfusion? No   12. Are you willing to have a blood transfusion if it is medically needed before, during, or after your surgery? Yes   13. Have you or any of your relatives ever had problems with anesthesia? No   14. Do you have sleep apnea, excessive snoring or daytime drowsiness? YES - as below- uses cpap   14a. Do you have a CPAP machine? Yes   15. Do you have any artifical heart valves or other implanted medical devices like a pacemaker, defibrillator, or continuous glucose monitor? No   16. Do you have artificial joints? No   17. Are you allergic to latex? No       Health Care Directive  Patient does not have a Health Care Directive or Living Will: Discussed advance care planning with patient; information given to patient to review.    Preoperative Review of    reviewed - no record of controlled substances prescribed.      Status of Chronic Conditions:  ANEMIA - Patient has a recent history of moderate-severe anemia, which has not been symptomatic. Work up to date  has revealed resolved. Treatment has beenresolved .     COPD - Patient has a longstanding history of moderate-severe COPD . Patient has been doing well overall noting SOB and continues on medication regimen consisting of symbicort with nebs prn without adverse reactions or side effects.    DEPRESSION - Patient has a long history of Depression of moderate severity requiring medication for control with recent symptoms being stable..Current symptoms of depression include fatigue.     HYPERLIPIDEMIA - Patient has a long history of significant Hyperlipidemia requiring medication for treatment with recent good control. Patient reports no problems or side effects with the medication.     HYPERTENSION - Patient has longstanding history of HTN , currently denies any symptoms referable to elevated blood pressure. Specifically denies chest pain, palpitations, dyspnea, orthopnea, PND or peripheral edema. Blood pressure readings have been in normal range. Current medication regimen is as listed below. Patient denies any side effects of medication.     RENAL INSUFFICIENCY - Patient has a longstanding history of moderate-severe chronic renal insufficiency. Last Cr   Creatinine   Date Value Ref Range Status   06/01/2023 3.06 (H) 0.67 - 1.17 mg/dL Final        SLEEP PROBLEM - Patient has a longstanding history of snoring, excessive daytime somnolence, fatigue, and depression and mood changes.. Patient has tried OTC medications with limited success.     Patient Active Problem List    Diagnosis Date Noted    Chronic right-sided thoracic back pain 04/18/2024     Priority: Medium    Positive colorectal cancer screening using Cologuard test 02/28/2024     Priority: Medium    Generalized muscle weakness 09/13/2023     Priority: Medium    Anemia due to stage 4 chronic kidney disease (H) 06/06/2023     Priority: Medium    Advanced directives, counseling/discussion 06/01/2023     Priority: Medium     No document  Would trust his son Bryon  Laura as primary or daughter Juan Sanchez as alternative.        Callus of foot 04/15/2022     Priority: Medium    Hyperkalemia 07/27/2021     Priority: Medium    Language barrier affecting health care 07/26/2021     Priority: Medium    Chronic bronchitis (H) 01/28/2020     Priority: Medium    Moderate major depression (H)      Priority: Medium    Steroid-induced hyperglycemia      Priority: Medium    CKD (chronic kidney disease) stage 4, GFR 15-29 ml/min (H) 08/22/2019     Priority: Medium    Shortness of breath 08/22/2019     Priority: Medium    Allergic rhinitis due to pollen 08/22/2019     Priority: Medium    Chronic pleural effusion      Priority: Medium    Chronic left-sided low back pain without sciatica      Priority: Medium     Created by Conversion        Fibrothorax      Priority: Medium    GERD (gastroesophageal reflux disease)      Priority: Medium    Moderate persistent asthma      Priority: Medium    Essential Hypertriglyceridemia      Priority: Medium     Created by Conversion  [facility] Annotation: Feb 28 2008  2:13PM - Kory Augustine: Niaspan   GI   side   effectsLopid  not effective        Neck Pain      Priority: Medium     Created by Conversion  [facility] Annotation: May  1 2008  4:20PM - Daniella Fuentes: Chronic        Benign essential HTN 04/15/2014     Priority: Medium     8/21: No renal artery stenosis.        Past Medical History:   Diagnosis Date    Community acquired pneumonia of right lower lobe of lung     Fibrothorax     GERD (gastroesophageal reflux disease)     Moderate persistent asthma     Pleural effusion on left      Past Surgical History:   Procedure Laterality Date    CATARACT EXTRACTION BILATERAL W/ ANTERIOR VITRECTOMY Right      Current Outpatient Medications   Medication Sig Dispense Refill    diclofenac (VOLTAREN) 1 % topical gel APPLY 2 GRAMS TOPICALLY FOUR TIMES A DAY. DO NOT TO EXCEED 32 GRAMS/DAY./PLEEV 2 GRAMS 4 ZAUG LAINEYA JEFF RAWELLIOTT MOSES.      LIDOCAINE PAIN RELIEF 4 %  Patch APPLY 1 PATCH TOPICALLY DAILY AS DIRECTED./IB HNUB LO 1 SALEEM MONTOYAA.      lisinopril (ZESTRIL) 2.5 MG tablet TAKE 1 TABLET DAILY FOR BLOOD PRESSURE OR KIDNEY // 1 HNUB NOJ 1 LUB PAB ROXANNE NTSHAV SIAB LO LUB RAUM.      albuterol (PROAIR HFA/PROVENTIL HFA/VENTOLIN HFA) 108 (90 Base) MCG/ACT inhaler Inhale 2 puffs into the lungs every 4 hours as needed for shortness of breath or wheezing 18 g 11    aspirin (ASPIRIN LOW DOSE) 81 MG EC tablet TAKE 1 TABLET BY MOUTH DAILY FOR STROKE PREVENTION // IB HNUB NOJ 1 LUB PAB KOM NTSHAV KHIAV ZOO 30 tablet 11    blood pressure monitor Kit [BLOOD PRESSURE MONITOR KIT] Use to check blood pressure daily 1 each 0    fenofibrate (TRICOR) 145 MG tablet TAKE 1 TABLET BY MOUTH DAILY FOR CHOLESTEROL // IB HNUB NOJ 1 LUB PAB ROXANNE NTSHAV MUAJ ROJ 90 tablet 4    fluticasone (FLONASE) 50 MCG/ACT nasal spray Spray 2 sprays into both nostrils daily 16 g 2    gabapentin (NEURONTIN) 300 MG capsule TAKE 1 CAPSULE 3 TIMES DAILY // IB ZAUG NOJ 1 LUB, 1 HNUB NOJ 3 ZAUG 90 capsule 3    ipratropium - albuterol 0.5 mg/2.5 mg/3 mL (DUONEB) 0.5-2.5 (3) MG/3ML neb solution Take 1 vial (3 mLs) by nebulization 4 times daily as needed for shortness of breath or wheezing 480 mL 3    loratadine (CLARITIN) 10 MG tablet Take 1 tablet (10 mg) by mouth daily 30 tablet 2    montelukast (SINGULAIR) 10 MG tablet Take 1 tablet (10 mg) by mouth at bedtime 30 tablet 11    Multiple Vitamin (TAB-A-GERARDO) TABS TAKE 1 TABLET BY MOUTH DAILY // IB HNUB NOJ 1 LUB 30 tablet 11    sodium bicarbonate 650 MG tablet TAKE 2 TABLETS BY MOUTH 3 TIMES DAILY // 1 ZAUG NOJ 2 LUB, 1 HNUB NOJ 3 ZAUG      SYMBICORT 160-4.5 MCG/ACT Inhaler INHALE 2 PUFFS TWICE DAILY. RINSE MOUTH AND SPIT OUT AFTERWARDS // 1 ZAUG NQUS 2 PAS, 1 HNU SIV 2 ZAUG. YAUG NCAUJ THAUM SIV TAS. 10.2 g 11    vitamin D2 (ERGOCALCIFEROL) 40952 units (1250 mcg) capsule TAKE ONE CAPSULE WEEKLY//IB ASTHIV NOJ IB LUB. 4 capsule 11       Allergies   Allergen Reactions  "   Lisinopril Nephrotoxicity    Niacin Other (See Comments)        Social History     Tobacco Use    Smoking status: Never    Smokeless tobacco: Never   Substance Use Topics    Alcohol use: No     History reviewed. No pertinent family history.  History   Drug Use No         Review of Systems    Review of Systems  Constitutional, neuro, ENT, endocrine, pulmonary, cardiac, gastrointestinal, genitourinary, musculoskeletal, integument and psychiatric systems are negative, except as otherwise noted.    Objective    /74 (BP Location: Right arm, Patient Position: Sitting, Cuff Size: Adult Regular)   Pulse 79   Temp 97.7  F (36.5  C) (Temporal)   Resp 19   Ht 1.67 m (5' 5.75\")   Wt 79.8 kg (176 lb)   SpO2 99%   BMI 28.63 kg/m     Estimated body mass index is 28.63 kg/m  as calculated from the following:    Height as of this encounter: 1.67 m (5' 5.75\").    Weight as of this encounter: 79.8 kg (176 lb).  Physical Exam  Complete 10 point ROS completed today as part of the exam and patient denies any symptoms as reviewed in HPI     Wt Readings from Last 3 Encounters:   04/18/24 79.8 kg (176 lb)   06/01/23 79.8 kg (176 lb)   05/10/23 79.4 kg (175 lb)       No LMP for male patient.    All normal as below except abnormalities include: decrease breath sounds on left side only c/w fibrothorax  General is a  68 year old sitting comfortably in no apparent distress   HEENT:  TM are clear bilaterally.  Eye exams within normal   Neck: Supple without lymphadenopathy or thyromegally  CV: Regular rate and rhythm S1S2 without rubs, murmurs or gallops,   Lungs: Clear to auscultation bilaterally  Abd:  +BS, soft NT/ND,  No masses or organomegally,   Extremities: Warm, No Edema, 2+ Pedal and radial pulses bilaterally  Skin: No lesions or rashes noted  Neuro: Able to ambulate around the exam room with equal movement, strength and normal coordination of the upper and lower extremeties symmetrically    Independent historian: son " helps with some history details pt does not know due to language and education level     History summarized from1-2:nephrology visit 11/2023 reviewed  Old Records-1: Outside allergies, meds, problems and immunizations were reconciled as needed from [facility]  Requested latest note from 3/2024 nephrology   Radiology tests reviewed-1: CXR 2023  Lab tests reviewed-1: [id number]     Follow-up Visit   Expected date:  Sep 02, 2024 (Approximate)      Follow Up Appointment Details:     Follow-up with whom?: PCP    Follow-Up for what?: Chronic Disease f/u    Chronic Disease f/u:  Depression  Hypertension  Hyperlipidemia  CKD  COPD       How?: In Person                     Sylvia Lugo MD     Recent Labs   Lab Test 06/01/23  1413 04/28/23  0808   HGB 10.3* 12.7*    154    137   POTASSIUM 4.0 5.8*   CR 3.06* 4.84*   A1C  --  5.9*        Diagnostics  Recent Results (from the past 240 hour(s))   EKG 12-lead ([facility] Only)    Collection Time: 04/18/24  1:31 PM   Result Value Ref Range    Systolic Blood Pressure  mmHg    Diastolic Blood Pressure  mmHg    Ventricular Rate 74 BPM    Atrial Rate 74 BPM    RI Interval 166 ms    QRS Duration 100 ms     ms    QTc 444 ms    P Axis 46 degrees    R AXIS -27 degrees    T Axis 12 degrees    Interpretation ECG       Sinus rhythm  Normal ECG  When compared with ECG of 22-JAN-2020 20:05,  No significant change was found     CBC with platelets    Collection Time: 04/18/24  2:11 PM   Result Value Ref Range    WBC Count 8.1 4.0 - 11.0 10e3/uL    RBC Count 4.88 4.40 - 5.90 10e6/uL    Hemoglobin 14.2 13.3 - 17.7 g/dL    Hematocrit 44.4 40.0 - 53.0 %    MCV 91 78 - 100 fL    MCH 29.1 26.5 - 33.0 pg    MCHC 32.0 31.5 - 36.5 g/dL    RDW 13.0 10.0 - 15.0 %    Platelet Count 148 (L) 150 - 450 10e3/uL   Hemoglobin A1c    Collection Time: 04/18/24  2:11 PM   Result Value Ref Range    Hemoglobin A1C 5.8 (H) 0.0 - 5.6 %        Revised Cardiac Risk Index (RCRI)  The  patient has the following serious cardiovascular risks for perioperative complications:   - Serum Creatinine >2.0 mg/dl = 1 point     RCRI Interpretation: 1 point: Class II (low risk - 0.9% complication rate)         Signed Electronically by: Sylvia Lugo MD  Copy of this evaluation report is provided to requesting physician.           Prior to immunization administration, verified patients identity using patient s name and date of birth. Please see Immunization Activity for additional information.     Screening Questionnaire for Adult Immunization    Are you sick today?   No   Do you have allergies to medications, food, a vaccine component or latex?   No   Have you ever had a serious reaction after receiving a vaccination?   No   Do you have a long-term health problem with heart, lung, kidney, or metabolic disease (e.g., diabetes), asthma, a blood disorder, no spleen, complement component deficiency, a cochlear implant, or a spinal fluid leak?  Are you on long-term aspirin therapy?   Yes   Do you have cancer, leukemia, HIV/AIDS, or any other immune system problem?   No   Do you have a parent, brother, or sister with an immune system problem?   No   In the past 3 months, have you taken medications that affect  your immune system, such as prednisone, other steroids, or anticancer drugs; drugs for the treatment of rheumatoid arthritis, Crohn s disease, or psoriasis; or have you had radiation treatments?   No   Have you had a seizure, or a brain or other nervous system problem?   No   During the past year, have you received a transfusion of blood or blood    products, or been given immune (gamma) globulin or antiviral drug?   No   For women: Are you pregnant or is there a chance you could become       pregnant during the next month?   No   Have you received any vaccinations in the past 4 weeks?   No     Immunization questionnaire was positive for at least one answer.  Notified .      Patient instructed to  remain in clinic for 15 minutes afterwards, and to report any adverse reactions.     Screening performed by RUDI Mcgill MA on 4/18/2024 at 12:09 PM.   Answers submitted by the patient for this visit:  Patient Health Questionnaire (Submitted on 4/18/2024)  If you checked off any problems, how difficult have these problems made it for you to do your work, take care of things at home, or get along with other people?: Not difficult at all  PHQ9 TOTAL SCORE: 12

## 2024-04-18 NOTE — PATIENT INSTRUCTIONS
Preparing for Your Surgery  Getting started  A nurse will call you to review your health history and instructions. They will give you an arrival time based on your scheduled surgery time. Please be ready to share:  Your doctor's clinic name and phone number  Your medical, surgical, and anesthesia history  A list of allergies and sensitivities  A list of medicines, including herbal treatments and over-the-counter drugs  Whether the patient has a legal guardian (ask how to send us the papers in advance)  Please tell us if you're pregnant--or if there's any chance you might be pregnant. Some surgeries may injure a fetus (unborn baby), so they require a pregnancy test. Surgeries that are safe for a fetus don't always need a test, and you can choose whether to have one.   If you have a child who's having surgery, please ask for a copy of Preparing for Your Child's Surgery.    Preparing for surgery  Within 10 to 30 days of surgery: Have a pre-op exam (sometimes called an H&P, or History and Physical). This can be done at a clinic or pre-operative center.  If you're having a , you may not need this exam. Talk to your care team.  At your pre-op exam, talk to your care team about all medicines you take. If you need to stop any medicines before surgery, ask when to start taking them again.  We do this for your safety. Many medicines can make you bleed too much during surgery. Some change how well surgery (anesthesia) drugs work.  Call your insurance company to let them know you're having surgery. (If you don't have insurance, call [phone].)  Call your clinic if there's any change in your health. This includes signs of a cold or flu (sore throat, runny nose, cough, rash, fever). It also includes a scrape or scratch near the surgery site.  If you have questions on the day of surgery, call your hospital or surgery center.  Eating and drinking guidelines  For your safety: Unless your surgeon tells you otherwise,  follow the guidelines below.  Eat and drink as usual until 8 hours before you arrive for surgery. After that, no food or milk.  Drink clear liquids until 2 hours before you arrive. These are liquids you can see through, like water, Gatorade, and Propel Water. They also include plain black coffee and tea (no cream or milk), candy, and breath mints. You can spit out gum when you arrive.  If you drink alcohol: Stop drinking it the night before surgery.  If your care team tells you to take medicine on the morning of surgery, it's okay to take it with a sip of water.  Preventing infection  Shower or bathe the night before and morning of your surgery. Follow the instructions your clinic gave you. (If no instructions, use regular soap.)  Don't shave or clip hair near your surgery site. We'll remove the hair if needed.  Don't smoke or vape the morning of surgery. You may chew nicotine gum up to 2 hours before surgery. A nicotine patch is okay.  Note: Some surgeries require you to completely quit smoking and nicotine. Check with your surgeon.  Your care team will make every effort to keep you safe from infection. We will:  Clean our hands often with soap and water (or an alcohol-based hand rub).  Clean the skin at your surgery site with a special soap that kills germs.  Give you a special gown to keep you warm. (Cold raises the risk of infection.)  Wear special hair covers, masks, gowns and gloves during surgery.  Give antibiotic medicine, if prescribed. Not all surgeries need antibiotics.  What to bring on the day of surgery  Photo ID and insurance card  Copy of your health care directive, if you have one  Glasses and hearing aids (bring cases)  You can't wear contacts during surgery  Inhaler and eye drops, if you use them (tell us about these when you arrive)  CPAP machine or breathing device, if you use them  A few personal items, if spending the night  If you have . . .  A pacemaker, ICD (cardiac defibrillator) or other  implant: Bring the ID card.  An implanted stimulator: Bring the remote control.  A legal guardian: Bring a copy of the certified (court-stamped) guardianship papers.  Please remove any jewelry, including body piercings. Leave jewelry and other valuables at home.  If you're going home the day of surgery  You must have a responsible adult drive you home. They should stay with you overnight as well.  If you don't have someone to stay with you, and you aren't safe to go home alone, we may keep you overnight. Insurance often won't pay for this.  After surgery  If it's hard to control your pain or you need more pain medicine, please call your surgeon's office.  Questions?   If you have any questions for your care team, list them here: _________________________________________________________________________________________________________________________________________________________________________ ____________________________________ ____________________________________ ____________________________________  For informational purposes only. Not to replace the advice of your health care provider. Copyright   2003, 2019 [employer]. All rights reserved. Clinically reviewed by Arline Elias MD. [employer] 755972 - REV 12/22.    How to Take Your Medication Before Surgery  - HOLD (do not take) Aspirin for 7 days    Use albuterol rescue inhaler or nebulizer the morning of your colonoscopy      Preventive Care Advice   This is general advice given by our system to help you stay healthy. However, your care team may have specific advice just for you. Please talk to your care team about your preventive care needs.  Nutrition  Eat 5 or more servings of fruits and vegetables each day.  Try wheat bread, brown rice and whole grain pasta (instead of white bread, rice, and pasta).  Get enough calcium and vitamin D. Check the label on foods and aim for 100% of the RDA (recommended daily allowance).  Lifestyle  Exercise at  least 150 minutes each week   (30 minutes a day, 5 days a week).  Do muscle strengthening activities 2 days a week. These help control your weight and prevent disease.  No smoking.  Wear sunscreen to prevent skin cancer.  Have a dental exam and cleaning every 6 months.  Yearly exams  See your health care team every year to talk about:  Any changes in your health.  Any medicines your care team has prescribed.  Preventive care, family planning, and ways to prevent chronic diseases.  Shots (vaccines)   HPV shots (up to age 26), if you've never had them before.  Hepatitis B shots (up to age 59), if you've never had them before.  COVID-19 shot: Get this shot when it's due.  Flu shot: Get a flu shot every year.  Tetanus shot: Get a tetanus shot every 10 years.  Pneumococcal, hepatitis A, and RSV shots: Ask your care team if you need these based on your risk.  Shingles shot (for age 50 and up).  General health tests  Diabetes screening:  Starting at age 35, Get screened for diabetes at least every 3 years.  If you are younger than age 35, ask your care team if you should be screened for diabetes.  Cholesterol test: At age 39, start having a cholesterol test every 5 years, or more often if advised.  Bone density scan (DEXA): At age 50, ask your care team if you should have this scan for osteoporosis (brittle bones).  Hepatitis C: Get tested at least once in your life.  STIs (sexually transmitted infections)  Before age 24: Ask your care team if you should be screened for STIs.  After age 24: Get screened for STIs if you're at risk. You are at risk for STIs (including HIV) if:  You are sexually active with more than one person.  You don't use condoms every time.  You or a partner was diagnosed with a sexually transmitted infection.  If you are at risk for HIV, ask about PrEP medicine to prevent HIV.  Get tested for HIV at least once in your life, whether you are at risk for HIV or not.  Cancer screening tests  Cervical  cancer screening: If you have a cervix, begin getting regular cervical cancer screening tests at age 21. Most people who have regular screenings with normal results can stop after age 65. Talk about this with your provider.  Breast cancer scan (mammogram): If you've ever had breasts, begin having regular mammograms starting at age 40. This is a scan to check for breast cancer.  Colon cancer screening: It is important to start screening for colon cancer at age 45.  Have a colonoscopy test every 10 years (or more often if you're at risk) Or, ask your provider about stool tests like a FIT test every year or Cologuard test every 3 years.  To learn more about your testing options, visit: [email or web address].  For help making a decision, visit: https://bit.ly/kf08549.  Prostate cancer screening test: If you have a prostate and are age 55 to 69, ask your provider if you would benefit from a yearly prostate cancer screening test.  Lung cancer screening: If you are a current or former smoker age 50 to 80, ask your care team if ongoing lung cancer screenings are right for you.  For informational purposes only. Not to replace the advice of your health care provider. Copyright   2023 [facility]. All rights reserved. Clinically reviewed by the [facility]. wise.io [id number] - REV 01/24.    Learning About Activities of Daily Living  What are activities of daily living?     Activities of daily living (ADLs) are the basic self-care tasks you do every day. These include eating, bathing, dressing, and moving around.  As you age, and if you have health problems, you may find that it's harder to do some of these tasks. If so, your doctor can suggest ideas that may help.  To measure what kind of help you may need, your doctor will ask how well you are able to do ADLs. Let your doctor know if there are any tasks that you are having trouble doing. This is an important first step to  getting help. And when you have the help you need, you can stay as independent as possible.  How will a doctor assess your ADLs?  Asking about ADLs is part of a routine health checkup your doctor will likely do as you age. Your health check might be done in a doctor's office, in your home, or at a hospital. The goal is to find out if you are having any problems that could make it hard to care for yourself or that make it unsafe for you to be on your own.  To measure your ADLs, your doctor will ask how hard it is for you to do routine tasks. Your doctor may also want to know if you have changed the way you do a task because of a health problem. Your doctor may watch how you:  Walk back and forth.  Keep your balance while you stand or walk.  Move from sitting to standing or from a bed to a chair.  Button or unbutton a shirt or sweater.  Remove and put on your shoes.  It's common to feel a little worried or anxious if you find you can't do all the things you used to be able to do. Talking with your doctor about ADLs is a way to make sure you're as safe as possible and able to care for yourself as well as you can. You may want to bring a caregiver, friend, or family member to your checkup. They can help you talk to your doctor.  Follow-up care is a key part of your treatment and safety. Be sure to make and go to all appointments, and call your doctor if you are having problems. It's also a good idea to know your test results and keep a list of the medicines you take.  Current as of: October 24, 2023               Content Version: 14.0    9351-9314 [employer].   Care instructions adapted under license by your healthcare professional. If you have questions about a medical condition or this instruction, always ask your healthcare professional. [employer] disclaims any warranty or liability for your use of this information.      Learning About Stress  What is stress?     Stress is your body's  response to a hard situation. Your body can have a physical, emotional, or mental response. Stress is a fact of life for most people, and it affects everyone differently. What causes stress for you may not be stressful for someone else.  A lot of things can cause stress. You may feel stress when you go on a job interview, take a test, or run a race. This kind of short-term stress is normal and even useful. It can help you if you need to work hard or react quickly. For example, stress can help you finish an important job on time.  Long-term stress is caused by ongoing stressful situations or events. Examples of long-term stress include long-term health problems, ongoing problems at work, or conflicts in your family. Long-term stress can harm your health.  How does stress affect your health?  When you are stressed, your body responds as though you are in danger. It makes hormones that speed up your heart, make you breathe faster, and give you a burst of energy. This is called the fight-or-flight stress response. If the stress is over quickly, your body goes back to normal and no harm is done.  But if stress happens too often or lasts too long, it can have bad effects. Long-term stress can make you more likely to get sick, and it can make symptoms of some diseases worse. If you tense up when you are stressed, you may develop neck, shoulder, or low back pain. Stress is linked to high blood pressure and heart disease.  Stress also harms your emotional health. It can make you alicea, tense, or depressed. Your relationships may suffer, and you may not do well at work or school.  What can you do to manage stress?  You can try these things to help manage stress:   Do something active. Exercise or activity can help reduce stress. Walking is a great way to get started. Even everyday activities such as housecleaning or yard work can help.  Try yoga or cam chi. These techniques combine exercise and meditation. You may need some  training at first to learn them.  Do something you enjoy. For example, listen to music or go to a movie. Practice your hobby or do volunteer work.  Meditate. This can help you relax, because you are not worrying about what happened before or what may happen in the future.  Do guided imagery. Imagine yourself in any setting that helps you feel calm. You can use online videos, books, or a teacher to guide you.  Do breathing exercises. For example:  From a standing position, bend forward from the waist with your knees slightly bent. Let your arms dangle close to the floor.  Breathe in slowly and deeply as you return to a standing position. Roll up slowly and lift your head last.  Hold your breath for just a few seconds in the standing position.  Breathe out slowly and bend forward from the waist.  Let your feelings out. Talk, laugh, cry, and express anger when you need to. Talking with supportive friends or family, a counselor, or a iman leader about your feelings is a healthy way to relieve stress. Avoid discussing your feelings with people who make you feel worse.  Write. It may help to write about things that are bothering you. This helps you find out how much stress you feel and what is causing it. When you know this, you can find better ways to cope.  What can you do to prevent stress?  You might try some of these things to help prevent stress:  Manage your time. This helps you find time to do the things you want and need to do.  Get enough sleep. Your body recovers from the stresses of the day while you are sleeping.  Get support. Your family, friends, and community can make a difference in how you experience stress.  Limit your news feed. Avoid or limit time on social media or news that may make you feel stressed.  Do something active. Exercise or activity can help reduce stress. Walking is a great way to get started.  Where can you learn more?  Go to https://www.healthwise.net/patiented  Enter N032 in the  "search box to learn more about \"Learning About Stress.\"  Current as of: October 24, 2023               Content Version: 14.0    4628-9642 [employer].   Care instructions adapted under license by your healthcare professional. If you have questions about a medical condition or this instruction, always ask your healthcare professional. [employer] disclaims any warranty or liability for your use of this information.      Learning About Sleeping Well  What does sleeping well mean?     Sleeping well means getting enough sleep to feel good and stay healthy. How much sleep is enough varies among people.  The number of hours you sleep and how you feel when you wake up are both important. If you do not feel refreshed, you probably need more sleep. Another sign of not getting enough sleep is feeling tired during the day.  Experts recommend that adults get at least 7 or more hours of sleep per day. Children and older adults need more sleep.  Why is getting enough sleep important?  Getting enough quality sleep is a basic part of good health. When your sleep suffers, your physical health, mood, and your thoughts can suffer too. You may find yourself feeling more grumpy or stressed. Not getting enough sleep also can lead to serious problems, including injury, accidents, anxiety, and depression.  What might cause poor sleeping?  Many things can cause sleep problems, including:  Changes to your sleep schedule.  Stress. Stress can be caused by fear about a single event, such as giving a speech. Or you may have ongoing stress, such as worry about work or school.  Depression, anxiety, and other mental or emotional conditions.  Changes in your sleep habits or surroundings. This includes changes that happen where you sleep, such as noise, light, or sleeping in a different bed. It also includes changes in your sleep pattern, such as having jet lag or working a late shift.  Health problems, such as pain, " "breathing problems, and restless legs syndrome.  Lack of regular exercise.  Using alcohol, nicotine, or caffeine before bed.  How can you help yourself?  Here are some tips that may help you sleep more soundly and wake up feeling more refreshed.  Your sleeping area   Use your bedroom only for sleeping and sex. A bit of light reading may help you fall asleep. But if it doesn't, do your reading elsewhere in the house. Try not to use your TV, computer, smartphone, or tablet while you are in bed.  Be sure your bed is big enough to stretch out comfortably, especially if you have a sleep partner.  Keep your bedroom quiet, dark, and cool. Use curtains, blinds, or a sleep mask to block out light. To block out noise, use earplugs, soothing music, or a \"white noise\" machine.  Your evening and bedtime routine   Create a relaxing bedtime routine. You might want to take a warm shower or bath, or listen to soothing music.  Go to bed at the same time every night. And get up at the same time every morning, even if you feel tired.  What to avoid   Limit caffeine (coffee, tea, caffeinated sodas) during the day, and don't have any for at least 6 hours before bedtime.  Avoid drinking alcohol before bedtime. Alcohol can cause you to wake up more often during the night.  Try not to smoke or use tobacco, especially in the evening. Nicotine can keep you awake.  Limit naps during the day, especially close to bedtime.  Avoid lying in bed awake for too long. If you can't fall asleep or if you wake up in the middle of the night and can't get back to sleep within about 20 minutes, get out of bed and go to another room until you feel sleepy.  Avoid taking medicine right before bed that may keep you awake or make you feel hyper or energized. Your doctor can tell you if your medicine may do this and if you can take it earlier in the day.  If you can't sleep   Imagine yourself in a peaceful, pleasant scene. Focus on the details and feelings of " "being in a place that is relaxing.  Get up and do a quiet or boring activity until you feel sleepy.  Avoid drinking any liquids before going to bed to help prevent waking up often to use the bathroom.  Where can you learn more?  Go to https://www.[employer].net/patiented  Enter J942 in the search box to learn more about \"Learning About Sleeping Well.\"  Current as of: July 10, 2023               Content Version: 14.0    5753-2749 [employer].   Care instructions adapted under license by your healthcare professional. If you have questions about a medical condition or this instruction, always ask your healthcare professional. [employer] disclaims any warranty or liability for your use of this information.      Learning About Depression Screening  What is depression screening?  Depression screening is a way to see if you have depression symptoms. It may be done by a doctor or counselor. It's often part of a routine checkup. That's because your mental health is just as important as your physical health.  Depression is a mental health condition that affects how you feel, think, and act. You may:  Have less energy.  Lose interest in your daily activities.  Feel sad and grouchy for a long time.  Depression is very common. It affects people of all ages.  Many things can lead to depression. Some people become depressed after they have a stroke or find out they have a major illness like cancer or heart disease. The death of a loved one or a breakup may lead to depression. It can run in families. Most experts believe that a combination of inherited genes and stressful life events can cause it.  What happens during screening?  You may be asked to fill out a form about your depression symptoms. You and the doctor will discuss your answers. The doctor may ask you more questions to learn more about how you think, act, and feel.  What happens after screening?  If you have symptoms of depression, your " "doctor will talk to you about your options.  Doctors usually treat depression with medicines or counseling. Often, combining the two works best. Many people don't get help because they think that they'll get over the depression on their own. But people with depression may not get better unless they get treatment.  The cause of depression is not well understood. There may be many factors involved. But if you have depression, it's not your fault.  A serious symptom of depression is thinking about death or suicide. If you or someone you care about talks about this or about feeling hopeless, get help right away.  It's important to know that depression can be treated. Medicine, counseling, and self-care may help.  Where can you learn more?  Go to https://www.[employer].net/patiented  Enter T185 in the search box to learn more about \"Learning About Depression Screening.\"  Current as of: June 24, 2023               Content Version: 14.0    1629-8541 [employer].   Care instructions adapted under license by your healthcare professional. If you have questions about a medical condition or this instruction, always ask your healthcare professional. [employer] disclaims any warranty or liability for your use of this information.      "

## 2024-04-18 NOTE — COMMUNITY RESOURCES LIST (ENGLISH)
April 18, 2024           YOUR PERSONALIZED LIST OF SERVICES & PROGRAMS           NAVIGATION    Eligibility Screening      [facility] [place] application assistance [facility]  [address] [place] [address] (Distance: 2.5 miles)  Phone: [phone]  Language: English, Malay  Fee: Free      [facility] application assistance  [address] [place] [address] (Distance: 2.5 miles)  Language: English  Fee: Free      Sure - Navigators  Phone: [phone]  Website: https://www.[facility].org/about-us/assister-program/navigators/index.jsp  Language: English  Hours: Mon 8:00 AM - 4:00 PM Tue 8:00 AM - 4:00 PM Wed 8:00 AM - 4:00 PM Thu 8:00 AM - 4:00 PM        ASSISTANCE    Nutrition Benefits      [facility] [place] application assistance [facility]  [address] [place] [address] (Distance: 2.5 miles)  Phone: [phone]  Language: English, Malay  Fee: Free      House - Family Centers  [address] [place] [address] (Distance: 2.9 miles)  Phone: [phone]  Website: https://www.[facility].org  Language: English, Hmong, Malay  Fee: Free  Accessibility: Ada accessible, Blind accommodation, Deaf or hard of hearing, Translation services      [facility] Hipbones  Phone: [phone]  Website: https://www.[employer].org/programs/market-bucks/  Language: English  Hours: Mon 10:00 AM - 5:00 PM Tue 10:00 AM - 5:00 PM Wed 10:00 AM - 5:00 PM Thu 10:00 AM - 5:00 PM Fri 10:00 AM - 5:00 PM  Fee: Self pay    [facility]      Community Services - Food Shelf  [address] [address] (Distance: 2.6 miles)  Phone: [phone]  Website: http://www.[employer].PixelFish  Language: English, Malay, Kyrgyz, Honduran, Hmong  Fee: Free  Accessibility: Translation services      [facility]  [address] [place] [address]  (Distance: 0.7 miles)  Phone: [phone]  Website: https://[facility].org/find-support/partner-organizations/housing-assistance/  Language: English  Fee: Free  Accessibility: Ada accessible      [facility]visors - [facility]  Phone: [phone]  Website: https://www.advocateforFindThatCourse.[employer]  Language: English, Bulgarian  Hours: Mon 8:00 AM - 5:00 PM Tue 8:00 AM - 5:00 PM Wed 8:00 AM - 5:00 PM Thu 8:00 AM - 5:00 PM Fri 8:00 AM - 5:00 PM  Fee: Free  Accessibility: Ada accessible        & SHELTER    Case Management      [facility] - Housing search assistance  [address] [place] [address] (Distance: 6.9 miles)  Phone: [phone]  Language: English, Lithuanian, St Helenian, Hmong  Fee: Free  Accessibility: Ada accessible, Blind accommodation, Deaf or hard of hearing, Translation services      [facility] Access  [address] [place] [address] (Distance: 7.1 miles)  Phone: [phone]  Language: English  Fee: Free  Accessibility: Translation services, Ada accessible      [employer] - Housing Stabilization Services  Phone: [phone]  Website: https://homebasemn.com/  Language: English  Hours: Mon 8:00 AM - 4:00 PM Tue 8:00 AM - 4:00 PM Wed 8:00 AM - 4:00 PM Thu 8:00 AM - 4:00 PM Fri 8:00 AM - 4:00 PM  Fee: Free  Accessibility: Blind accommodation, Deaf or hard of hearing  Transportation Options: Free transportation    Payment Assistance      [facility] - Rent Payment Assistance - [facility]  [address] [place] [address] (Distance: 3.3 miles)  Phone: [phone]  Language: English  Fee: Free      30-Days Foundation - Keep the Key  Phone: [phone]  Website: https://www.lpc77-bxtrwxvilmborq.org/programs.html  Language: English  Hours: Mon 7:00 AM - 7:00 PM Tue 7:00 AM - 7:00 PM Wed 7:00 AM - 7:00 PM Thu 7:00 AM - 7:00 PM Fri 7:00 AM - 7:00 PM  Fee: Free      House -  Housing Stability  Phone: [phone]  Website: https://www.[facility].org  Language: English, Hmong, Sao Tomean  Hours: Mon 8:00 AM - 8:00 PM Tue 8:00 AM - 8:00 PM Wed 8:00 AM - 8:00 PM Thu 8:00 AM - 8:00 PM Fri 8:00 AM - 8:00 PM  Fee: Free  Accessibility: Ada accessible, Blind accommodation, Deaf or hard of hearing, Translation services    Mediation & Eviction Prevention      [facility] - Mortgage Foreclosure Prevention  [address] [place] [address] (Distance: 8.4 miles)  Phone: [phone]  Language: English  Fee: Free  Accessibility: Ada accessible      Home Partners - Foreclosure Prevention  [address] [place] [address] (Distance: 5.8 miles)  Phone: [phone]  Website: http://www.[facility] Homeartners.org  Language: English, Sao Tomean, Hmong  Fee: Free, Sliding scale  Accessibility: Blind accommodation, Ada accessible      Line - Tenant Rights / Eviction Prevention  Website: https://[facility].org/s-eazc-eo-/  Language: English, Sao Tomean            Medical Transportation, (NEMT)      Ride - Transportation to medical appointments  [address] [address] (Distance: 4.9 miles)  Phone: [phone]  Website: https://www.[employer]/  Language: English  Fee: Self pay, Insurance      [facility] Nurse Program - Transportation to medical appointments  [address] [place] [address] (Distance: 8.8 miles)  Phone: [phone]  Website: http://eEyepeacemn.org/  Language: English, Sao Tomean, Indian  Fee: Sliding scale  Accessibility: Translation services      Social Service [facility] - Neighbor to Neighbor Program  Phone: [phone]  Email: [email or web address]  Website: https://www.[facility].org/services/older-adults/-services/neighbor-to-neighbor  Language: English  Hours: Mon 8:00 AM - 5:00 PM Tue 8:00 AM - 5:00 PM Wed 8:00 AM - 5:00 PM Thu 8:00 AM - 5:00 PM Fri 8:00 AM - 5:00  PM  Fee: Insurance, Self pay  Accessibility: Deaf or hard of hearing, Blind accommodation, Translation services    Expense Assistance      [place] - Transit Link  [address] [place] [address] (Distance: 4.6 miles)  Phone: [phone]  Website: https://[facility] Center[facility]/NHRMCLumenis/Transportation/Services/Transit-Link.aspx  Language: English  Fee: Self pay      Transit - [place] - Transit Assistance Program (TAP) - Transportation expense assistance  [address] [place] [address] (Distance: 4.6 miles)  Language: English, Argentine  Fee: Free, Sliding scale, Self pay  Accessibility: Translation services      - Dislocated Worker/Adult [employer] Employment Program  Phone: [phone]  Email: [email or web address]  Website: https://[employer]/services/employment-services/dislocated-worker-program/  Language: English, Egyptian  Hours: Mon 8:00 AM - 4:30 PM Tue 8:00 AM - 4:30 PM Wed 8:00 AM - 4:30 PM Thu 8:00 AM - 4:30 PM Fri 8:00 AM - 4:30 PM  Fee: Free  Accessibility: Ada accessible    Coordination      [employer] (TO) - Free or low-cost transportation  [address] [place] [address] (Distance: 3.5 miles)  Phone: [phone]  Website: http://www.[facility] Center[facility]ist  Language: English  Fee: Free, Sliding scale  Transportation Options: Free transportation      [facility] Free Bus and Gas Cards - Free or low-cost transportation  [address] [place] [address] (Distance: 3.3 miles)  Phone: [phone]  Language: English  Fee: Free      - Services  Phone: [phone]  Website: https://CellARide/#howitworks-section  Hours: Sun 12:00 AM - 11:45 PM Mon 12:00 AM - 11:45 PM Tue 12:00 AM - 11:45 PM Wed 12:00 AM - 11:45 PM Thu 12:00 AM - 11:45 PM Fri 12:00 AM - 11:45 PM Sat 12:00 AM - 11:45 PM  Fee: Self pay  Accessibility: Ada accessible, Blind accommodation, Deaf or hard of hearing               IMPORTANT NUMBERS & WEBSITES        Emergency Services  911  .   United  Way  211 http://211unitedway.org  .   Poison Control  (849) 500-4339 http://[place]poison.org http://wisconsinpoison.org  .     Suicide and Crisis Lifeline  988 http://988lifeline.org  .   Childhelp [place] Child Abuse Hotline  319.193.5384 http://Childhelphotline.org   .   [place] Sexual Assault Hotline  (936) 306-5093 (HOPE) http://Cross Mediaworksn.org   .     [place] Runaway Safeline  (872) 162-1839 (RUNAWAY) http://HipWayruQuikr India.Thuzio Inc.  .   Pregnancy & Postpartum Support  Call/text 613-737-4143  [place]: http://ppsupportmn.org  [place]: http://psichapters.com/wi  .   Substance Abuse National Helpline ([facility])  332-000-HELP (0171) http://Findtreatment.gov   .                DISCLAIMER: These resources have been generated via the APGR Green Platform. [employer] does not endorse any service providers mentioned in this resource list. [employer] does not guarantee that the services mentioned in this resource list will be available to you or will improve your health or wellness.    [facility] [www.[facility].com]

## 2024-04-18 NOTE — COMMUNITY RESOURCES LIST (ENGLISH)
April 18, 2024           YOUR PERSONALIZED LIST OF SERVICES & PROGRAMS           NAVIGATION    Eligibility Screening      [facility] application assistance [facility]  [address] [place] [address] (Distance: 2.5 miles)  Phone: [phone]  Language: English, Nepali  Fee: Free      [facility] application assistance  [address] [place] [address] (Distance: 2.5 miles)  Language: English  Fee: Free      Sure - Navigators  Phone: [phone]  Website: https://www.[facility].org/about-us/assister-program/navigators/index.jsp  Language: English  Hours: Mon 8:00 AM - 4:00 PM Tue 8:00 AM - 4:00 PM Wed 8:00 AM - 4:00 PM Thu 8:00 AM - 4:00 PM        ASSISTANCE    Nutrition Benefits      [facility] application assistance [facility]  [address] [place] [address] (Distance: 2.5 miles)  Phone: [phone]  Language: English, Nepali  Fee: Free      House - Family Centers  [address] [place] [address] (Distance: 2.9 miles)  Phone: [phone]  Website: https://www.[facility].org  Language: English, Hmong, Nepali  Fee: Free  Accessibility: Ada accessible, Blind accommodation, Deaf or hard of hearing, Translation services      [facility]  Phone: [phone]  Website: https://www.[employer].org/programs/market-bucks/  Language: English  Hours: Mon 10:00 AM - 5:00 PM Tue 10:00 AM - 5:00 PM Wed 10:00 AM - 5:00 PM Thu 10:00 AM - 5:00 PM Fri 10:00 AM - 5:00 PM  Fee: Self pay    [facility] Community Services  [address] [place] [address] (Distance: 0.7 miles)  Phone: [phone]  Website: https://[facility].org/find-support/partner-organizations/housing-assistance/  Language: English  Fee: Free  Accessibility: Ada accessible      Community Services - Food Shelf  [address] [facility] [address] [place] [address]  (Distance: 2.6 miles)  Phone: [phone]  Website: http://www.[facility].org  Language: English, Jamaican, Russian, Maldivian, Hmong  Fee: Free  Accessibility: Translation services      Health Advisors - [facility] Veterans  Phone: [phone]  Website: https://www.advocateforveterans.[employer]  Language: English, Jamaican  Hours: Mon 8:00 AM - 5:00 PM Tue 8:00 AM - 5:00 PM Wed 8:00 AM - 5:00 PM Thu 8:00 AM - 5:00 PM Fri 8:00 AM - 5:00 PM  Fee: Free  Accessibility: Ada accessible        & SHELTER    Case Management      [facility] - Housing search assistance  [address] [place] [address] (Distance: 6.9 miles)  Phone: [phone]  Language: English, Russian, Samoan, Hmong  Fee: Free  Accessibility: Ada accessible, Blind accommodation, Deaf or hard of hearing, Translation services      [facility] Access  [address] [place] [address] (Distance: 7.1 miles)  Phone: [phone]  Language: English  Fee: Free  Accessibility: Translation services, Ada accessible      [employer] - Housing Stabilization Services  Phone: [phone]  Website: https://[employer]/  Language: English  Hours: Mon 8:00 AM - 4:00 PM Tue 8:00 AM - 4:00 PM Wed 8:00 AM - 4:00 PM Thu 8:00 AM - 4:00 PM Fri 8:00 AM - 4:00 PM  Fee: Free  Accessibility: Blind accommodation, Deaf or hard of hearing  Transportation Options: Free transportation    Payment Assistance      [facility] - Rent Payment Assistance [facility]  [address] [place] [address] (Distance: 3.3 miles)  Phone: [phone]  Language: English  Fee: Free      House - Housing Stability  Phone: [phone]  Website: https://www.[facility].[employer] Objects  Language: English, Hmong, Jamaican  Hours: Mon 8:00 AM - 8:00 PM Tue 8:00 AM - 8:00 PM Wed 8:00 AM - 8:00 PM Thu 8:00 AM - 8:00 PM Fri 8:00 AM - 8:00 PM  Fee: Free  Accessibility: Ada accessible,  Blind accommodation, Deaf or hard of hearing, Translation services      30-Days Foundation - Keep the Key  Phone: [phone]  Website: https://www.dzo52-ngovejekwclmya.org/programs.html  Language: English  Hours: Mon 7:00 AM - 7:00 PM Tue 7:00 AM - 7:00 PM Wed 7:00 AM - 7:00 PM Thu 7:00 AM - 7:00 PM Fri 7:00 AM - 7:00 PM  Fee: Free    Mediation & Eviction Prevention      [facility] - Mortgage Foreclosure Prevention  [address] [place] [address] (Distance: 8.4 miles)  Phone: [phone]  Language: English  Fee: Free  Accessibility: Ada accessible      Home Partners - Foreclosure Prevention  [address] [place] [address] (Distance: 5.8 miles)  Phone: [phone]  Website: http://www.[facility] Cent[facility]repK[facility] Resourcesners.org  Language: English, Afghan, Hmong  Fee: Free, Sliding scale  Accessibility: Blind accommodation, Ada accessible      Line - Tenant Rights / Eviction Prevention  Website: https://homelinemn.org/h-nhvt-iq-/  Language: English, Afghan            Medical Transportation, (NEMT)      Ride - Transportation to medical appointments  [address] [address] (Distance: 4.9 miles)  Phone: [phone]  Website: https://www.sellpoints/  Language: English  Fee: Self pay, Insurance      [facility] Nurse Program - Transportation to medical appointments  [address] [place] [address] (Distance: 8.8 miles)  Phone: [phone]  Website: http://Waybeo Incpeacemn.org/  Language: English, Afghan, Cayman Islander  Fee: Sliding scale  Accessibility: Translation services      Social Service [facility] - Neighbor to Neighbor Program  Phone: [phone]  Email: [email or web address]@[facility].org  Website: https://www.[facility].org/services/older-adults/-services/neighbor-to-neighbor  Language: English  Hours: Mon 8:00 AM - 5:00 PM Tue 8:00 AM - 5:00 PM Wed 8:00 AM - 5:00 PM Thu 8:00 AM - 5:00 PM Fri 8:00 AM - 5:00 PM  Fee:  Insurance, Self pay  Accessibility: Deaf or hard of hearing, Blind accommodation, Translation services    Expense Assistance      [place] - Transit Link  [address] [place] [address] (Distance: 4.6 miles)  Phone: [phone]  Website: https://AlgorithmicsFirst[facility] HospitalDarkstrand/Transportation/Services/Transit-Link.aspx  Language: English  Fee: Self pay      Transit - [place] - Transit Assistance Program (TAP) - Transportation expense assistance  [address] [place] [address] (Distance: 4.6 miles)  Language: English, Chadian  Fee: Free, Sliding scale, Self pay  Accessibility: Translation services      - Dislocated Worker/Adult [employer] Employment Program  Phone: [phone]  Email: patrice@[employer]  Website: https://iDoneThis/services/employment-services/dislocated-worker-program/  Language: English, Dominican  Hours: Mon 8:00 AM - 4:30 PM Tue 8:00 AM - 4:30 PM Wed 8:00 AM - 4:30 PM Thu 8:00 AM - 4:30 PM Fri 8:00 AM - 4:30 PM  Fee: Free  Accessibility: Ada accessible    Coordination      [employer] (TO) - Free or low-cost transportation  [address] [address] (Distance: 3.5 miles)  Phone: [phone]  Website: http://www.[facility] HospitalDeep-Secure  Language: English  Fee: Free, Sliding scale  Transportation Options: Free transportation      [facility] - Free Bus and Gas Cards - Free or low-cost transportation  [address] [place] [address] (Distance: 3.3 miles)  Phone: [phone]  Language: English  Fee: Free      - Services  Phone: [phone]  Website: https://[employer]/#howitworks-section  Hours: Sun 12:00 AM - 11:45 PM Mon 12:00 AM - 11:45 PM Tue 12:00 AM - 11:45 PM Wed 12:00 AM - 11:45 PM Thu 12:00 AM - 11:45 PM Fri 12:00 AM - 11:45 PM Sat 12:00 AM - 11:45 PM  Fee: Self pay  Accessibility: Ada accessible, Blind accommodation, Deaf or hard of hearing               IMPORTANT NUMBERS & WEBSITES        Emergency Services  911  .   [facility]  211  http://211unitedway.org  .   Poison Control  (458) 732-6388 http://[place]poison.org http://wisconsinpoison.org  .     Suicide and Crisis Lifeline  988 http://988lifeline.org  .   Childhelp [place] Child Abuse Hotline  509.156.7669 http://Childhelphotline.org   .   [place] Sexual Assault Hotline  (993) 581-6898 (HOPE) http://AxisRoomsn.org   .     [place] Runaway Safeline  (254) 975-4214 (RUNAWAY) http://DownstreamruLama Lab.CultureIQ  .   Pregnancy & Postpartum Support  Call/text 325-830-8047  [place]: http://ppsupportmn.org  [place]: http://psichapters.com/wi  .   Substance Abuse National Helpline ([facility])  027-845-HELP (0892) http://Findtreatment.gov   .                DISCLAIMER: These resources have been generated via the Insplorion Platform. Insplorion does not endorse any service providers mentioned in this resource list. Insplor[employer] does not guarantee that the services mentioned in this resource list will be available to you or will improve your health or wellness.    [facility]

## 2024-04-18 NOTE — Clinical Note
Colonoscopy next week 4/26 Please call pt and family 1-2 days prior to go through colonoscopy prep instructions with patient

## 2024-04-18 NOTE — PROGRESS NOTES
Subjective     Venita Fisher is a 68 year old, presenting for the following health issues:  Pre-Op Exam and Wellness Visit        4/18/2024    12:04 PM   Additional Questions   Roomed by M   Accompanied by son       RN Annual Wellness Visit     -Colon cancer screening done via Cologuard on 2/19/2024 (impression: positive). Scheduled for colonoscopy 4/26/2024.  -PSA lab work performed 11/9/2010 (value of 1.2 micrograms/L). Discussed risks/benefits of PSA testing today in detail, including possibility of false positive results and/or possibility of biopsy recommended as next step. Pt would like to proceed with PSA testing; lab ordered  -Immunizations: Pt is due for Shingles, RSV, & influenza vaccines. RSV & influenza given today, Shingles declined.   -Education: Pt education provided on Diet, Exercise, Home Safety, and ADLs  -Derm: Does patient regularly see dermatologist? No-       Patient has been advised of split billing requirements and indicates understanding: Yes     Health Care Directive  Patient does not have a Health Care Directive or Living Will: Discussed advance care planning with patient; information given to patient to review.        4/18/2024   General Health   How would you rate your overall physical health? Good   Feel stress (tense, anxious, or unable to sleep) Very much          4/18/2024   Nutrition   Diet: I don't know         4/18/2024   Exercise   Days per week of moderate/strenous exercise 2 days   (!) EXERCISE CONCERN  Starting to exercise more as weather gets warmer          4/18/2024   Social Factors   Frequency of gathering with friends or relatives Once a week   Worry food won't last until get money to buy more Yes   Food not last or not have enough money for food? Yes   Do you have housing?  Yes   Are you worried about losing your housing? Yes   Lack of transportation? Yes   Unable to get utilities (heat,electricity)? No   Want help with housing or utility concern? (!) YES   (!) FOOD SECURITY  CONCERN PRESENT (!) TRANSPORTATION CONCERN PRESENT(!) HOUSING CONCERN PRESENT  Care coordination referral placed today regarding the above concerns; food and housing insecurity secondary to financial constraints.           4/18/2024   Activities of Daily Living- Home Safety   Needs help with the following daily activites Telephone use    Transportation    Shopping    Preparing meals    Housework    Bathing    Laundry    Medication administration    Money management    Toileting    Feeding    Dressing   Safety concerns in the home None of the above         4/18/2024   Dental   Dentist two times every year? (!) NO   Per son, family is working on this          4/18/2024   Hearing Screening   Hearing concerns? None of the above         4/18/2024   Driving Risk Screening   Patient/family members have concerns about driving No         4/18/2024   General Alertness/Fatigue Screening   Have you been more tired than usual lately? (!) YES   Difficulty sleeping at night--sleep medicine referral placed today          4/18/2024   Urinary Incontinence Screening   Bothered by leaking urine in past 6 months No     Social History     Tobacco Use    Smoking status: Never    Smokeless tobacco: Never   Vaping Use    Vaping status: Never Used   Substance Use Topics    Alcohol use: No    Drug use: No       Today's PHQ-9 Score:       4/18/2024    11:45 AM   PHQ-9 SCORE   PHQ-9 Total Score MyChart 12 (Moderate depression)   PHQ-9 Total Score 12         4/18/2024   AAA Screening   Family history of Abdominal Aortic Aneurysm (AAA)? No   Last PSA:   Prostate Specific Antigen Screen   Date Value Ref Range Status   11/09/2010 1.2 <3.6 ng/mL Final     Comment:     Method is Abbott Prostate-Specific Antigen (PSA)            Standard-WHO 1st International (90:10) as of 09/26/05         Reviewed and updated as needed this visit by Provider     Tobacco  Allergies  Meds  Problems  Med Hx  Surg Hx  Fam Hx            Current providers sharing in  care for this patient include:  Patient Care Team:  Sylvia Lugo MD as PCP - General (Family Medicine)  Bry Corona, PharmD as Pharmacist (Pharmacist)  Sylvia Lugo MD as Assigned PCP  Lexy Espinoza RN as Lead Care Coordinator (Primary Care - CC)  Martir Quinn MD as Assigned Pulmonology Provider  Herber Dawkins PA-C as Physician Assistant  Bry Corona, PharmD as Assigned [place] Pharmacist    The following health maintenance items are reviewed in Epic and correct as of today:  Health Maintenance   Topic Date Due    COPD ACTION PLAN  Never done    ZOSTER IMMUNIZATION (1 of 2) Never done    MICROALBUMIN  07/28/2023    BMP  09/01/2023    COLORECTAL CANCER SCREENING  02/20/2024    LIPID  04/28/2024    DEPRESSION 12 MO INDEX REPEAT PHQ-9  05/02/2024    PHQ-9  10/18/2024    HEMOGLOBIN  10/18/2024    MEDICARE ANNUAL WELLNESS VISIT  04/18/2025    ANNUAL REVIEW OF HM ORDERS  04/18/2025    FALL RISK ASSESSMENT  04/18/2025    GLUCOSE  06/01/2026    ADVANCE CARE PLANNING  06/01/2028    DTAP/TDAP/TD IMMUNIZATION (4 - Td or Tdap) 10/19/2028    PARATHYROID  Completed    PHOSPHORUS  Completed    SPIROMETRY  Completed    HEPATITIS C SCREENING  Completed    DEPRESSION ACTION PLAN  Completed    INFLUENZA VACCINE  Completed    Pneumococcal Vaccine: 65+ Years  Completed    URINALYSIS  Completed    ALK PHOS  Completed    RSV VACCINE (Pregnancy & 60+)  Completed    COVID-19 Vaccine  Completed    IPV IMMUNIZATION  Aged Out    HPV IMMUNIZATION  Aged Out    MENINGITIS IMMUNIZATION  Aged Out    RSV MONOCLONAL ANTIBODY  Aged Out       Appropriate preventive services were discussed with this patient, including applicable screening as appropriate for fall prevention, nutrition, physical activity, Tobacco-use cessation, weight loss and cognition.  Checklist reviewing preventive services available has been given to the patient.           4/18/2024   Mini Cog   Clock Draw Score 0 Abnormal   3 Item Recall 0 objects recalled  "  Mini Cog Total Score 0       Objective    /74 (BP Location: Right arm, Patient Position: Sitting, Cuff Size: Adult Regular)   Pulse 79   Temp 97.7  F (36.5  C) (Temporal)   Resp 19   Ht 1.67 m (5' 5.75\")   Wt 79.8 kg (176 lb)   SpO2 99%   BMI 28.63 kg/m    Body mass index is 28.63 kg/m .      See preop for documentation of Exam    Follow-up Visit   Expected date:  Sep 02, 2024 (Approximate)      Follow Up Appointment Details:     Follow-up with whom?: PCP    Follow-Up for what?: Chronic Disease f/u    Chronic Disease f/u:  Depression  Hypertension  Hyperlipidemia  CKD  COPD       How?: In Person                     AWV completed by MOUNIKA Austin MD    "

## 2024-04-18 NOTE — COMMUNITY RESOURCES LIST (ENGLISH)
April 18, 2024           YOUR PERSONALIZED LIST OF SERVICES & PROGRAMS           NAVIGATION    Eligibility Screening      [facility] [place] [facility]  [address] [place] [address] (Distance: 2.5 miles)  Phone: [phone]  Language: English, Welsh  Fee: Free      [facility] application assistance  [address] [place] [address] (Distance: 2.5 miles)  Language: English  Fee: Free      [employer]  Phone: [phone]  Website: https://www.[facility].org/about-us/assister-program/navigators/index.jsp  Language: English  Hours: Mon 8:00 AM - 4:00 PM Tue 8:00 AM - 4:00 PM Wed 8:00 AM - 4:00 PM Thu 8:00 AM - 4:00 PM        ASSISTANCE    Nutrition Benefits      [facility] [place] [facility] [place] [facility]  [address] [place] [address] (Distance: 2.5 miles)  Phone: [phone]  Language: English, Welsh  Fee: Free      [facility]  [address] [place] [address] (Distance: 2.9 miles)  Phone: [phone]  Website: https://www.[facility].org  Language: English, Hmong, Welsh  Fee: Free  Accessibility: Ada accessible, Blind accommodation, Deaf or hard of hearing, Translation services      [employer] [place] - SNAP (formerly food stamps) Screening and Application help  Phone: (831) 677-4494  Website: https://www.hungersolutions.org/programs/[place]-food-helpline/  Language: English  Hours: Mon 10:00 AM - 5:00 PM Tue 10:00 AM - 5:00 PM Wed 10:00 AM - 5:00 PM Thu 10:00 AM - 5:00 PM Fri 10:00 AM - 5:00 PM  Fee: Free  Accessibility: Ada accessible, Blind accommodation, Deaf or hard of hearing, Translation services    [facility]      Community Services - Food Shelf  [address] [facility] [address] [place] [address] (Distance: 2.6 miles)  Phone: [phone]  Website: http://www.[facility].org  Language: English, Welsh, Omani, Finnish,  Hmong  Fee: Free  Accessibility: Translation services      [facility]  [address] [place] [address] (Distance: 0.7 miles)  Phone: [phone]  Website: https://[facility].org/find-support/partner-organizations/housing-assistance/  Language: English  Fee: Free  Accessibility: Ada accessible      [facility] [employer] - [employer]  Phone: [phone]  Website: https://www.advocateforAmerican Red Cross.[employer]  Language: English, Tunisian  Hours: Mon 8:00 AM - 5:00 PM Tue 8:00 AM - 5:00 PM Wed 8:00 AM - 5:00 PM Thu 8:00 AM - 5:00 PM Fri 8:00 AM - 5:00 PM  Fee: Free  Accessibility: Ada accessible        & SHELTER    Case Management      Priscilla Rodriguez [facility] - Housing search assistance  [address] [place] [address] (Distance: 6.9 miles)  Phone: [phone]  Language: English, Azerbaijani, Singaporean, Hmong  Fee: Free  Accessibility: Ada accessible, Blind accommodation, Deaf or hard of hearing, Translation services      [facility]  [address] [place] [address] (Distance: 7.1 miles)  Phone: [phone]  Language: English  Fee: Free  Accessibility: Translation services, Ada accessible      [employer] - Housing Stabilization Services  Phone: [phone]  Website: https://[employer]/  Language: English  Hours: Mon 8:00 AM - 4:00 PM Tue 8:00 AM - 4:00 PM Wed 8:00 AM - 4:00 PM Thu 8:00 AM - 4:00 PM Fri 8:00 AM - 4:00 PM  Fee: Free  Accessibility: Blind accommodation, Deaf or hard of hearing  Transportation Options: Free transportation    Payment Assistance      [facility] - Rent Payment Assistance [facility]  [address] [place] [address] (Distance: 3.3 miles)  Phone: [phone]  Language: English  Fee: Free      House - Housing Stability  Phone: [phone]  Website: https://www.[facility].org  Language: English, Hmong, Tunisian  Hours: Mon 8:00  AM - 8:00 PM Tue 8:00 AM - 8:00 PM Wed 8:00 AM - 8:00 PM Thu 8:00 AM - 8:00 PM Fri 8:00 AM - 8:00 PM  Fee: Free  Accessibility: Ada accessible, Blind accommodation, Deaf or hard of hearing, Translation services      [employer] - Keep the Key  Phone: [phone]  Website: https://www.jzd04-lbxrvjxlvplvnl.org/programs.html  Language: English  Hours: Mon 7:00 AM - 7:00 PM Tue 7:00 AM - 7:00 PM Wed 7:00 AM - 7:00 PM Thu 7:00 AM - 7:00 PM Fri 7:00 AM - 7:00 PM  Fee: Free    Mediation & Eviction Prevention      [facility] [employer] - Mortgage Foreclosure Prevention  [address] [place] [address] (Distance: 8.4 miles)  Phone: [phone]  Language: English  Fee: Free  Accessibility: Ada accessible      [employer] - Foreclosure Prevention  [address] [place] [address] (Distance: 5.8 miles)  Phone: [phone]  Website: http://www.[facility].org  Language: English, Hong Konger, Hmong  Fee: Free, Sliding scale  Accessibility: Blind accommodation, Ada accessible      Line - Tenant Rights / Eviction Prevention  Website: https://homelinemn.org/f-ierp-qq-/  Language: English, Hong Konger            Medical Transportation, (NEMT)      [employer] - Transportation to medical appointments  [address] [address] (Distance: 4.9 miles)  Phone: [phone]  Website: https://www.CloudSplit/  Language: English  Fee: Self pay, Insurance      [facility] Nurse Program - Transportation to medical appointments  [address] [place] [address] (Distance: 8.8 miles)  Phone: [phone]  Website: http://[employer]mn.org/  Language: English, Hong Konger, Cypriot  Fee: Sliding scale  Accessibility: Translation services      Social Service [facility] - Neighbor to Neighbor Program  Phone: [phone]  Email: eliezer@[facility]  Website: https://www.[facility].org/services/older-adults/-services/neighbor-to-neighbor   Language: English  Hours: Mon 8:00 AM - 5:00 PM Tue 8:00 AM - 5:00 PM Wed 8:00 AM - 5:00 PM Thu 8:00 AM - 5:00 PM Fri 8:00 AM - 5:00 PM  Fee: Insurance, Self pay  Accessibility: Deaf or hard of hearing, Blind accommodation, Translation services    Expense Assistance      [facility] Transit Link  [address] [place] [address] (Distance: 4.6 miles)  Phone: [phone]) [phone]  Website: https://Versus/Transportation/Services/Transit-Link.aspx  Language: English  Fee: Self pay      [employer]it - [place] - Transit Assistance Program (TAP) - Transportation expense assistance  [address] [place] [address] (Distance: 4.6 miles)  Language: English, Citizen of the [place]  Fee: Free, Sliding scale, Self pay  Accessibility: Translation services      - Dislocated Worker/Adult [employer] Employment Program  Phone: (550) 824-2055  Email: patrice@[employer]  Website: https://[employer]/services/employment-services/dislocated-worker-program/  Language: English, Tuvaluan  Hours: Mon 8:00 AM - 4:30 PM Tue 8:00 AM - 4:30 PM Wed 8:00 AM - 4:30 PM Thu 8:00 AM - 4:30 PM Fri 8:00 AM - 4:30 PM  Fee: Free  Accessibility: Ada accessible    Coordination      [employer] (TO) - Free or low-cost transportation  [address] [place] [address] (Distance: 3.5 miles)  Phone: [phone]  Website: http://www.[facility] HospitalSonicLiving  Language: English  Fee: Free, Sliding scale  Transportation Options: Free transportation      [facility] - Free Bus and Gas Cards - Free or low-cost transportation  [address] [place] [address] (Distance: 3.3 miles)  Phone: [phone]  Language: English  Fee: Free      - Services  Phone: (183) 497-4962  Website: https://Rollad/#howitworks-section  Hours: Sun 12:00 AM - 11:45 PM Mon 12:00 AM - 11:45 PM Tue 12:00 AM - 11:45 PM Wed 12:00 AM - 11:45 PM Thu 12:00 AM - 11:45 PM Fri 12:00 AM - 11:45 PM Sat 12:00 AM - 11:45 PM  Fee: Self pay  Accessibility: Ada accessible,  Blind accommodation, Deaf or hard of hearing               IMPORTANT NUMBERS & WEBSITES        Emergency Services  911  .   United Way  211 http://211unitedway.org  .   Poison Control  (263) 466-7957 http://[place]poison.org http://wisconsinpoison.org  .     Suicide and Crisis Lifeline  988 http://987BioElectronicsline.org  .   Childhelp [place] Child Abuse Hotline  485.934.9556 http://Childhelphotline.org   .   [place] Sexual Assault Hotline  (951) 547-7219 (HOPE) http://Instant Opinionn.org   .     [place] Runaway Safeline  (760) 426-3510 (RUNAWAY) http://Just Between FriendsruInnerWorkings.Abound Solar  .   Pregnancy & Postpartum Support  Call/text 983-417-7495  [place]: http://ppsupportmn.org  [place]: http://psichapters.com/wi  .   Substance Abuse National Helpline ([facility])  302-442-HELP (9725) http://Findtreatment.gov   .                DISCLAIMER: These resources have been generated via the [employer] Platform. [employer] does not endorse any service providers mentioned in this resource list. [employer] does not guarantee that the services mentioned in this resource list will be available to you or will improve your health or wellness.    [facility] 76M hx HTN, HLD, psoriasis on prednisone 10mg daily admitted for dyspnea. Oncology consulted for further evaluation of recently diagnosed  76M hx HTN, HLD, psoriasis on prednisone 10mg daily admitted for dyspnea. 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    #Metastatic Poorly Differentiated Adenocarcinoma  Patient recently admitted to [facility] and was found to have an esophageal mass and liver mass. IR biopsied the liver mass 12/22/22 and now pathology shows poorly differentiated adenocarcinoma favoring upper GI or pancreaticobiliary origin.  - CT chest non-con and MRI abdomen w/wo IV contrast were performed at [facility]. He was planned to get a PET/CT soon but unable to get one due to renal function.  - CTA chest 12/31/22 showing pulmonary embolus (started on heparin gtt for this new finding already), indeterminate lung nodules which will require follow up CT chest w/ IV contrast in 3 months  - At this time, does not require any additional imaging based on the review of both charts ([facility] and [place]),  - Still awaiting results of NGS testing (HER2, PD-L1), which will determine if he is a candidate for immunotherapy  - Recommend GI evaluation for PEG or PEJ tube as patient has not been able to tolerate PO for weeks now.    Oncology will continue to follow. Please page with questions.    Delroy Varner MD  Hematology/Oncology Fellow PGY-4  Pager: [facility] [phone] / CRISTAL [phone]   After 5pm and on weekends please page on-call fellow  76M hx HTN, HLD, psoriasis on prednisone 10mg daily admitted for dyspnea. 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    #Metastatic Poorly Differentiated Adenocarcinoma  Patient recently admitted to [facility] and was found to have an esophageal mass and liver mass. IR biopsied the liver mass 12/22/22 and now pathology shows poorly differentiated adenocarcinoma favoring upper GI or pancreaticobiliary origin.  - CT chest non-con and MRI abdomen w/wo IV contrast were performed at [facility]. He was planned to get a PET/CT soon but unable to get one due to renal function.  - CTA chest 12/31/22 showing pulmonary embolus (started on heparin gtt for this new finding already), indeterminate lung nodules which will require follow up CT chest w/ IV contrast in 3 months  - At this time, does not require any additional imaging based on the review of both charts ([facility] and [place]),  - Still awaiting results of NGS testing (HER2, PD-L1), which will determine if he is a candidate for immunotherapy  - Recommend GI evaluation for PEG or PEJ tube as patient has not been able to tolerate PO for weeks now.    Oncology will continue to follow. Please page with questions.    Delroy Varner MD  Hematology/Oncology Fellow PGY-4  Pager: [facility] [phone] / CRISTAL [phone]   After 5pm and on weekends please page on-call fellow  76M hx HTN, HLD, psoriasis on prednisone 10mg daily admitted for dyspnea. 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    #Metastatic Poorly Differentiated Adenocarcinoma  Patient recently admitted to [facility] and was found to have an esophageal mass and liver mass. IR biopsied the liver mass 12/22/22 and now pathology shows poorly differentiated adenocarcinoma favoring upper GI or pancreaticobiliary origin.  - CT chest non-con and MRI abdomen w/wo IV contrast were performed at [facility]. He was planned to get a PET/CT soon but unable to get one due to renal function.  - CTA chest 12/31/22 showing pulmonary embolus (started on heparin gtt for this new finding already), indeterminate lung nodules which will require follow up CT chest w/ IV contrast in 3 months  - At this time, does not require any additional imaging based on the review of both charts ([facility] and [place]),  - Still awaiting results of NGS testing (HER2, PD-L1), which will determine if he is a candidate for immunotherapy  - Recommend GI evaluation for PEG or PEJ tube as patient has not been able to tolerate PO for weeks now.    Oncology will continue to follow. Please page with questions.    Delroy Varner MD  Hematology/Oncology Fellow PGY-4  Pager: [facility] [phone] / CRISTAL [phone]   After 5pm and on weekends please page on-call fellow  76M hx HTN, HLD, psoriasis on prednisone 10mg daily admitted for dyspnea. 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    #Metastatic Poorly Differentiated Adenocarcinoma  Patient recently admitted to [facility] and was found to have an esophageal mass and liver mass. IR biopsied the liver mass 12/22/22 and now pathology shows poorly differentiated adenocarcinoma favoring upper GI or pancreaticobiliary origin.  - CT chest non-con and MRI abdomen w/wo IV contrast were performed at [facility]. He was planned to get a PET/CT soon but unable to get one due to renal function.  - CTA chest 12/31/22 showing pulmonary embolus (started on heparin gtt for this new finding already), indeterminate lung nodules which will require follow up CT chest w/ IV contrast in 3 months  - At this time, does not require any additional imaging based on the review of both charts ([facility] and [place]),  - Still awaiting results of NGS testing (HER2, PD-L1), which will determine if he is a candidate for immunotherapy  - Recommend following up with GI for PEG or PEJ tube as patient has not been able to tolerate PO for weeks now.    Oncology will continue to follow. Please page with questions.    Delroy Varner MD  Hematology/Oncology Fellow PGY-4  Pager: [facility] [phone] / CRISTAL [phone]   After 5pm and on weekends please page on-call fellow  76M hx HTN, HLD, psoriasis on prednisone 10mg daily admitted for dyspnea. 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    #Metastatic Poorly Differentiated Adenocarcinoma  Patient recently admitted to [facility] and was found to have an esophageal mass and liver mass. IR biopsied the liver mass 12/22/22 and now pathology shows poorly differentiated adenocarcinoma favoring upper GI or pancreaticobiliary origin.  - CT chest non-con and MRI abdomen w/wo IV contrast were performed at [facility]. He was planned to get a PET/CT soon but unable to get one due to renal function.  - CTA chest 12/31/22 showing pulmonary embolus (started on heparin gtt for this new finding already), indeterminate lung nodules which will require follow up CT chest w/ IV contrast in 3 months  - At this time, does not require any additional imaging based on the review of both charts ([facility] [place]),  - Still awaiting results of NGS testing (HER2, PD-L1), which will determine if he is a candidate for immunotherapy  - Recommend following up with GI for PEG or PEJ tube as patient has not been able to tolerate PO for weeks now.    Oncology will continue to follow. Please page with questions.    Delroy Varner MD  Hematology/Oncology Fellow PGY-4  Pager: [facility] [phone] / CRISTAL [phone]   After 5pm and on weekends please page on-call fellow  76M hx HTN, HLD, psoriasis on prednisone 10mg daily admitted for dyspnea. 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    #Metastatic Poorly Differentiated Adenocarcinoma  Patient recently admitted to [facility] and was found to have an esophageal mass and liver mass. IR biopsied the liver mass 12/22/22 and now pathology shows poorly differentiated adenocarcinoma favoring upper GI or pancreaticobiliary origin.  - CT chest non-con and MRI abdomen w/wo IV contrast were performed at [facility]. He was planned to get a PET/CT soon but unable to get one due to renal function.  - CTA chest 12/31/22 showing pulmonary embolus (started on heparin gtt for this new finding already), indeterminate lung nodules which will require follow up CT chest w/ IV contrast in 3 months  - At this time, does not require any additional imaging based on the review of both charts ([facility] and [place]),  - Still awaiting results of NGS testing (HER2, PD-L1), which will determine if he is a candidate for immunotherapy  - Recommend following up with GI for PEG or PEJ tube as patient has not been able to tolerate PO for weeks now.    Oncology will continue to follow. Please page with questions.    Delroy Varner MD  Hematology/Oncology Fellow PGY-4  Pager: [facility] [phone] / CRISTAL [phone]   After 5pm and on weekends please page on-call fellow

## 2024-04-19 ENCOUNTER — TELEPHONE (OUTPATIENT)
Dept: PULMONOLOGY | Facility: CLINIC | Age: 69
End: 2024-04-19
Payer: COMMERCIAL

## 2024-04-19 NOTE — TELEPHONE ENCOUNTER
Left Voicemail (1st Attempt) for the patient to call back and schedule the following:    Appointment type: NPULM REFERRAL  Provider: NONE  Return date: NEXT AVAILABLE  Specialty phone number: [phone]  Additional appointment(s) needed: FPFT  Additonal Notes: N/A

## 2024-04-20 LAB
ALBUMIN SERPL BCG-MCNC: 4.3 G/DL (ref 3.5–5.2)
ALP SERPL-CCNC: 76 U/L (ref 40–150)
ALT SERPL W P-5'-P-CCNC: 19 U/L (ref 0–70)
ANION GAP SERPL CALCULATED.3IONS-SCNC: 12 MMOL/L (ref 7–15)
AST SERPL W P-5'-P-CCNC: 17 U/L (ref 0–45)
BILIRUB SERPL-MCNC: 0.4 MG/DL
BUN SERPL-MCNC: 55.9 MG/DL (ref 8–23)
CALCIUM SERPL-MCNC: 9.3 MG/DL (ref 8.8–10.2)
CHLORIDE SERPL-SCNC: 106 MMOL/L (ref 98–107)
CHOLEST SERPL-MCNC: 128 MG/DL
CREAT SERPL-MCNC: 2.74 MG/DL (ref 0.67–1.17)
CREAT UR-MCNC: 79.5 MG/DL
DEPRECATED HCO3 PLAS-SCNC: 21 MMOL/L (ref 22–29)
EGFRCR SERPLBLD CKD-EPI 2021: 24 ML/MIN/1.73M2
FASTING STATUS PATIENT QL REPORTED: NO
GLUCOSE SERPL-MCNC: 75 MG/DL (ref 70–99)
HDLC SERPL-MCNC: 32 MG/DL
LDLC SERPL CALC-MCNC: 61 MG/DL
MICROALBUMIN UR-MCNC: 908 MG/L
MICROALBUMIN/CREAT UR: 1142.14 MG/G CR (ref 0–17)
NONHDLC SERPL-MCNC: 96 MG/DL
POTASSIUM SERPL-SCNC: 4.2 MMOL/L (ref 3.4–5.3)
PROT SERPL-MCNC: 8.3 G/DL (ref 6.4–8.3)
PSA SERPL DL<=0.01 NG/ML-MCNC: 2.15 NG/ML (ref 0–4.5)
SODIUM SERPL-SCNC: 139 MMOL/L (ref 135–145)
TRIGL SERPL-MCNC: 176 MG/DL

## 2024-04-22 ENCOUNTER — TELEPHONE (OUTPATIENT)
Dept: FAMILY MEDICINE | Facility: CLINIC | Age: 69
End: 2024-04-22
Payer: COMMERCIAL

## 2024-04-22 NOTE — PROGRESS NOTES
Refer to 4/22/24 TE for regarding Nephrology follow-up per clinician's request below.     Sylvia Lugo MD  [facility]  Please call nephrology and let them know that he is still on lisinopril 2.5mg daily from his pharmacy.  Is this okay with them?

## 2024-04-23 ENCOUNTER — PATIENT OUTREACH (OUTPATIENT)
Dept: GERIATRIC MEDICINE | Facility: CLINIC | Age: 69
End: 2024-04-23
Payer: COMMERCIAL

## 2024-04-23 LAB
ATRIAL RATE - MUSE: 74 BPM
DIASTOLIC BLOOD PRESSURE - MUSE: NORMAL MMHG
INTERPRETATION ECG - MUSE: NORMAL
P AXIS - MUSE: 46 DEGREES
PR INTERVAL - MUSE: 166 MS
QRS DURATION - MUSE: 100 MS
QT - MUSE: 400 MS
QTC - MUSE: 444 MS
R AXIS - MUSE: -27 DEGREES
SYSTOLIC BLOOD PRESSURE - MUSE: NORMAL MMHG
T AXIS - MUSE: 12 DEGREES
VENTRICULAR RATE- MUSE: 74 BPM

## 2024-04-23 PROCEDURE — 93010 ELECTROCARDIOGRAM REPORT: CPT | Performed by: INTERNAL MEDICINE

## 2024-04-23 NOTE — TELEPHONE ENCOUNTER
No  available    Appointment type: NPULM REFERRAL  Provider: NONE  Return date: NEXT \[facility]\""  Specialty phone number: [phone]  Additional appointment(s) needed: FPFT  Additonal Notes: No  available    [No Acute Distress] : no acute distress [Normal Sclera/Conjunctiva] : normal sclera/conjunctiva [Normal Outer Ear/Nose] : the ears and nose were normal in appearance [No Respiratory Distress] : no respiratory distress [Normal PMI] : the apical impulse was normal [Normal Bowel Sounds] : normal bowel sounds [Carotids Normal] : carotid pulses were normal with no bruits [No CVA Tenderness] : no ~M costovertebral angle tenderness [No Stigmata of Cushings Syndrome] : no stigmata of Cushings Syndrome [Normal Gait] : normal gait [No Rash] : no rash [Normal Reflexes] : deep tendon reflexes were 2+ and symmetric [Oriented x3] : oriented to person, place, and time [de-identified] : Left lumps visualized and palpated at the left lobe, smooth borders and no tenderness. No LN enlargement

## 2024-04-23 NOTE — Clinical Note
Thien Lugo,  This is regarding the Primary Care Coordination referral you made. See note for further details. I had registered patient for some food programs and provided him with food and utility resources.  Please complete or close referral as appropriate. Thank you.  Lexy Espinoza RN [facility] [phone]

## 2024-04-23 NOTE — PROGRESS NOTES
[facility] Care Coordination Contact    Received care coordinator referral from PCP about financial and food support. Called and left voice message for member to return call as care coordinator has some resources for him.    Lexy Espinoza RN  [facility]  [phone]    Member returned care coordinator's call and care coordinator asked if member had received the [facility] Healthy Benefits visa card. Member reports he is not sure. Care coordinator will request for [facility] to mail him another card. Care coordinator reviewed all supplemental benefits with member and what he can used Healthy Benefits Visa card towards.He is eligible for grocery discounts up to $50 per week.    Care coordinator introduced member to [employer] and [place] [employer] program where member will receive $80 to shop at [employer] and weekly delivery of from local [employer]. He states he is interested in both programs.     Care coordinator registered member for both programs.    Member reports he is getting SNAP from the [facility]: around $180 to $230 per month for food.    Asked member if he needs assistance with anything else and he reports this is good for now. He reported that when he applied to get SNAP, he should have applied for cash assistance but he is not getting any cash assistance from the [facility] besides his Social security money. His son is also helping pay for rent his son lives with him.    Lexy Espinoza RN  [facility]  [phone]

## 2024-04-25 ENCOUNTER — ANESTHESIA EVENT (OUTPATIENT)
Dept: SURGERY | Facility: CLINIC | Age: 69
End: 2024-04-25
Payer: COMMERCIAL

## 2024-04-26 ENCOUNTER — ANESTHESIA (OUTPATIENT)
Dept: SURGERY | Facility: CLINIC | Age: 69
End: 2024-04-26
Payer: COMMERCIAL

## 2024-04-26 ENCOUNTER — HOSPITAL ENCOUNTER (OUTPATIENT)
Facility: CLINIC | Age: 69
Discharge: HOME | End: 2024-04-26
Attending: INTERNAL MEDICINE | Admitting: INTERNAL MEDICINE
Payer: COMMERCIAL

## 2024-04-26 VITALS
RESPIRATION RATE: 19 BRPM | HEART RATE: 72 BPM | SYSTOLIC BLOOD PRESSURE: 143 MMHG | OXYGEN SATURATION: 98 % | DIASTOLIC BLOOD PRESSURE: 86 MMHG | TEMPERATURE: 97.3 F

## 2024-04-26 LAB — COLONOSCOPY: NORMAL

## 2024-04-26 PROCEDURE — 710N000012 HC RECOVERY PHASE 2, PER MINUTE: Performed by: INTERNAL MEDICINE

## 2024-04-26 PROCEDURE — 88305 TISSUE EXAM BY PATHOLOGIST: CPT | Mod: TC | Performed by: INTERNAL MEDICINE

## 2024-04-26 PROCEDURE — 258N000003 HC RX IP 258 OP 636: Performed by: ANESTHESIOLOGY

## 2024-04-26 PROCEDURE — 999N000141 HC STATISTIC PRE-PROCEDURE NURSING ASSESSMENT: Performed by: INTERNAL MEDICINE

## 2024-04-26 PROCEDURE — 250N000011 HC RX IP 250 OP 636: Performed by: NURSE ANESTHETIST, CERTIFIED REGISTERED

## 2024-04-26 PROCEDURE — 370N000017 HC ANESTHESIA TECHNICAL FEE, PER MIN: Performed by: INTERNAL MEDICINE

## 2024-04-26 PROCEDURE — 360N000075 HC SURGERY LEVEL 2, PER MIN: Performed by: INTERNAL MEDICINE

## 2024-04-26 PROCEDURE — 272N000001 HC OR GENERAL SUPPLY STERILE: Performed by: INTERNAL MEDICINE

## 2024-04-26 PROCEDURE — 88305 TISSUE EXAM BY PATHOLOGIST: CPT | Mod: 26 | Performed by: PATHOLOGY

## 2024-04-26 RX ORDER — SODIUM CHLORIDE, SODIUM LACTATE, POTASSIUM CHLORIDE, CALCIUM CHLORIDE 600; 310; 30; 20 MG/100ML; MG/100ML; MG/100ML; MG/100ML
INJECTION, SOLUTION INTRAVENOUS CONTINUOUS
Status: DISCONTINUED | OUTPATIENT
Start: 2024-04-26 | End: 2024-04-26 | Stop reason: HOSPADM

## 2024-04-26 RX ORDER — NALOXONE HYDROCHLORIDE 0.4 MG/ML
0.2 INJECTION, SOLUTION INTRAMUSCULAR; INTRAVENOUS; SUBCUTANEOUS
Status: DISCONTINUED | OUTPATIENT
Start: 2024-04-26 | End: 2024-04-26 | Stop reason: HOSPADM

## 2024-04-26 RX ORDER — PROCHLORPERAZINE MALEATE 5 MG
5 TABLET ORAL EVERY 6 HOURS PRN
Status: DISCONTINUED | OUTPATIENT
Start: 2024-04-26 | End: 2024-04-26 | Stop reason: HOSPADM

## 2024-04-26 RX ORDER — LIDOCAINE 40 MG/G
CREAM TOPICAL
Status: DISCONTINUED | OUTPATIENT
Start: 2024-04-26 | End: 2024-04-26 | Stop reason: HOSPADM

## 2024-04-26 RX ORDER — ONDANSETRON 2 MG/ML
4 INJECTION INTRAMUSCULAR; INTRAVENOUS EVERY 6 HOURS PRN
Status: DISCONTINUED | OUTPATIENT
Start: 2024-04-26 | End: 2024-04-26 | Stop reason: HOSPADM

## 2024-04-26 RX ORDER — PROPOFOL 10 MG/ML
INJECTION, EMULSION INTRAVENOUS PRN
Status: DISCONTINUED | OUTPATIENT
Start: 2024-04-26 | End: 2024-04-26

## 2024-04-26 RX ORDER — NALOXONE HYDROCHLORIDE 0.4 MG/ML
0.4 INJECTION, SOLUTION INTRAMUSCULAR; INTRAVENOUS; SUBCUTANEOUS
Status: DISCONTINUED | OUTPATIENT
Start: 2024-04-26 | End: 2024-04-26 | Stop reason: HOSPADM

## 2024-04-26 RX ORDER — PROPOFOL 10 MG/ML
INJECTION, EMULSION INTRAVENOUS CONTINUOUS PRN
Status: DISCONTINUED | OUTPATIENT
Start: 2024-04-26 | End: 2024-04-26

## 2024-04-26 RX ORDER — ONDANSETRON 2 MG/ML
4 INJECTION INTRAMUSCULAR; INTRAVENOUS
Status: DISCONTINUED | OUTPATIENT
Start: 2024-04-26 | End: 2024-04-26 | Stop reason: HOSPADM

## 2024-04-26 RX ORDER — FLUMAZENIL 0.1 MG/ML
0.2 INJECTION, SOLUTION INTRAVENOUS
Status: DISCONTINUED | OUTPATIENT
Start: 2024-04-26 | End: 2024-04-26 | Stop reason: HOSPADM

## 2024-04-26 RX ORDER — NALOXONE HYDROCHLORIDE 0.4 MG/ML
0.1 INJECTION, SOLUTION INTRAMUSCULAR; INTRAVENOUS; SUBCUTANEOUS
Status: DISCONTINUED | OUTPATIENT
Start: 2024-04-26 | End: 2024-04-26 | Stop reason: HOSPADM

## 2024-04-26 RX ORDER — ONDANSETRON 4 MG/1
4 TABLET, ORALLY DISINTEGRATING ORAL EVERY 30 MIN PRN
Status: DISCONTINUED | OUTPATIENT
Start: 2024-04-26 | End: 2024-04-26 | Stop reason: HOSPADM

## 2024-04-26 RX ORDER — ONDANSETRON 4 MG/1
4 TABLET, ORALLY DISINTEGRATING ORAL EVERY 6 HOURS PRN
Status: DISCONTINUED | OUTPATIENT
Start: 2024-04-26 | End: 2024-04-26 | Stop reason: HOSPADM

## 2024-04-26 RX ORDER — ONDANSETRON 2 MG/ML
4 INJECTION INTRAMUSCULAR; INTRAVENOUS EVERY 30 MIN PRN
Status: DISCONTINUED | OUTPATIENT
Start: 2024-04-26 | End: 2024-04-26 | Stop reason: HOSPADM

## 2024-04-26 RX ADMIN — PROPOFOL 30 MG: 10 INJECTION, EMULSION INTRAVENOUS at 10:21

## 2024-04-26 RX ADMIN — PROPOFOL 20 MG: 10 INJECTION, EMULSION INTRAVENOUS at 10:31

## 2024-04-26 RX ADMIN — PROPOFOL 100 MCG/KG/MIN: 10 INJECTION, EMULSION INTRAVENOUS at 10:21

## 2024-04-26 RX ADMIN — SODIUM CHLORIDE, POTASSIUM CHLORIDE, SODIUM LACTATE AND CALCIUM CHLORIDE: 600; 310; 30; 20 INJECTION, SOLUTION INTRAVENOUS at 09:51

## 2024-04-26 RX ADMIN — PROPOFOL 30 MG: 10 INJECTION, EMULSION INTRAVENOUS at 10:40

## 2024-04-26 ASSESSMENT — ACTIVITIES OF DAILY LIVING (ADL)
ADLS_ACUITY_SCORE: 35

## 2024-04-26 NOTE — H&P
ENDOSCOPY PRE-PROCEDURE HISTORY & PHYSICAL    CHIEF COMPLAINT / REASON FOR PROCEDURE:  Positive FOBT    PERTINENT HISTORY   Past Medical History:   Diagnosis Date    Community acquired pneumonia of right lower lobe of lung     Fibrothorax     GERD (gastroesophageal reflux disease)     Moderate persistent asthma     Pleural effusion on left      Past Surgical History:   Procedure Laterality Date    CATARACT EXTRACTION BILATERAL W/ ANTERIOR VITRECTOMY Right     EYE SURGERY       Other:  None  Bleeding tendencies:  None    Relevant Family History:  None    Relevant Social History:  None    A relevant Review of Systems was performed and was negative.    ALLERGIES/SENSITIVITIES:   Allergies   Allergen Reactions    Lisinopril Nephrotoxicity    Niacin Other (See Comments)        CURRENT MEDICATIONS:     Medications Prior to Admission   Medication Sig Dispense Refill Last Dose    albuterol (PROAIR HFA/PROVENTIL HFA/VENTOLIN HFA) 108 (90 Base) MCG/ACT inhaler Inhale 2 puffs into the lungs every 4 hours as needed for shortness of breath or wheezing 18 g 11 4/25/2024    aspirin (ASPIRIN LOW DOSE) 81 MG EC tablet TAKE 1 TABLET BY MOUTH DAILY FOR STROKE PREVENTION // IB HNUB NOJ 1 LUB PAB KOM NTSHAV KHIAV ZOO 30 tablet 11 Past Week    diclofenac (VOLTAREN) 1 % topical gel APPLY 2 GRAMS TOPICALLY FOUR TIMES A DAY. DO NOT TO EXCEED 32 GRAMS/DAY./PLEEV 2 GRAMS 4 ZAUG TXHUA HNUB RAWS LI QHIA.   Past Week    fenofibrate (TRICOR) 145 MG tablet TAKE 1 TABLET BY MOUTH DAILY FOR CHOLESTEROL // IB HNUB NOJ 1 LUB PAB ROXANNE NTSHAV MUAJ ROJ 90 tablet 4 Past Week    fluticasone (FLONASE) 50 MCG/ACT nasal spray Spray 2 sprays into both nostrils daily 16 g 2 Past Week    gabapentin (NEURONTIN) 300 MG capsule TAKE 1 CAPSULE 3 TIMES DAILY // IB ZAUG NOJ 1 LUB, 1 HNUB NOJ 3 ZAUG 90 capsule 3 Past Week    ipratropium - albuterol 0.5 mg/2.5 mg/3 mL (DUONEB) 0.5-2.5 (3) MG/3ML neb solution Take 1 vial (3 mLs) by nebulization 4 times daily as needed for  shortness of breath or wheezing 480 mL 3 Past Week    montelukast (SINGULAIR) 10 MG tablet Take 1 tablet (10 mg) by mouth at bedtime 30 tablet 11 Past Week    Multiple Vitamin (TAB-A-GERARDO) TABS TAKE 1 TABLET BY MOUTH DAILY // IB HNUB NOJ 1 LUB 30 tablet 11 Past Week    sodium bicarbonate 650 MG tablet TAKE 2 TABLETS BY MOUTH 3 TIMES DAILY // 1 ZAUG NOJ 2 LUB, 1 HNUB NOJ 3 ZAUG   Past Week    vitamin D2 (ERGOCALCIFEROL) 55023 units (1250 mcg) capsule TAKE ONE CAPSULE WEEKLY//IB ASTHIV NOJ IB LUB. 4 capsule 11 Past Week    blood pressure monitor Kit [BLOOD PRESSURE MONITOR KIT] Use to check blood pressure daily 1 each 0     LIDOCAINE PAIN RELIEF 4 % Patch APPLY 1 PATCH TOPICALLY DAILY AS DIRECTED./IB HNUB LO 1 DAIM RAWS LI QHIA.   4/24/2024    loratadine (CLARITIN) 10 MG tablet Take 1 tablet (10 mg) by mouth daily 30 tablet 2 4/23/2024    SYMBICORT 160-4.5 MCG/ACT Inhaler INHALE 2 PUFFS TWICE DAILY. RINSE MOUTH AND SPIT OUT AFTERWARDS // 1 ZAUG NQUS 2 PAS, 1 HNU SIV 2 ZAUG. YAUG NCAUJ THAUM SIV TAS. 10.2 g 11            LABORATORY:   CMP Results: No lab results found in last 7 days.   CBCNo lab results found in last 7 days.  INRNo lab results found in last 7 days.       I reviewed the patient's pertinent imaging test results.       RELEVANT EXAM:     /80   Temp 97.8  F (36.6  C) (Temporal)   Resp 16   SpO2 96%    Lung Exam:   Normal  Heart Exam:  Normal  Airway Exam:  Normal  Mental Status:  Patient understands indications, risks and benefits and wishes to proceed with The colonoscopy.  Mallampati:II  Previous reaction to anesthesia/sedation:   None  Sedation plan based on assessment:  Moderate  Comment(s):  None    Consent signed by:  The patient    ASA Classification:  3    IMPRESSION:      Positive FOBT. Index colonoscopy    PLAN:      Colonoscopy    DO PEE [facility]  [phone]

## 2024-04-26 NOTE — PROGRESS NOTES
Bedside  used for discharge instructions. Patient to follow up with [facility] in a couple weeks in regards to the pathology. Family present at bedside as well to listen to instructions.

## 2024-04-26 NOTE — PROGRESS NOTES
Patient discharged home with AVS. Vitals stable on RA. Will follow up in 3 years with colonoscopy. Ambulates independently at discharge.

## 2024-04-26 NOTE — ANESTHESIA CARE TRANSFER NOTE
Patient: Venita Sanchez    Procedure: Procedure(s):  COLONOSCOPY WITH POLYPECTOMIES       Diagnosis: Guaiac positive stools [R19.5]  Screening for colon cancer [Z12.11]  Diagnosis Additional Information: No value filed.    Anesthesia Type:   MAC     Note:    Oropharynx: oropharynx clear of all foreign objects and spontaneously breathing  Level of Consciousness: awake and drowsy  Oxygen Supplementation: face mask  Level of Supplemental Oxygen (L/min / FiO2): 5  Independent Airway: airway patency satisfactory and stable  Dentition: dentition unchanged  Vital Signs Stable: post-procedure vital signs reviewed and stable  Report to RN Given: handoff report given  Patient transferred to: Phase II    Handoff Report: Identifed the Patient, Identified the Reponsible Provider, Reviewed the pertinent medical history, Discussed the surgical course, Reviewed Intra-OP anesthesia mangement and issues during anesthesia, Set expectations for post-procedure period and Allowed opportunity for questions and acknowledgement of understanding  Vitals:  Vitals Value Taken Time   /67 04/26/24 1055   Temp 36.1  C (96.9  F) 04/26/24 1055   Pulse 78 04/26/24 1058   Resp 16 04/26/24 1055   SpO2 98 % 04/26/24 1058   Vitals shown include unfiled device data.    Electronically Signed By: ODETTE Johnson CRNA  April 26, 2024  11:00 AM

## 2024-04-26 NOTE — ANESTHESIA POSTPROCEDURE EVALUATION
Patient: Venita Sanchez    Procedure: Procedure(s):  COLONOSCOPY WITH POLYPECTOMIES       Anesthesia Type:  MAC    Note:  Disposition: Outpatient   Postop Pain Control: Uneventful            Sign Out: Well controlled pain   PONV: No   Neuro/Psych: Uneventful            Sign Out: Acceptable/Baseline neuro status   Airway/Respiratory: Uneventful            Sign Out: Acceptable/Baseline resp. status   CV/Hemodynamics: Uneventful            Sign Out: Acceptable CV status; No obvious hypovolemia; No obvious fluid overload   Other NRE: NONE   DID A NON-ROUTINE EVENT OCCUR? No       Last vitals:  Vitals Value Taken Time   /86 04/26/24 1120   Temp 36.3  C (97.3  F) 04/26/24 1120   Pulse 72 04/26/24 1120   Resp 19 04/26/24 1120   SpO2 98 % 04/26/24 1120       Electronically Signed By: Ilan Gomez MD  April 26, 2024  12:11 PM

## 2024-04-26 NOTE — ANESTHESIA PREPROCEDURE EVALUATION
Anesthesia Pre-Procedure Evaluation    Patient: Venita Snachez   MRN: [id number] : 1955        Procedure : Procedure(s):  COLONOSCOPY          Past Medical History:   Diagnosis Date     Community acquired pneumonia of right lower lobe of lung      Fibrothorax      GERD (gastroesophageal reflux disease)      Moderate persistent asthma      Pleural effusion on left       Past Surgical History:   Procedure Laterality Date     CATARACT EXTRACTION BILATERAL W/ ANTERIOR VITRECTOMY Right       Allergies   Allergen Reactions     Lisinopril Nephrotoxicity     Niacin Other (See Comments)      Social History     Tobacco Use     Smoking status: Never     Smokeless tobacco: Never   Substance Use Topics     Alcohol use: No      Wt Readings from Last 1 Encounters:   24 79.8 kg (176 lb)        Anesthesia Evaluation   Pt has had prior anesthetic.         ROS/MED HX  ENT/Pulmonary:     (+)                     Moderate Persistent, asthma                  Neurologic:  - neg neurologic ROS     Cardiovascular:     (+)  hypertension- -   -  - -                                      METS/Exercise Tolerance:     Hematologic:  - neg hematologic  ROS     Musculoskeletal:  - neg musculoskeletal ROS     GI/Hepatic:  - neg GI/hepatic ROS     Renal/Genitourinary:     (+) renal disease, type: CRI, Pt does not require dialysis,           Endo:  - neg endo ROS     Psychiatric/Substance Use:  - neg psychiatric ROS   (+) psychiatric history depression       Infectious Disease:  - neg infectious disease ROS     Malignancy:  - neg malignancy ROS     Other:  - neg other ROS        Physical Exam    Airway  airway exam normal      Mallampati: II   TM distance: > 3 FB   Neck ROM: full   Mouth opening: > 3 cm    Respiratory Devices and Support         Dental  no notable dental history     (+) Multiple visibly decayed, broken teeth      Cardiovascular   cardiovascular exam normal       Rhythm and rate: regular and normal     Pulmonary   pulmonary  "exam normal        breath sounds clear to auscultation       OUTSIDE LABS:  CBC:   Lab Results   Component Value Date    WBC 8.1 04/18/2024    WBC 8.3 06/01/2023    HGB 14.2 04/18/2024    HGB 10.3 (L) 06/01/2023    HCT 44.4 04/18/2024    HCT 31.8 (L) 06/01/2023     (L) 04/18/2024     06/01/2023     BMP:   Lab Results   Component Value Date     04/18/2024     06/01/2023    POTASSIUM 4.2 04/18/2024    POTASSIUM 4.0 06/01/2023    CHLORIDE 106 04/18/2024    CHLORIDE 108 (H) 06/01/2023    CO2 21 (L) 04/18/2024    CO2 20 (L) 06/01/2023    BUN 55.9 (H) 04/18/2024    BUN 52.2 (H) 06/01/2023    CR 2.74 (H) 04/18/2024    CR 3.06 (H) 06/01/2023    GLC 75 04/18/2024     (H) 06/01/2023     COAGS:   Lab Results   Component Value Date    INR 0.98 09/06/2018     POC: No results found for: \"BGM\", \"HCG\", \"HCGS\"  HEPATIC:   Lab Results   Component Value Date    ALBUMIN 4.3 04/18/2024    PROTTOTAL 8.3 04/18/2024    ALT 19 04/18/2024    AST 17 04/18/2024    ALKPHOS 76 04/18/2024    BILITOTAL 0.4 04/18/2024     OTHER:   Lab Results   Component Value Date    LACT 2.3 (H) 01/23/2020    A1C 5.8 (H) 04/18/2024    JACK 9.3 04/18/2024    PHOS 4.6 (H) 12/21/2021    TSH 1.88 04/28/2023       Anesthesia Plan    ASA Status:  3    NPO Status:  NPO Appropriate    Anesthesia Type: MAC.     - Reason for MAC: straight local not clinically adequate              Consents    Anesthesia Plan(s) and associated risks, benefits, and realistic alternatives discussed. Questions answered and patient/representative(s) expressed understanding.     - Discussed:     - Discussed with:  Patient       - Patient is DNR/DNI Status: No          Postoperative Care    Pain management: Multi-modal analgesia.   PONV prophylaxis: Ondansetron (or other 5HT-3)     Comments:             Ilan Gomez MD    I have reviewed the pertinent notes and labs in the chart from the past 30 days and (re)examined the patient.  Any updates or changes from " "those notes are reflected in this note.             # Drug Induced Platelet Defect: home medication list includes an antiplatelet medication  # Overweight: Estimated body mass index is 28.63 kg/m  as calculated from the following:    Height as of 4/18/24: 1.67 m (5' 5.75\").    Weight as of 4/18/24: 79.8 kg (176 lb).      "

## 2024-04-29 PROBLEM — R19.5 POSITIVE COLORECTAL CANCER SCREENING USING COLOGUARD TEST: Status: RESOLVED | Noted: 2024-02-28 | Resolved: 2024-04-29

## 2024-04-29 PROBLEM — E87.5 HYPERKALEMIA: Status: RESOLVED | Noted: 2021-07-27 | Resolved: 2024-04-29

## 2024-04-29 LAB
PATH REPORT.COMMENTS IMP SPEC: NORMAL
PATH REPORT.COMMENTS IMP SPEC: NORMAL
PATH REPORT.FINAL DX SPEC: NORMAL
PATH REPORT.GROSS SPEC: NORMAL
PATH REPORT.MICROSCOPIC SPEC OTHER STN: NORMAL
PATH REPORT.RELEVANT HX SPEC: NORMAL
PHOTO IMAGE: NORMAL

## 2024-04-29 NOTE — RESULT ENCOUNTER NOTE
Team - please call patient with results and send result letter with this information if patient desires for their records.     His kidney tests are stable - keep working with his kidney doctor   Healthy liver tests     Healthy blood counts- no anemia     His cholesterol is healthy- stay on his fenofibrate daily     No diabetes     Healthy prostate level

## 2024-04-29 NOTE — TELEPHONE ENCOUNTER
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

## 2024-04-29 NOTE — RESULT ENCOUNTER NOTE
Team - please call patient with results and send result letter with this information if patient desires for their records.     His chest xray was stable- see his lung doctor this summer

## 2024-04-30 ENCOUNTER — APPOINTMENT (OUTPATIENT)
Dept: INTERPRETER SERVICES | Facility: CLINIC | Age: 69
End: 2024-04-30
Payer: COMMERCIAL

## 2024-04-30 ENCOUNTER — TELEPHONE (OUTPATIENT)
Dept: FAMILY MEDICINE | Facility: CLINIC | Age: 69
End: 2024-04-30
Payer: COMMERCIAL

## 2024-04-30 NOTE — CONFIDENTIAL NOTE
Sylvia Lugo MD [facility] [place]  Team - please call patient with results and send result letter with this information if patient desires for their records.    His kidney tests are stable - keep working with his kidney doctor  Healthy liver tests    Healthy blood counts- no anemia    His cholesterol is healthy- stay on his fenofibrate daily    No diabetes    Healthy prostate level

## 2024-04-30 NOTE — TELEPHONE ENCOUNTER
----- Message from Sylvia Lugo MD sent at 4/29/2024  1:53 PM CDT -----  Team - please call patient with results and send result letter with this information if patient desires for their records.     His chest xray was stable- see his lung doctor this summer

## 2024-04-30 NOTE — TELEPHONE ENCOUNTER
Left Voicemail (2nd Attempt) for the patient to call back and schedule the following:    Appointment type: NPULM REFERRAL  Provider: NONE  Return date: NEXT AVAILABLE  Specialty phone number: [phone] #1  Additional appointment(s) needed: FPFT  Additonal Notes: N/A

## 2024-04-30 NOTE — TELEPHONE ENCOUNTER
Callback was received from pt's son Shayy to schedule appt, however, it is not indicated whether pt is to be scheduled in general pulmonology, or if he should be seen by ILD provider. Message sent to care team requesting clarification. Son will wait for call back to schedule appt (no  was needed to speak with anthony Lucas).

## 2024-05-01 DIAGNOSIS — J42 CHRONIC BRONCHITIS (H): Primary | ICD-10-CM

## 2024-05-01 NOTE — TELEPHONE ENCOUNTER
Patient's son called back. Message was relayed. Patient's son verbalized understanding. Completing task.

## 2024-05-02 ENCOUNTER — PATIENT OUTREACH (OUTPATIENT)
Dept: CARE COORDINATION | Facility: CLINIC | Age: 69
End: 2024-05-02
Payer: COMMERCIAL

## 2024-05-02 NOTE — PROGRESS NOTES
Food Resource Navigator Contact    FRN - Initial Outreach    Reason for outreach: Food Insecurity  Hypertension  Cardiovascular Disease    Food Insecurity: High Risk (4/18/2024)    Food Insecurity     Within the past 12 months, did you worry that your food would run out before you got money to buy more?: Yes     Within the past 12 months, did the food you bought just not last and you didn t have money to get more?: Yes     Housing Stability: High Risk (4/18/2024)    Housing Stability     Do you have housing? : Yes     Are you worried about losing your housing?: Yes     Financial Resource Strain: Low Risk  (4/18/2024)    Financial Resource Strain     Within the past 12 months, have you or your family members you live with been unable to get utilities (heat, electricity) when it was really needed?: No     Transportation Needs: High Risk (4/18/2024)    Transportation Needs     Within the past 12 months, has lack of transportation kept you from medical appointments, getting your medicines, non-medical meetings or appointments, work, or from getting things that you need?: Yes       The patient was provided with the following food resources:  Food Resource Packet  [employer]  [employer] food boxes    The patient was provided the following community resources:  None at this time    I have discussed the following goals with the patient: Pt encouraged to use [facility] for additional grocery needs. Pt states he follows a diet in mostly leafy greens and not a whole lot of protein-meats. I signed him up for [employer] box to be delivered to his home. I will also make a reminder call to him Wed 05/08 to see if he is available to come to [facility] to access [facility].       Best Gongora  Community [employer]ment Food Resource Navigator  [employer]   Cell: [phone]

## 2024-05-06 ENCOUNTER — APPOINTMENT (OUTPATIENT)
Dept: INTERPRETER SERVICES | Facility: CLINIC | Age: 69
End: 2024-05-06
Payer: COMMERCIAL

## 2024-05-10 ENCOUNTER — PATIENT OUTREACH (OUTPATIENT)
Dept: GASTROENTEROLOGY | Facility: CLINIC | Age: 69
End: 2024-05-10
Payer: COMMERCIAL

## 2024-05-13 DIAGNOSIS — M54.50 CHRONIC LEFT-SIDED LOW BACK PAIN WITHOUT SCIATICA: ICD-10-CM

## 2024-05-13 DIAGNOSIS — G89.29 CHRONIC LEFT-SIDED LOW BACK PAIN WITHOUT SCIATICA: ICD-10-CM

## 2024-05-13 DIAGNOSIS — F32.9 MAJOR DEPRESSIVE DISORDER, SINGLE EPISODE, UNSPECIFIED: ICD-10-CM

## 2024-05-13 DIAGNOSIS — J45.40 MODERATE PERSISTENT ASTHMA: ICD-10-CM

## 2024-05-13 RX ORDER — ALBUTEROL SULFATE 90 UG/1
2 AEROSOL, METERED RESPIRATORY (INHALATION) EVERY 4 HOURS PRN
Qty: 18 G | Refills: 0 | Status: SHIPPED | OUTPATIENT
Start: 2024-05-13 | End: 2024-06-04

## 2024-05-13 RX ORDER — MULTIVITAMIN WITH FOLIC ACID 400 MCG
TABLET ORAL
Qty: 30 TABLET | Refills: 11 | Status: SHIPPED | OUTPATIENT
Start: 2024-05-13

## 2024-05-13 RX ORDER — GABAPENTIN 300 MG/1
CAPSULE ORAL
Qty: 90 CAPSULE | Refills: 3 | Status: SHIPPED | OUTPATIENT
Start: 2024-05-13 | End: 2024-08-28

## 2024-06-03 ENCOUNTER — APPOINTMENT (OUTPATIENT)
Dept: INTERPRETER SERVICES | Facility: CLINIC | Age: 69
End: 2024-06-03
Payer: COMMERCIAL

## 2024-06-04 ENCOUNTER — OFFICE VISIT (OUTPATIENT)
Dept: PULMONOLOGY | Facility: CLINIC | Age: 69
End: 2024-06-04
Attending: FAMILY MEDICINE
Payer: COMMERCIAL

## 2024-06-04 ENCOUNTER — OFFICE VISIT (OUTPATIENT)
Dept: PULMONOLOGY | Facility: CLINIC | Age: 69
End: 2024-06-04
Attending: NURSE PRACTITIONER
Payer: COMMERCIAL

## 2024-06-04 VITALS
WEIGHT: 176 LBS | DIASTOLIC BLOOD PRESSURE: 70 MMHG | HEART RATE: 78 BPM | SYSTOLIC BLOOD PRESSURE: 124 MMHG | OXYGEN SATURATION: 97 % | BODY MASS INDEX: 28.28 KG/M2 | HEIGHT: 66 IN

## 2024-06-04 DIAGNOSIS — J42 CHRONIC BRONCHITIS (H): ICD-10-CM

## 2024-06-04 DIAGNOSIS — I10 ESSENTIAL HYPERTENSION: ICD-10-CM

## 2024-06-04 DIAGNOSIS — J45.40 MODERATE PERSISTENT ASTHMA, UNSPECIFIED WHETHER COMPLICATED: ICD-10-CM

## 2024-06-04 DIAGNOSIS — J41.0 SIMPLE CHRONIC BRONCHITIS (H): Primary | ICD-10-CM

## 2024-06-04 DIAGNOSIS — J32.9 CHRONIC SINUSITIS, UNSPECIFIED LOCATION: ICD-10-CM

## 2024-06-04 DIAGNOSIS — J30.1 ALLERGIC RHINITIS DUE TO POLLEN, UNSPECIFIED SEASONALITY: ICD-10-CM

## 2024-06-04 DIAGNOSIS — J45.40 MODERATE PERSISTENT ASTHMA: ICD-10-CM

## 2024-06-04 DIAGNOSIS — J47.9 BRONCHIECTASIS WITHOUT COMPLICATION (H): ICD-10-CM

## 2024-06-04 DIAGNOSIS — J94.1 FIBROTHORAX: ICD-10-CM

## 2024-06-04 DIAGNOSIS — J45.40 MODERATE PERSISTENT ASTHMA WITHOUT COMPLICATION: ICD-10-CM

## 2024-06-04 LAB — HGB BLD-MCNC: 11.5 G/DL

## 2024-06-04 PROCEDURE — 85018 HEMOGLOBIN: CPT | Mod: QW

## 2024-06-04 PROCEDURE — 94375 RESPIRATORY FLOW VOLUME LOOP: CPT | Mod: 26 | Performed by: INTERNAL MEDICINE

## 2024-06-04 PROCEDURE — 94729 DIFFUSING CAPACITY: CPT | Mod: 26 | Performed by: INTERNAL MEDICINE

## 2024-06-04 PROCEDURE — 99214 OFFICE O/P EST MOD 30 MIN: CPT | Performed by: NURSE PRACTITIONER

## 2024-06-04 PROCEDURE — 94726 PLETHYSMOGRAPHY LUNG VOLUMES: CPT | Mod: 26 | Performed by: INTERNAL MEDICINE

## 2024-06-04 RX ORDER — FLUTICASONE PROPIONATE 50 MCG
2 SPRAY, SUSPENSION (ML) NASAL DAILY
Qty: 16 G | Refills: 4 | Status: SHIPPED | OUTPATIENT
Start: 2024-06-04

## 2024-06-04 RX ORDER — LORATADINE 10 MG/1
10 TABLET ORAL DAILY
Qty: 30 TABLET | Refills: 4 | Status: SHIPPED | OUTPATIENT
Start: 2024-06-04

## 2024-06-04 RX ORDER — IPRATROPIUM BROMIDE AND ALBUTEROL SULFATE 2.5; .5 MG/3ML; MG/3ML
3 SOLUTION RESPIRATORY (INHALATION) 4 TIMES DAILY PRN
Qty: 480 ML | Refills: 3 | Status: SHIPPED | OUTPATIENT
Start: 2024-06-04

## 2024-06-04 RX ORDER — ALBUTEROL SULFATE 90 UG/1
2 AEROSOL, METERED RESPIRATORY (INHALATION) EVERY 4 HOURS PRN
Qty: 18 G | Refills: 4 | Status: SHIPPED | OUTPATIENT
Start: 2024-06-04

## 2024-06-04 RX ORDER — BUDESONIDE AND FORMOTEROL FUMARATE DIHYDRATE 160; 4.5 UG/1; UG/1
2 AEROSOL RESPIRATORY (INHALATION)
Qty: 10.2 G | Refills: 11 | Status: SHIPPED | OUTPATIENT
Start: 2024-06-04

## 2024-06-04 NOTE — PROGRESS NOTES
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     Chronic dyspnea  Chronic left calcified pleural effusion/fibrothorax with adjacent bronchiectasis  Pseudomonas airway colonization  Chronic rhinosinusitis  Patient with chronic exertional dyspnea and cough. Restrictive physiology on PFTs but may have an asthma component and has clinically responded to asthma therapy. Has a loculated left pleural effusion with partially calcified thickened rind, stable radiographically since 2014 and known to have been present since at least 2004 when the patient immigrated; notes he was \"very ill with a virus\" at age 10 and hospitalized, told he had a lung problem. Per Dr Quinn:  These chronic, stable loculated effusions with calcified rind/fibrothorax are fairly common in the [place]  population, likely a sequela of past parasitic, bacterial or mycobacterial infection. Venita had worsening dyspnea and left chest pain in October 2020 and was hospitalized again at [facility] for 5 nights in December 2020, where they actually placed a left pleural pigtail catheter into the collection, which predictably showed a chronic chylothorax, as this effusion has been present for decades, at least since he immigrated in 2004, and is surrounded by a calcified rind. Of course, the lung failed to fully expand with this pigtail catheter, which was subsequently removed. He had left lateral chest pain since this hospitalization, which slowly improved. He did not improve with LAMA, which was stopped.  Today he reports stable breathing over the past  year, no hospitalizations or URIs.  He is no longer on azithromycin chronically and he is unsure when he stopped, without worsening of symptoms since that time.  PFTs " were repeated today, which shows restrictive physiology (TLC 66% predicted) and normal DLCO.     Plan:  - reviewed PFT results with patient today.   - continue Symbicort (budesonide-formoterol) 160-4.5 mcg two inhalations two times a day; rinse/gargle/spit water after use; previously provided holding chamber with instruction on use.  - continue Duonebs QID with Aerobika flutter valve.  - continue montelukast 10 mg at bedtime.  - continue loratadine 10 mg daily.  - continue nasal fluticasone one spray in each nostril daily.  - albuterol HFA as needed  - Per Dr Quinn previously: surgery would be morbid and unlikely to be successful at achieving left lung reexpansion  - was previously offered pulmonary rehabilitation several times, which he has declined due to transportation/scheduling issues.  - he is UTD with COVID booster, annual influenza vaccine, prevnar 20, and RSV vaccine.    Follow-up  - 6 months, per patient request      Francy Christensen, CNP  Pulmonary Medicine  [facility]  [phone]       CC:     Annual visit     HPI:     68 year old male never smoker with a history of chronic loculated left pleural effusion with calcified left fibrothorax and adjacent bronchiectasis, GERD, CKD, HTN, Pseudomonas airway colonization, presenting for annual follow-up.    Since last visit (5/2023, Dr Quinn), breathing has been about the same, sometimes worse sometimes better.  Symbicort twice daily, rinses mouth.  Duonebs 1-2/day.  No longer on azithromycin, isn't sure when he stopped this, breathing has not changed since being on this.   Still on montelukast.   No hospitalizations or URIs.     ROS:     10-point ROS performed and is negative aside from those listed in HPI.       Medical history:       PMH:  Chronic loculated left pleural effusion with calcified rind (fibrothorax) with chronic exudative changes of chylothorax on laboratory analysis and adjacent  bronchiectasis  GERD  Possible chronic rhinosinusitis  Possible asthma  CKD  HTN    PSH:  Past Surgical History:   Procedure Laterality Date    CATARACT EXTRACTION BILATERAL W/ ANTERIOR VITRECTOMY Right     COLONOSCOPY N/A 4/26/2024    Procedure: COLONOSCOPY WITH POLYPECTOMIES;  Surgeon: Bacilio Villegas DO;  Location: [facility] Main OR    EYE SURGERY         Allergies:  Allergies   Allergen Reactions    Lisinopril Nephrotoxicity    Niacin Other (See Comments)       Family HX:  No family history on file.    Social Hx:  Social History     Socioeconomic History    Marital status:      Spouse name: Not on file    Number of children: Not on file    Years of education: Not on file    Highest education level: Not on file   Occupational History    Not on file   Tobacco Use    Smoking status: Never    Smokeless tobacco: Never   Vaping Use    Vaping status: Never Used   Substance and Sexual Activity    Alcohol use: No    Drug use: No    Sexual activity: Yes     Partners: Female     Birth control/protection: Post-menopausal   Other Topics Concern    Not on file   Social History Narrative    Lives with 2 sons, wife lives with other kids, 1 of his sons is home with him most of the time     Social Determinants of Health     Financial Resource Strain: Low Risk  (4/18/2024)    Financial Resource Strain     Within the past 12 months, have you or your family members you live with been unable to get utilities (heat, electricity) when it was really needed?: No   Food Insecurity: High Risk (4/18/2024)    Food Insecurity     Within the past 12 months, did you worry that your food would run out before you got money to buy more?: Yes     Within the past 12 months, did the food you bought just not last and you didn t have money to get more?: Yes   Transportation Needs: High Risk (4/18/2024)    Transportation Needs     Within the past 12 months, has lack of transportation kept you from medical appointments, getting your medicines,  non-medical meetings or appointments, work, or from getting things that you need?: Yes   Physical Activity: Unknown (4/18/2024)    Exercise Vital Sign     Days of Exercise per Week: 2 days     Minutes of Exercise per Session: Not on file   Stress: Stress Concern Present (4/18/2024)    [employer] - Occupational Stress Questionnaire     Feeling of Stress : Very much   Social Connections: Unknown (4/18/2024)    Social Connection and Isolation Panel [NHANES]     Frequency of Communication with Friends and Family: Not on file     Frequency of Social Gatherings with Friends and Family: Once a week     Attends Sikh Services: Not on file     Active Member of Clubs or Organizations: Not on file     Attends Club or Organization Meetings: Not on file     Marital Status: Not on file   Interpersonal Safety: Not on file   Housing Stability: High Risk (4/18/2024)    Housing Stability     Do you have housing? : Yes     Are you worried about losing your housing?: Yes       Current Meds:  Current Outpatient Medications   Medication Sig Dispense Refill    albuterol (PROAIR HFA/PROVENTIL HFA/VENTOLIN HFA) 108 (90 Base) MCG/ACT inhaler Inhale 2 puffs into the lungs every 4 hours as needed for shortness of breath or wheezing 18 g 0    fluticasone (FLONASE) 50 MCG/ACT nasal spray Spray 2 sprays into both nostrils daily 16 g 2    ipratropium - albuterol 0.5 mg/2.5 mg/3 mL (DUONEB) 0.5-2.5 (3) MG/3ML neb solution Take 1 vial (3 mLs) by nebulization 4 times daily as needed for shortness of breath or wheezing 480 mL 3    SYMBICORT 160-4.5 MCG/ACT Inhaler INHALE 2 PUFFS TWICE DAILY. RINSE MOUTH AND SPIT OUT AFTERWARDS // 1 ZAUG NQUS 2 PAS, 1 HNU SIV 2 ZAUG. YAUG NCAUJ THAUM SIV TAS. 10.2 g 11    aspirin (ASPIRIN LOW DOSE) 81 MG EC tablet TAKE 1 TABLET BY MOUTH DAILY FOR STROKE PREVENTION // IB HNUB NOJ 1 LUB PAB KOM NTSHAV KHIAV ZOO 30 tablet 11    blood pressure monitor Kit [BLOOD PRESSURE MONITOR KIT] Use  "to check blood pressure daily 1 each 0    diclofenac (VOLTAREN) 1 % topical gel APPLY 2 GRAMS TOPICALLY FOUR TIMES A DAY. DO NOT TO EXCEED 32 GRAMS/DAY./PLEEV 2 GRAMS 4 ZAUG TXHUA HNUB RAWS LI QHIA.      fenofibrate (TRICOR) 145 MG tablet TAKE 1 TABLET BY MOUTH DAILY FOR CHOLESTEROL // IB HNUB NOJ 1 LUB PAB ROXANNE NTSHAV MUAJ ROJ 90 tablet 4    gabapentin (NEURONTIN) 300 MG capsule TAKE 1 CAPSULE 3 TIMES DAILY // IB ZAUG NOJ 1 LUB, 1 HNUB NOJ 3 ZAUG 90 capsule 3    LIDOCAINE PAIN RELIEF 4 % Patch APPLY 1 PATCH TOPICALLY DAILY AS DIRECTED./IB HNUB LO 1 DAIM RAWS LI QHIA.      loratadine (CLARITIN) 10 MG tablet Take 1 tablet (10 mg) by mouth daily 30 tablet 2    montelukast (SINGULAIR) 10 MG tablet Take 1 tablet (10 mg) by mouth at bedtime 30 tablet 11    Multiple Vitamin (TAB-A-GERARDO) TABS TAKE 1 TABLET BY MOUTH DAILY // IB HNUB NOJ 1 LUB 30 tablet 11    sodium bicarbonate 650 MG tablet TAKE 2 TABLETS BY MOUTH 3 TIMES DAILY // 1 ZAUG NOJ 2 LUB, 1 HNUB NOJ 3 ZAUG      vitamin D2 (ERGOCALCIFEROL) 89242 units (1250 mcg) capsule TAKE ONE CAPSULE WEEKLY//IB ASTHIV NOJ IB LUB. 4 capsule 11        Physical Exam:     /70 (BP Location: Left arm, Patient Position: Chair, Cuff Size: Adult Regular)   Pulse 78   Ht 1.67 m (5' 5.75\")   Wt 79.8 kg (176 lb)   SpO2 97%   BMI 28.62 kg/m    Gen: adult male, appears in NAD  HEENT: clear conjunctivae, moist mucous membranes  CV: RRR, no M/G/R  Resp: CTAB, no focal crackles or wheezes.  Respirations even and unlabored.  On RA.   Skin: no apparent rashes on visible skin  Ext: no cyanosis, clubbing or edema  Neuro: alert and answering questions appropriately     Data:       Labs:  Left pleural fluid analysis March 2020 and December 2020 at [place] showed chronic exudate with elevated triglyceride level, negative for malignancy and infection     Imaging studies:    Chest CT with contrast (February 2020, [place]):  FINDINGS:   A 12.8 x 8.1 x 14.9 cm (AP x lateral x SI; approximately 850ml) " loculated,  complex fluid collection within the left pleural space demonstrates thin  peripheral septations, areas of internal fat (series 3, image 246) and higher  density heterogeneous internal components (series 3, image 313). No evidence of  internal gas or inflammatory changes surrounding this mass to suggest  acute/aggressive infectious process, with preservation of the extrapleural fat  plane.  There is some  leftward mediastinal shift suggesting chronic fibrosis  and atelectasis in the left upper and lower lobes. No evidence of central  obstructing lesion.     Mildly prominent mediastinal and hilar lymph nodes, some of which are calcified.  These may be benign/reactive but are indeterminate by CT criteria. The central  left upper lobe bronchus appears obstructed (series 3 image 179) but no definite  endobronchial lesion is identified.    There are areas of probably chronic atelectasis in the right middle and lower  lobes with some peripheral bronchial mucous plugging.     Probable right adrenal adenoma. Probable 1.1 cm cyst superior left kidney  (series 5, image 84). No worrisome osseous lesions.    COMPARISON REPORT:  Images from the 06/18/2018 and 04/10/2014 examinations are now available. The  complex left pleural abnormality which currently measures 12.8 x 8.1 x 14.9 cm  has slightly increased in volume since the prior exams, measuring approximately  12.5 x 7.7 x 14 cm on 06/18/2019 and 11.8 x 7.0 x 13 cm on 04/10/2014. This is  approximately 850 mL currently compared with 750 on 06/18/2018 and 550 on  04/10/2014. Peripheral calcification has slightly increased but the  heterogeneous internal density is similar, with no new definite soft tissue  components. Compressive atelectasis in the left lung and shift the mediastinum  is also slightly greater.    The minimal lack of change over many years is highly suggestive of a benign  abnormality but the CT appearance is indeterminate. Given several  other  left-sided pleural calcifications, a chronic/old empyema particularly by  atypical agents such as tuberculosis is most likely. A seroma or lymphangioma  might also be considered. Given some minimal fatty elements, a atypical  hamartoma could be potentially considered although the extensive fluid component  is unusual.    The probable right adrenal adenoma is unchanged since 06/18/2018, but slightly  more apparent than on 04/10/2014.     1. Loculated, complex fluid collection with scattered peripheral calcification  in the left pleural space measures 12.8 x 8.1 x 14.9 cm. Given lack of  inflammatory or aggressive features and reported presence dating back at least  to outside CTA chest 06/08/2018, this mass is likely benign. This may be due to  previous trauma or infection and would be of usual for malignancy; however,  cannot entirely exclude the possibility of chronic low-grade infection such as  tuberculous empyema. No findings to suggest an acute infectious or aggressive  neoplastic process.  2.  Probable small right adrenal adenoma.     CT chest (December 2020, [place]):  - images directly reviewed, formal interpretation follows:  Since prior imaging, a new left chest pigtail catheter is in place with  substantial decrease in size of the loculated left pleural effusion seen  previously.  There is some ex vacuo pneumothorax with an extensive thick-walled rind along  the periphery of this cavity with areas of calcification.  Partial reexpansion of the left lower lobe.  Scattered bronchial wall thickening, lower lobe hypoinflation, and air trapping  is at least partly related to the phase of respiration (but there may be  reactive airways disease and/or an element of bronchitis).  Aortic valvular minimal coronary artery calcification.     CXR (April 2023):  - images directly reviewed, formal interpretation follows:  IMPRESSION: Chronic, loculated moderate left effusion is unchanged with associated compressive  atelectasis on the left. Some pleural calcifications are also visible. Right lung is clear. Heart and pulmonary vascularity are normal.     TTE (August 2018):  - Normal left ventricular size and systolic performance with a visually estimated ejection fraction of 60%.  - There is mild aortic insufficiency.  - Normal right ventricular size and systolic performance.      Pulmonary Function Testing  August 2018  FEV1/FVC is 74% and is normal.  FEV1 is 1.41L (55%) predicted and is reduced.  FVC is 1.91L (60%) predicted and reduced.  There was no improvement in spirometry after a single inhaled dose of bronchodilator.  TLC is 3.57L (58%) predicted and is reduced.  RV is 1.40L (60%) predicted and is reduced.  DLCO is 16.54ml/min/hg (64%) predicted and is reduced when it is corrected for hemoglobin.  Flow volume loops indicate no abnormalities.     October 2019  FEV1/FVC is 80 and is normal.  FEV1 is 56% predicted and is reduced.  FVC is 56% predicted and reduced.  There was no no improvement in spirometry after a single inhaled does of bronchodilator.  TLC is 56% predicted and is reduced.  RV is 63% predicted and is reduced.  DLCO is 58% predicted and is reduced when it   is corrected for hemoglobin.

## 2024-06-04 NOTE — PATIENT INSTRUCTIONS
It was a pleasure to see you in clinic today.   Here is what we discussed:    Continue Symbicort two puffs twice daily, rinse/gargle after use.  Continue Duonebs two-four times daily, with flutter valve.  Continue montelukast 10mg at bedtime.  Continue loratadine 10mg daily.  Call my nurse, Fredi ([phone]) with any change or worsening of your breathing.  Follow-up in 6 months.    Francy Christensen CNP  Pulmonary Medicine  [facility]  [phone]

## 2024-06-06 LAB
DLCOCOR-%PRED-PRE: 77 %
DLCOCOR-PRE: 17.41 ML/MIN/MMHG
DLCOUNC-%PRED-PRE: 69 %
DLCOUNC-PRE: 15.67 ML/MIN/MMHG
DLCOUNC-PRED: 22.47 ML/MIN/MMHG
ERV-%PRED-PRE: 21 %
ERV-PRE: 0.26 L
ERV-PRED: 1.21 L
EXPTIME-PRE: 5.55 SEC
FEF2575-%PRED-PRE: 48 %
FEF2575-PRE: 1.01 L/SEC
FEF2575-PRED: 2.1 L/SEC
FEFMAX-%PRED-PRE: 31 %
FEFMAX-PRE: 2.3 L/SEC
FEFMAX-PRED: 7.4 L/SEC
FEV1-%PRED-PRE: 52 %
FEV1-PRE: 1.36 L
FEV1FEV6-PRE: 71 %
FEV1FEV6-PRED: 78 %
FEV1FVC-PRE: 71 %
FEV1FVC-PRED: 78 %
FEV1SVC-PRE: 69 %
FEV1SVC-PRED: 70 %
FIFMAX-PRE: 1.81 L/SEC
FRCPLETH-%PRED-PRE: 69 %
FRCPLETH-PRE: 2.35 L
FRCPLETH-PRED: 3.39 L
FVC-%PRED-PRE: 57 %
FVC-PRE: 1.91 L
FVC-PRED: 3.31 L
IC-%PRED-PRE: 70 %
IC-PRE: 1.71 L
IC-PRED: 2.42 L
RVPLETH-%PRED-PRE: 85 %
RVPLETH-PRE: 2.1 L
RVPLETH-PRED: 2.45 L
TLCPLETH-%PRED-PRE: 66 %
TLCPLETH-PRE: 4.06 L
TLCPLETH-PRED: 6.11 L
VA-%PRED-PRE: 56 %
VA-PRE: 3.05 L
VC-%PRED-PRE: 53 %
VC-PRE: 1.97 L
VC-PRED: 3.7 L

## 2024-06-06 RX ORDER — ASPIRIN 81 MG/1
TABLET, COATED ORAL
Qty: 30 TABLET | Refills: 11 | OUTPATIENT
Start: 2024-06-06

## 2024-06-10 DIAGNOSIS — I10 ESSENTIAL HYPERTENSION: ICD-10-CM

## 2024-06-10 RX ORDER — ASPIRIN 81 MG/1
TABLET, COATED ORAL
Qty: 30 TABLET | Refills: 11 | OUTPATIENT
Start: 2024-06-10

## 2024-06-12 DIAGNOSIS — I10 ESSENTIAL HYPERTENSION: ICD-10-CM

## 2024-06-12 RX ORDER — ASPIRIN 81 MG/1
TABLET, COATED ORAL
Qty: 30 TABLET | Refills: 11 | OUTPATIENT
Start: 2024-06-12

## 2024-06-17 PROBLEM — Z71.89 ADVANCED DIRECTIVES, COUNSELING/DISCUSSION: Status: RESOLVED | Noted: 2023-06-01 | Resolved: 2024-06-17

## 2024-07-08 DIAGNOSIS — E55.9 VITAMIN D DEFICIENCY: ICD-10-CM

## 2024-07-08 RX ORDER — ERGOCALCIFEROL 1.25 MG/1
CAPSULE, LIQUID FILLED ORAL
Qty: 4 CAPSULE | Refills: 11 | Status: SHIPPED | OUTPATIENT
Start: 2024-07-08

## 2024-07-30 ENCOUNTER — PATIENT OUTREACH (OUTPATIENT)
Dept: GERIATRIC MEDICINE | Facility: CLINIC | Age: 69
End: 2024-07-30
Payer: COMMERCIAL

## 2024-07-30 NOTE — PROGRESS NOTES
[facility] Care Coordination Contact    Called member to schedule annual HRA home visit. HRA has been scheduled for 8/5/24.    Lexy Espinoza RN  [facility]  [phone]

## 2024-07-31 ENCOUNTER — OFFICE VISIT (OUTPATIENT)
Dept: FAMILY MEDICINE | Facility: CLINIC | Age: 69
End: 2024-07-31
Payer: COMMERCIAL

## 2024-07-31 VITALS
WEIGHT: 171 LBS | SYSTOLIC BLOOD PRESSURE: 118 MMHG | RESPIRATION RATE: 14 BRPM | HEIGHT: 66 IN | DIASTOLIC BLOOD PRESSURE: 76 MMHG | OXYGEN SATURATION: 96 % | BODY MASS INDEX: 27.48 KG/M2 | HEART RATE: 74 BPM | TEMPERATURE: 97.5 F

## 2024-07-31 DIAGNOSIS — D63.1 ANEMIA DUE TO STAGE 4 CHRONIC KIDNEY DISEASE (H): ICD-10-CM

## 2024-07-31 DIAGNOSIS — N18.4 CKD (CHRONIC KIDNEY DISEASE) STAGE 4, GFR 15-29 ML/MIN (H): Primary | ICD-10-CM

## 2024-07-31 DIAGNOSIS — R73.03 PREDIABETES: ICD-10-CM

## 2024-07-31 DIAGNOSIS — E78.1 PURE HYPERGLYCERIDEMIA: ICD-10-CM

## 2024-07-31 DIAGNOSIS — F32.1 MODERATE MAJOR DEPRESSION (H): ICD-10-CM

## 2024-07-31 DIAGNOSIS — Z86.0100 HISTORY OF COLONIC POLYPS: ICD-10-CM

## 2024-07-31 DIAGNOSIS — N18.4 ANEMIA DUE TO STAGE 4 CHRONIC KIDNEY DISEASE (H): ICD-10-CM

## 2024-07-31 DIAGNOSIS — I10 BENIGN ESSENTIAL HTN: ICD-10-CM

## 2024-07-31 PROBLEM — Z60.3 LANGUAGE BARRIER AFFECTING HEALTH CARE: Status: RESOLVED | Noted: 2021-07-26 | Resolved: 2024-07-31

## 2024-07-31 PROBLEM — Z75.8 LANGUAGE BARRIER AFFECTING HEALTH CARE: Status: RESOLVED | Noted: 2021-07-26 | Resolved: 2024-07-31

## 2024-07-31 LAB
ALBUMIN UR-MCNC: >=300 MG/DL
APPEARANCE UR: CLEAR
BACTERIA #/AREA URNS HPF: ABNORMAL /HPF
BILIRUB UR QL STRIP: NEGATIVE
COLOR UR AUTO: YELLOW
ERYTHROCYTE [DISTWIDTH] IN BLOOD BY AUTOMATED COUNT: 12.8 % (ref 10–15)
GLUCOSE UR STRIP-MCNC: NEGATIVE MG/DL
HBA1C MFR BLD: 5.7 % (ref 0–5.6)
HCT VFR BLD AUTO: 39.4 % (ref 40–53)
HGB BLD-MCNC: 12.4 G/DL (ref 13.3–17.7)
HGB UR QL STRIP: ABNORMAL
KETONES UR STRIP-MCNC: NEGATIVE MG/DL
LEUKOCYTE ESTERASE UR QL STRIP: NEGATIVE
MCH RBC QN AUTO: 29.1 PG (ref 26.5–33)
MCHC RBC AUTO-ENTMCNC: 31.5 G/DL (ref 31.5–36.5)
MCV RBC AUTO: 93 FL (ref 78–100)
NITRATE UR QL: NEGATIVE
PH UR STRIP: 5.5 [PH] (ref 5–8)
PLATELET # BLD AUTO: 149 10E3/UL (ref 150–450)
RBC # BLD AUTO: 4.26 10E6/UL (ref 4.4–5.9)
RBC #/AREA URNS AUTO: ABNORMAL /HPF
SP GR UR STRIP: 1.02 (ref 1–1.03)
SQUAMOUS #/AREA URNS AUTO: ABNORMAL /LPF
UROBILINOGEN UR STRIP-ACNC: 0.2 E.U./DL
WBC # BLD AUTO: 7.1 10E3/UL (ref 4–11)
WBC #/AREA URNS AUTO: ABNORMAL /HPF

## 2024-07-31 PROCEDURE — 83036 HEMOGLOBIN GLYCOSYLATED A1C: CPT | Performed by: FAMILY MEDICINE

## 2024-07-31 PROCEDURE — 36415 COLL VENOUS BLD VENIPUNCTURE: CPT | Performed by: FAMILY MEDICINE

## 2024-07-31 PROCEDURE — 85027 COMPLETE CBC AUTOMATED: CPT | Performed by: FAMILY MEDICINE

## 2024-07-31 PROCEDURE — 90750 HZV VACC RECOMBINANT IM: CPT | Performed by: FAMILY MEDICINE

## 2024-07-31 PROCEDURE — 80048 BASIC METABOLIC PNL TOTAL CA: CPT | Performed by: FAMILY MEDICINE

## 2024-07-31 PROCEDURE — 99214 OFFICE O/P EST MOD 30 MIN: CPT | Mod: 25 | Performed by: FAMILY MEDICINE

## 2024-07-31 PROCEDURE — T1013 SIGN LANG/ORAL INTERPRETER: HCPCS | Mod: U4

## 2024-07-31 PROCEDURE — 81001 URINALYSIS AUTO W/SCOPE: CPT | Performed by: FAMILY MEDICINE

## 2024-07-31 PROCEDURE — 90471 IMMUNIZATION ADMIN: CPT | Performed by: FAMILY MEDICINE

## 2024-07-31 RX ORDER — FENOFIBRATE 145 MG/1
TABLET, COATED ORAL
Qty: 90 TABLET | Refills: 4 | Status: SHIPPED | OUTPATIENT
Start: 2024-07-31

## 2024-07-31 RX ORDER — LOSARTAN POTASSIUM 25 MG/1
TABLET ORAL
COMMUNITY
Start: 2024-06-05 | End: 2024-07-31

## 2024-07-31 ASSESSMENT — COLUMBIA-SUICIDE SEVERITY RATING SCALE - C-SSRS
1. WITHIN THE PAST MONTH, HAVE YOU WISHED YOU WERE DEAD OR WISHED YOU COULD GO TO SLEEP AND NOT WAKE UP?: NO
2. IN THE PAST MONTH, HAVE YOU ACTUALLY HAD ANY THOUGHTS OF KILLING YOURSELF?: NO
6. HAVE YOU EVER DONE ANYTHING, STARTED TO DO ANYTHING, OR PREPARED TO DO ANYTHING TO END YOUR LIFE?: NO

## 2024-07-31 ASSESSMENT — PATIENT HEALTH QUESTIONNAIRE - PHQ9
SUM OF ALL RESPONSES TO PHQ QUESTIONS 1-9: 6
10. IF YOU CHECKED OFF ANY PROBLEMS, HOW DIFFICULT HAVE THESE PROBLEMS MADE IT FOR YOU TO DO YOUR WORK, TAKE CARE OF THINGS AT HOME, OR GET ALONG WITH OTHER PEOPLE: SOMEWHAT DIFFICULT
SUM OF ALL RESPONSES TO PHQ QUESTIONS 1-9: 6

## 2024-07-31 NOTE — PROGRESS NOTES
07/31/24 [id number]   Does the patient have a mental health provider?   Does the patient have a mental health provider? No   [place] Suicide Severity Rating Scale   Within the last month, have you wished you were dead or wished you could go to sleep and not wake up? No   Within the last month, have you had any actual thoughts of killing yourself? No   OTHER   Within the last month, have you ever done anything, started to do anything, or prepared to do anything to end your life? No

## 2024-07-31 NOTE — PROGRESS NOTES
Assessment & Plan     CKD (chronic kidney disease) stage 4, GFR 15-29 ml/min (H)  Mild hyperkalemia noted- asymptomatic.  Has follow-up with neprhology coming up.  Will inform them of tests today.  Peritoneal dialysis in future as needed.    - Hemoglobin A1c  - Basic metabolic panel  (Ca, Cl, CO2, Creat, Gluc, K, Na, BUN)  - CBC with platelets  - UA with Microscopic reflex to Culture - Clinic Collect  - Hemoglobin A1c  - Basic metabolic panel  (Ca, Cl, CO2, Creat, Gluc, K, Na, BUN)  - CBC with platelets  - UA Microscopic with Reflex to Culture    History of colonic polyps  Follow-up colonoscopy in 3 years     Benign essential HTN  BP Readings from Last 3 Encounters:   07/31/24 118/76   06/04/24 124/70   04/26/24 (!) 143/86        controlled today.  Plan for bloodpressure management includes ongoing focus on healthy DASH type diet and increased activity, encouraged to avoid tobacco products and limit alcohol use, stress reduction, not on ace/arb per nephrology.  Continue off meds.  Labwork and meds ordered and reviewed as below  Potassium   Date Value Ref Range Status   07/31/2024 5.5 (H) 3.4 - 5.3 mmol/L Final   04/15/2022 4.5 3.5 - 5.0 mmol/L Final      Creatinine   Date Value Ref Range Status   07/31/2024 2.83 (H) 0.67 - 1.17 mg/dL Final          Anemia due to stage 4 chronic kidney disease (H)  Hemoglobin   Date Value Ref Range Status   07/31/2024 12.4 (L) 13.3 - 17.7 g/dL Final   Improved- not sure if pt got meds through nephrology?      Moderate major depression (H)  PHQ-9 score:        7/31/2024     1:05 PM   PHQ   PHQ-9 Total Score 6   Q9: Thoughts of better off dead/self-harm past 2 weeks Several days   F/U: Thoughts of suicide or self-harm No   F/U: Safety concerns No     Pt notes he is doing well- mostly depressed due to his chronic medical conditions and limitations and worried about his health/dying, etc.  Has done therapy in the past but this made him feel worse.  Declines meds.        The  longitudinal plan of care for the diagnosis(es)/condition(s) as documented were addressed during this visit. Due to the added complexity in care, I will continue to support Venita Fisher in the subsequent management and with ongoing continuity of care.      Depression Screening Follow Up              7/31/2024     1:24 PM   C-SSRS (Brief Gentry)   Within the last month, have you wished you were dead or wished you could go to sleep and not wake up? No   Within the last month, have you had any actual thoughts of killing yourself? No   Within the last month, have you ever done anything, started to do anything, or prepared to do anything to end your life? No   Notes that he is scared of dying due to his breathing but not having thoughts of wanting to die to escape             Follow Up Actions Taken  Crisis resource information provided in the After Visit Summary  Patient declined referral.    Discussed the following ways the patient can remain in a safe environment:  be around others      Subjective   Venita Fisher is a 68 year old, presenting for the following health issues:  Follow Up        7/31/2024     1:03 PM   Additional Questions   Roomed by HSER   Accompanied by SELF     History of Present Illness       Reason for visit:  FOLLOW UP    He eats 2-3 servings of fruits and vegetables daily.He consumes 1 sweetened beverage(s) daily.He exercises with enough effort to increase his heart rate 10 to 19 minutes per day.  He exercises with enough effort to increase his heart rate 3 or less days per week.   He is taking medications regularly.         Right flank.CVA pain comes and goes.  Makes it harder to breath.      Mental health-depressed due to renal issue and having so many medical problems.  Not able to do the things he wants to do throughout the day due to SOB.  Worse when laying flat or with exertion.       Breathing is hard when weather is so hot and humid    Higher sugars when on steroids     CKD- anemia   Son notes he  "was seen 2 weeks ago for lab draw and has follow-up next week   Was recommended to start 1/2 losartan 25mg daily but after further discussion was advised to not start this.              Objective    /76 (BP Location: Right arm, Patient Position: Sitting, Cuff Size: Adult Regular)   Pulse 74   Temp 97.5  F (36.4  C) (Temporal)   Resp 14   Ht 1.664 m (5' 5.5\")   Wt 77.6 kg (171 lb)   SpO2 96%   BMI 28.02 kg/m    Body mass index is 28.02 kg/m .  Physical Exam   Complete 10 point ROS completed today as part of the exam and patient denies any symptoms as reviewed in HPI     Wt Readings from Last 3 Encounters:   07/31/24 77.6 kg (171 lb)   06/04/24 79.8 kg (176 lb)   04/18/24 79.8 kg (176 lb)       No LMP for male patient.    All normal as below except abnormalities include: mild right cva   Left lung decreased air movement  Diffuse insp/exp wheezing in both lung fields    General is a  68 year old sitting comfortably in no apparent distress   HEENT:  Eye exams within normal   Neck: Supple without lymphadenopathy or thyromegally  CV: Regular rate and rhythm S1S2 without rubs, murmurs or gallops,   Lungs: Clear to auscultation bilaterally  Abd:  +BS, soft NT/ND,  No masses or organomegally,   Extremities: Warm, No Edema, 2+ Pedal and radial pulses bilaterally  Skin: No lesions or rashes noted  Neuro: Able to ambulate around the exam room with equal movement, strength and normal coordination of the upper and lower extremeties symmetrically    History summarized from1-2:Pulmonary visit 4/2024  Nephrology visit    Old Records-1: Outside allergies, meds, problems and immunizations were reconciled as needed from [facility]  Radiology tests reviewed-1: renal us 2021- no ABELARDO  Lab tests reviewed-1: 2024  Medicine tests reviewed-1: colonoscopy 2024- polyp.  Repeat in 3 years      Follow-up Visit   Expected date:  Oct 31, 2024 (Approximate)      Follow Up Appointment Details:     Follow-up with whom?: PCP    " Follow-Up for what?: Chronic Disease f/u    Chronic Disease f/u:  Hypertension  COPD  Depression       How?: In Person                     Sylvia Lugo MD             Signed Electronically by: Sylvia Lugo MD      Prior to immunization administration, verified patients identity using patient s name and date of birth. Please see Immunization Activity for additional information.     Screening Questionnaire for Adult Immunization    Are you sick today?   No   Do you have allergies to medications, food, a vaccine component or latex?   Yes   Have you ever had a serious reaction after receiving a vaccination?   No   Do you have a long-term health problem with heart, lung, kidney, or metabolic disease (e.g., diabetes), asthma, a blood disorder, no spleen, complement component deficiency, a cochlear implant, or a spinal fluid leak?  Are you on long-term aspirin therapy?   Yes   Do you have cancer, leukemia, HIV/AIDS, or any other immune system problem?   No   Do you have a parent, brother, or sister with an immune system problem?   No   In the past 3 months, have you taken medications that affect  your immune system, such as prednisone, other steroids, or anticancer drugs; drugs for the treatment of rheumatoid arthritis, Crohn s disease, or psoriasis; or have you had radiation treatments?   Don't Know   Have you had a seizure, or a brain or other nervous system problem?   No   During the past year, have you received a transfusion of blood or blood    products, or been given immune (gamma) globulin or antiviral drug?   No   For women: Are you pregnant or is there a chance you could become       pregnant during the next month?   No   Have you received any vaccinations in the past 4 weeks?   No     Immunization questionnaire was positive for at least one answer.  Notified PROVIDER.      Patient instructed to remain in clinic for 15 minutes afterwards, and to report any adverse reactions.     Screening performed by Nano Funk  Yobany Jameson MA on 7/31/2024 at 1:07 PM.

## 2024-08-01 PROBLEM — R73.03 PREDIABETES: Status: ACTIVE | Noted: 2024-08-01

## 2024-08-01 LAB
ANION GAP SERPL CALCULATED.3IONS-SCNC: 12 MMOL/L (ref 7–15)
BUN SERPL-MCNC: 46.1 MG/DL (ref 8–23)
CALCIUM SERPL-MCNC: 9 MG/DL (ref 8.8–10.4)
CHLORIDE SERPL-SCNC: 109 MMOL/L (ref 98–107)
CREAT SERPL-MCNC: 2.83 MG/DL (ref 0.67–1.17)
EGFRCR SERPLBLD CKD-EPI 2021: 24 ML/MIN/1.73M2
GLUCOSE SERPL-MCNC: 93 MG/DL (ref 70–99)
HCO3 SERPL-SCNC: 19 MMOL/L (ref 22–29)
POTASSIUM SERPL-SCNC: 5.5 MMOL/L (ref 3.4–5.3)
SODIUM SERPL-SCNC: 140 MMOL/L (ref 135–145)

## 2024-08-01 NOTE — RESULT ENCOUNTER NOTE
Team - please call patient with results and send result letter with this information if patient desires for their records. Refer to [facility] result note as needed.     His kidney tests are stable- not worse.  His potassium is a little high   No urine infection seen   Stable blood counts.   Only mild anemia   Prediabetes- no diabetes. Continue to try to eat healthy     Please fax results to [facility]- see care team for contact info and call his CNP with this clinic to let her know that he has mildly elevated potassium- she is seeing him soon.

## 2024-08-02 ENCOUNTER — TELEPHONE (OUTPATIENT)
Dept: FAMILY MEDICINE | Facility: CLINIC | Age: 69
End: 2024-08-02
Payer: COMMERCIAL

## 2024-08-02 NOTE — TELEPHONE ENCOUNTER
----- Message from Sylvia Lugo sent at 8/1/2024  5:30 PM CDT -----  Team - please call patient with results and send result letter with this information if patient desires for their records. Refer to [facility] result note as needed.     His kidney tests are stable- not worse.  His potassium is a little high   No urine infection seen   Stable blood counts.   Only mild anemia   Prediabetes- no diabetes. Continue to try to eat healthy     Please fax [facility] to [facility]- see care team for contact info and call his CNP with this clinic to let her know that he has mildly elevated potassium- she is seeing him soon.

## 2024-08-05 ENCOUNTER — PATIENT OUTREACH (OUTPATIENT)
Dept: GERIATRIC MEDICINE | Facility: CLINIC | Age: 69
End: 2024-08-05

## 2024-08-05 ENCOUNTER — OFFICE VISIT (OUTPATIENT)
Dept: SLEEP MEDICINE | Facility: CLINIC | Age: 69
End: 2024-08-05
Attending: FAMILY MEDICINE
Payer: COMMERCIAL

## 2024-08-05 VITALS
HEART RATE: 75 BPM | DIASTOLIC BLOOD PRESSURE: 71 MMHG | HEIGHT: 65 IN | RESPIRATION RATE: 12 BRPM | BODY MASS INDEX: 28.89 KG/M2 | OXYGEN SATURATION: 97 % | SYSTOLIC BLOOD PRESSURE: 145 MMHG | WEIGHT: 173.4 LBS

## 2024-08-05 DIAGNOSIS — G47.10 HYPERSOMNIA: ICD-10-CM

## 2024-08-05 DIAGNOSIS — I10 BENIGN ESSENTIAL HTN: ICD-10-CM

## 2024-08-05 DIAGNOSIS — R06.83 SNORING: ICD-10-CM

## 2024-08-05 DIAGNOSIS — R51.9 SLEEP RELATED HEADACHES: ICD-10-CM

## 2024-08-05 DIAGNOSIS — R06.81 WITNESSED EPISODE OF APNEA: Primary | ICD-10-CM

## 2024-08-05 PROCEDURE — T1013 SIGN LANG/ORAL INTERPRETER: HCPCS | Mod: U4

## 2024-08-05 PROCEDURE — 99204 OFFICE O/P NEW MOD 45 MIN: CPT | Performed by: INTERNAL MEDICINE

## 2024-08-05 RX ORDER — LISINOPRIL 2.5 MG/1
TABLET ORAL
COMMUNITY
Start: 2024-07-31

## 2024-08-05 ASSESSMENT — SLEEP AND FATIGUE QUESTIONNAIRES
HOW LIKELY ARE YOU TO NOD OFF OR FALL ASLEEP WHILE WATCHING TV: MODERATE CHANCE OF DOZING
HOW LIKELY ARE YOU TO NOD OFF OR FALL ASLEEP WHEN YOU ARE A PASSENGER IN A CAR FOR AN HOUR WITHOUT A BREAK: MODERATE CHANCE OF DOZING
HOW LIKELY ARE YOU TO NOD OFF OR FALL ASLEEP WHILE SITTING QUIETLY AFTER LUNCH WITHOUT ALCOHOL: MODERATE CHANCE OF DOZING
HOW LIKELY ARE YOU TO NOD OFF OR FALL ASLEEP WHILE SITTING INACTIVE IN A PUBLIC PLACE: MODERATE CHANCE OF DOZING
HOW LIKELY ARE YOU TO NOD OFF OR FALL ASLEEP IN A CAR, WHILE STOPPED FOR A FEW MINUTES IN TRAFFIC: SLIGHT CHANCE OF DOZING
HOW LIKELY ARE YOU TO NOD OFF OR FALL ASLEEP WHILE SITTING AND TALKING TO SOMEONE: MODERATE CHANCE OF DOZING
HOW LIKELY ARE YOU TO NOD OFF OR FALL ASLEEP WHILE LYING DOWN TO REST IN THE AFTERNOON WHEN CIRCUMSTANCES PERMIT: MODERATE CHANCE OF DOZING
HOW LIKELY ARE YOU TO NOD OFF OR FALL ASLEEP WHILE SITTING AND READING: MODERATE CHANCE OF DOZING

## 2024-08-05 NOTE — NURSING NOTE
"Chief Complaint   Patient presents with    Sleep Problem     Patient presents to clinic for a consult on snoring.       Initial BP (!) 145/71   Pulse 75   Resp 12   Ht 1.651 m (5' 5\")   Wt 78.7 kg (173 lb 6.4 oz)   SpO2 97%   BMI 28.86 kg/m   Estimated body mass index is 28.86 kg/m  as calculated from the following:    Height as of this encounter: 1.651 m (5' 5\").    Weight as of this encounter: 78.7 kg (173 lb 6.4 oz).    Medication Reconciliation: complete  ESS: 15  Neck circumference: 16.25 inches / 41 centimeters.  DME: N/A  Violeta Guerrier CMA      "

## 2024-08-05 NOTE — PATIENT INSTRUCTIONS
What is a Home Sleep Study?    your doctor can give you a portable sleep monitor to use at home, so you don t have to spend the night in the sleep lab. But you should use a portable monitor only if:   ?Your doctor thinks you have a condition that makes you stop breathing for short periods while you are asleep, called  sleep apnea.    ?You do not have other serious medical problems, such as heart disease or lung disease.    Please bring the home sleep study device back to the sleep center as soon as you are finished with it so we can score it.     The cost of care estimate line is [phone]. They are able to give the patient an estimate of the charges and also an estimate of their insurance coverage/patient responsibility.   After your sleep study is performed, please call us at [phone] or [phone] to schedule for a follow up to review the results of the sleep study.    Consider using one tab of low dose melatonin 3 mg or less on the night of the study.    It is completely voluntary.    Do not drive or operate machinery after intake of melatonin.     Due to the pandemic, we have many people waiting in line for sleep studies- this wait list is improving each week.   You should receive a call within 2 weeks from our staff to schedule you for  your test.   Depending on the delay in approval by your insurance carrier, the study will be completed within a few days to 2 weeks of that call.    Please make every effort you can to sleep on your back with one pillow behind your head.    Please also call your insurance company next week to see if your sleep study is approved and find out your co-pay.

## 2024-08-05 NOTE — PROGRESS NOTES
Additional 15 minutes on the date of service was spent performing the following:    -Preparing to see the patient  -Obtaining and/or reviewing separately obtained history   -Ordering medications, tests, or procedures   -Documenting clinical information in the electronic or other health record     All the information was obtained and communicated with the help of the interpretor.    Assessment and Plan:    In summary Venita Sanchez is a 68 year old year old male here for sleep disturbance.  1. Witness apnea/Hypersomnia/Snoring/HTN   Venita Sanchez has high risk for obstructive sleep apnea based on the history of witnessed apnea, hypersomnia, snoring and a crowded airway. I educated the patient on the underlying pathophysiology of obstructive sleep apnea. We reviewed the risks associated with sleep apnea, including increased cardiovascular risk and overall death. We talked about treatments briefly. I recommend getting a Home sleep study. The patient should return to the clinic to discuss results and treatment option in a patient-centered approach.    History of present illness:    He is a 68 year old male who comes to the clinic with a chief complaint of witnessed apneas has been going on for approximately 4 years.  The patient has been informed by his friends and family members that he has pauses in his breathing during sleep followed by loud snoring.  Despite adequate hours of sleep he was still feel tired upon awakening.  He also complains of waking up with headaches and is currently being treated for high blood pressure and chronic renal insufficiency.  The patient's blood pressure was elevated during the clinic visit.  He denies any types of headaches, chest pain, shortness of breath or double vision.  I offered the patient to have his symptoms of wheezing and elevated blood pressure evaluated in the urgent care after our clinic visit.     Sleep-Wake Cycle:    TIME IN BED:    1) Work/School Days:    Do you work or  go to school? No   What time do you usually get into bed? 10 pm   About how long does it take you to fall asleep? 30  minutes   How often do you have trouble falling asleep? every night   How often do you wake up during the night? always   Do you work days/evenings/nights/rotating shifts? Rotating Shifts   What wakes you up at night? Snorting self awake    Pain    Use the bathroom    Nightmares   How often do you have trouble falling back to sleep?    About how long does it take to fall back to sleep?    What do you usually do if you have trouble getting back to sleep? stay up for couple hours to get back to sleep   What time do you usually get out of bed to start your day? 5 am in the morning   Do you use an alarm? No   2) Weekends/Non-work Days/All Other Days    What time do you usually get into bed? 10 pm   About how long does it take you to fall asleep? 30 minutes   What time do you usually get out of bed to start your day? 5 am   Do you use an alarm? No   SLEEP NEED    On average, about how much sleep do you think you get? about 2 TO 3 hours   About how much sleep do you think you need? at least 7 to 8 hours a night   SLEEP POSITION    Which sleep positions do you prefer? Back    Side    Stomach    Head Elevated    Recliner   Do you do any of the following activities in bed? Watch TV    Use phone, computer, or tablet   How often do you take a nap on purpose?    About how long are your naps? 2 to 3 hours   Do you feel better after naps? Yes   How often do you doze off unintentionally? N/A   Have you ever had a driving accident or near-miss due to sleepiness/drowsiness? No       Stop Bang Questionnaire    Question 8/5/2024  1:35 PM CDT - Filed by Patient   Do you SNORE loudly (louder than talking or loud enough to be heard through closed doors)? Yes   Do you often feel TIRED, fatigued, or sleepy during daytime? Yes   Has anyone OBSERVED you stop breathing during your sleep? Yes   Do you have or are you being  treated for high blood PRESSURE? Yes   BMI more than 35kg/m2? No   AGE over 50 years old? Yes   NECK circumference > 16 inches (40cm)? Yes   GENDER: Male? Yes   STOP-BANG Total Score (range: 0 - 8) 6 (High risk of SEKOU) Critical        GARY:  GARY Total Score: 23  Total score - [place]: 15 (8/5/2024  1:34 PM)      Patient told to return in one week after the sleep study is interpreted.    Patient Active Problem List   Diagnosis    Essential Hypertriglyceridemia    Neck Pain    Chronic left-sided low back pain without sciatica    Benign essential HTN    Chronic pleural effusion    Fibrothorax    GERD (gastroesophageal reflux disease)    Moderate persistent asthma    CKD (chronic kidney disease) stage 4, GFR 15-29 ml/min (H)    Shortness of breath    Allergic rhinitis due to pollen    Steroid-induced hyperglycemia    Chronic bronchitis (H)    Moderate major depression (H)    Callus of foot    Advanced directives, counseling/discussion    Anemia due to stage 4 chronic kidney disease (H)    Generalized muscle weakness    Chronic right-sided thoracic back pain    History of colonic polyps    Prediabetes       Past Medical History  Past Medical History:   Diagnosis Date    Community acquired pneumonia of right lower lobe of lung     Fibrothorax     GERD (gastroesophageal reflux disease)     Hyperkalemia 07/27/2021    Moderate persistent asthma     Pleural effusion on left     Positive colorectal cancer screening using Cologuard test 02/28/2024        Past Surgical History  Past Surgical History:   Procedure Laterality Date    CATARACT EXTRACTION BILATERAL W/ ANTERIOR VITRECTOMY Right     COLONOSCOPY N/A 4/26/2024    Procedure: COLONOSCOPY WITH POLYPECTOMIES;  Surgeon: Bacilio Villegas DO;  Location: [facility] Main OR    EYE SURGERY          Meds  Current Outpatient Medications   Medication Sig Dispense Refill    albuterol (PROAIR HFA/PROVENTIL HFA/VENTOLIN HFA) 108 (90 Base) MCG/ACT inhaler Inhale 2 puffs into the lungs  every 4 hours as needed for shortness of breath or wheezing 18 g 4    aspirin (ASPIRIN LOW DOSE) 81 MG EC tablet TAKE 1 TABLET BY MOUTH DAILY FOR STROKE PREVENTION // IB HNUB NOJ 1 LUB PAB KOM NTSHAV KHIAV ZOO 30 tablet 11    blood pressure monitor Kit [BLOOD PRESSURE MONITOR KIT] Use to check blood pressure daily 1 each 0    budesonide-formoterol (SYMBICORT) 160-4.5 MCG/ACT Inhaler Inhale 2 puffs into the lungs two times daily 10.2 g 11    diclofenac (VOLTAREN) 1 % topical gel APPLY 2 GRAMS TOPICALLY FOUR TIMES A DAY. DO NOT TO EXCEED 32 GRAMS/DAY./PLEEV 2 GRAMS 4 ZAUG TXHUA HNUB RAWS LI QHIA.      fenofibrate (TRICOR) 145 MG tablet TAKE 1 TABLET BY MOUTH DAILY FOR CHOLESTEROL // IB HNUB NOJ 1 LUB PAB ROXANNE NTSV MUAJ ROJ 90 tablet 4    fluticasone (FLONASE) 50 MCG/ACT nasal spray Spray 2 sprays into both nostrils daily 16 g 4    gabapentin (NEURONTIN) 300 MG capsule TAKE 1 CAPSULE 3 TIMES DAILY // IB ZAUG NOJ 1 LUB, 1 HNUB NOJ 3 ZAUG 90 capsule 3    ipratropium - albuterol 0.5 mg/2.5 mg/3 mL (DUONEB) 0.5-2.5 (3) MG/3ML neb solution Take 1 vial (3 mLs) by nebulization 4 times daily as needed for shortness of breath or wheezing 480 mL 3    LIDOCAINE PAIN RELIEF 4 % Patch APPLY 1 PATCH TOPICALLY DAILY AS DIRECTED./IB HNUB LO 1 DAIM RAWS LI QHIA.      lisinopril (ZESTRIL) 2.5 MG tablet TAKE 1 TABLET DAILY FOR BLOOD PRESSURE OR KIDNEY // 1 HNUB NOJ 1 LUB PAB ROXANNE NTSV SIAB LO LUB RAUM.      loratadine (CLARITIN) 10 MG tablet Take 1 tablet (10 mg) by mouth daily 30 tablet 4    montelukast (SINGULAIR) 10 MG tablet Take 1 tablet (10 mg) by mouth at bedtime 30 tablet 11    Multiple Vitamin (TAB-A-GERARDO) TABS TAKE 1 TABLET BY MOUTH DAILY // IB HNUB NOJ 1 LUB 30 tablet 11    sodium bicarbonate 650 MG tablet TAKE 2 TABLETS BY MOUTH 3 TIMES DAILY // 1 ZAUG NOJ 2 LUB, 1 HNUB NOJ 3 ZAUG      vitamin D2 (ERGOCALCIFEROL) 62698 units (1250 mcg) capsule TAKE ONE CAPSULE WEEKLY//IB ASTHIV NOJ IB LUB. 4 capsule 11         Allergies  Lisinopril and Niacin     Social History  Social History     Socioeconomic History    Marital status:      Spouse name: Not on file    Number of children: Not on file    Years of education: Not on file    Highest education level: Not on file   Occupational History    Not on file   Tobacco Use    Smoking status: Never     Passive exposure: Never    Smokeless tobacco: Never   Vaping Use    Vaping status: Never Used   Substance and Sexual Activity    Alcohol use: No    Drug use: No    Sexual activity: Yes     Partners: Female     Birth control/protection: Post-menopausal   Other Topics Concern    Not on file   Social History Narrative    Lives with 2 sons, wife lives with other kids, 1 of his sons is home with him most of the time     Social Determinants of Health     Financial Resource Strain: Low Risk  (4/18/2024)    Financial Resource Strain     Within the past 12 months, have you or your family members you live with been unable to get utilities (heat, electricity) when it was really needed?: No   Food Insecurity: High Risk (4/18/2024)    Food Insecurity     Within the past 12 months, did you worry that your food would run out before you got money to buy more?: Yes     Within the past 12 months, did the food you bought just not last and you didn t have money to get more?: Yes   Transportation Needs: High Risk (4/18/2024)    Transportation Needs     Within the past 12 months, has lack of transportation kept you from medical appointments, getting your medicines, non-medical meetings or appointments, work, or from getting things that you need?: Yes   Physical Activity: Unknown (4/18/2024)    Exercise Vital Sign     Days of Exercise per Week: 2 days     Minutes of Exercise per Session: Not on file   Stress: Stress Concern Present (4/18/2024)    [employer] [place] [employer] - Occupational Stress Questionnaire     Feeling of Stress : Very much   Social Connections: Unknown (4/18/2024)     "Social Connection and Isolation Panel [NHANES]     Frequency of Communication with Friends and Family: Not on file     Frequency of Social Gatherings with Friends and Family: Once a week     Attends Evangelical Services: Not on file     Active Member of Clubs or Organizations: Not on file     Attends Club or Organization Meetings: Not on file     Marital Status: Not on file   Interpersonal Safety: Not on file   Housing Stability: High Risk (4/18/2024)    Housing Stability     Do you have housing? : Yes     Are you worried about losing your housing?: Yes        Family History  Family History   Problem Relation Age of Onset    Nephrolithiasis Brother      Patient's family history was not pertinent to chief complaint.     Review of Systems:  Constitutional: Negative except as noted in HPI.   Eyes: Negative except as noted in HPI.   ENT: Negative except as noted in HPI.   Cardiovascular: Negative except as noted in HPI.   Respiratory: Negative except as noted in HPI.   Gastrointestinal: Negative except as noted in HPI.   Genitourinary: Negative except as noted in HPI.   Musculoskeletal: Negative except as noted in HPI.   Integumentary: Negative except as noted in HPI.   Neurological: Negative except as noted in HPI.   Psychiatric: Negative except as noted in HPI.   Endocrine: Negative except as noted in HPI.   Hematologic/Lymphatic: Negative except as noted in HPI.      Physical Exam:  BP (!) 145/71   Pulse 75   Resp 12   Ht 1.651 m (5' 5\")   Wt 78.7 kg (173 lb 6.4 oz)   SpO2 97%   BMI 28.86 kg/m    BMI:Body mass index is 28.86 kg/m .   GEN: NAD, appropriate for age  Head: Normocephalic.  EYES: PERRLA, EOMI  ENT: Oropharynx is clear, Pratt class 4+ airway.   Nasal mucosa is moist without erythema  Neck : Thyroid is within normal limits.   CV: Regular rate and rhythm, S1 & S2 positive.  LUNGS: Right lung wheezing appreciated.  ABDOMEN: Positive bowel sounds in all quadrants, soft, no rebound or " guarding  MUSCULOSKELETAL: Bilateral trace leg swelling  SKIN: warm, dry, no rashes  Neurological: Alert, Gait is normal. Strength 5/5 in all extremities.  Psych: normal mood, normal affect     Labs/Studies:     Lab Results   Component Value Date    TSH 1.88 04/28/2023    TSH 1.33 07/26/2021     Lab Results   Component Value Date    GLC 93 07/31/2024    GLC 75 04/18/2024     Lab Results   Component Value Date    HGB 12.4 (L) 07/31/2024    HGB 11.5 06/04/2024     Lab Results   Component Value Date    BUN 46.1 (H) 07/31/2024    BUN 55.9 (H) 04/18/2024    CR 2.83 (H) 07/31/2024    CR 2.74 (H) 04/18/2024     Lab Results   Component Value Date    AST 17 04/18/2024    AST 24 04/28/2023    ALT 19 04/18/2024    ALT 37 04/28/2023    ALKPHOS 76 04/18/2024    ALKPHOS 73 04/28/2023    BILITOTAL 0.4 04/18/2024    BILITOTAL 0.5 04/28/2023         Patient verbalized understanding of these issues, agrees with the plan and all questions were answered today. Patient was given an opportuntity to voice any other symptoms or concerns not listed above. Patient did not have any other symptoms or concerns.         Yasmani Romero DO,   Board Certified in Internal Medicine and Sleep Medicine    (Note created with Dragon voice recognition and unintended spelling errors and word substitutions may occur)

## 2024-08-05 NOTE — Clinical Note
Annual health risk assessment completed. No issues. See note for further details on community resources.   Thank you.  Lexy Espinoza RN [facility] [phone]

## 2024-08-06 NOTE — TELEPHONE ENCOUNTER
Called patient with a [facility] . Relayed clinician's message below. Pt verbalized understanding and in agreement with plan. Called [facility] and spoke to Vibha RIBERA. Relayed clinician's message below. Pt has appt with Heather PINO CNP on 8/7/24. Faxed lab results to [phone].     Vineet JEAN RN  [facility]

## 2024-08-07 ENCOUNTER — ANCILLARY ORDERS (OUTPATIENT)
Dept: ULTRASOUND IMAGING | Facility: HOSPITAL | Age: 69
End: 2024-08-07

## 2024-08-07 DIAGNOSIS — N18.4 CHRONIC RENAL DISEASE, STAGE IV (H): Primary | ICD-10-CM

## 2024-08-07 DIAGNOSIS — G89.29 CHRONIC RIGHT FLANK PAIN: ICD-10-CM

## 2024-08-07 DIAGNOSIS — R10.9 CHRONIC RIGHT FLANK PAIN: ICD-10-CM

## 2024-08-07 ASSESSMENT — LIFESTYLE VARIABLES
AUDIT-C TOTAL SCORE: 0
SKIP TO QUESTIONS 9-10: 1

## 2024-08-07 ASSESSMENT — PATIENT HEALTH QUESTIONNAIRE - PHQ9: SUM OF ALL RESPONSES TO PHQ QUESTIONS 1-9: 8

## 2024-08-07 NOTE — PROGRESS NOTES
[facility] Care Coordination Contact    [facility] Home Visit Assessment     Home visit for Health Risk Assessment with Venita Sanchez completed on August 5, 2024    Type of residence:: Private home - stairs  Current living arrangement:: I live in a private home with family     Assessment completed with:: Patient    Current Care Plan  Member currently receiving the following home care services:     Member currently receiving the following community resources: PCA      Medication Review  Medication reconciliation completed in Epic: Yes  Medication set-up & administration: Independent-does not set up.  Self-administers medications.  Medication Risk Assessment Medication (1 or more, place referral to MTM): Taking 1 or more high-risk medications for adults >65 years  MTM Referral Placed: No: member declined. Nephrology already reviews meds with him    Mental/Behavioral Health   Depression Screening:      PHQ-9 Total Score: 8    Mental health DX:: Yes   Mental health DX how managed:: Medication, Outpatient Counseling    Falls Assessment:   Fallen 2 or more times in the past year?: Yes   Any fall with injury in the past year?: No    ADL/IADL Dependencies:   Dependent ADLs:: Ambulation-cane, Bathing, Dressing, Eating, Grooming, Incontinence, Positioning, Transfers, Toileting, Ambulation-walker  Dependent IADLs:: Cleaning, Cooking, Laundry, Shopping, Meal Preparation, Medication Management, Money Management, Transportation    Health Plan sponsored benefits: [employer]: Shared information regarding preventative health screening and health plan supplemental benefits/incentives. Reviewed medication disposal form.    PCA Assessment completed at visit: Yes Annual PCA assessment indicated 9.5 hours per day of PCA. This is an increase from the previous assessment.     Increased due to needing extensive assistance/time to complete ADLs.    Venita Fisher's CKD had really deteriorated his health and body over the past year.  His CKD is at stage 4/5 and PCP and specialist had warned him of the dangers of not doing dialysis but he refuses and reports he will wait until they tell him that he will die if he don't go to dialysis. When they tell him that, then he may decide to start dialysis. He has lost about 10-15 pounds the past year without trying. He reports it is due to all his diet restrictions. The plan was for peritoneal dialysis but he decided to hold off on it and has [facility]. He also has anemia due to CKD making him even more weak and he had no strength to do anything. His chronic dyspnea and chronic back pain also makes it hard for him to move or do much.  All these factors and diagnosis contributes to his need for extensive assistance and he needs significantly increased time for completion of activities of daily living (ADLs). He requires extensive hands on assistance for 7 ADL tasks that includes caregivers performing ADL tasks for him where he cannot help, supporting his weight when helping him move, etc. He reported he will inquire about wheelchair at next visit with PCP. At times he may require assistance of 2 persons for transfers and mobility. Due to extensive support needed, he does not go anywhere out of his home unless he goes to medical appointments.    Elderly Waiver Eligibility: Yes, but member declines EW services; will not open to EW  He declines waiver services when offered. He had attempted to get waiver services the previous year but was not able to get all the proofs or documents in time to [facility]. Since the house and a vehicle is still under his name, he does not want to attempt to get on EW at this time.    Care Plan & Recommendations: continue with persanal care attendant (PCA) services with an increased from 6.25 hrs/day to 9.5 hrs/day.    Venita Fisher was given food resources several months ago, He is signed up for the market rx program and gets food delivered to his home weekly and reports  no need for food resources at this time.    See [facility] for detailed assessment information.    Follow-Up Plan: Member informed of future contact, plan to f/u with member with a 6 month telephone assessment.  Contact information shared with member and family, encouraged member to call with any questions or concerns at any time.    [facility] care continuum providers: Please see Snapshot and Care Management Flowsheets for Specific details of care plan.    This CC note routed to PCP, Sylvia Lugo.    Lexy Espinoza RN  [facility]  [phone]

## 2024-08-08 ENCOUNTER — PATIENT OUTREACH (OUTPATIENT)
Dept: GERIATRIC MEDICINE | Facility: CLINIC | Age: 69
End: 2024-08-08
Payer: COMMERCIAL

## 2024-08-08 NOTE — LETTER
August 8, 2024    RACHELL TRIMBLE  [address] [place] [address]        Dear Rachell Fisher:    At [facility], we re dedicated to improving your health and wellness. Enclosed is the Care Plan developed with you on 08/05/2024. Please review the Care Plan carefully.    As a reminder, during your visit we talked about:  Ways to manage your physical and mental health  Using health care to maintain and improve your health   Your preventive care needs     Remember to contact your care coordinator if you:  Are hospitalized, or plan to be hospitalized   Have a fall    Have a change in your physical or mental health  Need help finding support or services    If you have questions, or don t agree with your Care Plan, call me at [phone]. You can also call me if your needs change. TTY users, call the [place] [employer] at (220) or 1-693.866.1103 (rrtqdf-sz-ktcdtc relay service).    Sincerely,          Lexy Espinoza RN    E-Mail:  [email or web address]  Phone: [phone]      [facility]    [id number] accepted    [id number] (07/2022)

## 2024-08-08 NOTE — PROGRESS NOTES
[facility] Care Coordination Contact    Received after visit chart from care coordinator.  Completed following tasks: Mailed copy of care plan/support plan to member, Mailed [place] Choices signature sheet pages 3-4, Mailed Safe Medication Disposal , and Updated services in Database    [employer]:  Emailed required PCA documents to [employer].  Faxed copy of PCA assessment to [facility] Agency and mailed copy to member.  Faxed MD Communication to PCP.     Aiyana Blanco  Care Management Specialist  [facility]  [phone]

## 2024-08-17 ENCOUNTER — APPOINTMENT (OUTPATIENT)
Dept: INTERPRETER SERVICES | Facility: CLINIC | Age: 69
End: 2024-08-17
Payer: COMMERCIAL

## 2024-08-20 ENCOUNTER — HOSPITAL ENCOUNTER (OUTPATIENT)
Dept: ULTRASOUND IMAGING | Facility: HOSPITAL | Age: 69
Discharge: HOME | End: 2024-08-20
Attending: PHYSICIAN ASSISTANT | Admitting: PHYSICIAN ASSISTANT
Payer: COMMERCIAL

## 2024-08-20 DIAGNOSIS — G89.29 CHRONIC RIGHT FLANK PAIN: ICD-10-CM

## 2024-08-20 DIAGNOSIS — R10.9 CHRONIC RIGHT FLANK PAIN: ICD-10-CM

## 2024-08-20 DIAGNOSIS — N18.4 CHRONIC RENAL DISEASE, STAGE IV (H): ICD-10-CM

## 2024-08-20 PROCEDURE — 76770 US EXAM ABDO BACK WALL COMP: CPT

## 2024-08-28 DIAGNOSIS — G89.29 CHRONIC LEFT-SIDED LOW BACK PAIN WITHOUT SCIATICA: ICD-10-CM

## 2024-08-28 DIAGNOSIS — M54.50 CHRONIC LEFT-SIDED LOW BACK PAIN WITHOUT SCIATICA: ICD-10-CM

## 2024-08-28 RX ORDER — GABAPENTIN 300 MG/1
CAPSULE ORAL
Qty: 90 CAPSULE | Refills: 3 | Status: SHIPPED | OUTPATIENT
Start: 2024-08-28

## 2024-08-29 NOTE — TELEPHONE ENCOUNTER
Spoke with sonShayy (on C2C) and schedule patient.      12/5/2024 12:00 PM OFFICE VISIT 20 min [facility]/OB Sylvia Lugo MD   Location Instructions:    [facility] is located at [address] [place] [address] in [place], at the intersection of [facility]. This is one block south of the [facility]. Free parking is available in the lot directly north of the clinic across [facility]. The clinic is near stops along bus [address] and [address].

## 2024-10-21 DIAGNOSIS — J32.9 CHRONIC SINUSITIS, UNSPECIFIED LOCATION: ICD-10-CM

## 2024-10-21 DIAGNOSIS — J45.40 MODERATE PERSISTENT ASTHMA WITHOUT COMPLICATION: ICD-10-CM

## 2024-10-21 DIAGNOSIS — J45.40 MODERATE PERSISTENT ASTHMA: ICD-10-CM

## 2024-10-21 DIAGNOSIS — J47.9 BRONCHIECTASIS WITHOUT COMPLICATION (H): ICD-10-CM

## 2024-10-21 DIAGNOSIS — J30.1 ALLERGIC RHINITIS DUE TO POLLEN, UNSPECIFIED SEASONALITY: ICD-10-CM

## 2024-10-21 DIAGNOSIS — J41.0 SIMPLE CHRONIC BRONCHITIS (H): ICD-10-CM

## 2024-10-21 RX ORDER — ALBUTEROL SULFATE 90 UG/1
2 INHALANT RESPIRATORY (INHALATION) EVERY 4 HOURS PRN
Qty: 18 G | Refills: 2 | Status: SHIPPED | OUTPATIENT
Start: 2024-10-21

## 2024-10-21 RX ORDER — IPRATROPIUM BROMIDE AND ALBUTEROL SULFATE 2.5; .5 MG/3ML; MG/3ML
3 SOLUTION RESPIRATORY (INHALATION) 4 TIMES DAILY PRN
Qty: 480 ML | Refills: 2 | Status: SHIPPED | OUTPATIENT
Start: 2024-10-21

## 2024-10-21 RX ORDER — LORATADINE 10 MG/1
10 TABLET ORAL DAILY
Qty: 30 TABLET | Refills: 2 | Status: SHIPPED | OUTPATIENT
Start: 2024-10-21

## 2024-10-21 RX ORDER — FLUTICASONE PROPIONATE 50 MCG
2 SPRAY, SUSPENSION (ML) NASAL DAILY
Qty: 16 G | Refills: 2 | Status: SHIPPED | OUTPATIENT
Start: 2024-10-21

## 2024-11-18 DIAGNOSIS — J45.40 MODERATE PERSISTENT ASTHMA, UNSPECIFIED WHETHER COMPLICATED: ICD-10-CM

## 2024-11-18 RX ORDER — MONTELUKAST SODIUM 10 MG/1
10 TABLET ORAL AT BEDTIME
Qty: 30 TABLET | Refills: 7 | Status: SHIPPED | OUTPATIENT
Start: 2024-11-18

## 2024-12-03 ENCOUNTER — APPOINTMENT (OUTPATIENT)
Dept: INTERPRETER SERVICES | Facility: CLINIC | Age: 69
End: 2024-12-03
Payer: COMMERCIAL

## 2024-12-05 ENCOUNTER — OFFICE VISIT (OUTPATIENT)
Dept: FAMILY MEDICINE | Facility: CLINIC | Age: 69
End: 2024-12-05
Payer: COMMERCIAL

## 2024-12-05 VITALS
SYSTOLIC BLOOD PRESSURE: 115 MMHG | WEIGHT: 170 LBS | BODY MASS INDEX: 28.32 KG/M2 | OXYGEN SATURATION: 98 % | HEIGHT: 65 IN | DIASTOLIC BLOOD PRESSURE: 75 MMHG | RESPIRATION RATE: 21 BRPM | TEMPERATURE: 97.7 F | HEART RATE: 80 BPM

## 2024-12-05 DIAGNOSIS — Z23 ENCOUNTER FOR IMMUNIZATION: ICD-10-CM

## 2024-12-05 DIAGNOSIS — N18.4 CKD (CHRONIC KIDNEY DISEASE) STAGE 4, GFR 15-29 ML/MIN (H): Primary | ICD-10-CM

## 2024-12-05 DIAGNOSIS — F32.1 MODERATE MAJOR DEPRESSION (H): ICD-10-CM

## 2024-12-05 LAB
ALBUMIN SERPL BCG-MCNC: 4 G/DL (ref 3.5–5.2)
ALP SERPL-CCNC: 77 U/L (ref 40–150)
ALT SERPL W P-5'-P-CCNC: 20 U/L (ref 0–70)
ANION GAP SERPL CALCULATED.3IONS-SCNC: 10 MMOL/L (ref 7–15)
AST SERPL W P-5'-P-CCNC: 19 U/L (ref 0–45)
BILIRUB SERPL-MCNC: 0.3 MG/DL
BUN SERPL-MCNC: 70.2 MG/DL (ref 8–23)
CALCIUM SERPL-MCNC: 9.4 MG/DL (ref 8.8–10.4)
CHLORIDE SERPL-SCNC: 106 MMOL/L (ref 98–107)
CREAT SERPL-MCNC: 3.15 MG/DL (ref 0.67–1.17)
EGFRCR SERPLBLD CKD-EPI 2021: 21 ML/MIN/1.73M2
ERYTHROCYTE [DISTWIDTH] IN BLOOD BY AUTOMATED COUNT: 12.6 % (ref 10–15)
EST. AVERAGE GLUCOSE BLD GHB EST-MCNC: 120 MG/DL
GLUCOSE SERPL-MCNC: 89 MG/DL (ref 70–99)
HBA1C MFR BLD: 5.8 % (ref 0–5.6)
HCO3 SERPL-SCNC: 20 MMOL/L (ref 22–29)
HCT VFR BLD AUTO: 42.9 % (ref 40–53)
HGB BLD-MCNC: 13.5 G/DL (ref 13.3–17.7)
MCH RBC QN AUTO: 29.1 PG (ref 26.5–33)
MCHC RBC AUTO-ENTMCNC: 31.5 G/DL (ref 31.5–36.5)
MCV RBC AUTO: 93 FL (ref 78–100)
PLATELET # BLD AUTO: 220 10E3/UL (ref 150–450)
POTASSIUM SERPL-SCNC: 5.8 MMOL/L (ref 3.4–5.3)
PROT SERPL-MCNC: 8.3 G/DL (ref 6.4–8.3)
RBC # BLD AUTO: 4.64 10E6/UL (ref 4.4–5.9)
SODIUM SERPL-SCNC: 136 MMOL/L (ref 135–145)
TSH SERPL DL<=0.005 MIU/L-ACNC: 0.51 UIU/ML (ref 0.3–4.2)
VIT D+METAB SERPL-MCNC: 50 NG/ML (ref 20–50)
WBC # BLD AUTO: 7.6 10E3/UL (ref 4–11)

## 2024-12-05 PROCEDURE — 82306 VITAMIN D 25 HYDROXY: CPT | Performed by: FAMILY MEDICINE

## 2024-12-05 PROCEDURE — 80053 COMPREHEN METABOLIC PANEL: CPT | Performed by: FAMILY MEDICINE

## 2024-12-05 PROCEDURE — 36415 COLL VENOUS BLD VENIPUNCTURE: CPT | Performed by: FAMILY MEDICINE

## 2024-12-05 PROCEDURE — 83036 HEMOGLOBIN GLYCOSYLATED A1C: CPT | Performed by: FAMILY MEDICINE

## 2024-12-05 PROCEDURE — 99214 OFFICE O/P EST MOD 30 MIN: CPT | Mod: 25 | Performed by: FAMILY MEDICINE

## 2024-12-05 PROCEDURE — 90662 IIV NO PRSV INCREASED AG IM: CPT | Performed by: FAMILY MEDICINE

## 2024-12-05 PROCEDURE — T1013 SIGN LANG/ORAL INTERPRETER: HCPCS | Performed by: INTERPRETER

## 2024-12-05 PROCEDURE — 96127 BRIEF EMOTIONAL/BEHAV ASSMT: CPT | Performed by: FAMILY MEDICINE

## 2024-12-05 PROCEDURE — 84443 ASSAY THYROID STIM HORMONE: CPT | Performed by: FAMILY MEDICINE

## 2024-12-05 PROCEDURE — 91320 SARSCV2 VAC 30MCG TRS-SUC IM: CPT | Performed by: FAMILY MEDICINE

## 2024-12-05 PROCEDURE — 90471 IMMUNIZATION ADMIN: CPT | Performed by: FAMILY MEDICINE

## 2024-12-05 PROCEDURE — 90480 ADMN SARSCOV2 VAC 1/ONLY CMP: CPT | Performed by: FAMILY MEDICINE

## 2024-12-05 PROCEDURE — 85027 COMPLETE CBC AUTOMATED: CPT | Performed by: FAMILY MEDICINE

## 2024-12-05 RX ORDER — NORTRIPTYLINE HYDROCHLORIDE 25 MG/1
25 CAPSULE ORAL AT BEDTIME
Qty: 30 CAPSULE | Refills: 4 | Status: SHIPPED | OUTPATIENT
Start: 2024-12-05

## 2024-12-05 ASSESSMENT — PATIENT HEALTH QUESTIONNAIRE - PHQ9
10. IF YOU CHECKED OFF ANY PROBLEMS, HOW DIFFICULT HAVE THESE PROBLEMS MADE IT FOR YOU TO DO YOUR WORK, TAKE CARE OF THINGS AT HOME, OR GET ALONG WITH OTHER PEOPLE: NOT DIFFICULT AT ALL
SUM OF ALL RESPONSES TO PHQ QUESTIONS 1-9: 6
SUM OF ALL RESPONSES TO PHQ QUESTIONS 1-9: 6

## 2024-12-05 ASSESSMENT — ANXIETY QUESTIONNAIRES
8. IF YOU CHECKED OFF ANY PROBLEMS, HOW DIFFICULT HAVE THESE MADE IT FOR YOU TO DO YOUR WORK, TAKE CARE OF THINGS AT HOME, OR GET ALONG WITH OTHER PEOPLE?: NOT DIFFICULT AT ALL
GAD7 TOTAL SCORE: 4
GAD7 TOTAL SCORE: 4
5. BEING SO RESTLESS THAT IT IS HARD TO SIT STILL: NOT AT ALL
1. FEELING NERVOUS, ANXIOUS, OR ON EDGE: SEVERAL DAYS
6. BECOMING EASILY ANNOYED OR IRRITABLE: SEVERAL DAYS
2. NOT BEING ABLE TO STOP OR CONTROL WORRYING: NOT AT ALL
GAD7 TOTAL SCORE: 4
3. WORRYING TOO MUCH ABOUT DIFFERENT THINGS: SEVERAL DAYS
4. TROUBLE RELAXING: NOT AT ALL
IF YOU CHECKED OFF ANY PROBLEMS ON THIS QUESTIONNAIRE, HOW DIFFICULT HAVE THESE PROBLEMS MADE IT FOR YOU TO DO YOUR WORK, TAKE CARE OF THINGS AT HOME, OR GET ALONG WITH OTHER PEOPLE: NOT DIFFICULT AT ALL
7. FEELING AFRAID AS IF SOMETHING AWFUL MIGHT HAPPEN: SEVERAL DAYS
7. FEELING AFRAID AS IF SOMETHING AWFUL MIGHT HAPPEN: SEVERAL DAYS

## 2024-12-05 NOTE — PROGRESS NOTES
"  Assessment & Plan     CKD (chronic kidney disease) stage 4, GFR 15-29 ml/min (H)  Working with nephrology.  Checking labs.  Continue lisinopril, sodium bicarb, vitamin D, MVI,   - CBC with platelets  - Hemoglobin A1c  - Comprehensive metabolic panel (BMP + Alb, Alk Phos, ALT, AST, Total. Bili, TP)  - Vitamin D Deficiency  - CBC with platelets  - Hemoglobin A1c  - Comprehensive metabolic panel (BMP + Alb, Alk Phos, ALT, AST, Total. Bili, TP)  - Vitamin D Deficiency    Encounter for immunization  - INFLUENZA HIGH DOSE, TRIVALENT, PF (FLUZONE)  - COVID-19 12+ (PFIZER)    Moderate major depression (H)  Requesting help with sleep- with ongoing pain, headaches, etc will try nortriptyline 25mg hs.  Check for thyroid dysfunction.  Continue gabapentin 300mg tid.   - TSH with free T4 reflex  - nortriptyline (PAMELOR) 25 MG capsule  Dispense: 30 capsule; Refill: 4  - TSH with free T4 reflex      The longitudinal plan of care for the diagnosis(es)/condition(s) as documented were addressed during this visit. Due to the added complexity in care, I will continue to support Venita Fisher in the subsequent management and with ongoing continuity of care.      BMI  Estimated body mass index is 28.32 kg/m  as calculated from the following:    Height as of this encounter: 1.65 m (5' 4.96\").    Weight as of this encounter: 77.1 kg (170 lb).   Weight management plan: Discussed healthy diet and exercise guidelines        Subjective   Venita Fisher is a 69 year old, presenting for the following health issues:  RECHECK (Meds check )        12/5/2024    11:43 AM   Additional Questions   Roomed by M   Accompanied by son     History of Present Illness       Reason for visit:  Check up    He eats 2-3 servings of fruits and vegetables daily.He consumes 0 sweetened beverage(s) daily.He exercises with enough effort to increase his heart rate 20 to 29 minutes per day.  He exercises with enough effort to increase his heart rate 7 days per week.   He is " "taking medications regularly.     Nephrology- last follow-up was yesterday.  No records yet to review.    Anemia- no shots from kidney doctor.    Bp  Ultrasound- 8/2024- medical renal disease     Mental health- does note some underlying stress about how poor his health and kidneys are doing.   PHQ-9 score:        12/5/2024    11:29 AM   PHQ   PHQ-9 Total Score 6    Q9: Thoughts of better off dead/self-harm past 2 weeks Not at all        Patient-reported       Sleep - recommended home sleep study   Family thinks the home study is set up, follow-up apt is 2/10/25 so the home study is likely closer to this date?      Has felt more tired than usually the past couple of weeks.      Okay with vaccines as recommended     Pulmonary- using symbicort 2 puffs bid.    Albuterol once every 2-3 days.    Had follow-up with lung doctor yesterday but didn't show up.        Objective    /75 (BP Location: Right arm, Patient Position: Sitting, Cuff Size: Adult Large)   Pulse 80   Temp 97.7  F (36.5  C) (Temporal)   Resp 21   Ht 1.65 m (5' 4.96\")   Wt 77.1 kg (170 lb)   SpO2 98%   BMI 28.32 kg/m    Body mass index is 28.32 kg/m .  Physical Exam   Complete 10 point ROS completed today as part of the exam and patient denies any symptoms as reviewed in HPI     Wt Readings from Last 3 Encounters:   12/05/24 77.1 kg (170 lb)   08/05/24 78.7 kg (173 lb 6.4 oz)   07/31/24 77.6 kg (171 lb)       No LMP for male patient.    All normal as below except abnormalities include: left sided lungs with stable insp crackles/wheezes and decreased air movement related to chronic pleural effusion/scaring.    General is a  69 year old sitting comfortably in no apparent distress   HEENT:  TM are clear bilaterally.  Eye exams within normal   Neck: Supple without lymphadenopathy or thyromegally  CV: Regular rate and rhythm S1S2 without rubs, murmurs or gallops,   Lungs: Clear to auscultation bilaterally  Abd:  +BS, soft NT/ND,  No masses or " organomegally,   Extremities: Warm, No Edema, 2+ Pedal and radial pulses bilaterally  Skin: No lesions or rashes noted  Neuro: Able to ambulate around the exam room with equal movement, strength and normal coordination of the upper and lower extremeties symmetrically    History summarized from1-2:nephrology   Old Records-1: Outside allergies, meds, problems and immunizations were reconciled as needed from [facility]  Radiology tests reviewed-1: renal us 2024 reviewed   Lab tests reviewed-1: 2024  Medicine tests reviewed-1: PFTs 6/2024         Sylvia Lugo MD             Signed Electronically by: Sylvia Lugo MD      Prior to immunization administration, verified patients identity using patient s name and date of birth. Please see Immunization Activity for additional information.     Screening Questionnaire for Adult Immunization    Are you sick today?   No   Do you have allergies to medications, food, a vaccine component or latex?   No   Have you ever had a serious reaction after receiving a vaccination?   No   Do you have a long-term health problem with heart, lung, kidney, or metabolic disease (e.g., diabetes), asthma, a blood disorder, no spleen, complement component deficiency, a cochlear implant, or a spinal fluid leak?  Are you on long-term aspirin therapy?   Yes   Do you have cancer, leukemia, HIV/AIDS, or any other immune system problem?   No   Do you have a parent, brother, or sister with an immune system problem?   No   In the past 3 months, have you taken medications that affect  your immune system, such as prednisone, other steroids, or anticancer drugs; drugs for the treatment of rheumatoid arthritis, Crohn s disease, or psoriasis; or have you had radiation treatments?   No   Have you had a seizure, or a brain or other nervous system problem?   No   During the past year, have you received a transfusion of blood or blood    products, or been given immune (gamma) globulin or antiviral drug?   No    For women: Are you pregnant or is there a chance you could become       pregnant during the next month?   No   Have you received any vaccinations in the past 4 weeks?   No     Immunization questionnaire was positive for at least one answer.  Notified .      Patient instructed to remain in clinic for 15 minutes afterwards, and to report any adverse reactions.     Screening performed by RUDI Mcgill MA on 12/5/2024 at 11:47 AM.

## 2024-12-10 ENCOUNTER — TELEPHONE (OUTPATIENT)
Dept: FAMILY MEDICINE | Facility: CLINIC | Age: 69
End: 2024-12-10
Payer: COMMERCIAL

## 2024-12-10 NOTE — RESULT ENCOUNTER NOTE
Team - please call patient with results and send result letter with this information if patient desires for their records. Refer to [employer] result note as needed.      Please fax results to his kidney doctor     His tests all look stable- prediabetes is stable and not getting worse.      Normal thyroid level     Normal liver tests     Vitamin D is healthy     Healthy blood counts

## 2024-12-10 NOTE — TELEPHONE ENCOUNTER
----- Message from Sylvia Brittney sent at 12/9/2024  7:05 PM CST -----  Team - please call patient with results and send result letter with this information if patient desires for their records. Refer to [employer] result note as needed.      Please fax results to his kidney doctor     His tests all look stable- prediabetes is stable and not getting worse.      Normal thyroid level     Normal liver tests     Vitamin D is healthy     Healthy blood counts

## 2024-12-13 NOTE — TELEPHONE ENCOUNTER
Called and left message to return call in clinic. If patient returns calls please relay provider message. #1

## 2024-12-16 DIAGNOSIS — G89.29 CHRONIC LEFT-SIDED LOW BACK PAIN WITHOUT SCIATICA: ICD-10-CM

## 2024-12-16 DIAGNOSIS — M54.50 CHRONIC LEFT-SIDED LOW BACK PAIN WITHOUT SCIATICA: ICD-10-CM

## 2024-12-16 RX ORDER — GABAPENTIN 300 MG/1
CAPSULE ORAL
Qty: 90 CAPSULE | Refills: 3 | Status: SHIPPED | OUTPATIENT
Start: 2024-12-16

## 2024-12-16 NOTE — TELEPHONE ENCOUNTER
Called and spoke to son, Ortiz relayed message below. He verbalizes understanding.     Results faxed to Kidney doctor to the fax number of [phone].

## 2025-02-06 ENCOUNTER — PATIENT OUTREACH (OUTPATIENT)
Dept: GERIATRIC MEDICINE | Facility: CLINIC | Age: 70
End: 2025-02-06
Payer: COMMERCIAL

## 2025-02-06 NOTE — PROGRESS NOTES
[facility] Care Coordination Contact      [facility] Six-Month Telephone Assessment    6 month telephone assessment completed on 2/6/2025.    ER visits: No  Hospitalizations: No  TCU stays: No  Significant health status changes: no  Falls/Injuries: Yes: 1 fall resulting in coccyx pain  ADL/IADL changes: No  Changes in services: No    Caregiver Assessment follow up:  n/a    Goals: See Support Plan for goal progress documentation.      Will see member in 6 months for an annual health risk assessment.   Encouraged member to call CC with any questions or concerns in the meantime.     Lexy Espinoza RN  [facility]  [phone]

## 2025-02-11 DIAGNOSIS — J41.0 SIMPLE CHRONIC BRONCHITIS (H): ICD-10-CM

## 2025-02-11 DIAGNOSIS — J47.9 BRONCHIECTASIS WITHOUT COMPLICATION (H): ICD-10-CM

## 2025-02-11 RX ORDER — IPRATROPIUM BROMIDE AND ALBUTEROL SULFATE 2.5; .5 MG/3ML; MG/3ML
3 SOLUTION RESPIRATORY (INHALATION) 4 TIMES DAILY PRN
Qty: 480 ML | Refills: 2 | Status: SHIPPED | OUTPATIENT
Start: 2025-02-11

## 2025-02-18 NOTE — TELEPHONE ENCOUNTER
PREOP VISIT      Patient: Martha Seth Date of Service: 2025   : 1968 MRN: [id number]     SUBJECTIVE:   HISTORY OF PRESENT ILLNESS:  Martha Seth is a 56 year old female who presents today for preop    Chief Complaint   Patient presents with    Pre-Op Exam     3/10/25 Bilateral breast reduction   Bilateral Panniculectomy with Chacorta Comer MD    Intermitted forms and fml forms to be filled out.      Other     Pt pain level 8       Today`s Date: 2025    Date of Surgery: 3/10/25  Type of Surgery:  Procedure Laterality Anesthesia   Bilateral breast reduction Bilateral General   Panniculectomy N/A General     Location:[facility]  Surgeon  Name:  Chacorta Comer MD       Indication:BACK PAIN   History of Anesthesia Problems:  No adverse event reported by patient in past .  Sleep Apnea:NO   Medical History: ARTHRITIS / HTN / HIGH BMI   History of Bleeding Disorder: NO    RCRI:No hx of diabetes / cad / CHF/ CKD /Stroke noted     METS:Can easily walk few blocks without sob , can walk flight of stairs without any chest pain /sob .  Mets>4      PAST MEDICAL HISTORY:  Past Medical History:   Diagnosis Date    Hypertension     Sleep apnea        MEDICATIONS:  Current Outpatient Medications   Medication Sig    amLODIPine (NORVASC) 10 MG tablet Take 1 tablet by mouth daily.    traZODone (DESYREL) 50 MG tablet Take 1 tablet by mouth nightly.    losartan (COZAAR) 100 MG tablet Take 1 tablet by mouth daily.    metFORMIN (GLUCOPHAGE) 500 MG tablet Take 1 tablet by mouth in the morning and 1 tablet in the evening. Take with meals. (Patient not taking: Reported on 2025)    nystatin (MYCOSTATIN) 263185 UNIT/GM cream Apply topically in the morning and in the evening.    losartan (COZAAR) 100 MG tablet TAKE 1 TABLET BY MOUTH DAILY    ondansetron (ZOFRAN ODT) 4 MG disintegrating tablet Place 1 tablet onto the tongue every 8 hours as needed for Nausea.    ibuprofen (MOTRIN) 800 MG tablet Take 1 tablet by mouth  Patient has a future F2F appointment on 7/26/2021 with Dr. Lugo. Completing task.     every 8 hours as needed for Pain. (Patient not taking: Reported on 2/18/2025)     No current facility-administered medications for this visit.       ALLERGIES:  ALLERGIES:   Allergen Reactions    Sulfa Antibiotics SWELLING    Codeine Nausea & Vomiting    Penicillins HIVES and Other (See Comments)     Cerner Allergy Text Annotation: penicillin         PAST SURGICAL HISTORY:  Past Surgical History:   Procedure Laterality Date    Egd with gastric outlet reduction and sleeve gastroplasty      Fracture surgery      Gastric bypass  2013       FAMILY HISTORY:  Family History   Problem Relation Age of Onset    Congestive Heart Failure Mother     Heart disease Mother     Hypertension Mother     COPD Father     Congestive Heart Failure Father     Hypertension Father     Cancer, Breast Paternal Grandmother     Cancer, Breast Other        SOCIAL HISTORY:  Social History     Tobacco Use    Smoking status: Never    Smokeless tobacco: Never   Vaping Use    Vaping status: never used   Substance Use Topics    Alcohol use: Never    Drug use: Never       ROS :  CONSTITUTIONAL: No unwanted weight change , fever , chills or malaise .  EYES: No change in vision , blurred or double vision.  ENT: No problem  hearing,chewing, swallowing.  RESPIRATORY:No cough ,SOB,H/of lung disorder  CV:No chest pain or pressure,GARCIA,Orthopnea, PND ,or palpitations.  GI:No change in bowel habits, no nausea ,vomiting or diarrhea  :No dysuria, hematuria, nocturia,urgency or frequency  MUSCULO-SKELETAL: HX OF ARTHRITIS   SKIN:No rash  CNS:No problems with co ordination , balance , vision , strength, numbness or tingling .  PSYCH:No depression /anxiety. Suicidal thoughts.      OBJECTIVE:     Visit Vitals  /74 (BP Location: LUE - Left upper extremity, Patient Position: Sitting, Cuff Size: Large Adult)   Pulse 91   Temp 97.6 °F (36.4 °C) (Temporal)   Resp 14   Ht 5' (1.524 m)   LMP  (LMP Unknown)   SpO2 98%   BMI 44.92 kg/m²     General:Well oriented in  time /place /person    HEENT: Conjunctiva clear,PERLLA, EOMI.  NECK:Supple, no tenderness , no adenopathy,  , no JVD .  LUNGS:LUNGS CLEAR , NORMAL BREATH SOUNDS , No wheezing ,rales or Rhonchi noted .  CARDIOVASCULAR:RRR, Heart sounds normal   ABDOMEN: Abdomen Soft , non tender , positive bowel sounds in all quadrant noted   EXTREMITIES:No clubbing , cyanosis or edema noted  MUSCULOSKELETAL:ROM seems ok at spine   SKIN:NO RASHES  NEUROLOGICAL:Intact , Mental status normal , Gait normal   DIAGNOSTIC STUDIES:   LAB RESULTS:ordered        EKG:nsr , no acute st-t wave changes noted    ASSESSMENT AND PLAN:       - CBC with Automated Differential  - Comprehensive Metabolic Panel  - Microalbumin Urine Random  - Electrocardiogram 12-Lead    2. Back pain, unspecified back location, unspecified back pain laterality, unspecified chronicity   Going for surgery     This is a 56 year old year-old female who presents with for preop.    Patient is undergoing moderate risk surgery   No risk factor identified  Mets>4  Meds reconciliation done   Labs ordered  If blood work will come back ok than   PATENT CAN PROCEED WITH SURGERY WITH GENERAL RISK OF ANAESTHESIA .      Send copies to Referring Physician and Pre Surgical Testing    Instructions provided as documented in the AVS.        The patient indicated understanding of the diagnosis and agreed with the plan of care.    Thank you for referring Martha SALMA Seth to me for consultation and allowing me to participitate in the care of this patient . My note and related tests will be faxed to surgeon's office / surgical center provided     Total time spent today on this visit  is  45 minutes which includes  preparing to see the patient by reviewing prior records, obtaining and reviewing history, performing a physical exam, counseling the patient ,documenting clinical information in the medical record,ordering medications/tests/procedures and completing a referral to another  provider        Electronically Signed     Cathie Chen MD

## 2025-02-20 DIAGNOSIS — N18.4 CHRONIC KIDNEY DISEASE, STAGE IV (SEVERE) (H): Primary | ICD-10-CM

## 2025-02-21 ENCOUNTER — LAB (OUTPATIENT)
Dept: LAB | Facility: HOSPITAL | Age: 70
End: 2025-02-21
Payer: COMMERCIAL

## 2025-02-21 DIAGNOSIS — N18.4 CHRONIC KIDNEY DISEASE, STAGE IV (SEVERE) (H): ICD-10-CM

## 2025-02-21 LAB
ANION GAP SERPL CALCULATED.3IONS-SCNC: 9 MMOL/L (ref 7–15)
BUN SERPL-MCNC: 49.7 MG/DL (ref 8–23)
CALCIUM SERPL-MCNC: 9.4 MG/DL (ref 8.8–10.4)
CHLORIDE SERPL-SCNC: 101 MMOL/L (ref 98–107)
CREAT SERPL-MCNC: 3.12 MG/DL (ref 0.67–1.17)
EGFRCR SERPLBLD CKD-EPI 2021: 21 ML/MIN/1.73M2
GLUCOSE SERPL-MCNC: 92 MG/DL (ref 70–99)
HCO3 SERPL-SCNC: 26 MMOL/L (ref 22–29)
POTASSIUM SERPL-SCNC: 5.2 MMOL/L (ref 3.4–5.3)
SODIUM SERPL-SCNC: 136 MMOL/L (ref 135–145)

## 2025-02-21 PROCEDURE — 80048 BASIC METABOLIC PNL TOTAL CA: CPT

## 2025-02-21 PROCEDURE — 36415 COLL VENOUS BLD VENIPUNCTURE: CPT

## 2025-03-25 ENCOUNTER — TRANSFERRED RECORDS (OUTPATIENT)
Dept: HEALTH INFORMATION MANAGEMENT | Facility: CLINIC | Age: 70
End: 2025-03-25
Payer: COMMERCIAL

## 2025-04-07 DIAGNOSIS — J45.40 MODERATE PERSISTENT ASTHMA WITHOUT COMPLICATION: ICD-10-CM

## 2025-04-07 DIAGNOSIS — M54.50 CHRONIC LEFT-SIDED LOW BACK PAIN WITHOUT SCIATICA: ICD-10-CM

## 2025-04-07 DIAGNOSIS — G89.29 CHRONIC LEFT-SIDED LOW BACK PAIN WITHOUT SCIATICA: ICD-10-CM

## 2025-04-07 DIAGNOSIS — F32.9 MAJOR DEPRESSIVE DISORDER, SINGLE EPISODE, UNSPECIFIED: ICD-10-CM

## 2025-04-07 DIAGNOSIS — J32.9 CHRONIC SINUSITIS, UNSPECIFIED LOCATION: ICD-10-CM

## 2025-04-07 RX ORDER — GABAPENTIN 300 MG/1
CAPSULE ORAL
Qty: 90 CAPSULE | Refills: 4 | Status: SHIPPED | OUTPATIENT
Start: 2025-04-07

## 2025-04-07 RX ORDER — MULTIVITAMIN WITH FOLIC ACID 400 MCG
TABLET ORAL
Qty: 30 TABLET | Refills: 12 | Status: SHIPPED | OUTPATIENT
Start: 2025-04-07

## 2025-04-07 RX ORDER — LORATADINE 10 MG/1
10 TABLET ORAL DAILY
Qty: 30 TABLET | Refills: 0 | Status: SHIPPED | OUTPATIENT
Start: 2025-04-07

## 2025-04-18 ENCOUNTER — TELEPHONE (OUTPATIENT)
Dept: FAMILY MEDICINE | Facility: CLINIC | Age: 70
End: 2025-04-18

## 2025-04-18 ENCOUNTER — OFFICE VISIT (OUTPATIENT)
Dept: FAMILY MEDICINE | Facility: CLINIC | Age: 70
End: 2025-04-18
Payer: COMMERCIAL

## 2025-04-18 VITALS
SYSTOLIC BLOOD PRESSURE: 134 MMHG | TEMPERATURE: 97.5 F | HEIGHT: 65 IN | OXYGEN SATURATION: 97 % | RESPIRATION RATE: 14 BRPM | WEIGHT: 179 LBS | HEART RATE: 72 BPM | DIASTOLIC BLOOD PRESSURE: 68 MMHG | BODY MASS INDEX: 29.82 KG/M2

## 2025-04-18 DIAGNOSIS — R73.03 PREDIABETES: ICD-10-CM

## 2025-04-18 DIAGNOSIS — F32.1 MODERATE MAJOR DEPRESSION (H): ICD-10-CM

## 2025-04-18 DIAGNOSIS — N18.4 CKD (CHRONIC KIDNEY DISEASE) STAGE 4, GFR 15-29 ML/MIN (H): ICD-10-CM

## 2025-04-18 DIAGNOSIS — Z13.6 SCREENING FOR CARDIOVASCULAR CONDITION: Primary | ICD-10-CM

## 2025-04-18 DIAGNOSIS — K59.01 SLOW TRANSIT CONSTIPATION: ICD-10-CM

## 2025-04-18 DIAGNOSIS — Z12.5 SCREENING FOR PROSTATE CANCER: ICD-10-CM

## 2025-04-18 DIAGNOSIS — E78.1 PURE HYPERGLYCERIDEMIA: ICD-10-CM

## 2025-04-18 DIAGNOSIS — E78.1 PURE HYPERGLYCERIDEMIA: Primary | ICD-10-CM

## 2025-04-18 DIAGNOSIS — I10 BENIGN ESSENTIAL HTN: ICD-10-CM

## 2025-04-18 DIAGNOSIS — N18.4 ANEMIA DUE TO STAGE 4 CHRONIC KIDNEY DISEASE (H): ICD-10-CM

## 2025-04-18 DIAGNOSIS — D63.1 ANEMIA DUE TO STAGE 4 CHRONIC KIDNEY DISEASE (H): ICD-10-CM

## 2025-04-18 DIAGNOSIS — Z00.00 ROUTINE GENERAL MEDICAL EXAMINATION AT A HEALTH CARE FACILITY: ICD-10-CM

## 2025-04-18 DIAGNOSIS — J45.40 MODERATE PERSISTENT ASTHMA WITHOUT COMPLICATION: ICD-10-CM

## 2025-04-18 LAB
ALBUMIN SERPL BCG-MCNC: 4.2 G/DL (ref 3.5–5.2)
ALP SERPL-CCNC: 73 U/L (ref 40–150)
ALT SERPL W P-5'-P-CCNC: 19 U/L (ref 0–70)
ANION GAP SERPL CALCULATED.3IONS-SCNC: 11 MMOL/L (ref 7–15)
AST SERPL W P-5'-P-CCNC: 19 U/L (ref 0–45)
BILIRUB SERPL-MCNC: 0.4 MG/DL
BUN SERPL-MCNC: 65.1 MG/DL (ref 8–23)
CALCIUM SERPL-MCNC: 8.9 MG/DL (ref 8.8–10.4)
CHLORIDE SERPL-SCNC: 108 MMOL/L (ref 98–107)
CHOLEST SERPL-MCNC: 129 MG/DL
CREAT SERPL-MCNC: 3.45 MG/DL (ref 0.67–1.17)
CREAT UR-MCNC: 76.6 MG/DL
EGFRCR SERPLBLD CKD-EPI 2021: 18 ML/MIN/1.73M2
ERYTHROCYTE [DISTWIDTH] IN BLOOD BY AUTOMATED COUNT: 12.7 % (ref 10–15)
EST. AVERAGE GLUCOSE BLD GHB EST-MCNC: 114 MG/DL
FASTING STATUS PATIENT QL REPORTED: ABNORMAL
FASTING STATUS PATIENT QL REPORTED: ABNORMAL
GLUCOSE SERPL-MCNC: 83 MG/DL (ref 70–99)
HBA1C MFR BLD: 5.6 % (ref 0–5.6)
HCO3 SERPL-SCNC: 20 MMOL/L (ref 22–29)
HCT VFR BLD AUTO: 37.9 % (ref 40–53)
HDLC SERPL-MCNC: 30 MG/DL
HGB BLD-MCNC: 12.3 G/DL (ref 13.3–17.7)
LDLC SERPL CALC-MCNC: 59 MG/DL
MCH RBC QN AUTO: 29.7 PG (ref 26.5–33)
MCHC RBC AUTO-ENTMCNC: 32.5 G/DL (ref 31.5–36.5)
MCV RBC AUTO: 92 FL (ref 78–100)
MICROALBUMIN UR-MCNC: 780 MG/L
MICROALBUMIN/CREAT UR: 1018.28 MG/G CR (ref 0–17)
NONHDLC SERPL-MCNC: 99 MG/DL
PLATELET # BLD AUTO: 159 10E3/UL (ref 150–450)
POTASSIUM SERPL-SCNC: 5.9 MMOL/L (ref 3.4–5.3)
PROT SERPL-MCNC: 7.4 G/DL (ref 6.4–8.3)
PSA SERPL DL<=0.01 NG/ML-MCNC: 3.26 NG/ML (ref 0–4.5)
RBC # BLD AUTO: 4.14 10E6/UL (ref 4.4–5.9)
SODIUM SERPL-SCNC: 139 MMOL/L (ref 135–145)
TRIGL SERPL-MCNC: 202 MG/DL
VIT D+METAB SERPL-MCNC: 41 NG/ML (ref 20–50)
WBC # BLD AUTO: 8 10E3/UL (ref 4–11)

## 2025-04-18 PROCEDURE — 80053 COMPREHEN METABOLIC PANEL: CPT | Performed by: FAMILY MEDICINE

## 2025-04-18 PROCEDURE — 83036 HEMOGLOBIN GLYCOSYLATED A1C: CPT | Performed by: FAMILY MEDICINE

## 2025-04-18 PROCEDURE — 99214 OFFICE O/P EST MOD 30 MIN: CPT | Mod: 25 | Performed by: FAMILY MEDICINE

## 2025-04-18 PROCEDURE — 85027 COMPLETE CBC AUTOMATED: CPT | Performed by: FAMILY MEDICINE

## 2025-04-18 PROCEDURE — 3078F DIAST BP <80 MM HG: CPT | Performed by: FAMILY MEDICINE

## 2025-04-18 PROCEDURE — 82570 ASSAY OF URINE CREATININE: CPT | Performed by: FAMILY MEDICINE

## 2025-04-18 PROCEDURE — 80061 LIPID PANEL: CPT | Performed by: FAMILY MEDICINE

## 2025-04-18 PROCEDURE — 82043 UR ALBUMIN QUANTITATIVE: CPT | Performed by: FAMILY MEDICINE

## 2025-04-18 PROCEDURE — 3075F SYST BP GE 130 - 139MM HG: CPT | Performed by: FAMILY MEDICINE

## 2025-04-18 PROCEDURE — G0103 PSA SCREENING: HCPCS | Performed by: FAMILY MEDICINE

## 2025-04-18 PROCEDURE — G2211 COMPLEX E/M VISIT ADD ON: HCPCS | Performed by: FAMILY MEDICINE

## 2025-04-18 PROCEDURE — 82306 VITAMIN D 25 HYDROXY: CPT | Performed by: FAMILY MEDICINE

## 2025-04-18 PROCEDURE — 36415 COLL VENOUS BLD VENIPUNCTURE: CPT | Performed by: FAMILY MEDICINE

## 2025-04-18 PROCEDURE — 90750 HZV VACC RECOMBINANT IM: CPT | Performed by: FAMILY MEDICINE

## 2025-04-18 PROCEDURE — 90471 IMMUNIZATION ADMIN: CPT | Performed by: FAMILY MEDICINE

## 2025-04-18 PROCEDURE — 99397 PER PM REEVAL EST PAT 65+ YR: CPT | Mod: 25 | Performed by: FAMILY MEDICINE

## 2025-04-18 RX ORDER — SODIUM ZIRCONIUM CYCLOSILICATE 10 G/10G
10 POWDER, FOR SUSPENSION ORAL
COMMUNITY
Start: 2025-04-07

## 2025-04-18 RX ORDER — NORTRIPTYLINE HYDROCHLORIDE 25 MG/1
25 CAPSULE ORAL AT BEDTIME
Qty: 30 CAPSULE | Refills: 11 | Status: SHIPPED | OUTPATIENT
Start: 2025-04-18

## 2025-04-18 SDOH — HEALTH STABILITY: PHYSICAL HEALTH: ON AVERAGE, HOW MANY DAYS PER WEEK DO YOU ENGAGE IN MODERATE TO STRENUOUS EXERCISE (LIKE A BRISK WALK)?: 2 DAYS

## 2025-04-18 ASSESSMENT — PATIENT HEALTH QUESTIONNAIRE - PHQ9
SUM OF ALL RESPONSES TO PHQ QUESTIONS 1-9: 6
SUM OF ALL RESPONSES TO PHQ QUESTIONS 1-9: 6
10. IF YOU CHECKED OFF ANY PROBLEMS, HOW DIFFICULT HAVE THESE PROBLEMS MADE IT FOR YOU TO DO YOUR WORK, TAKE CARE OF THINGS AT HOME, OR GET ALONG WITH OTHER PEOPLE: SOMEWHAT DIFFICULT

## 2025-04-18 ASSESSMENT — SOCIAL DETERMINANTS OF HEALTH (SDOH): HOW OFTEN DO YOU GET TOGETHER WITH FRIENDS OR RELATIVES?: ONCE A WEEK

## 2025-04-18 NOTE — TELEPHONE ENCOUNTER
----- Sylvia Lugo MD sent at 4/18/25 at 11:30 AM CDT ------  Please call pharmacy to get a meds list- he indicates he is taking 2 stomach meds but I don't see anything on his med list.

## 2025-04-18 NOTE — PATIENT INSTRUCTIONS
"Patient Education   Maria Del Carmen Vernon Xeeb Saib Xyuas Kom Tiv Thaiv Tus Kheej  Nov yog ib co maria del carmen vernon xeeb uas peb yeej meem muab briana tib neeg pab lawv noj qab nyob zoo. Mario zaum koj pab pawg saib xyuas yuav muaj ib co maria del carmen vernon xeeb uas tshwj xeeb briana koj nkaus xwb. Thov nrog koj pab pawg saib xyuas sib yuliya txog mario yam uas koj toob niko kom tiv thaiv koj tus kheej.  Vanessa Coj Lub Neej  Es xaws xais ntau shannan 150 feeb txhua lub abreu tiam (30 feeb txhua hnub, 5 hnub ib lub abreu tiam).  Ua yam pab cov leeg muaj zog tuaj 2 zaug txhua lub abreu tiam. Mario yam no pab koj tswj hwm koj lub cev qhov hnyav thiab tiv thaiv ntawm kab mob.  Txhob haus luam yeeb.  Pleev tshuaj tiv thaiv tshav kub kom thiaj tsis raug mob khees xaws ntawm nqaij tawv.  Txhua 2 mus briana 5 xyoos yuav tau kuaj koj lub tsev briana qhov radon. Radon yog ib jennyfer lavern uas tsis muaj xim tsis muaj ntxhiab uas mob tau koj cov ntsws. Kom thiaj kawm ntxiv, mus saib [email or web address] thiab ntaus ntawv \"Radon in Homes.\"  Ceev cov phom kom tsis muaj mos txwv briana hauv thiab muab xauv qamar hauv ib qho chaw nyab xeeb xws li lub txhoj, los yog muab xauv qamar thiab muab cov yawm sij zais qamar. Muab cov mos txwv xauv briana hauv lwm qhov chaw nrug cov phom. Kom thiaj kawm ntxiv, mus saib [email or web address] thiab ntaus ntawv \"safe gun storage.\"  Vanessa Noj Qab Huv  Noj 5 qho txiv hmab txiv ntoo thiab zaub txhua hnub.  Noj nplem nplej, txhuv xim av thiab cov fawm muaj nplej (los theej nplem dawb, txhuv dawb, thiab fawm dawb).  Ua tib zoo noj calcium thiab vitamin D ntau. Saib cov ntawv lo briana pob khoom noj thiab siv zog noj kom txog 100% RDA (qhov ntau hauv ib hnub).  Yeej meem kuaj ntsuas  Txhua 6 lub hli mus kuaj hniav thiab muab tu.  Txhua xyoo mus saib koj pab pawg saib xyuas vanessa noj qab nyob zoo kom sib yuliya txog:  Ib yam dab tsi uas hloov ntawm koj txoj vanessa noj qab nyob zoo.  Cov tshuaj uas koj pab pawg tau hais kom siv.  Vanessa saib xyuas kom tiv thaiv, vanessa npaj briana tsev neeg, thiab yuav ua li inga tiv " thaiv ntawm kab mob uas ntev.  Vanessa txhaj tshuaj (koob tshuaj tiv thaiv)   Cov koob tshuaj HPV (txog thaum muaj 26 xyoo), yog koj yeej tsis tau txais shannan li.  Cov koob tshuaj Hepatitis B Kab Mob Siab (txog thaum muaj 59 xyoos), yog koj yeej tsis tau txais shannan li.  Koob tshuaj COVID-19: Txais koob tshuaj no thaum txog caij txais.  Koob tshuaj tiv thaiv ntawm mob khaub thuas: Txais txhua xyoo.  Koob tshuaj tiv thaiv mob daig tsaig: Txais txhua 10 xyoo.  Pneumococcal, hepatitis A, thiab RSV cov koob tshuaj: Nug koj pab pawg saib xyuas kosta puas tsim nyog briana koj txais cov no raws li koj qhov pheej hmoo.  Koob tshuaj tiv thaiv ntawm kab mob sawv hlwv (briana cov muaj 50 xyoo rov елена).  Vanessa kuaj ntsuas briana vanessa noj qab nyob zoo  Kuaj mob ntshav qab zib:  Pib thaum muaj 35 xyoos, Mus kuaj mob ntshav qab zib txhua 3 xyoos los yog ntau zog.  Yog koj tseem tsis tau txog 35 xyoos, nug koj pab pawg saib xyuas kosta puas tsim nyog briana koj kuaj mob ntshav qab zib.  Kuaj cholesterol: Thaum muaj 39 xyoo, pib kuaj cholesterol txhua 5 xyoos, los yog ntau shannan ntawd yog kws nader mob qhia.  Kuaj txha qhov tuab (DEXA): Thaum txog 50 xyoo lawd, nug koj pab pawg saib xyuas kosta puas tsim nyog briana koj kuaj txha kosta puas ruaj.  Kab Mob Siab Hepatitis C: Kuaj ib zaug hauv koj lub neej.  Kuaj Txoj Hlab Ntshav Hauv Plab: Nrog koj tus kws nader mob yuliya txog vanessa kuaj ntsuas no yog koj:  Tau haus luam yeeb ib zaug li; thiab  Yog txiv neej; thiab  Nruab hnub nyoog 65 thiab 75.  Mob Niko Cees (kis mob dhau ntawm vanessa sib deev)  Ua ntej muaj 24 xyoos: Nug koj pab pawg saib xyuas kosta puas tsim nyog kuaj briana mob niko cees.  Vahe qab muaj 24 xyoos: Mus kuaj briana mob niko cees yog koj muaj vanessa pheej hmoo. Koj muaj vanessa pheej hmoo briana mob niko cees (suav nrog HIV) yog:  Koj sib deev nrog ntau shannan ib tug neeg.  Koj tsis siv cov hnab looj qau thaum sib deev.  Koj los yog koj tus khub deev kuaj pom tias muaj mob niko cees lawm.  Yog koj muaj vanessa pheej hmoo briana mob niko cees  HIV, nug txog cov tshuaj PrEP kom tiv thaiv ntawm HIV.  Mus kuaj briana mob niko cees HIV yam ntau shannan ib zaug hauv koj lub neej, txawm yog koj muaj vanessa pheej hmoo briana mob niko cees HIV los tsis muaj los xij.  Kuaj briana mob khees xaws  Kuaj lub ncauj tsev me nyuam briana mob khees xaws: Yog koj muaj ib lub ncauj tsev me nyuam, janice yuav tau ib sij kuaj lub ncauj tsev me nyuam seb puas muaj mob khees xaws pib thaum muaj 21 xyoos. Neeg feem coob uas ib sij kuaj lub ncauj tsev me nyuam thiab tsis pom dab tsi txawv lawv tsum tau anna qab muaj 65 xyoos. Nrog koj tus kws nader mob sib yuliya txog qhov no.  Kuaj lub mis briana mob khees xaws (mammogram): Yog koj tau muaj mis shannan li, yuav tau ib sij kuaj lub mis pib thaum muaj 40 xyoo. Vanessa kuaj no yog kom saib seb puas muaj mob khees xaws hauv lub mis.  Kuaj Txoj Hnyuv Briana Mob Khees Xaws: Yeej tseem ceeb pib kuaj txoj hnyuv briana mob khees xaws pib thaum muaj 45 xyoos.  Txhua 10 xyoo yuav tau kuaj txoj hnyuv (los yog ntau zog yog koj muaj vanessa pheej hmoo) Los sis, nug koj tus kws nader mob txog vanessa kuaj thooj quav FIT txhua xyoo los yog Cologuard txhua 3 xyoos.  Kom thiaj kawm ntxiv txog mario jennyfer kuaj no, mus saib: [email or web address].  Yog xav tau vanessa pab txog qhov no, mus saib: [email or web address].  Kuaj lub mikki kua phev (prostate) briana mob khees xaws: Yog koj muaj ib lub mikki kua phev (prostate) thiab nruab hnub nyoog 55 mus briana 69, nug koj tus kws nader mob kosta puas tsim nyog briana koj kuaj lub mikki kua phev.  Kuaj ntsws briana mob khees xaws: Yog koj haus luam yeeb nettles sim no los yog tau haus yav tas los uas muaj hnub nyoog nruab 50 xyoos mus briana 80 xyoo, nug koj pab pawg saib xyuas kosta puas tsim nyog briana koj yeej meem kuaj lub ntsws briana mob khees xaws.    Briana cov jennyfer phiaj vanessa siv ua ntaub ntawv qhia nkaus xwb. Yuav tsis pauv vanessa qhia los ntawm koj qhov chaw nader mob. Copyright (juan lopez)   2023 [facility].   Txhua txoj john raug tswj tseg lawm. Tshaj xyuas los ntawm [facility]  Register Transitions Program. UGE [id number] - REV 04/24.  Learning About Activities of Daily Living  What are activities of daily living?     Activities of daily living (ADLs) are the basic self-care tasks you do every day. These include eating, bathing, dressing, and moving around.  As you age, and if you have health problems, you may find that it's harder to do some of these tasks. If so, your doctor can suggest ideas that may help.  To measure what kind of help you may need, your doctor will ask how well you are able to do ADLs. Let your doctor know if there are any tasks that you are having trouble doing. This is an important first step to getting help. And when you have the help you need, you can stay as independent as possible.  How will a doctor assess your ADLs?  Asking about ADLs is part of a routine health checkup your doctor will likely do as you age. Your health check might be done in a doctor's office, in your home, or at a hospital. The goal is to find out if you are having any problems that could make it hard to care for yourself or that make it unsafe for you to be on your own.  To measure your ADLs, your doctor will ask how hard it is for you to do routine tasks. Your doctor may also want to know if you have changed the way you do a task because of a health problem. Your doctor may watch how you:  Walk back and forth.  Keep your balance while you stand or walk.  Move from sitting to standing or from a bed to a chair.  Button or unbutton a shirt or sweater.  Remove and put on your shoes.  It's common to feel a little worried or anxious if you find you can't do all the things you used to be able to do. Talking with your doctor about ADLs is a way to make sure you're as safe as possible and able to care for yourself as well as you can. You may want to bring a caregiver, friend, or family member to your checkup. They can help you talk to your doctor.  Follow-up care is a key part of your treatment  and safety. Be sure to make and go to all appointments, and call your doctor if you are having problems. It's also a good idea to know your test results and keep a list of the medicines you take.  Current as of: October 24, 2024  Content Version: 14.4    4380-7086 [employer].   Care instructions adapted under license by your healthcare professional. If you have questions about a medical condition or this instruction, always ask your healthcare professional. [employer] disclaims any warranty or liability for your use of this information.    Learning About Stress  What is stress?     Stress is your body's response to a hard situation. Your body can have a physical, emotional, or mental response. Stress is a fact of life for most people, and it affects everyone differently. What causes stress for you may not be stressful for someone else.  A lot of things can cause stress. You may feel stress when you go on a job interview, take a test, or run a race. This kind of short-term stress is normal and even useful. It can help you if you need to work hard or react quickly. For example, stress can help you finish an important job on time.  Long-term stress is caused by ongoing stressful situations or events. Examples of long-term stress include long-term health problems, ongoing problems at work, or conflicts in your family. Long-term stress can harm your health.  How does stress affect your health?  When you are stressed, your body responds as though you are in danger. It makes hormones that speed up your heart, make you breathe faster, and give you a burst of energy. This is called the fight-or-flight stress response. If the stress is over quickly, your body goes back to normal and no harm is done.  But if stress happens too often or lasts too long, it can have bad effects. Long-term stress can make you more likely to get sick, and it can make symptoms of some diseases worse. If you tense up when you  are stressed, you may develop neck, shoulder, or low back pain. Stress is linked to high blood pressure and heart disease.  Stress also harms your emotional health. It can make you alicea, tense, or depressed. Your relationships may suffer, and you may not do well at work or school.  What can you do to manage stress?  You can try these things to help manage stress:   Do something active. Exercise or activity can help reduce stress. Walking is a great way to get started. Even everyday activities such as housecleaning or yard work can help.  Try yoga or cam chi. These techniques combine exercise and meditation. You may need some training at first to learn them.  Do something you enjoy. For example, listen to music or go to a movie. Practice your hobby or do volunteer work.  Meditate. This can help you relax, because you are not worrying about what happened before or what may happen in the future.  Do guided imagery. Imagine yourself in any setting that helps you feel calm. You can use online videos, books, or a teacher to guide you.  Do breathing exercises. For example:  From a standing position, bend forward from the waist with your knees slightly bent. Let your arms dangle close to the floor.  Breathe in slowly and deeply as you return to a standing position. Roll up slowly and lift your head last.  Hold your breath for just a few seconds in the standing position.  Breathe out slowly and bend forward from the waist.  Let your feelings out. Talk, laugh, cry, and express anger when you need to. Talking with supportive friends or family, a counselor, or a iman leader about your feelings is a healthy way to relieve stress. Avoid discussing your feelings with people who make you feel worse.  Write. It may help to write about things that are bothering you. This helps you find out how much stress you feel and what is causing it. When you know this, you can find better ways to cope.  What can you do to prevent stress?  You  "might try some of these things to help prevent stress:  Manage your time. This helps you find time to do the things you want and need to do.  Get enough sleep. Your body recovers from the stresses of the day while you are sleeping.  Get support. Your family, friends, and community can make a difference in how you experience stress.  Limit your news feed. Avoid or limit time on social media or news that may make you feel stressed.  Do something active. Exercise or activity can help reduce stress. Walking is a great way to get started.  Where can you learn more?  Go to https://www.[employer].net/patiented  Enter N032 in the search box to learn more about \"Learning About Stress.\"  Current as of: October 24, 2024  Content Version: 14.4 2024-2025 [employer].   Care instructions adapted under license by your healthcare professional. If you have questions about a medical condition or this instruction, always ask your healthcare professional. [employer] disclaims any warranty or liability for your use of this information.    Learning About Sleeping Well  What does sleeping well mean?     Sleeping well means getting enough sleep to feel good and stay healthy. How much sleep is enough varies among people.  The number of hours you sleep and how you feel when you wake up are both important. If you do not feel refreshed, you probably need more sleep. Another sign of not getting enough sleep is feeling tired during the day.  Experts recommend that adults get at least 7 or more hours of sleep per day. Children and older adults need more sleep.  Why is getting enough sleep important?  Getting enough quality sleep is a basic part of good health. When your sleep suffers, your physical health, mood, and your thoughts can suffer too. You may find yourself feeling more grumpy or stressed. Not getting enough sleep also can lead to serious problems, including injury, accidents, anxiety, and depression.  What " "might cause poor sleeping?  Many things can cause sleep problems, including:  Changes to your sleep schedule.  Stress. Stress can be caused by fear about a single event, such as giving a speech. Or you may have ongoing stress, such as worry about work or school.  Depression, anxiety, and other mental or emotional conditions.  Changes in your sleep habits or surroundings. This includes changes that happen where you sleep, such as noise, light, or sleeping in a different bed. It also includes changes in your sleep pattern, such as having jet lag or working a late shift.  Health problems, such as pain, breathing problems, and restless legs syndrome.  Lack of regular exercise.  Using alcohol, nicotine, or caffeine before bed.  How can you help yourself?  Here are some tips that may help you sleep more soundly and wake up feeling more refreshed.  Your sleeping area   Use your bedroom only for sleeping and sex. A bit of light reading may help you fall asleep. But if it doesn't, do your reading elsewhere in the house. Try not to use your TV, computer, smartphone, or tablet while you are in bed.  Be sure your bed is big enough to stretch out comfortably, especially if you have a sleep partner.  Keep your bedroom quiet, dark, and cool. Use curtains, blinds, or a sleep mask to block out light. To block out noise, use earplugs, soothing music, or a \"white noise\" machine.  Your evening and bedtime routine   Create a relaxing bedtime routine. You might want to take a warm shower or bath, or listen to soothing music.  Go to bed at the same time every night. And get up at the same time every morning, even if you feel tired.  What to avoid   Limit caffeine (coffee, tea, caffeinated sodas) during the day, and don't have any for at least 6 hours before bedtime.  Avoid drinking alcohol before bedtime. Alcohol can cause you to wake up more often during the night.  Try not to smoke or use tobacco, especially in the evening. Nicotine can " "keep you awake.  Limit naps during the day, especially close to bedtime.  Avoid lying in bed awake for too long. If you can't fall asleep or if you wake up in the middle of the night and can't get back to sleep within about 20 minutes, get out of bed and go to another room until you feel sleepy.  Avoid taking medicine right before bed that may keep you awake or make you feel hyper or energized. Your doctor can tell you if your medicine may do this and if you can take it earlier in the day.  If you can't sleep   Imagine yourself in a peaceful, pleasant scene. Focus on the details and feelings of being in a place that is relaxing.  Get up and do a quiet or boring activity until you feel sleepy.  Avoid drinking any liquids before going to bed to help prevent waking up often to use the bathroom.  Where can you learn more?  Go to https://www.[employer].net/patiented  Enter J942 in the search box to learn more about \"Learning About Sleeping Well.\"  Current as of: July 31, 2024  Content Version: 14.4    6869-8690 [employer].   Care instructions adapted under license by your healthcare professional. If you have questions about a medical condition or this instruction, always ask your healthcare professional. [employer] disclaims any warranty or liability for your use of this information.    Learning About Depression Screening  What is depression screening?  Depression screening is a way to see if you have depression symptoms. It may be done by a doctor or counselor. It's often part of a routine checkup. That's because your mental health is just as important as your physical health.  Depression is a mental health condition that affects how you feel, think, and act. You may:  Have less energy.  Lose interest in your daily activities.  Feel sad and grouchy for a long time.  Depression is very common. It affects people of all ages.  Many things can lead to depression. Some people become depressed after " "they have a stroke or find out they have a major illness like cancer or heart disease. The death of a loved one or a breakup may lead to depression. It can run in families. Most experts believe that a combination of inherited genes and stressful life events can cause it.  What happens during screening?  You may be asked to fill out a form about your depression symptoms. You and the doctor will discuss your answers. The doctor may ask you more questions to learn more about how you think, act, and feel.  What happens after screening?  If you have symptoms of depression, your doctor will talk to you about your options.  Doctors usually treat depression with medicines or counseling. Often, combining the two works best. Many people don't get help because they think that they'll get over the depression on their own. But people with depression may not get better unless they get treatment.  The cause of depression is not well understood. There may be many factors involved. But if you have depression, it's not your fault.  A serious symptom of depression is thinking about death or suicide. If you or someone you care about talks about this or about feeling hopeless, get help right away.  It's important to know that depression can be treated. Medicine, counseling, and self-care may help.  Where can you learn more?  Go to https://www.[employer].net/patiented  Enter T185 in the search box to learn more about \"Learning About Depression Screening.\"  Current as of: July 31, 2024  Content Version: 14.4    7450-9812 [employer].   Care instructions adapted under license by your healthcare professional. If you have questions about a medical condition or this instruction, always ask your healthcare professional. [employer] disclaims any warranty or liability for your use of this information.       "

## 2025-04-18 NOTE — TELEPHONE ENCOUNTER
Writer called and spoke to [facility].  Per [facility], medications on file are    -nortriptyline (PAMELOR) 25 MG capsule   -psyllium (METAMUCIL/KONSYL) 58.6 % powder   -gabapentin (NEURONTIN) 300 MG capsule   -Multiple Vitamin (TAB-A-GERARDO) TABS   -LOKELMA 10 g PACK packet   -ipratropium - albuterol 0.5 mg/2.5 mg/3 mL (DUONEB) 0.5-2.5 (3) MG/3ML neb solution   -fluticasone (FLONASE) 50 MCG/ACT nasal spray   -montelukast (SINGULAIR) 10 MG tablet   -albuterol (PROAIR HFA/PROVENTIL HFA/VENTOLIN HFA) 108 (90 Base) MCG/ACT inhaler   -fenofibrate (TRICOR) 145 MG tablet   -vitamin D2 (ERGOCALCIFEROL) 75241 units (1250 mcg) capsule   -budesonide-formoterol (SYMBICORT) 160-4.5 MCG/ACT Inhaler   -sodium bicarbonate 650 MG tablet   -loratadine 10 mg    Omeprazole 20 mg was discontinued in 2023    Routed to Dr. Lugo to review    Vineet JEAN RN  [facility]

## 2025-04-18 NOTE — PROGRESS NOTES
Preventive Care Visit  [facility]  Sylvia Lugo MD, Family Medicine  Apr 18, 2025  {Provider  Link to [facility] :[id number]}    Assessment & Plan     Screening for cardiovascular condition  ***    Pure hyperglyceridemia  ***  - Lipid panel reflex to direct LDL Fasting  - Lipid panel reflex to direct LDL Fasting    CKD (chronic kidney disease) stage 4, GFR 15-29 ml/min (H)  ***  - Albumin Random Urine Quantitative with Creat Ratio  - Comprehensive metabolic panel (BMP + Alb, Alk Phos, ALT, AST, Total. Bili, TP)  - CBC with platelets  - Vitamin D Deficiency  - Comprehensive metabolic panel (BMP + Alb, Alk Phos, ALT, AST, Total. Bili, TP)  - CBC with platelets  - Vitamin D Deficiency  - Albumin Random Urine Quantitative with Creat Ratio  - Adult Eye  Referral    Prediabetes  ***  - Comprehensive metabolic panel (BMP + Alb, Alk Phos, ALT, AST, Total. Bili, TP)  - Hemoglobin A1c  - Comprehensive metabolic panel (BMP + Alb, Alk Phos, ALT, AST, Total. Bili, TP)  - Hemoglobin A1c    Moderate persistent asthma without complication  ***    Moderate major depression (H)  ***  - nortriptyline (PAMELOR) 25 MG capsule  Dispense: 30 capsule; Refill: 11    Benign essential HTN  ***  - Comprehensive metabolic panel (BMP + Alb, Alk Phos, ALT, AST, Total. Bili, TP)  - Comprehensive metabolic panel (BMP + Alb, Alk Phos, ALT, AST, Total. Bili, TP)    Anemia due to stage 4 chronic kidney disease (H)  ***  - CBC with platelets  - CBC with platelets    Routine general medical examination at a health care facility  ***    Screening for prostate cancer  ***  - PSA, screen  - PSA, screen    Slow transit constipation  ***  - psyllium (METAMUCIL/KONSYL) 58.6 % powder  Dispense: 660 g; Refill: 5      Patient has been advised of split billing requirements and indicates understanding: Yes        Counseling  Appropriate preventive services were addressed with this patient via screening, questionnaire, or discussion  as appropriate for fall prevention, nutrition, physical activity, Tobacco-use cessation, social engagement, weight loss and cognition.  Checklist reviewing preventive services available has been given to the patient.  Reviewed patient's diet, addressing concerns and/or questions.   He is at risk for lack of exercise and has been provided with information to increase physical activity for the benefit of his well-being.   The patient was instructed to see the dentist every 6 months.   He is at risk for psychosocial distress and has been provided with information to reduce risk.   Discussed possible causes of fatigue. Updated plan of care.  Patient reported difficulty with activities of daily living were addressed today.The patient's PHQ-9 score is consistent with mild depression. He was provided with information regarding depression.         Subjective   Venita Fisher is a 69 year old, presenting for the following:  Wellness Visit        4/18/2025    10:40 AM   Additional Questions   Roomed by hser   Accompanied by son          HPI  Last visit 12/2024- no medical visits since the.  Relatively healthy winter.      Mental health- under a lot of stress. Taking gabapentin tid to help with pain.  Taking nortriptyline at bedtime.    PHQ-9 score:        4/18/2025    10:02 AM   PHQ   PHQ-9 Total Score 6    Q9: Thoughts of better off dead/self-harm past 2 weeks Not at all       Patient-reported     Stomach symptoms for past 2 weeks.  Feels tight.  Eating or not eating feels the same.  Use to take daily PPI but stopped 9/2023.  Pt thinks he is taking 2 stomach pill daily.  No burning.  Tight and full feeling in upper abd- no chest symptoms.  Gas and bloated feeling.  No reflux symptoms.  Harder stools.  No black/bloody stools.  No weight loss.  Normal cologard 2024.  No change or concern in urinary symptoms.  Nausea was bad a couple days ago.      Last EKG reviewed 4/2024- NSR.     Ongoing SOB and cough- using flonase.  Using his  symbicort inhaler 2 puffs bid.  Using duonebs 1-3x/day as needed depending on his breathing symptoms.  Minimal use of rescue inhaler.  Singulair at hs.      Missed sleep clinic visit 2/2025 - last visit 8/2024 with plans for a home sleep study- still hasn't gotten this done.     Did see ANC 3/27/25 - reviewed note today.  CKD stage 4/5- suspect Htn nephrosclerosis or underlying IgA nephropathy.  No reversible causes noted.  Msk right flank pain.  Considering peritoneal dialysis when needed.  Stable gfr 20-24.  Renal diet.  Bicar and lkelma.  Hgb stable with iv iron.  Statin.  Ppi.  Ru in 3 months lisinopril 2.5mg daily. Vitamin D weekly.  Bicarb 2 tid.  Powder 3 days/week.      Cholesterol med daily.    Asa daily.              Advance Care Planning  {The storyboard will display whether the patient has ACP docs on file. Hover over the Code section in the storyboard to access the ACP documents. :[id number]}  Discussed advance care planning with patient; informed AVS has link to [facility] [employer].        4/18/2025   General Health   How would you rate your overall physical health? Good   Feel stress (tense, anxious, or unable to sleep) Very much   (!) STRESS CONCERN      4/18/2025   Nutrition   Diet: I don't know         4/18/2025   Exercise   Days per week of moderate/strenous exercise 2 days   (!) EXERCISE CONCERN      4/18/2025   Social Factors   Frequency of gathering with friends or relatives Once a week   Worry food won't last until get money to buy more Yes   Food not last or not have enough money for food? Yes   Do you have housing? (Housing is defined as stable permanent housing and does not include staying ouside in a car, in a tent, in an abandoned building, in an overnight shelter, or couch-surfing.) Yes   Are you worried about losing your housing? Yes   Lack of transportation? No   Unable to get utilities (heat,electricity)? Yes   Want help with housing or utility concern? (!) YES   (!) FOOD SECURITY CONCERN  PRESENT(!) HOUSING CONCERN PRESENT(!) FINANCIAL RESOURCE STRAIN CONCERN      4/18/2025   Fall Risk   Fallen 2 or more times in the past year? No    Trouble with walking or balance? No        Proxy-reported          4/18/2025   Activities of Daily Living- Home Safety   Needs help with the following daily activites Telephone use    Transportation    Shopping    Preparing meals    Housework    Bathing    Laundry    Medication administration    Money management    Toileting    Feeding    Dressing   Safety concerns in the home None of the above       Multiple values from one day are sorted in reverse-chronological order         4/18/2025   Dental   Dentist two times every year? (!) NO         4/18/2025   Hearing Screening   Hearing concerns? None of the above         4/18/2025   Driving Risk Screening   Patient/family members have concerns about driving No         4/18/2025   General Alertness/Fatigue Screening   Have you been more tired than usual lately? (!) YES         4/18/2025   Urinary Incontinence Screening   Bothered by leaking urine in past 6 months No       Today's PHQ-9 Score:       4/18/2025    10:02 AM   PHQ-9 SCORE   PHQ-9 Total Score MyChart 6 (Mild depression)   PHQ-9 Total Score 6        Patient-reported         4/18/2025   Substance Use   Alcohol more than 3/day or more than 7/wk No   Do you have a current opioid prescription? No   How severe/bad is pain from 1 to 10? 1/10   Do you use any other substances recreationally? No     Social History     Tobacco Use    Smoking status: Never     Passive exposure: Never    Smokeless tobacco: Never   Vaping Use    Vaping status: Never Used   Substance Use Topics    Alcohol use: No    Drug use: No     {Provider  If there are gaps in the social history shown above, please follow the link to update and then refresh the note Link to Social and Substance History :[id number]}      4/18/2025   AAA Screening   Family history of Abdominal Aortic Aneurysm (AAA)? No   Last  PSA:   Prostate Specific Antigen Screen   Date Value Ref Range Status   04/18/2024 2.15 0.00 - 4.50 ng/mL Final   11/09/2010 1.2 <3.6 ng/mL Final     Comment:     Method is Abbott Prostate-Specific Antigen (PSA)            Standard-WHO 1st International (90:10) as of 09/26/05       ASCVD Risk   The ASCVD Risk score (Gagandeep YEUNG, et al., 2019) failed to calculate for the following reasons:    The valid total cholesterol range is 130 to 320 mg/dL        {Provider  REQUIRED FOR AWV Use the storyboard to review patient history, after sections have been marked as reviewed, refresh note to capture documentation:[id number]}  {Provider   REQUIRED AWV use this link to review and update sexual activity history  after section has been marked as reviewed, refresh note to capture documentation:496102}  Reviewed and updated as needed this visit by Provider   Tobacco  Allergies  Meds  Problems  Med Hx  Surg Hx  Fam Hx              Current providers sharing in care for this patient include:  Patient Care Team:  Sylvia Lugo MD as PCP - General (Family Medicine)  Sylvia Lugo MD as Assigned PCP  Lexy Espinoza RN as Lead Care Coordinator (Primary Care - CC)  Herber Dawkins PA-C as Physician Assistant  Best Gongora MA as Food Resource Navigator  Francy Christensen NP as Assigned Pulmonology Provider  Heather Williamson PA as Nurse Practitioner (Nephrology)  Yasmani Romero DO as Assigned Sleep Provider    The following health maintenance items are reviewed in Epic and correct as of today:  Health Maintenance   Topic Date Due    COPD ACTION PLAN  Never done    ZOSTER IMMUNIZATION (2 of 2) 09/25/2024    LIPID  04/18/2025    MICROALBUMIN  04/18/2025    BMP  05/21/2025    COVID-19 Vaccine (6 - 2024-25 season) 06/05/2025    PHQ-9  10/18/2025    HEMOGLOBIN  10/18/2025    MEDICARE ANNUAL WELLNESS VISIT  04/18/2026    ANNUAL REVIEW OF HM ORDERS  04/18/2026    FALL RISK ASSESSMENT  04/18/2026    COLORECTAL CANCER  "SCREENING  04/26/2027    DIABETES SCREENING  04/18/2028    DTAP/TDAP/TD IMMUNIZATION (4 - Td or Tdap) 10/19/2028    ADVANCE CARE PLANNING  04/18/2029    PARATHYROID  Completed    PHOSPHORUS  Completed    SPIROMETRY  Completed    HEPATITIS C SCREENING  Completed    DEPRESSION ACTION PLAN  Completed    INFLUENZA VACCINE  Completed    Pneumococcal Vaccine: 50+ Years  Completed    URINALYSIS  Completed    ALK PHOS  Completed    RSV VACCINE  Completed    HPV IMMUNIZATION  Aged Out    MENINGITIS IMMUNIZATION  Aged Out            Objective    Exam  /68 (BP Location: Right arm, Patient Position: Sitting, Cuff Size: Adult Regular)   Pulse 72   Temp 97.5  F (36.4  C) (Temporal)   Resp 14   Ht 1.651 m (5' 5\")   Wt 81.2 kg (179 lb)   SpO2 97%   BMI 29.79 kg/m     Estimated body mass index is 29.79 kg/m  as calculated from the following:    Height as of this encounter: 1.651 m (5' 5\").    Weight as of this encounter: 81.2 kg (179 lb).    Physical Exam  Complete 10 point ROS completed today as part of the exam and patient denies any symptoms as reviewed in HPI     Wt Readings from Last 3 Encounters:   04/18/25 81.2 kg (179 lb)   12/05/24 77.1 kg (170 lb)   08/05/24 78.7 kg (173 lb 6.4 oz)       No LMP for male patient.    All normal as below except abnormalities include: all normal   General is a  69 year old sitting comfortably in no apparent distress   HEENT:  TM are clear bilaterally.  Eye exams within normal   Neck: Supple without lymphadenopathy or thyromegally  CV: Regular rate and rhythm S1S2 without rubs, murmurs or gallops,   Lungs: Clear to auscultation bilaterally  Abd:  +BS, soft NT/ND,  No masses or organomegally,   Extremities: Warm, No Edema, 2+ Pedal and radial pulses bilaterally  Skin: No lesions or rashes noted  Neuro: Able to ambulate around the exam room with equal movement, strength and normal coordination of the upper and lower extremeties symmetrically    History summarized from1-2:nephrology " 3/2025 reviewed   Old Records-1: Outside allergies, meds, problems and immunizations were reconciled as needed from [facility]  Radiology tests reviewed-1: renal us 8/2024   Lab tests reviewed-1: [id number]  Medicine tests reviewed-1: EKG 2024     Sylvia Lugo MD    {Provider  The Mini-Cog is incomplete, use link to complete and refresh note Link to Mini-Cog :[id number]}      4/18/2025   Mini Cog   Mini-Cog Not Completed (choose reason) --   Clock Draw Score --     {A Mini-Cog total score of 0-2 suggests the possibility of dementia, score of 3-5 suggests no dementia:594676}        Signed Electronically by: Sylvia Lugo MD  {Email feedback regarding this note to [email or web address]   :[id number]}  Answers submitted by the patient for this visit:  Patient Health Questionnaire (Submitted on 4/18/2025)  If you checked off any problems, how difficult have these problems made it for you to do your work, take care of things at home, or get along with other people?: Somewhat difficult  PHQ9 TOTAL SCORE: 6

## 2025-04-18 NOTE — PROGRESS NOTES
Prior to immunization administration, verified patients identity using patient s name and date of birth. Please see Immunization Activity for additional information.     Screening Questionnaire for Adult Immunization    Are you sick today?   No   Do you have allergies to medications, food, a vaccine component or latex?   Yes   Have you ever had a serious reaction after receiving a vaccination?   No   Do you have a long-term health problem with heart, lung, kidney, or metabolic disease (e.g., diabetes), asthma, a blood disorder, no spleen, complement component deficiency, a cochlear implant, or a spinal fluid leak?  Are you on long-term aspirin therapy?   Yes   Do you have cancer, leukemia, HIV/AIDS, or any other immune system problem?   No   Do you have a parent, brother, or sister with an immune system problem?   No   In the past 3 months, have you taken medications that affect  your immune system, such as prednisone, other steroids, or anticancer drugs; drugs for the treatment of rheumatoid arthritis, Crohn s disease, or psoriasis; or have you had radiation treatments?   No   Have you had a seizure, or a brain or other nervous system problem?   No   During the past year, have you received a transfusion of blood or blood    products, or been given immune (gamma) globulin or antiviral drug?   No   For women: Are you pregnant or is there a chance you could become       pregnant during the next month?   No   Have you received any vaccinations in the past 4 weeks?   No     Immunization questionnaire was positive for at least one answer.  Notified provider.      Patient instructed to remain in clinic for 15 minutes afterwards, and to report any adverse reactions.     Screening performed by Nano Jameson MA on 4/18/2025 at 10:42 AM.       Answers submitted by the patient for this visit:  Patient Health Questionnaire (Submitted on 4/18/2025)  If you checked off any problems, how difficult have these problems made it  for you to do your work, take care of things at home, or get along with other people?: Somewhat difficult  PHQ9 TOTAL SCORE: 6

## 2025-04-18 NOTE — TELEPHONE ENCOUNTER
4/18/25 left message on VM, patient to call clinic. When patient calls please help him schedule  1-Pulmonology  2-sleep study  3-[facility] follow up in June    See previous message

## 2025-04-18 NOTE — TELEPHONE ENCOUNTER
----- Message from Sylvia Lugo sent at 4/18/2025 11:01 AM CDT -----  Pt needs to schedule his annual follow-up with pulmonary clinic    Pt needs to schedule his home sleep study    Pt needs to schedule follow-up with his kidney doctor - [facility] in June

## 2025-04-21 ENCOUNTER — TELEPHONE (OUTPATIENT)
Dept: SLEEP MEDICINE | Facility: CLINIC | Age: 70
End: 2025-04-21

## 2025-04-21 NOTE — TELEPHONE ENCOUNTER
Order/Referral Request    Who is requesting: Pt    Orders being requested: Pt wanting to schedule the HST sleep study. Order expires on 8.5.25 and [facility] scheduling after this date. Plz extend referral    Reason service is needed/diagnosis: see above    When are orders needed by: asap    Has this been discussed with Provider: Yes    Does patient have a preference on a Group/Provider/Facility? [facility]    Does patient have an appointment scheduled?: No, Not able to schedule due to appt available after the expiration date    Where to send orders: Place orders within Epic    Okay to leave a detailed message?:

## 2025-04-23 ENCOUNTER — OFFICE VISIT (OUTPATIENT)
Dept: PULMONOLOGY | Facility: CLINIC | Age: 70
End: 2025-04-23
Payer: COMMERCIAL

## 2025-04-23 VITALS
WEIGHT: 179.6 LBS | BODY MASS INDEX: 29.89 KG/M2 | OXYGEN SATURATION: 99 % | DIASTOLIC BLOOD PRESSURE: 82 MMHG | SYSTOLIC BLOOD PRESSURE: 134 MMHG | HEART RATE: 79 BPM

## 2025-04-23 DIAGNOSIS — J94.1 FIBROTHORAX: ICD-10-CM

## 2025-04-23 DIAGNOSIS — J45.40 MODERATE PERSISTENT ASTHMA WITHOUT COMPLICATION: Primary | ICD-10-CM

## 2025-04-23 DIAGNOSIS — J47.9 BRONCHIECTASIS WITHOUT COMPLICATION (H): ICD-10-CM

## 2025-04-23 PROCEDURE — 99214 OFFICE O/P EST MOD 30 MIN: CPT | Performed by: NURSE PRACTITIONER

## 2025-04-23 PROCEDURE — 3079F DIAST BP 80-89 MM HG: CPT | Performed by: NURSE PRACTITIONER

## 2025-04-23 PROCEDURE — 3075F SYST BP GE 130 - 139MM HG: CPT | Performed by: NURSE PRACTITIONER

## 2025-04-23 PROCEDURE — T1013 SIGN LANG/ORAL INTERPRETER: HCPCS | Mod: U4 | Performed by: INTERPRETER

## 2025-04-23 RX ORDER — BUDESONIDE AND FORMOTEROL FUMARATE DIHYDRATE 160; 4.5 UG/1; UG/1
2 AEROSOL RESPIRATORY (INHALATION)
Qty: 10.2 G | Refills: 11 | Status: SHIPPED | OUTPATIENT
Start: 2025-04-23

## 2025-04-23 RX ORDER — MONTELUKAST SODIUM 10 MG/1
10 TABLET ORAL AT BEDTIME
Qty: 90 TABLET | Refills: 4 | Status: SHIPPED | OUTPATIENT
Start: 2025-04-23

## 2025-04-23 RX ORDER — IPRATROPIUM BROMIDE AND ALBUTEROL SULFATE 2.5; .5 MG/3ML; MG/3ML
3 SOLUTION RESPIRATORY (INHALATION) 4 TIMES DAILY PRN
Qty: 480 ML | Refills: 11 | Status: SHIPPED | OUTPATIENT
Start: 2025-04-23

## 2025-04-23 RX ORDER — PSYLLIUM HUSK 3 G/5.8 G
POWDER (GRAM) ORAL
COMMUNITY
Start: 2025-04-18

## 2025-04-23 RX ORDER — ALBUTEROL SULFATE 90 UG/1
2 INHALANT RESPIRATORY (INHALATION) EVERY 4 HOURS PRN
Qty: 18 G | Refills: 11 | Status: SHIPPED | OUTPATIENT
Start: 2025-04-23

## 2025-04-23 NOTE — PATIENT INSTRUCTIONS
It was a pleasure to see you in clinic today.   Here is what we discussed:    Continue Symbicort two puffs twice daily, rinse/gargle after use.   Continue Duonebs with flutter valve 2-4 times daily.  Continue Singulair 10mg at bedtime.   Continue Albuterol inhaler every 4-6 hours as needed for shortness of breath or wheezing.  Talk to Dr Lugo about your stomach concerns.   Call my nurse, Fredi ([phone]) with any change or worsening of your breathing.  Follow-up in one year.     Francy Christensen CNP  Pulmonary Medicine  [facility]  [phone]

## 2025-04-23 NOTE — PROGRESS NOTES
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  Patient with chronic exertional dyspnea and cough. Restrictive physiology on PFTs but may have an asthma component and has clinically responded to asthma therapy. Has a loculated left pleural effusion with partially calcified thickened rind, stable radiographically since 2014 and known to have been present since at least 2004 when the patient immigrated; notes he was \"very ill with a virus\" at age 10 and hospitalized, told he had a lung problem. Per Dr Quinn:  These chronic, stable loculated effusions with calcified rind/fibrothorax are fairly common in the [place]  population, likely a sequela of past parasitic, bacterial or mycobacterial infection. Venita had worsening dyspnea and left chest pain in October 2020 and was hospitalized again at [facility] for 5 nights in December 2020, where they actually placed a left pleural pigtail catheter into the collection, which predictably showed a chronic chylothorax, as this effusion has been present for decades, at least since he immigrated in 2004, and is surrounded by a calcified rind. Of course, the lung failed to fully expand with this pigtail catheter, which was subsequently removed. He had left lateral chest pain since this hospitalization, which slowly improved. He did not improve with LAMA, which was stopped.  PFTs were repeated 6/2024, which continued to show restrictive physiology, although improved (TLC 66% predicted) and normal DLCO.  He reports stable " breathing today, no URIs or exacerbations since last visit.  There is some confusion as he is here to discuss some stomach issues.     Plan:  - continue Symbicort (budesonide-formoterol) 160-4.5 mcg two inhalations two times a day; rinse/gargle/spit water after use; previously provided holding chamber with instruction on use.  - continue Duonebs BID with Aerobika flutter valve.  - continue montelukast 10 mg at bedtime.  - continue loratadine 10 mg daily.  - continue nasal fluticasone one spray in each nostril daily.  - albuterol HFA as needed  - Per Dr Quinn previously: surgery would be morbid and unlikely to be successful at achieving left lung reexpansion.  - was previously offered pulmonary rehabilitation several times, which he has declined due to transportation/scheduling issues.  - I encouraged him to discuss his stomach issues (feeling full) with Dr Lugo, his PCP.   - he is UTD with COVID booster, annual influenza vaccine, prevnar 20, and RSV vaccine.    Follow-up  - annually      Francy Christensen CNP  Pulmonary Medicine  [facility]  [phone]       CC:     Annual visit     HPI:     69 year old male never smoker with a history of chronic loculated left pleural effusion with calcified left fibrothorax and adjacent bronchiectasis, GERD, CKD, HTN, Pseudomonas airway colonization, presenting for annual follow-up.    SOB still there.  But stable.  Here for stomach issues, feels full all the time.  Uses symbicort twice daily, rinses mouth.   Uses duonebs one-two times daily, finds benefit from this.   Continues on singulair.  Albuterol only when very SOB.  Once-twice/day.         4/20/2021     9:22 AM 7/26/2021     8:38 AM 5/10/2023     9:00 AM   ACT Total Scores   ACT TOTAL SCORE (Goal Greater than or Equal to 20) 10 13 11   In the past 12 months, how many times did you visit the emergency room for your asthma without being admitted to the hospital? 1 0 0   In the past  12 months, how many times were you hospitalized overnight because of your asthma? 1 0 0            ROS:     10-point ROS performed and is negative aside from those listed in HPI.       Medical history:       Past Medical History:   Diagnosis Date    Community acquired pneumonia of right lower lobe of lung     Fibrothorax     GERD (gastroesophageal reflux disease)     Hyperkalemia 07/27/2021    Moderate persistent asthma     Pleural effusion on left     Positive colorectal cancer screening using Cologuard test 02/28/2024       Past Surgical History:   Procedure Laterality Date    CATARACT EXTRACTION BILATERAL W/ ANTERIOR VITRECTOMY Right     COLONOSCOPY N/A 4/26/2024    Procedure: COLONOSCOPY WITH POLYPECTOMIES;  Surgeon: Bacilio Villegas DO;  Location: [facility] OR    EYE SURGERY           Social History     Socioeconomic History    Marital status:      Spouse name: Not on file    Number of children: Not on file    Years of education: Not on file    Highest education level: Not on file   Occupational History    Not on file   Tobacco Use    Smoking status: Never     Passive exposure: Never    Smokeless tobacco: Never   Vaping Use    Vaping status: Never Used   Substance and Sexual Activity    Alcohol use: No    Drug use: No    Sexual activity: Yes     Partners: Female     Birth control/protection: Post-menopausal   Other Topics Concern    Not on file   Social History Narrative    Lives with 2 sons, wife lives with other kids, 1 of his sons is home with him most of the time     Social Drivers of Health     Financial Resource Strain: High Risk (4/18/2025)    Financial Resource Strain     Within the past 12 months, have you or your family members you live with been unable to get utilities (heat, electricity) when it was really needed?: Yes   Food Insecurity: High Risk (4/18/2025)    Food Insecurity     Within the past 12 months, did you worry that your food would run out before you got money to buy more?:  Yes     Within the past 12 months, did the food you bought just not last and you didn t have money to get more?: Yes   Transportation Needs: Low Risk  (4/18/2025)    Transportation Needs     Within the past 12 months, has lack of transportation kept you from medical appointments, getting your medicines, non-medical meetings or appointments, work, or from getting things that you need?: No   Physical Activity: Unknown (4/18/2025)    Exercise Vital Sign     Days of Exercise per Week: 2 days     Minutes of Exercise per Session: Not on file   Stress: Stress Concern Present (4/18/2025)    [employer] - Occupational Stress Questionnaire     Feeling of Stress : Very much   Social Connections: Unknown (4/18/2025)    Social Connection and Isolation Panel [NHANES]     Frequency of Communication with Friends and Family: Not on file     Frequency of Social Gatherings with Friends and Family: Once a week     Attends Confucianist Services: Not on file     Active Member of Clubs or Organizations: Not on file     Attends Club or Organization Meetings: Not on file     Marital Status: Not on file   Interpersonal Safety: Low Risk  (8/7/2024)    Interpersonal Safety     Do you feel physically and emotionally safe where you currently live?: Yes     Within the past 12 months, have you been hit, slapped, kicked or otherwise physically hurt by someone?: Not on file     Within the past 12 months, have you been humiliated or emotionally abused in other ways by your partner or ex-partner?: Not on file   Housing Stability: High Risk (4/18/2025)    Housing Stability     Do you have housing? : Yes     Are you worried about losing your housing?: Yes         Current Outpatient Medications   Medication Sig Dispense Refill    albuterol (PROAIR HFA/PROVENTIL HFA/VENTOLIN HFA) 108 (90 Base) MCG/ACT inhaler Inhale 2 puffs into the lungs every 4 hours as needed for shortness of breath or wheezing. 18 g 2    aspirin (ASPIRIN LOW  DOSE) 81 MG EC tablet TAKE 1 TABLET BY MOUTH DAILY FOR STROKE PREVENTION // IB HNUB NOJ 1 LUB PAB KOM NTSHAV KHIAV ZOO 30 tablet 11    blood pressure monitor Kit [BLOOD PRESSURE MONITOR KIT] Use to check blood pressure daily 1 each 0    budesonide-formoterol (SYMBICORT) 160-4.5 MCG/ACT Inhaler Inhale 2 puffs into the lungs two times daily 10.2 g 11    diclofenac (VOLTAREN) 1 % topical gel APPLY 2 GRAMS TOPICALLY FOUR TIMES A DAY. DO NOT TO EXCEED 32 GRAMS/DAY./PLEEV 2 GRAMS 4 ZAUG TXHUA HNUB RAWS LI QHIA.      fenofibrate (TRICOR) 145 MG tablet TAKE 1 TABLET BY MOUTH DAILY FOR CHOLESTEROL // IB HNUB NOJ 1 LUB PAB ROXANNE NTSV MUAJ ROJ 90 tablet 4    fluticasone (FLONASE) 50 MCG/ACT nasal spray Spray 2 sprays into both nostrils daily. 16 g 2    gabapentin (NEURONTIN) 300 MG capsule TAKE 1 CAPSULE 3 TIMES DAILY // IB ZAUG NOJ 1 LUB, 1 HNUB NOJ 3 ZAUG 90 capsule 4    ipratropium - albuterol 0.5 mg/2.5 mg/3 mL (DUONEB) 0.5-2.5 (3) MG/3ML neb solution Take 1 vial (3 mLs) by nebulization 4 times daily as needed for shortness of breath or wheezing. 480 mL 2    LIDOCAINE PAIN RELIEF 4 % Patch APPLY 1 PATCH TOPICALLY DAILY AS DIRECTED./IB HNUB LO 1 DAIM RAWS LI QHIA.      lisinopril (ZESTRIL) 2.5 MG tablet TAKE 1 TABLET DAILY FOR BLOOD PRESSURE OR KIDNEY // 1 HNUB NOJ 1 LUB PAB ROXANNE NTSV SIAB LO LUB RAUM.      LOKELMA 10 g PACK packet Take 10 g by mouth three times a week.      montelukast (SINGULAIR) 10 MG tablet Take 1 tablet (10 mg) by mouth at bedtime. 30 tablet 7    Multiple Vitamin (TAB-A-GERARDO) TABS TAKE 1 TABLET BY MOUTH DAILY // IB HNUB NOJ 1 LUB 30 tablet 12    nortriptyline (PAMELOR) 25 MG capsule Take 1 capsule (25 mg) by mouth at bedtime. 30 capsule 11    psyllium (METAMUCIL/KONSYL) 58.6 % powder Take 15 g by mouth daily. 660 g 5    REGULOID 57.6 % POWD       sodium bicarbonate 650 MG tablet TAKE 2 TABLETS BY MOUTH 3 TIMES DAILY // 1 OZIEL HARDIN 2 KATHY, 1 JEFF HARDIN 3 OZIEL      vitamin D2 (ERGOCALCIFEROL) 32446 units  (1250 mcg) capsule TAKE ONE CAPSULE WEEKLY//IB ASTHIV NOJ IB LUB. 4 capsule 11        Physical Exam:     /82 (BP Location: Left arm, Patient Position: Sitting, Cuff Size: Adult Regular)   Pulse 79   Wt 81.5 kg (179 lb 9.6 oz)   SpO2 99%   BMI 29.89 kg/m    Gen: adult male, appears in NAD  HEENT: clear conjunctivae, moist mucous membranes  CV: RRR, no M/G/R  Resp: CTAB, no focal crackles or wheezes.  Respirations even and unlabored.  On RA.  No cough.  Skin: no apparent rashes on visible skin  Ext: no cyanosis, clubbing or edema  Neuro: alert and answering questions appropriately     Data:       Labs:  Left pleural fluid analysis March 2020 and December 2020 at [place] showed chronic exudate with elevated triglyceride level, negative for malignancy and infection     Imaging studies:    Chest CT with contrast (February 2020, [place]):  FINDINGS:   A 12.8 x 8.1 x 14.9 cm (AP x lateral x SI; approximately 850ml) loculated,  complex fluid collection within the left pleural space demonstrates thin  peripheral septations, areas of internal fat (series 3, image 246) and higher  density heterogeneous internal components (series 3, image 313). No evidence of  internal gas or inflammatory changes surrounding this mass to suggest  acute/aggressive infectious process, with preservation of the extrapleural fat  plane.  There is some  leftward mediastinal shift suggesting chronic fibrosis  and atelectasis in the left upper and lower lobes. No evidence of central  obstructing lesion.     Mildly prominent mediastinal and hilar lymph nodes, some of which are calcified.  These may be benign/reactive but are indeterminate by CT criteria. The central  left upper lobe bronchus appears obstructed (series 3 image 179) but no definite  endobronchial lesion is identified.    There are areas of probably chronic atelectasis in the right middle and lower  lobes with some peripheral bronchial mucous plugging.     Probable right adrenal  adenoma. Probable 1.1 cm cyst superior left kidney  (series 5, image 84). No worrisome osseous lesions.    COMPARISON REPORT:  Images from the 06/18/2018 and 04/10/2014 examinations are now available. The  complex left pleural abnormality which currently measures 12.8 x 8.1 x 14.9 cm  has slightly increased in volume since the prior exams, measuring approximately  12.5 x 7.7 x 14 cm on 06/18/2019 and 11.8 x 7.0 x 13 cm on 04/10/2014. This is  approximately 850 mL currently compared with 750 on 06/18/2018 and 550 on  04/10/2014. Peripheral calcification has slightly increased but the  heterogeneous internal density is similar, with no new definite soft tissue  components. Compressive atelectasis in the left lung and shift the mediastinum  is also slightly greater.    The minimal lack of change over many years is highly suggestive of a benign  abnormality but the CT appearance is indeterminate. Given several other  left-sided pleural calcifications, a chronic/old empyema particularly by  atypical agents such as tuberculosis is most likely. A seroma or lymphangioma  might also be considered. Given some minimal fatty elements, a atypical  hamartoma could be potentially considered although the extensive fluid component  is unusual.    The probable right adrenal adenoma is unchanged since 06/18/2018, but slightly  more apparent than on 04/10/2014.     1. Loculated, complex fluid collection with scattered peripheral calcification  in the left pleural space measures 12.8 x 8.1 x 14.9 cm. Given lack of  inflammatory or aggressive features and reported presence dating back at least  to outside CTA chest 06/08/2018, this mass is likely benign. This may be due to  previous trauma or infection and would be of usual for malignancy; however,  cannot entirely exclude the possibility of chronic low-grade infection such as  tuberculous empyema. No findings to suggest an acute infectious or aggressive  neoplastic process.  2.   Probable small right adrenal adenoma.     CT chest (December 2020, [place]):  - images directly reviewed, formal interpretation follows:  Since prior imaging, a new left chest pigtail catheter is in place with  substantial decrease in size of the loculated left pleural effusion seen  previously.  There is some ex vacuo pneumothorax with an extensive thick-walled rind along  the periphery of this cavity with areas of calcification.  Partial reexpansion of the left lower lobe.  Scattered bronchial wall thickening, lower lobe hypoinflation, and air trapping  is at least partly related to the phase of respiration (but there may be  reactive airways disease and/or an element of bronchitis).  Aortic valvular minimal coronary artery calcification.     CXR (April 2023):  - images directly reviewed, formal interpretation follows:  IMPRESSION: Chronic, loculated moderate left effusion is unchanged with associated compressive atelectasis on the left. Some pleural calcifications are also visible. Right lung is clear. Heart and pulmonary vascularity are normal.     TTE (August 2018):  - Normal left ventricular size and systolic performance with a visually estimated ejection fraction of 60%.  - There is mild aortic insufficiency.  - Normal right ventricular size and systolic performance.      Pulmonary Function Testing  August 2018  FEV1/FVC is 74% and is normal.  FEV1 is 1.41L (55%) predicted and is reduced.  FVC is 1.91L (60%) predicted and reduced.  There was no improvement in spirometry after a single inhaled dose of bronchodilator.  TLC is 3.57L (58%) predicted and is reduced.  RV is 1.40L (60%) predicted and is reduced.  DLCO is 16.54ml/min/hg (64%) predicted and is reduced when it is corrected for hemoglobin.  Flow volume loops indicate no abnormalities.     October 2019  FEV1/FVC is 80 and is normal.  FEV1 is 56% predicted and is reduced.  FVC is 56% predicted and reduced.  There was no no improvement in spirometry after a  single inhaled does of bronchodilator.  TLC is 56% predicted and is reduced.  RV is 63% predicted and is reduced.  DLCO is 58% predicted and is reduced when it   is corrected for hemoglobin.

## 2025-04-29 ENCOUNTER — TELEPHONE (OUTPATIENT)
Dept: FAMILY MEDICINE | Facility: CLINIC | Age: 70
End: 2025-04-29
Payer: COMMERCIAL

## 2025-04-29 PROCEDURE — 99207 PR NO CHARGE NURSE ONLY: CPT | Performed by: FAMILY MEDICINE

## 2025-04-29 NOTE — TELEPHONE ENCOUNTER
LM on pt and son's VM.     If pt or son calls back, please relayed clinician's message.     Result letter sent.     Arianne AQUINO RN  [facility]      ----- Message from Sylvia Lugo sent at 4/29/2025 11:33 AM CDT -----  Team - please call patient with results and send result letter with this information if patient desires for their records. Refer to [facility] result note as needed.      His kidney tests are stable. Keep taking his meds and follow-up with his kidney specialist as scheduled.      His liver tests are okay    His blood counts are stable- no anemia.      Vitamin D is healthy - keep taking his vitamin D weekly     His prostate level is okay   If he is struggling with his urination I'd like him to see a urologist to see if his prostate level is okay- this may help keep his kidneys healthy.    His sugars are doing better with the healthy changes he made to his eating     His cholesterol is okay - keep taking his fenofibrate daily

## 2025-04-29 NOTE — LETTER
April 29, 2025      Venita Sanchez  [address] [place] [address]        Dear ,    We are writing to inform you of your test results.    Your kidney tests are stable. Keep taking your medications and follow-up with your kidney specialist as scheduled.     Your liver tests are okay. Your blood count are also stable - no anemia. Your Vitamin D is at a healthy level, continue to take your vitamin D medication weekly as prescribed.     Your prostate level is okay. If you are struggling with urination, I like you to see a urologist to follow-up on your prostate level. This may also help keep your kidneys healthy.     Your blood sugars are doing better with the healthy changes you are making with your eating.     Lastly, your cholesterol is okay, continue to take the fenofibrate medication daily.     Resulted Orders   Lipid panel reflex to direct LDL Fasting   Result Value Ref Range    Cholesterol 129 <200 mg/dL    Triglycerides 202 (H) <150 mg/dL    Direct Measure HDL 30 (L) >=40 mg/dL    LDL Cholesterol Calculated 59 <100 mg/dL    Non HDL Cholesterol 99 <130 mg/dL    Patient Fasting > 8hrs? Unknown     Narrative    Cholesterol  Desirable: < 200 mg/dL  Borderline High: 200 - 239 mg/dL  High: >= 240 mg/dL    Triglycerides  Normal: < 150 mg/dL  Borderline High: 150 - 199 mg/dL  High: 200-499 mg/dL  Very High: >= 500 mg/dL    Direct Measure HDL  Female: >= 50 mg/dL   Male: >= 40 mg/dL    LDL Cholesterol  Desirable: < 100 mg/dL  Above Desirable: 100 - 129 mg/dL   Borderline High: 130 - 159 mg/dL   High:  160 - 189 mg/dL   Very High: >= 190 mg/dL    Non HDL Cholesterol  Desirable: < 130 mg/dL  Above Desirable: 130 - 159 mg/dL  Borderline High: 160 - 189 mg/dL  High: 190 - 219 mg/dL  Very High: >= 220 mg/dL   Comprehensive metabolic panel (BMP + Alb, Alk Phos, ALT, AST, Total. Bili, TP)   Result Value Ref Range    Sodium 139 135 - 145 mmol/L    Potassium 5.9 (H) 3.4 - 5.3 mmol/L    Carbon Dioxide (CO2) 20 (L)  22 - 29 mmol/L    Anion Gap 11 7 - 15 mmol/L    Urea Nitrogen 65.1 (H) 8.0 - 23.0 mg/dL    Creatinine 3.45 (H) 0.67 - 1.17 mg/dL    GFR Estimate 18 (L) >60 mL/min/1.73m2      Comment:      eGFR calculated using 2021 CKD-EPI equation.    Calcium 8.9 8.8 - 10.4 mg/dL    Chloride 108 (H) 98 - 107 mmol/L    Glucose 83 70 - 99 mg/dL    Alkaline Phosphatase 73 40 - 150 U/L    AST 19 0 - 45 U/L    ALT 19 0 - 70 U/L    Protein Total 7.4 6.4 - 8.3 g/dL    Albumin 4.2 3.5 - 5.2 g/dL    Bilirubin Total 0.4 <=1.2 mg/dL    Patient Fasting > 8hrs? Unknown    Hemoglobin A1c   Result Value Ref Range    Estimated Average Glucose 114 <117 mg/dL    Hemoglobin A1C 5.6 0.0 - 5.6 %      Comment:      Normal <5.7%   Prediabetes 5.7-6.4%    Diabetes 6.5% or higher     Note: Adopted from ADA consensus guidelines.   CBC with platelets   Result Value Ref Range    WBC Count 8.0 4.0 - 11.0 10e3/uL    RBC Count 4.14 (L) 4.40 - 5.90 10e6/uL    Hemoglobin 12.3 (L) 13.3 - 17.7 g/dL    Hematocrit 37.9 (L) 40.0 - 53.0 %    MCV 92 78 - 100 fL    MCH 29.7 26.5 - 33.0 pg    MCHC 32.5 31.5 - 36.5 g/dL    RDW 12.7 10.0 - 15.0 %    Platelet Count 159 150 - 450 10e3/uL   Vitamin D Deficiency   Result Value Ref Range    Vitamin D, Total (25-Hydroxy) 41 20 - 50 ng/mL      Comment:      optimum levels    Narrative    Season, race, dietary intake, and treatment affect the concentration of 25-hydroxy-Vitamin D. Values may decrease during winter months and increase during summer months.    Vitamin D determination is routinely performed by an immunoassay specific for 25 hydroxyvitamin D3.  If an individual is on vitamin D2(ergocalciferol) supplementation, please specify 25 OH vitamin D2 and D3 level determination by LCMSMS test VITD23.     PSA, screen   Result Value Ref Range    Prostate Specific Antigen Screen 3.26 0.00 - 4.50 ng/mL    Narrative    This result is obtained using the Roche Elecsys total PSA method on the manuelito e801 immunoassay analyzer, which is an  ultrasensitive method. Results obtained with different assay methods or kits cannot be used interchangeably.  This test is intended for initial prostate cancer screening. PSA values exceeding the age-specific limits are suspicious for prostate disease, but additional testing, such as prostate biopsy, is needed to diagnose prostate pathology. The American Cancer Society recommends annual examination with digital rectal examination and serum PSA beginning at age 50 and for men with a life expectancy of at least 10 years after detection of prostate cancer. For men in high-risk groups, such as  Americans or men with a first-degree relative diagnosed at a younger age, testing should begin at a younger age. It is generally recommended that information be provided to patients about the benefits and limitations of testing and treatment so they can make informed decisions.   Albumin Random Urine Quantitative with Creat Ratio   Result Value Ref Range    Creatinine Urine mg/dL 76.6 mg/dL      Comment:      The reference ranges have not been established in urine creatinine. The results should be integrated into the clinical context for interpretation.    Albumin Urine mg/L 780.0 mg/L      Comment:      The reference ranges have not been established in urine albumin. The results should be integrated into the clinical context for interpretation.    Albumin Urine mg/g Cr 1,018.28 (H) 0.00 - 17.00 mg/g Cr      Comment:      Microalbuminuria is defined as an albumin:creatinine ratio of 17 to 299 for males and 25 to 299 for females. A ratio of albumin:creatinine of 300 or higher is indicative of overt proteinuria.  Due to biologic variability, positive results should be confirmed by a second, first-morning random or 24-hour timed urine specimen. If there is discrepancy, a third specimen is recommended. When 2 out of 3 results are in the microalbuminuria range, this is evidence for incipient nephropathy and warrants increased  efforts at glucose control, blood pressure control, and institution of therapy with an angiotensin-converting-enzyme (ACE) inhibitor (if the patient can tolerate it).         If you have any questions or concerns, please call the clinic at the number listed above.       Sincerely,      Your [facility] Care team.     Electronically signed

## 2025-04-29 NOTE — RESULT ENCOUNTER NOTE
Team - please call patient with results and send result letter with this information if patient desires for their records. [facility].      His kidney tests are stable. Keep taking his meds and follow-up with his kidney specialist as scheduled.      His liver tests are okay    His blood counts are stable- no anemia.      Vitamin D is healthy - keep taking his vitamin D weekly     His prostate level is okay   If he is struggling with his urination I'd like him to see a urologist to see if his prostate level is okay- this may help keep his kidneys healthy.    His sugars are doing better with the healthy changes he made to his eating     His cholesterol is okay - keep taking his fenofibrate daily

## 2025-05-05 DIAGNOSIS — F32.9 MAJOR DEPRESSIVE DISORDER, SINGLE EPISODE, UNSPECIFIED: ICD-10-CM

## 2025-05-05 RX ORDER — MULTIVITAMIN WITH FOLIC ACID 400 MCG
TABLET ORAL
Qty: 30 TABLET | Refills: 12 | OUTPATIENT
Start: 2025-05-05

## 2025-06-02 DIAGNOSIS — E55.9 VITAMIN D DEFICIENCY: ICD-10-CM

## 2025-06-02 RX ORDER — ERGOCALCIFEROL 1.25 MG/1
CAPSULE, LIQUID FILLED ORAL
Qty: 4 CAPSULE | Refills: 1 | Status: SHIPPED | OUTPATIENT
Start: 2025-06-02

## 2025-06-10 ENCOUNTER — PATIENT OUTREACH (OUTPATIENT)
Dept: GERIATRIC MEDICINE | Facility: CLINIC | Age: 70
End: 2025-06-10
Payer: COMMERCIAL

## 2025-06-10 NOTE — Clinical Note
Annual health risk assessment completed. No issues. See note for further details on community resources.   Thank you.  Lexy Espinoza RN [facility] [phone]

## 2025-06-11 ASSESSMENT — LIFESTYLE VARIABLES
SKIP TO QUESTIONS 9-10: 1
AUDIT-C TOTAL SCORE: 0

## 2025-06-11 ASSESSMENT — PATIENT HEALTH QUESTIONNAIRE - PHQ9: SUM OF ALL RESPONSES TO PHQ QUESTIONS 1-9: 8

## 2025-06-11 NOTE — PROGRESS NOTES
[facility] Care Coordination Contact    [facility] Home Visit Assessment     Home visit for Health Risk Assessment with Venita Sanchez completed on June 11, 2025    Type of residence:: Private home - stairs  Current living arrangement:: I live in a private home with family     Assessment completed with:: Patient    Current Care Plan  Member currently receiving the following home care services:     Member currently receiving the following community resources: PCA      Medication Review  Medication reconciliation completed in Epic: Yes  Medication set-up & administration: Family/informal caregiver sets up daily.  Self-administers medications.  Medication Risk Assessment Medication (1 or more, place referral to MTM): Taking 1 or more high-risk medications for adults >65 years  MTM Referral Placed: No: member declined at this time saying pcp is managing his medications and he is too scared to do anything different due to CKD    Mental/Behavioral Health   Depression Screening:      PHQ-9 Total Score: 8    Mental health DX:: Yes   Mental health DX how managed:: Medication, Outpatient Counseling    Falls Assessment:   Fallen 2 or more times in the past year?: No   Any fall with injury in the past year?: No    ADL/IADL Dependencies:   Dependent ADLs:: Ambulation-cane, Bathing, Dressing, Eating, Grooming, Incontinence, Positioning, Transfers, Toileting, Ambulation-walker  Dependent IADLs:: Cleaning, Cooking, Laundry, Shopping, Meal Preparation, Medication Management, Money Management, Transportation    Health Plan sponsored benefits: [employer]: Shared information regarding preventative health screening and health plan supplemental benefits/incentives. Reviewed medication disposal form and transition of care member handout.    CFSS Assessment completed at visit: Yes Annual CFSS assessment indicated 10 hours per day of CFSS. This is an increase from the previous assessment.     Elderly Waiver Eligibility: Yes,  but member declines EW services; will not open to EW    Care Plan & Recommendations: member will continue with PCA/CFSS at 10 hours/day to assist him with all his personal cares and with movement.    CFSS increased from 9.5 hours/day to 10 hours/day due to daily need for feeding. Previous assessment member reported needing feeding assistance only 1-2 times/week but now reports daily need.  Venita Fisher reports that he  needs someone to feed him daily because of physical weakness where he cannot raise his arms long enough to complete task. He says he can still feed himself at times or would try to do as much as he can but caregivers need to finish task for him since he runs out of energy before task is finished.  Observed Venita Fisher having shortness of breath with exertion while trying to perform any activity.  Even when talking to Care Coordinator, Venita was short of breath. He was not able to keep arms up for long but able to touch face briefly.    Venita Fisher's CKD had really deteriorated his health and body over the couple years. His CKD is at stage 4/5 and PCP and specialist continues to warned him of the dangers of not doing dialysis but he refuses and reports he will wait until they tell him that he will die if he don't go to dialysis. When they tell him that, then he may decide to start dialysis. He also has anemia due to CKD making him even more weak and he had no strength to do anything. His chronic dyspnea and chronic back pain also makes it hard for him to move or do much.  All these factors and diagnosis contributes to his need for extensive assistance and he needs significantly increased time for completion of activities of daily living (ADLs).    [place].    Follow-Up Plan: Member informed of future contact, plan to f/u with member with a 6 month telephone assessment.  Contact information shared with member and family, encouraged member to call with any  questions or concerns at any time.    [place] care continuum providers: Please see Snapshot and Care Management Flowsheets for Specific details of care plan.    This CC note routed to PCP, Sylvia Lugo.    Lexy Espinoza RN  [facility]  [phone]

## 2025-06-17 NOTE — PROGRESS NOTES
During visit, member express needing assistance with making dental appointment and eye appointment to get vision check and new glasses. Reported that son assisted him with dental but was unsuccessful as clinic requires him to be current client.     Information forwarded to CHW to assist. Once appointment is made, family will provide transportation.    Morning appointments are fine with member.    Lexy Espinoza RN  [facility]  [phone]

## 2025-06-18 ENCOUNTER — PATIENT OUTREACH (OUTPATIENT)
Dept: GERIATRIC MEDICINE | Facility: CLINIC | Age: 70
End: 2025-06-18
Payer: COMMERCIAL

## 2025-06-18 NOTE — PROGRESS NOTES
[facility] Care Coordination Contact    Received after visit chart from care coordinator.  Completed following tasks: Mailed copy of support plan to member, Mailed [place] signature sheet pages 3-4, Mailed Safe Medication Disposal , Mailed Transition of Care Member Handout, and Updated services in Database.  , Provider Signature - No Support Plan Shared:  Member indicates that they do not want their support plan shared with any EW providers.    [employer]:  Emailed required CFSS documents to [employer].  Mailed member supplemental summary chart and assessment summary letter.     Aiyana Blanco  Care Management Specialist  [facility]  [phone]

## 2025-06-18 NOTE — LETTER
June 18, 2025       RACHELL TRIMBLE  [address] [place] [address]      Dear Rachell Fisher,    At [facility], we re dedicated to improving your health and wellness. Enclosed is the Support Plan developed with you on 06/10/2025. Please review the Support Plan carefully.    As a reminder, during your visit we talked about:   Ways to manage your physical and mental health   Using health care to maintain and improve your health    Your preventive care needs      Remember to contact your care coordinator if you:   Are hospitalized or plan to be hospitalized    Have a fall     Have a change in your physical or mental health   Need help finding support or services    If you have questions or don t agree with your Support Plan, call me at [phone]. You can also call me if your needs change. TTY users call the [place] [employer] at 045 or 1-521.507.1652 (zunbna-wz-ujvecq relay service).    Sincerely,         Lexy Espinoza RN    E-Mail:  [email or web address]  Phone: [phone]      [facility]                [id number] accepted     (06/2024)                [address] [place] [address]  [phone]  fax [phone]  [facility]

## 2025-06-19 ENCOUNTER — PATIENT OUTREACH (OUTPATIENT)
Dept: GERIATRIC MEDICINE | Facility: CLINIC | Age: 70
End: 2025-06-19
Payer: COMMERCIAL

## 2025-06-19 NOTE — PROGRESS NOTES
[facility] Care Coordination Contact    CHW left a voice mail for member's son Shayy per care coordinator Lexy Espinoza to assist scheduling an Eye Exam and Dental Exam. CHW left two voice mails, one via  and one direct. CHW contact info was provided.    PABLITO Garcia  [facility]  [phone]

## 2025-06-23 ENCOUNTER — PATIENT OUTREACH (OUTPATIENT)
Dept: GERIATRIC MEDICINE | Facility: CLINIC | Age: 70
End: 2025-06-23
Payer: COMMERCIAL

## 2025-07-01 ENCOUNTER — PATIENT OUTREACH (OUTPATIENT)
Dept: GERIATRIC MEDICINE | Facility: CLINIC | Age: 70
End: 2025-07-01
Payer: COMMERCIAL

## 2025-07-01 NOTE — PROGRESS NOTES
[facility] Care Coordination Contact     CHW confirmed w/ Member son Shayy the appointment noted below.    Date: 1/6/26@12pm    [address], [place] [address]  Phone: [phone]    PABLITO David  [facility]  [phone]

## 2025-07-03 ENCOUNTER — TRANSFERRED RECORDS (OUTPATIENT)
Dept: HEALTH INFORMATION MANAGEMENT | Facility: CLINIC | Age: 70
End: 2025-07-03
Payer: COMMERCIAL

## 2025-07-06 NOTE — LETTER
"    5/10/2023         RE: Venita Sanchez  [address] [place] [address]        Dear Colleague,    Thank you for referring your patient, Venita Sanchez, to the [facility]. Please see a copy of my visit note below.    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     Chronic dyspnea, chronic left calcified pleural effusion/fibrothorax with adjacent bronchiectasis, Pseudomonas airway colonization, chronic rhinosinusitis: Overall Venita has been stable. The left lower chest/flank pain that has been present for over 3 years since a left chest tube at [facility] in 2020 has been improved but still present. Exertional dyspnea stable. He walks but no other major exercise. Has previously declined pulmonary rehab due to transportation issues. Nebs help. Occasional albuterol use. Feels that the cough stabilized since being treated for chronic rhinosinusitis and taking the thrice-weekly azithromycin. Recall that, at baseline, he has chronic exertional dyspnea and cough. Restrictive physiology on PFTs but may have an asthma component and has clinically responded to asthma therapy. Has a loculated left pleural effusion with partially calcified thickened rind, stable radiographically since 2014 and known to have been present since at least 2004 when the patient immigrated; notes he was \"very ill with a virus\" at age 10 and hospitalized, told he had a lung problem. These chronic, stable loculated effusions with calcified rind/fibrothorax are fairly common in the [place]  population, likely a sequela of past parasitic, bacterial or mycobacterial infection. Venita had worsening dyspnea and left chest pain in October 2020 and was hospitalized again at [facility] for 5 " nights in December 2020, where they actually placed a left pleural pigtail catheter into the collection, which predictably showed a chronic chylothorax, as this effusion has been present for decades, at least since he immigrated in 2004, and is surrounded by a calcified rind. Of course, the lung failed to fully expand with this pigtail catheter, which was subsequently removed. He had left lateral chest pain since this hospitalization, which slowly improved. He did not improve with LAMA, which was stopped.     Plan:  - continue budesonide-formoterol 160-4.5 mcg two inhalations two times a day; rinse/gargle/spit water after use; previously provided holding chamber with instruction on use  - continue azithromycin 250 mg every Monday, Wednesday and Friday  - continue nebulized ipratropium-albuterol QID and provided Aerobika flutter valve with instruction to use in-line with nebs  - continue montelukast 10 mg at bedtime  - continue loratadine 10 mg daily  - continue nasal fluticasone one spray in each nostril daily  - albuterol HFA as needed  - surgery would be morbid and unlikely to be successful at achieving left lung reexpansion  - have previously offered pulmonary rehabilitation several times, which he has declined due to transportation/scheduling issues  - stay up to date with respiratory vaccinations  - follow up in one year  - encouraged him to call any time with questions or concerning symptoms    Martir Quinn MD  Pulmonary and Critical Care Medicine  [facility]  Office [phone]  Pager [phone]  he/him    CCx: chronic dyspnea, chronic left calcified pleural effusion/fibrothorax with adjacent bronchiectasis, Pseudomonas airway colonization, chronic rhinosinusitis    HPI: 67 year old male never smoker with a history of chronic loculated left pleural effusion with calcified left fibrothorax with adjacent bronchiectasis, GERD, CKD, HTN, Pseudomonas airway colonization, presenting  "for follow-up. Overall Venita has been stable. The left lower chest/flank pain that has been present for over 3 years since a left chest tube at [facility] in 2020 has been improved but still present. Exertional dyspnea stable. He walks but no other major exercise. Has previously declined pulmonary rehab due to transportation issues. Nebs help. Occasional albuterol use. Feels that the cough stabilized since being treated for chronic rhinosinusitis and taking the thrice-weekly azithromycin. Recall that, at baseline, he has chronic exertional dyspnea and cough. Restrictive physiology on PFTs but may have an asthma component and has clinically responded to asthma therapy. Has a loculated left pleural effusion with partially calcified thickened rind, stable radiographically since 2014 and known to have been present since at least 2004 when the patient immigrated; notes he was \"very ill with a virus\" at age 10 and hospitalized, told he had a lung problem. These chronic, stable loculated effusions with calcified rind/fibrothorax are fairly common in the [place]  population, likely a sequela of past parasitic, bacterial or mycobacterial infection. Venita had worsening dyspnea and left chest pain in October 2020 and was hospitalized again at [facility] for 5 nights in December 2020, where they actually placed a left pleural pigtail catheter into the collection, which predictably showed a chronic chylothorax, as this effusion has been present for decades, at least since he immigrated in [place], and is surrounded by a calcified rind. Of course, the lung failed to fully expand with this pigtail catheter, which was subsequently removed. He had left lateral chest pain since this hospitalization, which slowly improved. He did not improve with LAMA, which was stopped.    ROS:  A 12-system review was obtained and was negative with the exception of the symptoms endorsed in the history of present illness.    PMH:  Chronic loculated left pleural " effusion with calcified rind (fibrothorax) with chronic exudative changes of chylothorax on laboratory analysis and adjacent bronchiectasis  GERD  Possible chronic rhinosinusitis  Possible asthma  CKD  HTN    PSH:  Past Surgical History:   Procedure Laterality Date     CATARACT EXTRACTION BILATERAL W/ ANTERIOR VITRECTOMY Right        Allergies:  Allergies   Allergen Reactions     Niacin Other (See Comments)       Family HX:  No family history on file.    Social Hx:  Social History     Socioeconomic History     Marital status:      Spouse name: Not on file     Number of children: Not on file     Years of education: Not on file     Highest education level: Not on file   Occupational History     Not on file   Tobacco Use     Smoking status: Never     Smokeless tobacco: Never   Vaping Use     Vaping status: Never Used   Substance and Sexual Activity     Alcohol use: No     Drug use: No     Sexual activity: Yes     Partners: Female     Birth control/protection: Post-menopausal   Other Topics Concern     Not on file   Social History Narrative    Lives with 2 sons, wife lives with other kids, 1 of his sons is home with him most of the time     Social Determinants of Health     Financial Resource Strain: Low Risk  (10/3/2022)    Overall Financial Resource Strain (CARDIA)      Difficulty of Paying Living Expenses: Not very hard   Food Insecurity: No Food Insecurity (10/3/2022)    Hunger Vital Sign      Worried About Running Out of Food in the Last Year: Never true      Ran Out of Food in the Last Year: Never true   Transportation Needs: No Transportation Needs (10/3/2022)    PRAPARE - Transportation      Lack of Transportation (Medical): No      Lack of Transportation (Non-Medical): No   Physical Activity: Not on file   Stress: Stress Concern Present (10/3/2022)    [employer] - Occupational Stress Questionnaire      Feeling of Stress : To some extent   Social Connections: Not on file    Intimate Partner Violence: Not on file   Housing Stability: Unknown (10/3/2022)    Housing Stability Vital Sign      Unable to Pay for Housing in the Last Year: No      Number of Places Lived in the Last Year: Not on file      Unstable Housing in the Last Year: No       Current Meds:  Current Outpatient Medications   Medication Sig Dispense Refill     albuterol (PROAIR HFA/PROVENTIL HFA/VENTOLIN HFA) 108 (90 Base) MCG/ACT inhaler Inhale 2 puffs into the lungs every 4 hours as needed for shortness of breath / dyspnea or wheezing 18 g 11     amLODIPine (NORVASC) 10 MG tablet Take 10 mg by mouth daily       ammonium lactate (AMLACTIN) 12 % external cream APPLY 1-2 GRAMS ON THE SKIN TWICE A DAY./PLEEV 1-2 GRAMS 2 ZAUG TXHUA HNUB RAWS LI QHIA       ASPIRIN LOW DOSE 81 MG EC tablet TAKE 1 TABLET BY MOUTH DAILY FOR STROKE PREVENTION // IB HNUB NOJ 1 LUB PAB KOM NTSV KHIAV ZOO 30 tablet 3     azithromycin (ZITHROMAX) 250 MG tablet Take 1 tablet (250 mg) by mouth Every Mon, Wed, Fri Morning 36 tablet 3     blood pressure monitor Kit [BLOOD PRESSURE MONITOR KIT] Use to check blood pressure daily 1 each 0     clonazePAM (KLONOPIN) 1 MG tablet TAKE 1 TABLET DAILY AS NEEDED FOR ANXIETY // IB HNUB NOJ 1 LUB YOG CEEB 20 tablet 0     DULoxetine (CYMBALTA) 60 MG capsule TAKE 2 CAPSULE DAILY // 1 HNUB NOJ 2 LUB 60 capsule 3     Emollient (AMLACTIN ULTRA) CREA Externally apply topically daily Apply to feet daily 60 g 11     fenofibrate (TRICOR) 145 MG tablet TAKE 1 TABLET BY MOUTH DAILY FOR CHOLESTEROL // IB HNUB NOJ 1 LUB PAB ROXANNE NTSV MUAJ ROJ 90 tablet 0     fluticasone (FLONASE) 50 MCG/ACT nasal spray Spray 2 sprays into both nostrils daily 16 g 11     gabapentin (NEURONTIN) 300 MG capsule TAKE 1 CAPSULE 3 TIMES DAILY // IB ZAUG NOJ 1 LUB, 1 HNUB NOJ 3 ZAUG 90 capsule 3     ipratropium - albuterol 0.5 mg/2.5 mg/3 mL (DUONEB) 0.5-2.5 (3) MG/3ML neb solution Take 1 vial (3 mLs) by nebulization 4 times daily as needed for  shortness of breath or wheezing 480 mL 3     lisinopril (ZESTRIL) 2.5 MG tablet TAKE 1 TABLET DAILY FOR BLOOD PRESSURE OR KIDNEY // 1 HNUB NOJ 1 LUB PAB ROXANNE NTSHAV SIAB LO LUB RAUM.       loratadine (CLARITIN) 10 MG tablet Take 1 tablet (10 mg) by mouth daily 30 tablet 11     mirtazapine (REMERON) 7.5 MG tablet Take 1 tablet (7.5 mg) by mouth At Bedtime 30 tablet 3     montelukast (SINGULAIR) 10 MG tablet Take 1 tablet (10 mg) by mouth At Bedtime 30 tablet 11     Multiple Vitamin (TAB-A-GERARDO) TABS TAKE 1 TABLET BY MOUTH DAILY // IB HNUB NOJ 1 LUB 90 tablet 2     omeprazole (PRILOSEC) 20 MG DR capsule Take 1 capsule (20 mg) by mouth daily 30 capsule 11     SYMBICORT 160-4.5 MCG/ACT Inhaler INHALE 2 PUFFS TWICE DAILY. RINSE MOUTH AND SPIT OUT AFTERWARDS // 1 ZAUG NQUS 2 PAS, 1 HNU SIV 2 ZAUG. YAUG NCAUJ THAUM SIV TAS. 10.2 g 11     triamcinolone (KENALOG) 0.5 % external ointment APPLY TO HEELS TWO TIMES A DAY UNTIL SKIN IS SMOOTH./PLEEV 2 ZAUG TXHUA HNUB RAWS LI QHIA 30 g 2     vitamin D2 (ERGOCALCIFEROL) 38045 units (1250 mcg) capsule TAKE ONE CAPSULE WEEKLY//IB ASTHIV NOJ IB LUB. 4 capsule 4       Physical Exam:  /70 (BP Location: Left arm, Patient Position: Chair, Cuff Size: Adult Regular)   Pulse 90   Wt 79.4 kg (175 lb)   SpO2 98%   BMI 29.12 kg/m    Gen: alert, oriented, no distress  HEENT: nasal mucosa is unremarkable, no oropharyngeal lesions, no cervical or supraclavicular lymphadenopathy  CV: RRR, no M/G/R  Resp: diminished left base, no crackles/wheezes  Skin: no apparent rashes  Ext: no cyanosis, clubbing or edema  Neuro: alert, nonfocal    Labs:  Left pleural fluid analysis March 2020 and December 2020 at [facility] showed chronic exudate with elevated triglyceride level, negative for malignancy and infection     Imaging studies:  Chest CT with contrast (February 2020, [place]):  FINDINGS:   A 12.8 x 8.1 x 14.9 cm (AP x lateral x SI; approximately 850ml) loculated,  complex fluid collection within the  left pleural space demonstrates thin  peripheral septations, areas of internal fat (series 3, image 246) and higher  density heterogeneous internal components (series 3, image 313). No evidence of  internal gas or inflammatory changes surrounding this mass to suggest  acute/aggressive infectious process, with preservation of the extrapleural fat  plane.  There is some  leftward mediastinal shift suggesting chronic fibrosis  and atelectasis in the left upper and lower lobes. No evidence of central  obstructing lesion.     Mildly prominent mediastinal and hilar lymph nodes, some of which are calcified.  These may be benign/reactive but are indeterminate by CT criteria. The central  left upper lobe bronchus appears obstructed (series 3 image 179) but no definite  endobronchial lesion is identified.    There are areas of probably chronic atelectasis in the right middle and lower  lobes with some peripheral bronchial mucous plugging.     Probable right adrenal adenoma. Probable 1.1 cm cyst superior left kidney  (series 5, image 84). No worrisome osseous lesions.    COMPARISON REPORT:  Images from the 06/18/2018 and 04/10/2014 examinations are now available. The  complex left pleural abnormality which currently measures 12.8 x 8.1 x 14.9 cm  has slightly increased in volume since the prior exams, measuring approximately  12.5 x 7.7 x 14 cm on 06/18/2019 and 11.8 x 7.0 x 13 cm on 04/10/2014. This is  approximately 850 mL currently compared with 750 on 06/18/2018 and 550 on  04/10/2014. Peripheral calcification has slightly increased but the  heterogeneous internal density is similar, with no new definite soft tissue  components. Compressive atelectasis in the left lung and shift the mediastinum  is also slightly greater.    The minimal lack of change over many years is highly suggestive of a benign  abnormality but the CT appearance is indeterminate. Given several other  left-sided pleural calcifications, a chronic/old  empyema particularly by  atypical agents such as tuberculosis is most likely. A seroma or lymphangioma  might also be considered. Given some minimal fatty elements, a atypical  hamartoma could be potentially considered although the extensive fluid component  is unusual.    The probable right adrenal adenoma is unchanged since 06/18/2018, but slightly  more apparent than on 04/10/2014.     1. Loculated, complex fluid collection with scattered peripheral calcification  in the left pleural space measures 12.8 x 8.1 x 14.9 cm. Given lack of  inflammatory or aggressive features and reported presence dating back at least  to outside [facility]A chest 06/08/2018, this mass is likely benign. This may be due to  previous trauma or infection and would be of usual for malignancy; however,  cannot entirely exclude the possibility of chronic low-grade infection such as  tuberculous empyema. No findings to suggest an acute infectious or aggressive  neoplastic process.  2.  Probable small right adrenal adenoma.     CT chest (December 2020, [place] [facility]):  - images directly reviewed, formal interpretation follows:  Since prior imaging, a new left chest pigtail catheter is in place with  substantial decrease in size of the loculated left pleural effusion seen  previously.  There is some ex vacuo pneumothorax with an extensive thick-walled rind along  the periphery of this cavity with areas of calcification.  Partial reexpansion of the left lower lobe.  Scattered bronchial wall thickening, lower lobe hypoinflation, and air trapping  is at least partly related to the phase of respiration (but there may be  reactive airways disease and/or an element of bronchitis).  Aortic valvular minimal coronary artery calcification.     CXR (April 2023):  - images directly reviewed, formal interpretation follows:  IMPRESSION: Chronic, loculated moderate left effusion is unchanged with associated compressive atelectasis on the left. Some pleural calcifications are  also visible. Right lung is clear. Heart and pulmonary vascularity are normal.     TTE (August 2018):  - Normal left ventricular size and systolic performance with a visually estimated ejection fraction of 60%.  - There is mild aortic insufficiency.  - Normal right ventricular size and systolic performance.      Pulmonary Function Testing  August 2018  FEV1/FVC is 74% and is normal.  FEV1 is 1.41L (55%) predicted and is reduced.  FVC is 1.91L (60%) predicted and reduced.  There was no improvement in spirometry after a single inhaled dose of bronchodilator.  TLC is 3.57L (58%) predicted and is reduced.  RV is 1.40L (60%) predicted and is reduced.  DLCO is 16.54ml/min/hg (64%) predicted and is reduced when it is corrected for hemoglobin.  Flow volume loops indicate no abnormalities.     October 2019  FEV1/FVC is 80 and is normal.  FEV1 is 56% predicted and is reduced.  FVC is 56% predicted and reduced.  There was no no improvement in spirometry after a single inhaled does of bronchodilator.  TLC is 56% predicted and is reduced.  RV is 63% predicted and is reduced.  DLCO is 58% predicted and is reduced when it   is corrected for hemoglobin.    Again, thank you for allowing me to participate in the care of your patient.        Sincerely,        Martir Quinn MD     verbal cues/nonverbal cues (demo/gestures)/2 person assist

## 2025-07-21 ENCOUNTER — PATIENT OUTREACH (OUTPATIENT)
Dept: CARE COORDINATION | Facility: CLINIC | Age: 70
End: 2025-07-21
Payer: COMMERCIAL

## 2025-07-30 ENCOUNTER — OFFICE VISIT (OUTPATIENT)
Dept: SLEEP MEDICINE | Facility: CLINIC | Age: 70
End: 2025-07-30
Attending: INTERNAL MEDICINE
Payer: COMMERCIAL

## 2025-07-30 DIAGNOSIS — I10 BENIGN ESSENTIAL HTN: ICD-10-CM

## 2025-07-30 DIAGNOSIS — R51.9 SLEEP RELATED HEADACHES: ICD-10-CM

## 2025-07-30 DIAGNOSIS — R06.81 WITNESSED EPISODE OF APNEA: ICD-10-CM

## 2025-07-30 DIAGNOSIS — G47.10 HYPERSOMNIA: ICD-10-CM

## 2025-07-30 DIAGNOSIS — R06.83 SNORING: ICD-10-CM

## 2025-07-30 NOTE — PROGRESS NOTES
Pt is completing a home sleep test. Pt was instructed on how to put on the Noxturnal T3 device and associated equipment before going to bed and given the opportunity to practice putting it on before leaving the sleep center. Pt was reminded to bring the home sleep test kit back to the center tomorrow, at the scheduled time for download and reporting. Patient was instructed to complete study using the following treatment?  none  Neck circumference: 41 CM / 16 inches.  ESS: 15  Stop Ban  Device number: [id number]  If patient fails study may need in-lab due to lack of understanding instructions.

## 2025-07-31 ENCOUNTER — DOCUMENTATION ONLY (OUTPATIENT)
Dept: SLEEP MEDICINE | Facility: CLINIC | Age: 70
End: 2025-07-31
Attending: INTERNAL MEDICINE
Payer: COMMERCIAL

## 2025-07-31 NOTE — PROGRESS NOTES
HST POST-STUDY QUESTIONNAIRE    What time did you go to bed?  10:00 pm  How long do you think it took to fall asleep?  30 minutes  What time did you wake up to start the day?  4:30 AM  Did you get up during the night at all?  12 for restroom  If you woke up, do you remember approximately what time(s)? 12  Did you have any difficulty with the equipment?  No  Did you us any type of treatment with this study?  None  Was the head of the bed elevated? Yes  Did you sleep in a recliner?  No  Did you stop using CPAP at least 3 days before this test?  NA  Any other information you'd like us to know?

## 2025-08-18 DIAGNOSIS — E78.1 PURE HYPERGLYCERIDEMIA: ICD-10-CM

## 2025-08-18 DIAGNOSIS — G89.29 CHRONIC LEFT-SIDED LOW BACK PAIN WITHOUT SCIATICA: ICD-10-CM

## 2025-08-18 DIAGNOSIS — M54.50 CHRONIC LEFT-SIDED LOW BACK PAIN WITHOUT SCIATICA: ICD-10-CM

## 2025-08-18 RX ORDER — FENOFIBRATE 145 MG/1
TABLET, FILM COATED ORAL
Qty: 90 TABLET | Refills: 4 | Status: SHIPPED | OUTPATIENT
Start: 2025-08-18

## 2025-08-18 RX ORDER — GABAPENTIN 300 MG/1
CAPSULE ORAL
Qty: 90 CAPSULE | Refills: 4 | Status: SHIPPED | OUTPATIENT
Start: 2025-08-18

## (undated) DEVICE — SOL WATER IRRIG 1000ML BOTTLE 2F7114

## (undated) DEVICE — ENDO SNARE EXACTO COLD 9MM LOOP 2.4MMX230CM 00711115

## (undated) DEVICE — SUCTION MANIFOLD NEPTUNE 2 SYS 1 PORT 702-025-000

## (undated) DEVICE — TUBING SUCTION MEDI-VAC 1/4"X20' N620A